# Patient Record
Sex: FEMALE | Race: WHITE | NOT HISPANIC OR LATINO | Employment: OTHER | URBAN - METROPOLITAN AREA
[De-identification: names, ages, dates, MRNs, and addresses within clinical notes are randomized per-mention and may not be internally consistent; named-entity substitution may affect disease eponyms.]

---

## 2019-12-03 ENCOUNTER — APPOINTMENT (EMERGENCY)
Dept: RADIOLOGY | Facility: HOSPITAL | Age: 74
DRG: 552 | End: 2019-12-03
Payer: COMMERCIAL

## 2019-12-03 ENCOUNTER — APPOINTMENT (OUTPATIENT)
Dept: ULTRASOUND IMAGING | Facility: HOSPITAL | Age: 74
DRG: 552 | End: 2019-12-03
Payer: COMMERCIAL

## 2019-12-03 ENCOUNTER — APPOINTMENT (OUTPATIENT)
Dept: MRI IMAGING | Facility: HOSPITAL | Age: 74
DRG: 552 | End: 2019-12-03
Payer: COMMERCIAL

## 2019-12-03 ENCOUNTER — HOSPITAL ENCOUNTER (INPATIENT)
Facility: HOSPITAL | Age: 74
LOS: 2 days | Discharge: NURSING, REHAB OR LONG-TERM CARE | DRG: 552 | End: 2019-12-06
Attending: EMERGENCY MEDICINE | Admitting: INTERNAL MEDICINE
Payer: COMMERCIAL

## 2019-12-03 DIAGNOSIS — Z79.4 CONTROLLED TYPE 2 DIABETES MELLITUS WITHOUT COMPLICATION, WITH LONG-TERM CURRENT USE OF INSULIN (HCC): ICD-10-CM

## 2019-12-03 DIAGNOSIS — M54.30 SCIATICA: ICD-10-CM

## 2019-12-03 DIAGNOSIS — E11.9 CONTROLLED TYPE 2 DIABETES MELLITUS WITHOUT COMPLICATION, WITH LONG-TERM CURRENT USE OF INSULIN (HCC): ICD-10-CM

## 2019-12-03 DIAGNOSIS — M54.50 LOW BACK PAIN: Primary | ICD-10-CM

## 2019-12-03 PROBLEM — R26.2 AMBULATORY DYSFUNCTION: Status: ACTIVE | Noted: 2019-12-03

## 2019-12-03 PROBLEM — I10 BENIGN ESSENTIAL HTN: Status: ACTIVE | Noted: 2019-12-03

## 2019-12-03 PROBLEM — M54.42 ACUTE LEFT-SIDED LOW BACK PAIN WITH LEFT-SIDED SCIATICA: Status: ACTIVE | Noted: 2019-12-03

## 2019-12-03 LAB
ALBUMIN SERPL BCP-MCNC: 3.6 G/DL (ref 3.5–5)
ALP SERPL-CCNC: 100 U/L (ref 46–116)
ALT SERPL W P-5'-P-CCNC: 24 U/L (ref 12–78)
ANION GAP SERPL CALCULATED.3IONS-SCNC: 8 MMOL/L (ref 4–13)
AST SERPL W P-5'-P-CCNC: 8 U/L (ref 5–45)
BASOPHILS # BLD AUTO: 0.1 THOUSANDS/ΜL (ref 0–0.1)
BASOPHILS NFR BLD AUTO: 1 % (ref 0–1)
BILIRUB SERPL-MCNC: 0.29 MG/DL (ref 0.2–1)
BUN SERPL-MCNC: 24 MG/DL (ref 5–25)
CALCIUM SERPL-MCNC: 9.8 MG/DL (ref 8.3–10.1)
CHLORIDE SERPL-SCNC: 105 MMOL/L (ref 100–108)
CO2 SERPL-SCNC: 29 MMOL/L (ref 21–32)
CREAT SERPL-MCNC: 0.83 MG/DL (ref 0.6–1.3)
EOSINOPHIL # BLD AUTO: 0.13 THOUSAND/ΜL (ref 0–0.61)
EOSINOPHIL NFR BLD AUTO: 1 % (ref 0–6)
ERYTHROCYTE [DISTWIDTH] IN BLOOD BY AUTOMATED COUNT: 13.5 % (ref 11.6–15.1)
GFR SERPL CREATININE-BSD FRML MDRD: 69 ML/MIN/1.73SQ M
GLUCOSE SERPL-MCNC: 193 MG/DL (ref 65–140)
GLUCOSE SERPL-MCNC: 74 MG/DL (ref 65–140)
HCT VFR BLD AUTO: 45.5 % (ref 34.8–46.1)
HGB BLD-MCNC: 14.8 G/DL (ref 11.5–15.4)
IMM GRANULOCYTES # BLD AUTO: 0.1 THOUSAND/UL (ref 0–0.2)
IMM GRANULOCYTES NFR BLD AUTO: 1 % (ref 0–2)
LYMPHOCYTES # BLD AUTO: 2.79 THOUSANDS/ΜL (ref 0.6–4.47)
LYMPHOCYTES NFR BLD AUTO: 23 % (ref 14–44)
MCH RBC QN AUTO: 29.5 PG (ref 26.8–34.3)
MCHC RBC AUTO-ENTMCNC: 32.5 G/DL (ref 31.4–37.4)
MCV RBC AUTO: 91 FL (ref 82–98)
MONOCYTES # BLD AUTO: 1.14 THOUSAND/ΜL (ref 0.17–1.22)
MONOCYTES NFR BLD AUTO: 10 % (ref 4–12)
NEUTROPHILS # BLD AUTO: 7.69 THOUSANDS/ΜL (ref 1.85–7.62)
NEUTS SEG NFR BLD AUTO: 64 % (ref 43–75)
NRBC BLD AUTO-RTO: 0 /100 WBCS
PLATELET # BLD AUTO: 290 THOUSANDS/UL (ref 149–390)
PMV BLD AUTO: 9.8 FL (ref 8.9–12.7)
POTASSIUM SERPL-SCNC: 3.7 MMOL/L (ref 3.5–5.3)
PROT SERPL-MCNC: 6.7 G/DL (ref 6.4–8.2)
RBC # BLD AUTO: 5.02 MILLION/UL (ref 3.81–5.12)
SODIUM SERPL-SCNC: 142 MMOL/L (ref 136–145)
WBC # BLD AUTO: 11.95 THOUSAND/UL (ref 4.31–10.16)

## 2019-12-03 PROCEDURE — 72148 MRI LUMBAR SPINE W/O DYE: CPT

## 2019-12-03 PROCEDURE — 82948 REAGENT STRIP/BLOOD GLUCOSE: CPT

## 2019-12-03 PROCEDURE — 36415 COLL VENOUS BLD VENIPUNCTURE: CPT | Performed by: EMERGENCY MEDICINE

## 2019-12-03 PROCEDURE — 99285 EMERGENCY DEPT VISIT HI MDM: CPT

## 2019-12-03 PROCEDURE — 93971 EXTREMITY STUDY: CPT

## 2019-12-03 PROCEDURE — 85025 COMPLETE CBC W/AUTO DIFF WBC: CPT | Performed by: EMERGENCY MEDICINE

## 2019-12-03 PROCEDURE — 72100 X-RAY EXAM L-S SPINE 2/3 VWS: CPT

## 2019-12-03 PROCEDURE — 99223 1ST HOSP IP/OBS HIGH 75: CPT | Performed by: INTERNAL MEDICINE

## 2019-12-03 PROCEDURE — 99284 EMERGENCY DEPT VISIT MOD MDM: CPT | Performed by: PHYSICIAN ASSISTANT

## 2019-12-03 PROCEDURE — 80053 COMPREHEN METABOLIC PANEL: CPT | Performed by: EMERGENCY MEDICINE

## 2019-12-03 RX ORDER — INSULIN GLARGINE 100 [IU]/ML
55 INJECTION, SOLUTION SUBCUTANEOUS
Status: DISCONTINUED | OUTPATIENT
Start: 2019-12-03 | End: 2019-12-03

## 2019-12-03 RX ORDER — LIDOCAINE 50 MG/G
1 PATCH TOPICAL ONCE
Status: COMPLETED | OUTPATIENT
Start: 2019-12-03 | End: 2019-12-04

## 2019-12-03 RX ORDER — METHOCARBAMOL 500 MG/1
500 TABLET, FILM COATED ORAL EVERY 6 HOURS PRN
Status: DISCONTINUED | OUTPATIENT
Start: 2019-12-03 | End: 2019-12-03

## 2019-12-03 RX ORDER — NEBIVOLOL 5 MG/1
5 TABLET ORAL DAILY
COMMUNITY
End: 2020-05-26 | Stop reason: SDUPTHER

## 2019-12-03 RX ORDER — LIDOCAINE 50 MG/G
1 PATCH TOPICAL ONCE
Status: COMPLETED | OUTPATIENT
Start: 2019-12-04 | End: 2019-12-04

## 2019-12-03 RX ORDER — DOCUSATE SODIUM 100 MG/1
100 CAPSULE, LIQUID FILLED ORAL 2 TIMES DAILY
Status: DISCONTINUED | OUTPATIENT
Start: 2019-12-03 | End: 2019-12-06 | Stop reason: HOSPADM

## 2019-12-03 RX ORDER — GLIMEPIRIDE 2 MG/1
2 TABLET ORAL
COMMUNITY
End: 2020-10-13 | Stop reason: SDUPTHER

## 2019-12-03 RX ORDER — ACETAMINOPHEN 325 MG/1
650 TABLET ORAL ONCE
Status: COMPLETED | OUTPATIENT
Start: 2019-12-03 | End: 2019-12-03

## 2019-12-03 RX ORDER — METHOCARBAMOL 750 MG/1
750 TABLET, FILM COATED ORAL EVERY 8 HOURS SCHEDULED
Status: DISCONTINUED | OUTPATIENT
Start: 2019-12-03 | End: 2019-12-06 | Stop reason: HOSPADM

## 2019-12-03 RX ORDER — OXYCODONE HYDROCHLORIDE AND ACETAMINOPHEN 5; 325 MG/1; MG/1
1 TABLET ORAL ONCE
Status: COMPLETED | OUTPATIENT
Start: 2019-12-03 | End: 2019-12-03

## 2019-12-03 RX ORDER — AMLODIPINE BESYLATE 5 MG/1
5 TABLET ORAL DAILY
COMMUNITY
End: 2020-05-26 | Stop reason: SDUPTHER

## 2019-12-03 RX ORDER — INSULIN GLARGINE 100 [IU]/ML
55 INJECTION, SOLUTION SUBCUTANEOUS
Status: ON HOLD | COMMUNITY
End: 2019-12-06 | Stop reason: SDUPTHER

## 2019-12-03 RX ORDER — INSULIN GLARGINE 100 [IU]/ML
40 INJECTION, SOLUTION SUBCUTANEOUS
Status: DISCONTINUED | OUTPATIENT
Start: 2019-12-03 | End: 2019-12-06 | Stop reason: HOSPADM

## 2019-12-03 RX ORDER — AMLODIPINE BESYLATE 5 MG/1
5 TABLET ORAL DAILY
Status: DISCONTINUED | OUTPATIENT
Start: 2019-12-04 | End: 2019-12-06 | Stop reason: HOSPADM

## 2019-12-03 RX ORDER — KETOROLAC TROMETHAMINE 30 MG/ML
15 INJECTION, SOLUTION INTRAMUSCULAR; INTRAVENOUS EVERY 6 HOURS SCHEDULED
Status: DISCONTINUED | OUTPATIENT
Start: 2019-12-03 | End: 2019-12-03

## 2019-12-03 RX ORDER — ONDANSETRON 2 MG/ML
4 INJECTION INTRAMUSCULAR; INTRAVENOUS EVERY 6 HOURS PRN
Status: DISCONTINUED | OUTPATIENT
Start: 2019-12-03 | End: 2019-12-06 | Stop reason: HOSPADM

## 2019-12-03 RX ORDER — FOLIC ACID 1 MG/1
1 TABLET ORAL DAILY
Status: DISCONTINUED | OUTPATIENT
Start: 2019-12-04 | End: 2019-12-06 | Stop reason: HOSPADM

## 2019-12-03 RX ORDER — REPAGLINIDE 1 MG/1
1 TABLET ORAL DAILY
COMMUNITY
End: 2020-05-26 | Stop reason: SDUPTHER

## 2019-12-03 RX ORDER — NEBIVOLOL 5 MG/1
5 TABLET ORAL DAILY
Status: DISCONTINUED | OUTPATIENT
Start: 2019-12-04 | End: 2019-12-06 | Stop reason: HOSPADM

## 2019-12-03 RX ORDER — FOLIC ACID 1 MG/1
TABLET ORAL DAILY
COMMUNITY
End: 2020-05-26 | Stop reason: SDUPTHER

## 2019-12-03 RX ORDER — ACETAMINOPHEN 325 MG/1
975 TABLET ORAL EVERY 8 HOURS SCHEDULED
Status: DISCONTINUED | OUTPATIENT
Start: 2019-12-03 | End: 2019-12-06 | Stop reason: HOSPADM

## 2019-12-03 RX ADMIN — ACETAMINOPHEN 975 MG: 325 TABLET, FILM COATED ORAL at 22:45

## 2019-12-03 RX ADMIN — APREMILAST 1 TABLET: 30 TABLET, FILM COATED ORAL at 22:45

## 2019-12-03 RX ADMIN — DICLOFENAC SODIUM 2 G: 10 GEL TOPICAL at 22:46

## 2019-12-03 RX ADMIN — LIDOCAINE 1 PATCH: 50 PATCH TOPICAL at 13:03

## 2019-12-03 RX ADMIN — INSULIN GLARGINE 40 UNITS: 100 INJECTION, SOLUTION SUBCUTANEOUS at 22:45

## 2019-12-03 RX ADMIN — ACETAMINOPHEN 650 MG: 325 TABLET, FILM COATED ORAL at 13:03

## 2019-12-03 RX ADMIN — OXYCODONE HYDROCHLORIDE AND ACETAMINOPHEN 1 TABLET: 5; 325 TABLET ORAL at 13:03

## 2019-12-03 RX ADMIN — METHOCARBAMOL TABLETS 750 MG: 750 TABLET, COATED ORAL at 22:45

## 2019-12-03 RX ADMIN — DOCUSATE SODIUM 100 MG: 100 CAPSULE, LIQUID FILLED ORAL at 18:10

## 2019-12-03 RX ADMIN — ACETAMINOPHEN 975 MG: 325 TABLET, FILM COATED ORAL at 18:10

## 2019-12-03 RX ADMIN — INSULIN LISPRO 1 UNITS: 100 INJECTION, SOLUTION INTRAVENOUS; SUBCUTANEOUS at 19:04

## 2019-12-03 RX ADMIN — KETOROLAC TROMETHAMINE 15 MG: 30 INJECTION, SOLUTION INTRAMUSCULAR at 18:10

## 2019-12-03 NOTE — ASSESSMENT & PLAN NOTE
· Was seen by [address] [place] [address] outpatient and recommended admission with MRI Lspine  · Xray Lspine- Transitional lumbosacral vertebra is present  There are 4 non rib bearing lumbar vertebrae with unilateral sacralization of the L5 vertebra on the left  There is no evidence of acute fracture or destructive osseous lesion  There is 8 mm of anterolisthesis of L4 on L5   Alignment is otherwise unremarkable  Degenerative disc disease is present at several levels, including in the lower thoracic spine and at L4-L5    · Will order L-spine MRI inpatient  · Pain regimen: add Toradol IV, lidoderm patch, add PRN robaxin 500mg, routine tylenol 975mg TID, add aqua K pad  · Avoid steroids at this time in the setting of diabetes   · PT/OT-- pt unable to ambulate in the ED, concern for needing SNF   · Fall precautions   · F/u outpatient after discharge with Dr Jose Luis Almanzar

## 2019-12-03 NOTE — ASSESSMENT & PLAN NOTE
No results found for: HGBA1C    No results for input(s): POCGLU in the last 72 hours  Blood Sugar Average: Last 72 hrs:    · Check a1c  · Hold OHG: canaglifozin, amaryl and prandin     · Hod exenatide   · C/w lantus  55 units at HS  · Add SSI and accuchecks qac and HS   · CCD

## 2019-12-03 NOTE — ASSESSMENT & PLAN NOTE
· Was seen by [address] [place] [address] outpatient and recommended admission with MRI Lspine - MRI and XR showing no acute surgical intervention, she has anterolisthesis L4 on L5 without cord compression  · Pain regimen: add Toradol IV, lidoderm patch, add PRN robaxin 500mg, routine tylenol 975mg TID, add aqua K pad  · Avoid steroids at this time in the setting of diabetes   · PT/OT-- pt unable to ambulate in the ED, concern for needing SNF   · Fall precautions   · F/u outpatient after discharge with Dr Hamzah Medina

## 2019-12-03 NOTE — ED PROVIDER NOTES
History  Chief Complaint   Patient presents with    Back Pain     pt c/o lower back pain for a few days, nonradiating  denies injury      Pt is a 67 yo female with a PMH of DMII and HTN presents to the [facility] ED with complaints of L lower back pain, that radiates down the back of her left thigh and left lower leg  Pt states her symptoms started on Wednesday 11/27 and have persisted  She describes this pain as a constant aching pain with intermittent episodes of shooting pain into her left buttock and down her left posterior leg with movement  Pt states she has been unable to walk because of the pain she experiences in her L leg when she bears weight on it  She gets around using crutches at home and with the help of her grandson  Pt states she has been using heating pads, ice packs, and Aleve without relief of her symptoms  Pt denies any bowel/bladder incontinence, urinary retention, numbness in her legs, loss of sensation in her legs, fevers, chills, chest pain, and SOB  Pt denies any recent trauma or falls  Pt states she saw her neurologist this morning who stated he could not do anything more for her and sent her to the ED for an MRI and further workup  History provided by:  Patient   used: No    Back Pain   Location:  Lumbar spine  Quality:  Aching, shooting and stabbing  Radiates to:  L posterior upper leg  Pain severity:  Moderate  Pain is:  Same all the time  Onset quality:  Sudden  Duration:  6 days  Timing:  Constant (with intermittent episodes of sharp/shooting pain)  Progression:  Unchanged  Chronicity:  New  Relieved by:  Being still, heating pad and cold packs  Worsened by:   Movement  Ineffective treatments:  Ibuprofen (Aleve )  Associated symptoms: leg pain and weakness    Associated symptoms: no abdominal pain, no bladder incontinence, no bowel incontinence, no chest pain, no dysuria, no fever, no headaches, no numbness, no paresthesias and no tingling        Prior to Admission Medications   Prescriptions Last Dose Informant Patient Reported? Taking? Apremilast (OTEZLA) 30 MG TABS   Yes Yes   Sig: Take by mouth   Canagliflozin (INVOKANA) 300 MG TABS   Yes Yes   Sig: Take 300 mg by mouth daily   Exenatide ER (BYDUREON BCISE) 2 MG/0 85ML AUIJ   Yes Yes   Sig: Inject under the skin   amLODIPine (NORVASC) 5 mg tablet   Yes Yes   Sig: Take 5 mg by mouth daily   folic acid (FOLVITE) 1 mg tablet   Yes Yes   Sig: Take by mouth daily   glimepiride (AMARYL) 2 mg tablet   Yes Yes   Sig: Take 2 mg by mouth every morning before breakfast   insulin glargine (LANTUS) 100 units/mL subcutaneous injection   Yes Yes   Sig: Inject 55 Units under the skin daily at bedtime   nebivolol (BYSTOLIC) 5 mg tablet   Yes Yes   Sig: Take 5 mg by mouth daily   repaglinide (PRANDIN) 1 mg tablet   Yes Yes   Sig: Take 1 mg by mouth daily      Facility-Administered Medications: None       Past Medical History:   Diagnosis Date    [facility] mellitus ([facility] )        Past Surgical History:   Procedure Laterality Date    CHOLECYSTECTOMY         Family History   Problem Relation Age of Onset    Uterine cancer Mother     Emphysema Father     Leukemia Father      I have reviewed and agree with the history as documented  Social History     Tobacco Use    Smoking status: Former Smoker     Packs/day: 1 00     Years: 30 00     Pack years: 30 00     Types: Cigarettes    Smokeless tobacco: Never Used    Tobacco comment: quit 15 years ago    Substance Use Topics    Alcohol use: Never     Frequency: Never    Drug use: Never        Review of Systems   Constitutional: Negative for appetite change, chills, fatigue and fever  HENT: Negative for congestion, ear discharge, ear pain, postnasal drip, rhinorrhea, sinus pressure, sinus pain, sore throat and trouble swallowing  Eyes: Negative for pain, redness and itching     Respiratory: Negative for apnea, cough, choking, chest tightness, shortness of breath, wheezing and stridor  Cardiovascular: Negative for chest pain  Gastrointestinal: Negative for abdominal distention, abdominal pain, bowel incontinence, constipation, diarrhea, nausea and vomiting  Endocrine: Negative for polydipsia, polyphagia and polyuria  Genitourinary: Negative for bladder incontinence, difficulty urinating, dysuria, enuresis, frequency and urgency  Musculoskeletal: Positive for back pain  Negative for arthralgias and neck pain         + left lower back pain that radiates down her posterior left leg    Skin: Negative for color change, pallor, rash and wound  Neurological: Positive for weakness  Negative for dizziness, tingling, syncope, light-headedness, numbness, headaches and paresthesias  Psychiatric/Behavioral: Negative for agitation, behavioral problems, confusion and decreased concentration  The patient is not nervous/anxious  Physical Exam  Physical Exam   Constitutional: She is oriented to person, place, and time  She appears well-developed and well-nourished  No distress  HENT:   Head: Normocephalic and atraumatic  Right Ear: External ear normal    Left Ear: External ear normal    Eyes: Conjunctivae and EOM are normal  Right eye exhibits no discharge  Left eye exhibits no discharge  Neck: Normal range of motion  Neck supple  No JVD present  No tracheal deviation present  Cardiovascular: Normal rate, regular rhythm, normal heart sounds and intact distal pulses  No murmur heard   +2 dorsalis pedis pulses wes   Pulmonary/Chest: Effort normal and breath sounds normal  No stridor  No respiratory distress  She has no wheezes  She has no rales  She exhibits no tenderness  Abdominal: Soft  She exhibits no distension  There is no tenderness  Musculoskeletal: She exhibits no edema or deformity  Tenderness noted in left lumbar region of back  Neurological: She is alert and oriented to person, place, and time  She displays normal reflexes  No sensory deficit   She exhibits normal muscle tone  +2 wes patellar reflexes   +SLR left side   Strength 4/5 in flexion at L knee  Strength 4/5 in flexion of the L hip  Strength other 5/5 in other muscle groups of LLE  Strength 5/5 RLE  Sensation intact bilateral lower extremities    Skin: Skin is warm and dry  Capillary refill takes less than 2 seconds  No rash noted  No erythema  Psychiatric: She has a normal mood and affect   Her behavior is normal  Judgment and thought content normal        Vital Signs  ED Triage Vitals   Temperature Pulse Respirations Blood Pressure SpO2   12/03/19 1253 12/03/19 1154 12/03/19 1154 12/03/19 1154 12/03/19 1154   97 9 °F (36 6 °C) 66 18 (!) 137/102 98 %      Temp Source Heart Rate Source Patient Position - Orthostatic VS BP Location FiO2 (%)   12/03/19 1253 12/03/19 1154 12/03/19 1154 12/03/19 1154 --   Oral Monitor Sitting Right arm       Pain Score       12/03/19 1154       8           Vitals:    12/03/19 1154 12/03/19 1438 12/03/19 1608   BP: (!) 137/102 145/65 150/65   Pulse: 66 55 92   Patient Position - Orthostatic VS: Sitting Lying Lying         Visual Acuity      ED Medications  Medications   lidocaine (LIDODERM) 5 % patch 1 patch (1 patch Topical Medication Applied 12/3/19 1303)   amLODIPine (NORVASC) tablet 5 mg (has no administration in time range)   nebivolol (BYSTOLIC) tablet 5 mg (has no administration in time range)   folic acid (FOLVITE) tablet 1 mg (has no administration in time range)   Apremilast TABS 1 tablet (has no administration in time range)   lidocaine (LIDODERM) 5 % patch 1 patch (has no administration in time range)   acetaminophen (TYLENOL) tablet 975 mg (has no administration in time range)   ketorolac (TORADOL) injection 15 mg (has no administration in time range)   methocarbamol (ROBAXIN) tablet 500 mg (has no administration in time range)   insulin lispro (HumaLOG) 100 units/mL subcutaneous injection 1-6 Units (has no administration in time range)   insulin glargine (LANTUS) subcutaneous injection 40 Units 0 4 mL (has no administration in time range)   enoxaparin (LOVENOX) subcutaneous injection 40 mg (has no administration in time range)   docusate sodium (COLACE) capsule 100 mg (has no administration in time range)   ondansetron (ZOFRAN) injection 4 mg (has no administration in time range)   acetaminophen (TYLENOL) tablet 650 mg (650 mg Oral Given 12/3/19 1303)   oxyCODONE-acetaminophen (PERCOCET) 5-325 mg per tablet 1 tablet (1 tablet Oral Given 12/3/19 1303)       Diagnostic Studies  Results Reviewed     Procedure Component Value Units Date/Time    Platelet count [[id number]]     Lab Status:  No result Specimen:  Blood     Comprehensive metabolic panel [[id number]] Collected:  12/03/19 1450    Lab Status:  Final result Specimen:  Blood from Arm, Right Updated:  12/03/19 8073     Sodium 142 mmol/L      Potassium 3 7 mmol/L      Chloride 105 mmol/L      CO2 29 mmol/L      ANION GAP 8 mmol/L      BUN 24 mg/dL      Creatinine 0 83 mg/dL      Glucose 74 mg/dL      Calcium 9 8 mg/dL      AST 8 U/L      ALT 24 U/L      Alkaline Phosphatase 100 U/L      Total Protein 6 7 g/dL      Albumin 3 6 g/dL      Total Bilirubin 0 29 mg/dL      eGFR 69 ml/min/1 73sq m     Narrative:       [place] guidelines for Chronic Kidney Disease (CKD):     Stage 1 with normal or high GFR (GFR > 90 mL/min/1 73 square meters)    Stage 2 Mild CKD (GFR = 60-89 mL/min/1 73 square meters)    Stage 3A Moderate CKD (GFR = 45-59 mL/min/1 73 square meters)    Stage 3B Moderate CKD (GFR = 30-44 mL/min/1 73 square meters)    Stage 4 Severe CKD (GFR = 15-29 mL/min/1 73 square meters)    Stage 5 End Stage CKD (GFR <15 mL/min/1 73 square meters)  Note: GFR calculation is accurate only with a steady state creatinine    CBC and differential [[id number]]  (Abnormal) Collected:  12/03/19 1450    Lab Status:  Final result Specimen:  Blood from Arm, Right Updated:  12/03/19 4579     WBC 11 95 Thousand/uL      RBC 5 02 Million/uL      Hemoglobin 14 8 g/dL      Hematocrit 45 5 %      MCV 91 fL      MCH 29 5 pg      MCHC 32 5 g/dL      RDW 13 5 %      MPV 9 8 fL      Platelets 516 Thousands/uL      nRBC 0 /100 WBCs      Neutrophils Relative 64 %      Immat GRANS % 1 %      Lymphocytes Relative 23 %      Monocytes Relative 10 %      Eosinophils Relative 1 %      Basophils Relative 1 %      Neutrophils Absolute 7 69 Thousands/µL      Immature Grans Absolute 0 10 Thousand/uL      Lymphocytes Absolute 2 79 Thousands/µL      Monocytes Absolute 1 14 Thousand/µL      Eosinophils Absolute 0 13 Thousand/µL      Basophils Absolute 0 10 Thousands/µL                  XR lumbar spine 2 or 3 views   Final Result by Ion Paulino MD (12/03 1538)      Degenerative changes as described above  Transitional vertebra at the lumbosacral junction  No acute bony abnormality in the lumbar spine  Workstation performed: [id number]         MRI inpatient order    (Results Pending)              Procedures  Procedures         ED Course                               MDM  Number of Diagnoses or Management Options  Low back pain: new and requires workup  Sciatica: new and requires workup     Amount and/or Complexity of Data Reviewed  Clinical lab tests: ordered and reviewed  Tests in the radiology section of CPT®: ordered and reviewed  Tests in the medicine section of CPT®: ordered and reviewed    Risk of Complications, Morbidity, and/or Mortality  Presenting problems: moderate  Diagnostic procedures: moderate  Management options: moderate  General comments:     Pt presented to the ED complaining of left lower back pain that radiated down her posterior left leg  Pt was seen by her neurologist this morning and also spoke with her PCP who recommended she come to the ED for further evaluation and work up  The pt received an xray of her lumbar spine which showed degenerative changes but not acute bony abnormality   Pt also had labwork done here in the ED, including a CBC and CMP  The only abnormality noted was a mildy elevated WBC of 11 95  All her other results were within normal limits  The patient was given pain medicaitons in the ED of percocet and tylenol  She states they did not help her pain  Pt was still unable to ambulate in the ED  Pt was seen by case management who stated the patient will need to be evaluated by physical therapy and most likely be placed in a rehab facility  The pt was admitted to the hospitalist service for ambulatory dysfunction  Disposition  Final diagnoses:   Low back pain   Sciatica     Time reflects when diagnosis was documented in both MDM as applicable and the Disposition within this note     Time User Action Codes Description Comment    12/3/2019  3:50 PM Marbella Molina Add [M54 5] Low back pain     12/3/2019  3:50 PM Marbella Walker [M54 30] Sciatica       ED Disposition     ED Disposition Condition Date/Time Comment    Admit Stable Tue Dec 3, 2019  3:49 PM Case was discussed with AYESHA and the patient's admission status was agreed to be Admission Status: observation status to the service of Dr Alyssia Gross           Follow-up Information    None         Current Discharge Medication List      CONTINUE these medications which have NOT CHANGED    Details   amLODIPine (NORVASC) 5 mg tablet Take 5 mg by mouth daily      Apremilast (OTEZLA) 30 MG TABS Take by mouth      Canagliflozin (INVOKANA) 300 MG TABS Take 300 mg by mouth daily      Exenatide ER (BYDUREON BCISE) 2 MG/0 85ML AUIJ Inject under the skin      folic acid (FOLVITE) 1 mg tablet Take by mouth daily      glimepiride (AMARYL) 2 mg tablet Take 2 mg by mouth every morning before breakfast      insulin glargine (LANTUS) 100 units/mL subcutaneous injection Inject 55 Units under the skin daily at bedtime      nebivolol (BYSTOLIC) 5 mg tablet Take 5 mg by mouth daily      repaglinide (PRANDIN) 1 mg tablet Take 1 mg by mouth daily           No discharge procedures on file      ED Provider  Electronically Signed by           Taryn Tang PA-C  12/03/19 5647

## 2019-12-03 NOTE — H&P
H&P- Yani Jc 1945, 66 y o  female MRN: [id number]    Unit/Bed#: JOSEFINA Encounter: [id number]    Primary Care Provider: Lisette Elmore DO   Date and time admitted to hospital: 12/3/2019 11:52 AM        * Acute left-sided low back pain with left-sided sciatica  Assessment & Plan  · Was seen by [address] [place] [address] outpatient and recommended admission with MRI Lspine  · Xray Lspine- Transitional lumbosacral vertebra is present  There are 4 non rib bearing lumbar vertebrae with unilateral sacralization of the L5 vertebra on the left  There is no evidence of acute fracture or destructive osseous lesion  There is 8 mm of anterolisthesis of L4 on L5   Alignment is otherwise unremarkable  Degenerative disc disease is present at several levels, including in the lower thoracic spine and at L4-L5  · Will order L-spine MRI inpatient  · Pain regimen: add Toradol IV, lidoderm patch, add PRN robaxin 500mg, routine tylenol 975mg TID, add aqua K pad  · Avoid steroids at this time in the setting of diabetes   · PT/OT-- pt unable to ambulate in the ED, concern for needing SNF   · Fall precautions   · F/u outpatient after discharge with Dr Rehana Fermin    Ambulatory dysfunction  Assessment & Plan  · Consult PT/OT  · Fall precautions     Benign essential HTN  Assessment & Plan  · C/w home norvasc and bystolic   · BP elevated on admission likely secondary to pain     Controlled type 2 diabetes mellitus, with long-term current use of insulin ([facility] [address] )  Assessment & Plan  No results found for: HGBA1C    No results for input(s): POCGLU in the last 72 hours  Blood Sugar Average: Last 72 hrs:    · Check a1c  · Hold OHG: canaglifozin, amaryl and prandin  · Hod exenatide   · C/w lantus 40 units at HS  · Add SSI and accuchecks qac and HS   · CCD    VTE Prophylaxis: Enoxaparin (Lovenox)  / sequential compression device   Code Status: DNR level 3  POLST: POLST form is not discussed and not completed at this time        Anticipated Length of Stay: Patient will be admitted on an Observation basis with an anticipated length of stay of  Less than 2 midnights  Justification for Hospital Stay: ambulatory dysfunction and intractable pain     Total Time for Visit, including Counseling / Coordination of Care: 1 hour  Greater than 50% of this total time spent on direct patient counseling and coordination of care  Chief Complaint:   Intractable left low back pain and ambulatory dysfunction     History of Present Illness:    Pricila Manuel is a 66 y o  female history of insulin dependent diabetes, hypertension who presents with left lower back pain  Pt reports that the pain started on Wednesday, it has been constant since then and radiating from the left low back/buttocks into the posterior calf  She describes the pain as a sharp, constant, 10/10 pain  "It's like a elsi horse " The patient trialed Aleve, ibuprofen, ice and heating pad at home with no relief  Patient denies any bowel or bladder incontinence or urine retention  She denies any numbness or tingling in the left leg  She denies any recent trauma or falls  The patient went to see her primary neurosurgeon today that she follows with outpatient Dr Vadim Portillo and requested that he gave her an injection into her left lower back  The Neurosurgery reported that he is unable to give her another injeciton d/t her diabetes and recommended that she come to the emergency room for evaluation with an MRI of the lumbar spine due to the severity of her pain  The patient then reported to the emergency room and they trialed a lidocaine patch and Tylenol 975 mg x1 dose, and 1 dose of Percocet and the patient did not have any relief with this regimen  They then attempted to ambulate her but she was unable to bear weight and walk and was recommended that the patient be admitted and have a formal physical therapy evaluation MRI performed inpatient        Review of Systems:    Review of Systems   Constitutional: Negative for appetite change, chills, fever and unexpected weight change  HENT: Negative for congestion, ear pain, sore throat and trouble swallowing  Eyes: Negative for visual disturbance  Respiratory: Negative for cough, shortness of breath and wheezing  Cardiovascular: Negative for chest pain, palpitations and leg swelling  Gastrointestinal: Negative for abdominal pain, blood in stool, constipation, diarrhea, nausea and vomiting  Genitourinary: Negative for difficulty urinating, dysuria, frequency and hematuria  Musculoskeletal: Negative for back pain, joint swelling and myalgias  Skin: Negative for rash  Neurological: Negative for dizziness, tremors, seizures, syncope, speech difficulty, weakness, light-headedness, numbness and headaches  Psychiatric/Behavioral: Negative for suicidal ideas  The patient is not nervous/anxious  Past Medical and Surgical History:     Past Medical History:   Diagnosis Date    [facility]        Past Surgical History:   Procedure Laterality Date    CHOLECYSTECTOMY         Meds/Allergies:    Prior to Admission medications    Medication Sig Start Date End Date Taking?  Authorizing Provider   amLODIPine (NORVASC) 5 mg tablet Take 5 mg by mouth daily   Yes Historical Provider, MD   Apremilast (OTEZLA) 30 MG TABS Take by mouth   Yes Historical Provider, MD   Canagliflozin (INVOKANA) 300 MG TABS Take 300 mg by mouth daily   Yes Historical Provider, MD   Exenatide ER (BYDUREON BCISE) 2 MG/0 85ML AUIJ Inject under the skin   Yes Historical Provider, MD   folic acid (FOLVITE) 1 mg tablet Take by mouth daily   Yes Historical Provider, MD   glimepiride (AMARYL) 2 mg tablet Take 2 mg by mouth every morning before breakfast   Yes Historical Provider, MD   insulin glargine (LANTUS) 100 units/mL subcutaneous injection Inject 55 Units under the skin daily at bedtime   Yes Historical Provider, MD   nebivolol (BYSTOLIC) 5 mg tablet Take 5 mg by mouth daily Yes Historical Provider, MD   repaglinide (PRANDIN) 1 mg tablet Take 1 mg by mouth daily   Yes Historical Provider, MD     I have reviewed home medications with patient personally  Allergies: No Known Allergies    Social History:     Marital Status:    Occupation: retired   Patient Pre-hospital Living Situation: lives with grandson   Patient Pre-hospital Level of Mobility: was independent now using a walker and a crutch since Wednesday   Patient Pre-hospital Diet Restrictions: diabetic diet   Substance Use History:   Social History     Substance and Sexual Activity   Alcohol Use Never    Frequency: Never     Social History     Tobacco Use   Smoking Status Former Smoker    Packs/day: 1 00    Years: 30 00    Pack years: 30 00    Types: Cigarettes   Smokeless Tobacco Never Used   Tobacco Comment    quit 15 years ago      Social History     Substance and Sexual Activity   Drug Use Never       Family History:    non-contributory    Physical Exam:     Vitals:   Blood Pressure: 150/65 (12/03/19 1608)  Pulse: 92 (12/03/19 1608)  Temperature: 97 9 °F (36 6 °C) (12/03/19 1253)  Temp Source: Oral (12/03/19 1253)  Respirations: 18 (12/03/19 1608)  Weight - Scale: 93 5 kg (206 lb 2 1 oz) (12/03/19 1154)  SpO2: 97 % (12/03/19 1608)    Physical Exam   Constitutional: She is oriented to person, place, and time  She appears well-developed  She is cooperative  No distress  HENT:   Head: Normocephalic and atraumatic  Mouth/Throat: Oropharynx is clear and moist  No oropharyngeal exudate  Edentulous    Eyes: Pupils are equal, round, and reactive to light  Conjunctivae and EOM are normal  Right eye exhibits no discharge  No scleral icterus  Neck: Neck supple  No JVD present  Carotid bruit is not present  Cardiovascular: Normal rate, regular rhythm, S1 normal, S2 normal, normal heart sounds and intact distal pulses  Exam reveals no gallop and no friction rub  No murmur heard    Pulmonary/Chest: Effort normal and breath sounds normal  No respiratory distress  She has no wheezes  She has no rhonchi  She has no rales  Abdominal: Soft  Bowel sounds are normal  She exhibits no distension  There is no tenderness  There is no guarding  Musculoskeletal: She exhibits tenderness (left low back tenderness to palpation )  She exhibits no edema  Neurological: She is alert and oriented to person, place, and time  No cranial nerve deficit  Skin: Skin is warm and dry  No rash noted  She is not diaphoretic  No erythema  Psychiatric: She has a normal mood and affect  Her behavior is normal  Her mood appears not anxious  She does not exhibit a depressed mood  Additional Data:     Lab Results: I have personally reviewed pertinent reports  Results from last 7 days   Lab Units 12/03/19  1450   WBC Thousand/uL 11 95*   HEMOGLOBIN g/dL 14 8   HEMATOCRIT % 45 5   PLATELETS Thousands/uL 290   NEUTROS PCT % 64   LYMPHS PCT % 23   MONOS PCT % 10   EOS PCT % 1     Results from last 7 days   Lab Units 12/03/19  1450   SODIUM mmol/L 142   POTASSIUM mmol/L 3 7   CHLORIDE mmol/L 105   CO2 mmol/L 29   BUN mg/dL 24   CREATININE mg/dL 0 83   ANION GAP mmol/L 8   CALCIUM mg/dL 9 8   ALBUMIN g/dL 3 6   TOTAL BILIRUBIN mg/dL 0 29   ALK PHOS U/L 100   ALT U/L 24   AST U/L 8   GLUCOSE RANDOM mg/dL 74                       Imaging: I have personally reviewed pertinent reports  XR lumbar spine 2 or 3 views   Final Result by Tiff Niño MD (12/03 1538)      Degenerative changes as described above  Transitional vertebra at the lumbosacral junction  No acute bony abnormality in the lumbar spine  Workstation performed: [id number]             EKG, Pathology, and Other Studies Reviewed on Admission:   · EKG: n/a    Allscripts / Epic Records Reviewed: not available     ** Please Note: This note has been constructed using a voice recognition system   **

## 2019-12-03 NOTE — ASSESSMENT & PLAN NOTE
No results found for: HGBA1C    No results for input(s): POCGLU in the last 72 hours  Blood Sugar Average: Last 72 hrs:    · Check a1c  · Hold OHG: canaglifozin, amaryl and prandin     · Hod exenatide   · C/w lantus 40 units at HS  · Add SSI and accuchecks qac and HS   · CCD

## 2019-12-03 NOTE — SOCIAL WORK
ÓSCAR contacted Bertha Perez, Physical Therapist to let her know that patient is priority for eval  Bertha Perez stated that CM should email info, ÓSCAR emailed patient information  Bertha Perez mentioned that she might be able to see patient tonight if not then patient will be priority tomorrow  Patient will then need rehab choices if needing rehab and auth with insurance  CM department will continue to follow

## 2019-12-03 NOTE — ED ATTENDING ATTESTATION
12/3/2019  Jan Araya Marylin DO, saw and evaluated the patient  I have discussed the patient with the resident/non-physician practitioner and agree with the resident's/non-physician practitioner's findings, Plan of Care, and MDM as documented in the resident's/non-physician practitioner's note, except where noted  All available labs and Radiology studies were reviewed  I was present for key portions of any procedure(s) performed by the resident/non-physician practitioner and I was immediately available to provide assistance  At this point I agree with the current assessment done in the Emergency Department  I have conducted an independent evaluation of this patient a history and physical is as follows:  Patient is alert awake and oriented in no acute distress  Reports midline low back pain to palpation as well as left-sided paraspinal spasm noted  Positive straight leg raising on the left  Neurovascularly intact distally  No saddle anesthesia  ED Course     Lumbar spine x-ray shows degenerative disease but no acute findings  Patient is upset because she states she was sent here by her PCP or neurologist for an MRI  I explained there is no indication for emergent MRI at this time  Will treat her pain and recommend she follow up with [facility] as an outpatient  3:53 PM  Patient reports pain is too severe to ambulate  She is elderly and lives alone  Will need admission for PT evaluation and possible placement in short term rehab   D/w [facility] who accepts on Dr Socrates Rao Time  Procedures

## 2019-12-03 NOTE — ED NOTES
Spoke to Francisco Horn from case management, states she will come down to the ED and evaluate patient        Jean Jain RN  12/03/19 8874

## 2019-12-03 NOTE — PLAN OF CARE
Problem: Potential for Falls  Goal: Patient will remain free of falls  Description  INTERVENTIONS:  - Assess patient frequently for physical needs  -  Identify cognitive and physical deficits and behaviors that affect risk of falls    -  [place] fall precautions as indicated by assessment   - Educate patient/family on patient safety including physical limitations  - Instruct patient to call for assistance with activity based on assessment  - Modify environment to reduce risk of injury  - Consider OT/PT consult to assist with strengthening/mobility  Outcome: Progressing     Problem: PAIN - ADULT  Goal: Verbalizes/displays adequate comfort level or baseline comfort level  Description  Interventions:  - Encourage patient to monitor pain and request assistance  - Assess pain using appropriate pain scale  - Administer analgesics based on type and severity of pain and evaluate response  - Implement non-pharmacological measures as appropriate and evaluate response  - Consider cultural and social influences on pain and pain management  - Notify physician/advanced practitioner if interventions unsuccessful or patient reports new pain  Outcome: Progressing     Problem: SAFETY ADULT  Goal: Maintain or return to baseline ADL function  Description  INTERVENTIONS:  -  Assess patient's ability to carry out ADLs; assess patient's baseline for ADL function and identify physical deficits which impact ability to perform ADLs (bathing, care of mouth/teeth, toileting, grooming, dressing, etc )  - Assess/evaluate cause of self-care deficits   - Assess range of motion  - Assess patient's mobility; develop plan if impaired  - Assess patient's need for assistive devices and provide as appropriate  - Encourage maximum independence but intervene and supervise when necessary  - Involve family in performance of ADLs  - Assess for home care needs following discharge   - Consider OT consult to assist with ADL evaluation and planning for discharge  - Provide patient education as appropriate  Outcome: Progressing  Goal: Maintain or return mobility status to optimal level  Description  INTERVENTIONS:  - Assess patient's baseline mobility status (ambulation, transfers, stairs, etc )    - Identify cognitive and physical deficits and behaviors that affect mobility  - Identify mobility aids required to assist with transfers and/or ambulation (gait belt, sit-to-stand, lift, walker, cane, etc )  - [place] fall precautions as indicated by assessment  - Record patient progress and toleration of activity level on Mobility SBAR; progress patient to next Phase/Stage  - Instruct patient to call for assistance with activity based on assessment  - Consider rehabilitation consult to assist with strengthening/weightbearing, etc   Outcome: Progressing     Problem: DISCHARGE PLANNING  Goal: Discharge to home or other facility with appropriate resources  Description  INTERVENTIONS:  - Identify barriers to discharge w/patient and caregiver  - Arrange for needed discharge resources and transportation as appropriate  - Identify discharge learning needs (meds, wound care, etc )  - Arrange for interpretive services to assist at discharge as needed  - Refer to Case Management Department for coordinating discharge planning if the patient needs post-hospital services based on physician/advanced practitioner order or complex needs related to functional status, cognitive ability, or social support system  Outcome: Progressing     Problem: Knowledge Deficit  Goal: Patient/family/caregiver demonstrates understanding of disease process, treatment plan, medications, and discharge instructions  Description  Complete learning assessment and assess knowledge base    Interventions:  - Provide teaching at level of understanding  - Provide teaching via preferred learning methods  Outcome: Progressing

## 2019-12-04 LAB
ANION GAP SERPL CALCULATED.3IONS-SCNC: 8 MMOL/L (ref 4–13)
BUN SERPL-MCNC: 29 MG/DL (ref 5–25)
CALCIUM SERPL-MCNC: 9.7 MG/DL (ref 8.3–10.1)
CHLORIDE SERPL-SCNC: 103 MMOL/L (ref 100–108)
CO2 SERPL-SCNC: 31 MMOL/L (ref 21–32)
CREAT SERPL-MCNC: 0.92 MG/DL (ref 0.6–1.3)
EST. AVERAGE GLUCOSE BLD GHB EST-MCNC: 157 MG/DL
GFR SERPL CREATININE-BSD FRML MDRD: 61 ML/MIN/1.73SQ M
GLUCOSE SERPL-MCNC: 128 MG/DL (ref 65–140)
GLUCOSE SERPL-MCNC: 144 MG/DL (ref 65–140)
GLUCOSE SERPL-MCNC: 150 MG/DL (ref 65–140)
GLUCOSE SERPL-MCNC: 176 MG/DL (ref 65–140)
GLUCOSE SERPL-MCNC: 68 MG/DL (ref 65–140)
GLUCOSE SERPL-MCNC: 98 MG/DL (ref 65–140)
HBA1C MFR BLD: 7.1 % (ref 4.2–6.3)
POTASSIUM SERPL-SCNC: 4.4 MMOL/L (ref 3.5–5.3)
SODIUM SERPL-SCNC: 142 MMOL/L (ref 136–145)

## 2019-12-04 PROCEDURE — G8988 SELF CARE GOAL STATUS: HCPCS

## 2019-12-04 PROCEDURE — 82948 REAGENT STRIP/BLOOD GLUCOSE: CPT

## 2019-12-04 PROCEDURE — 99232 SBSQ HOSP IP/OBS MODERATE 35: CPT | Performed by: PHYSICIAN ASSISTANT

## 2019-12-04 PROCEDURE — 83036 HEMOGLOBIN GLYCOSYLATED A1C: CPT | Performed by: NURSE PRACTITIONER

## 2019-12-04 PROCEDURE — 97116 GAIT TRAINING THERAPY: CPT

## 2019-12-04 PROCEDURE — G8978 MOBILITY CURRENT STATUS: HCPCS

## 2019-12-04 PROCEDURE — 97163 PT EVAL HIGH COMPLEX 45 MIN: CPT

## 2019-12-04 PROCEDURE — G8979 MOBILITY GOAL STATUS: HCPCS

## 2019-12-04 PROCEDURE — G8987 SELF CARE CURRENT STATUS: HCPCS

## 2019-12-04 PROCEDURE — 97166 OT EVAL MOD COMPLEX 45 MIN: CPT

## 2019-12-04 PROCEDURE — 93971 EXTREMITY STUDY: CPT | Performed by: SURGERY

## 2019-12-04 PROCEDURE — 80048 BASIC METABOLIC PNL TOTAL CA: CPT | Performed by: NURSE PRACTITIONER

## 2019-12-04 RX ORDER — HYDROMORPHONE HCL/PF 1 MG/ML
0.5 SYRINGE (ML) INJECTION EVERY 4 HOURS PRN
Status: DISCONTINUED | OUTPATIENT
Start: 2019-12-04 | End: 2019-12-06

## 2019-12-04 RX ORDER — OXYCODONE HYDROCHLORIDE 10 MG/1
10 TABLET ORAL EVERY 4 HOURS PRN
Status: DISCONTINUED | OUTPATIENT
Start: 2019-12-04 | End: 2019-12-06 | Stop reason: HOSPADM

## 2019-12-04 RX ORDER — OXYCODONE HYDROCHLORIDE 5 MG/1
5 TABLET ORAL EVERY 4 HOURS PRN
Status: DISCONTINUED | OUTPATIENT
Start: 2019-12-04 | End: 2019-12-06 | Stop reason: HOSPADM

## 2019-12-04 RX ADMIN — OXYCODONE HYDROCHLORIDE 10 MG: 10 TABLET ORAL at 02:26

## 2019-12-04 RX ADMIN — DICLOFENAC SODIUM 2 G: 10 GEL TOPICAL at 21:46

## 2019-12-04 RX ADMIN — NEBIVOLOL HYDROCHLORIDE 5 MG: 5 TABLET ORAL at 08:16

## 2019-12-04 RX ADMIN — AMLODIPINE BESYLATE 5 MG: 5 TABLET ORAL at 08:16

## 2019-12-04 RX ADMIN — DICLOFENAC SODIUM 2 G: 10 GEL TOPICAL at 08:16

## 2019-12-04 RX ADMIN — ACETAMINOPHEN 975 MG: 325 TABLET, FILM COATED ORAL at 05:24

## 2019-12-04 RX ADMIN — INSULIN GLARGINE 40 UNITS: 100 INJECTION, SOLUTION SUBCUTANEOUS at 21:47

## 2019-12-04 RX ADMIN — OXYCODONE HYDROCHLORIDE 10 MG: 10 TABLET ORAL at 20:51

## 2019-12-04 RX ADMIN — DICLOFENAC SODIUM 2 G: 10 GEL TOPICAL at 12:08

## 2019-12-04 RX ADMIN — METHOCARBAMOL TABLETS 750 MG: 750 TABLET, COATED ORAL at 21:46

## 2019-12-04 RX ADMIN — LIDOCAINE 1 PATCH: 50 PATCH TOPICAL at 05:25

## 2019-12-04 RX ADMIN — DOCUSATE SODIUM 100 MG: 100 CAPSULE, LIQUID FILLED ORAL at 17:53

## 2019-12-04 RX ADMIN — METHOCARBAMOL TABLETS 750 MG: 750 TABLET, COATED ORAL at 05:24

## 2019-12-04 RX ADMIN — OXYCODONE HYDROCHLORIDE 10 MG: 10 TABLET ORAL at 07:33

## 2019-12-04 RX ADMIN — METHOCARBAMOL TABLETS 750 MG: 750 TABLET, COATED ORAL at 14:15

## 2019-12-04 RX ADMIN — APREMILAST 1 TABLET: 30 TABLET, FILM COATED ORAL at 21:46

## 2019-12-04 RX ADMIN — DICLOFENAC SODIUM 2 G: 10 GEL TOPICAL at 17:53

## 2019-12-04 RX ADMIN — ACETAMINOPHEN 975 MG: 325 TABLET, FILM COATED ORAL at 14:15

## 2019-12-04 RX ADMIN — DOCUSATE SODIUM 100 MG: 100 CAPSULE, LIQUID FILLED ORAL at 08:16

## 2019-12-04 RX ADMIN — INSULIN LISPRO 1 UNITS: 100 INJECTION, SOLUTION INTRAVENOUS; SUBCUTANEOUS at 17:53

## 2019-12-04 RX ADMIN — FOLIC ACID 1 MG: 1 TABLET ORAL at 08:16

## 2019-12-04 RX ADMIN — ENOXAPARIN SODIUM 40 MG: 40 INJECTION SUBCUTANEOUS at 08:16

## 2019-12-04 NOTE — PLAN OF CARE
Problem: OCCUPATIONAL THERAPY ADULT  Goal: Performs self-care activities at highest level of function for planned discharge setting  See evaluation for individualized goals  Description  Treatment Interventions: ADL retraining, Functional transfer training, Patient/family training, Equipment evaluation/education, Compensatory technique education, Energy conservation, Activityengagement  Equipment Recommended: (continued use of tub bench)       See flowsheet documentation for full assessment, interventions and recommendations  Note:   Limitation: Decreased ADL status, Decreased self-care trans, Decreased high-level ADLs     Assessment: Pt is a 72yo female [facility] 12/3/2019  Pt presents w/ acute left sided low back pain and significant PMH Imapcting her occupational performance including DM, HTN  Per [facility], MRI demonstrates no acute surgical intervention (anterolisthesis L4,L5 w/ out cord compression)  Pt reports living alone in [address] [facility] w/ full flight to enter from garage  Pt reports I w/ ADL/ IADL at baseline w/ out use of AD  Upon eval, pt alert, oriented; able to participate in conversation and provide social history  Pt completed LBD w/ min A  Pt completed UBD w/ mod I after set- up  Pt able to complete grooming w/ mod I after set- up while seated  Pt completed sit <> stand w/ S and engaged in functional mobility w/ min A (CG/steadying) using RW  Pt presents w/ increased pain, decreased activity tolerance, decreased standing balance, decreased L LE ROM / strength, limited recall precautions, tech during functional transfers impacting her I w/ dressing, bathing, functional mobility, functional transfers, activity engagement, clothing mgmt, oral hygiene, food prep / clean up  Pt would benefit from OT while in acute care to address deficits   From an OT perspective, pt would benefit from post acute rehab when medically stable for discharge from acute care pending progress w/ functional mobility and stairs vs home   Will continue to follow     OT Discharge Recommendation: (post acute rehab vs home pend progress (pain, steps))  OT - OK to Discharge: (when medically stable pend progress)

## 2019-12-04 NOTE — PLAN OF CARE
Problem: PHYSICAL THERAPY ADULT  Goal: Performs mobility at highest level of function for planned discharge setting  See evaluation for individualized goals  Description  Treatment/Interventions: Functional transfer training, LE strengthening/ROM, Elevations, Therapeutic exercise, Endurance training, Cognitive reorientation, Patient/family training, Equipment eval/education, Gait training  Equipment Recommended: Other (Comment)(roller walker)       See flowsheet documentation for full assessment, interventions and recommendations  12/4/2019 1327 by Angélica Marcos PT  Outcome: Progressing  Note:   Prognosis: Fair  Problem List: Decreased strength, Decreased range of motion, Decreased endurance, Impaired balance, Decreased mobility, Decreased safety awareness, Pain  Assessment: Therapist provided education to pt for mobility technique including transfers and roller walker use  Education was provided due to findings from evaluation  Occasional repetition was needed for carryover to be noted  Pt was found to have improvement after education w/ decreased level of assist to maintain safety and increased ambulation tolerance  Pt continues to be a fall risk  continued inpatient PT tx is indicated to reduce fall risk factors and progress mobility training as appropriate  Recommendation: Short-term skilled PT          See flowsheet documentation for full assessment  12/4/2019 1321 by Angélica Marcos PT  Outcome: Progressing  Note:   Prognosis: Fair  Problem List: Decreased strength, Decreased range of motion, Decreased endurance, Impaired balance, Decreased mobility, Decreased safety awareness, Pain  Assessment: Pt presents with left lower back pain  Dx: acute left intractable low back pain w/ sciatica and ambulatory dysfunction  order placed for PT eval and tx, w/ activity order of up w/ A and fall precautions   pt presents w/ comorbidities of DM and HTN and personal factors of advanced age, mobilizing w/ assistive device, stair(s) to enter home, limited home support and positive fall history  pt presents w/ pain, weakness, decreased ROM, decreased endurance, impaired balance, gait deviations, decreased safety awareness and fall risk  these impairments are evident in findings from physical examination (weakness and decreased ROM), mobility assessment (need for standby to mod assist w/ all phases of mobility when usually mobilizing independently, tolerance to only 15 feet of ambulation and need for cueing for mobility technique), and Barthel Index: 55/100  pt needed input for task focus and mobility technique/safety  pt is at risk for falls due to physical and safety awareness deficits  pt's clinical presentation is unstable/unpredictable (evident in need for assist w/ all phases of mobility when usually mobilizing independently, tolerance to only 15 feet of ambulation, pain impacting overall mobility status and need for input for task focus and mobility technique/safety)  pt needs inpatient PT tx to improve mobility deficits  discharge recommendation is for inpatient rehab to reduce fall risk and maximize level of functional independence  Recommendation: Short-term skilled PT          See flowsheet documentation for full assessment

## 2019-12-04 NOTE — UTILIZATION REVIEW
Initial Clinical Review    Admission: Date/Time/Statement: OBSERVATION 12/3/19 @1549 CONVERTED TO INPATIENT 12/4/19 @1233 DUE TO >2MN REQUIRED TO CARE FOR ELDERLY PATIENT WITH INTRACTABLE BACK PAIN WHO IS UNABLE TO AMBULATE WITHOUT ASSISTANCE, NEEDS PT/OT TREATMENT AND IMPROVED PAIN CONTROL  Inpatient Admission Orders (From admission, onward)     Ordered        12/04/19 1233  Inpatient Admission  Once                   Orders Placed This Encounter   Procedures    Inpatient Admission     Standing Status:   Standing     Number of Occurrences:   1     Order Specific Question:   Admitting Physician     Answer:   Jarad Mancini [[id number]]     Order Specific Question:   Level of Care     Answer:   Med Surg [[id number]]     Order Specific Question:   Estimated length of stay     Answer:   More than 2 Midnights     Order Specific Question:   Certification     Answer:   I certify that inpatient services are medically necessary for this patient for a duration of greater than two midnights  See H&P and MD Progress Notes for additional information about the patient's course of treatment  ED Arrival Information     Expected Arrival Acuity Means of Arrival Escorted By Service Admission Type    - 12/3/2019 11:44 Less Urgent Walk-In Family Member Hospitalist Urgent    Arrival Complaint    back pain        Chief Complaint   Patient presents with    Back Pain     pt c/o lower back pain for a few days, nonradiating  denies injury      Assessment/Plan: 67 yo female to ED from home admitted under observation due to intractable lower back pain, L sciatica with ambulatory dysfunction  Presented with constant sharp 10/10 L lower back pain radiating from the back/buttocks into the posterior calf  Unrelieved by NSAIDS, ice, and heat  Was seen by neurosurgery who sent her to the ED for MRI due to severe pain  They could not provide an injection due to diabetes  In ED, lidocaine patch with tylenol and percocet did not relieve the pain  Ambulation attempt was unsuccessful as she couldn't bear weight  PT/OT was consulted and MRI was ordered  Analgesic regimen and aqua K in progress  Concern exists for needing a SNF due to inability to walk  12/3 @1953 per med attending: calf pain seems unrelated to back pain  Doppler ordered to r/o DVT  Holding SCD's until this is ruled out  DC toradol, change robaxin to q8h, and continue tylenol, lidocaine patch, and voltaren gel  12/4: converted to inpatient admission  Per med: was unable to sleep 2nd to pain  Using rolling walker with difficulty, continues with L hip/L calf pain  needs to work with PT/OT, has insufficient pain control, and is not able to be dc to home  PT evaluated and notes that patient is a fall risk with decreased strength/rom/endurance/impaired balance/decreased mobility/decreased safety awareness, and pain  Recommending short term skilled PT and roller walker  Was able to use rolling walker with assist, required seated rest breaks for 1-2 minutes each in between ambulation attempts  Able to walk 25 feet, 20 feet, then 5 feet       ED Triage Vitals   Temperature Pulse Respirations Blood Pressure SpO2   12/03/19 1253 12/03/19 1154 12/03/19 1154 12/03/19 1154 12/03/19 1154   97 9 °F (36 6 °C) 66 18 (!) 137/102 98 %      Temp Source Heart Rate Source Patient Position - Orthostatic VS BP Location FiO2 (%)   12/03/19 1253 12/03/19 1154 12/03/19 1154 12/03/19 1154 --   Oral Monitor Sitting Right arm       Pain Score       12/03/19 1154       8        Wt Readings from Last 1 Encounters:   12/03/19 93 5 kg (206 lb 2 1 oz)     Additional Vital Signs:   Date/Time  Temp  Pulse  Resp  BP  MAP (mmHg)  SpO2  O2 Device  Patient Position - Orthostatic VS   12/04/19 0739  97 9 °F (36 6 °C)  56  18  182/72Abnormal   104  98 %  None (Room air)  Sitting   12/03/19 2300  98 °F (36 7 °C)  60  20  141/98  --  96 %  None (Room air)  Sitting   12/03/19 1700  98 5 °F (36 9 °C)  58  18  147/67  --  95 %  None (Room air)  Sitting   12/03/19 1608  --  92  18  150/65  --  97 %  None (Room air)  Lying   12/03/19 1438  --  55  16  145/65  --  98 %  None (Room air)  Lying       Pertinent Labs/Diagnostic Test Results:   No EKG  12/3 L spine:Degenerative changes Transitional vertebra at the lumbosacral junction  No acute bony abnormality in the lumbar spine  12/3 BLE doppler: pending  12/3 MRI L spine: 1  Scattered spondylotic changes most pronounced at L4-5 where there is a grade 1 anterolisthesis  2  There is a transitional vertebral body at the lumbosacral junction          Results from last 7 days   Lab Units 12/03/19  1450   WBC Thousand/uL 11 95*   HEMOGLOBIN g/dL 14 8   HEMATOCRIT % 45 5   PLATELETS Thousands/uL 290   NEUTROS ABS Thousands/µL 7 69*         Results from last 7 days   Lab Units 12/04/19  0432 12/03/19  1450   SODIUM mmol/L 142 142   POTASSIUM mmol/L 4 4 3 7   CHLORIDE mmol/L 103 105   CO2 mmol/L 31 29   ANION GAP mmol/L 8 8   BUN mg/dL 29* 24   CREATININE mg/dL 0 92 0 83   EGFR ml/min/1 73sq m 61 69   CALCIUM mg/dL 9 7 9 8     Results from last 7 days   Lab Units 12/03/19  1450   AST U/L 8   ALT U/L 24   ALK PHOS U/L 100   TOTAL PROTEIN g/dL 6 7   ALBUMIN g/dL 3 6   TOTAL BILIRUBIN mg/dL 0 29     Results from last 7 days   Lab Units 12/04/19  0738 12/03/19  2125 12/03/19  1736   POC GLUCOSE mg/dl 98 193* 150*     Results from last 7 days   Lab Units 12/04/19  0432 12/03/19  1450   GLUCOSE RANDOM mg/dL 68 74         Results from last 7 days   Lab Units 12/04/19  0432   HEMOGLOBIN A1C % 7 1*   EAG mg/dl 157   ED Treatment:   Medication Administration from 12/03/2019 1144 to 12/03/2019 1718       Date/Time Order Dose Route Action     12/03/2019 1303 acetaminophen (TYLENOL) tablet 650 mg 650 mg Oral Given     12/03/2019 1303 lidocaine (LIDODERM) 5 % patch 1 patch 1 patch Topical Medication Applied     12/03/2019 1303 oxyCODONE-acetaminophen (PERCOCET) 5-325 mg per tablet 1 tablet 1 tablet Oral Given Past Medical History:   Diagnosis Date    Diabetes mellitus ([facility] [address] )        Admitting Diagnosis: Sciatica [M54 30]  Low back pain [M54 5]  Back pain [M54 9]  Age/Sex: 66 y o  female  Admission Orders:  Scheduled Medications:    Medications:  acetaminophen 975 mg Oral Q8H [address]   amLODIPine 5 mg Oral Daily   Apremilast 30 mg Oral Daily   diclofenac sodium 2 g Topical 4x Daily   docusate sodium 100 mg Oral BID   enoxaparin 40 mg Subcutaneous Daily   folic acid 1 mg Oral Daily   insulin glargine 40 Units Subcutaneous HS   insulin lispro 1-6 Units Subcutaneous TID AC   lidocaine 1 patch Topical Once applied 12/3, reapplied 12/4   methocarbamol 750 mg Oral Q8H [address]   nebivolol 5 mg Oral Daily     ketorolac (TORADOL) injection 15 mg   Dose: 15 mg  Freq: Every 6 hours scheduled Route: IV  Start: 12/03/19 1800 End: 12/03/19 1848     PRN Meds:    HYDROmorphone 0 5 mg Intravenous Q4H PRN   ondansetron 4 mg Intravenous Q6H PRN   oxyCODONE 10 mg Oral Q4H PRN x 2 on 12/4   oxyCODONE 5 mg Oral Q4H PRN       SCD's      Network Utilization Review Department  [email or web address]  ATTENTION: Please call with any questions or concerns to [phone] and carefully listen to the prompts so that you are directed to the right person  All voicemails are confidential   Rashid Alfonso all requests for admission clinical reviews, approved or denied determinations and any other requests to dedicated fax number below belonging to the campus where the patient is receiving treatment   List of dedicated fax numbers for the Facilities:  FACILITY NAME UR FAX NUMBER   ADMISSION DENIALS (Administrative/Medical Necessity) [phone]   [address]  (Maternity/NICU/Pediatrics) [phone]   Cameron Barrera [phone]   Joana Navarro [phone]   [facility] [employer] [address] [place] [address] [facility] [phone]   [address] [phone]   [address] [place] [address] [phone]

## 2019-12-04 NOTE — PHYSICAL THERAPY NOTE
Physical Therapy Cancellation Note      Referral received for PT eval and tx  Chart check performed  Pt is pending results of L LE dopper study to rule out DVT  Will await results and perform eval as appropriate  Notified Susan CANTRELL and Lor KEARNEY of cancellation      Gerlean Hemp, PT

## 2019-12-04 NOTE — PLAN OF CARE
Problem: Potential for Falls  Goal: Patient will remain free of falls  Description  INTERVENTIONS:  - Assess patient frequently for physical needs  -  Identify cognitive and physical deficits and behaviors that affect risk of falls    -  [place] fall precautions as indicated by assessment   - Educate patient/family on patient safety including physical limitations  - Instruct patient to call for assistance with activity based on assessment  - Modify environment to reduce risk of injury  - Consider OT/PT consult to assist with strengthening/mobility  Outcome: Progressing     Problem: PAIN - ADULT  Goal: Verbalizes/displays adequate comfort level or baseline comfort level  Description  Interventions:  - Encourage patient to monitor pain and request assistance  - Assess pain using appropriate pain scale  - Administer analgesics based on type and severity of pain and evaluate response  - Implement non-pharmacological measures as appropriate and evaluate response  - Consider cultural and social influences on pain and pain management  - Notify physician/advanced practitioner if interventions unsuccessful or patient reports new pain  Outcome: Progressing     Problem: SAFETY ADULT  Goal: Maintain or return to baseline ADL function  Description  INTERVENTIONS:  -  Assess patient's ability to carry out ADLs; assess patient's baseline for ADL function and identify physical deficits which impact ability to perform ADLs (bathing, care of mouth/teeth, toileting, grooming, dressing, etc )  - Assess/evaluate cause of self-care deficits   - Assess range of motion  - Assess patient's mobility; develop plan if impaired  - Assess patient's need for assistive devices and provide as appropriate  - Encourage maximum independence but intervene and supervise when necessary  - Involve family in performance of ADLs  - Assess for home care needs following discharge   - Consider OT consult to assist with ADL evaluation and planning for discharge  - Provide patient education as appropriate  Outcome: Progressing  Goal: Maintain or return mobility status to optimal level  Description  INTERVENTIONS:  - Assess patient's baseline mobility status (ambulation, transfers, stairs, etc )    - Identify cognitive and physical deficits and behaviors that affect mobility  - Identify mobility aids required to assist with transfers and/or ambulation (gait belt, sit-to-stand, lift, walker, cane, etc )  - [place] fall precautions as indicated by assessment  - Record patient progress and toleration of activity level on Mobility SBAR; progress patient to next Phase/Stage  - Instruct patient to call for assistance with activity based on assessment  - Consider rehabilitation consult to assist with strengthening/weightbearing, etc   Outcome: Progressing     Problem: DISCHARGE PLANNING  Goal: Discharge to home or other facility with appropriate resources  Description  INTERVENTIONS:  - Identify barriers to discharge w/patient and caregiver  - Arrange for needed discharge resources and transportation as appropriate  - Identify discharge learning needs (meds, wound care, etc )  - Arrange for interpretive services to assist at discharge as needed  - Refer to Case Management Department for coordinating discharge planning if the patient needs post-hospital services based on physician/advanced practitioner order or complex needs related to functional status, cognitive ability, or social support system  Outcome: Progressing     Problem: Knowledge Deficit  Goal: Patient/family/caregiver demonstrates understanding of disease process, treatment plan, medications, and discharge instructions  Description  Complete learning assessment and assess knowledge base    Interventions:  - Provide teaching at level of understanding  - Provide teaching via preferred learning methods  Outcome: Progressing

## 2019-12-04 NOTE — PLAN OF CARE
Problem: PHYSICAL THERAPY ADULT  Goal: Performs mobility at highest level of function for planned discharge setting  See evaluation for individualized goals  Description  Treatment/Interventions: Functional transfer training, LE strengthening/ROM, Elevations, Therapeutic exercise, Endurance training, Cognitive reorientation, Patient/family training, Equipment eval/education, Gait training  Equipment Recommended: Other (Comment)(roller walker)       See flowsheet documentation for full assessment, interventions and recommendations  Outcome: Progressing  Note:   Prognosis: Fair  Problem List: Decreased strength, Decreased range of motion, Decreased endurance, Impaired balance, Decreased mobility, Decreased safety awareness, Pain  Assessment: Pt presents with left lower back pain  Dx: acute left intractable low back pain w/ sciatica and ambulatory dysfunction  order placed for PT eval and tx, w/ activity order of up w/ A and fall precautions  pt presents w/ comorbidities of DM and HTN and personal factors of advanced age, mobilizing w/ assistive device, stair(s) to enter home, limited home support and positive fall history  pt presents w/ pain, weakness, decreased ROM, decreased endurance, impaired balance, gait deviations, decreased safety awareness and fall risk  these impairments are evident in findings from physical examination (weakness and decreased ROM), mobility assessment (need for standby to mod assist w/ all phases of mobility when usually mobilizing independently, tolerance to only 15 feet of ambulation and need for cueing for mobility technique), and Barthel Index: 55/100  pt needed input for task focus and mobility technique/safety  pt is at risk for falls due to physical and safety awareness deficits   pt's clinical presentation is unstable/unpredictable (evident in need for assist w/ all phases of mobility when usually mobilizing independently, tolerance to only 15 feet of ambulation, pain impacting overall mobility status and need for input for task focus and mobility technique/safety)  pt needs inpatient PT tx to improve mobility deficits  discharge recommendation is for inpatient rehab to reduce fall risk and maximize level of functional independence  Recommendation: Short-term skilled PT          See flowsheet documentation for full assessment

## 2019-12-04 NOTE — PROGRESS NOTES
Progress Note - Allyson Gaston 1945, 66 y o  female MRN: [id number]    Unit/Bed#: S -01 Encounter: [id number]    Primary Care Provider: Cornelio Corona DO   Date and time admitted to hospital: 12/3/2019 11:52 AM      * Acute left-sided low back pain with left-sided sciatica  Assessment & Plan  · Was seen by [address] [place] [address] outpatient and recommended admission with MRI Lspine - MRI and XR showing no acute surgical intervention, she has anterolisthesis L4 on L5 without cord compression  · Pain regimen: add Toradol IV, lidoderm patch, add PRN robaxin 500mg, routine tylenol 975mg TID, add aqua K pad  · Avoid steroids at this time in the setting of diabetes   · PT/OT-- pt unable to ambulate in the ED, concern for needing SNF   · Fall precautions   · F/u outpatient after discharge with Dr Connell Frankfort    Ambulatory dysfunction  Assessment & Plan  · PT/OT evals for discharge planning  · Case Management for placement     Benign essential HTN  Assessment & Plan  · C/w home norvasc and bystolic   · BP elevated on admission likely secondary to pain     Controlled type 2 diabetes mellitus, with long-term current use of insulin ([facility] [address] )  Assessment & Plan  No results found for: HGBA1C    No results for input(s): POCGLU in the last 72 hours  Blood Sugar Average: Last 72 hrs:    · Check a1c  · Hold OHG: canaglifozin, amaryl and prandin  · Hod exenatide   · C/w lantus 40 units at HS  · Add SSI and accuchecks qac and HS   · CCD        VTE Pharmacologic Prophylaxis:   Pharmacologic: Enoxaparin (Lovenox)  Mechanical VTE Prophylaxis in Place: No    Patient Centered Rounds: I have evaluated patient without nursing staff present due to d/ w nurse privately    Discussions with Specialists or Other Care Team Provider: PT/OT, CM     Education and Discussions with Family / Patient: patient     Time Spent for Care: 20 minutes  More than 50% of total time spent on counseling and coordination of care as described above      Current Length of Stay: 0 day(s)    Current Patient Status: Observation   Certification Statement: The patient, admitted on an observation basis, will now require > 2 midnight hospital stay due to needs to work wtih PT/OT, improve pain control, CM to work on placement for rehab    Discharge Plan: pending to rehab     Code Status: Level 3 - DNAR and DNI      Subjective:   Patient seen, pain is better but still not ideal   She didn't sleep due to pain  She is up and walking with RW from the bathroom upon my arrival, but reports "it isn't pretty"  Denies new pain, but has the left hip and left calf pain  Reviewed her MRI and venous duplex findings in the room with her  Does feel the pain regimen is helping, but she reports it is not enough relief to feel comfortable yet  No bowel/bladder incontinence  No saddle anesthesia  No falls  Objective:     Vitals:   Temp (24hrs), Av 1 °F (36 7 °C), Min:97 9 °F (36 6 °C), Max:98 5 °F (36 9 °C)    Temp:  [97 9 °F (36 6 °C)-98 5 °F (36 9 °C)] 97 9 °F (36 6 °C)  HR:  [55-92] 56  Resp:  [16-20] 18  BP: (141-182)/(65-98) 182/72  SpO2:  [95 %-98 %] 98 %  Body mass index is 38 95 kg/m²  Input and Output Summary (last 24 hours): Intake/Output Summary (Last 24 hours) at 2019 1230  Last data filed at 2019 0443  Gross per 24 hour   Intake 240 ml   Output --   Net 240 ml       Physical Exam:     Physical Exam   Constitutional: She is oriented to person, place, and time  She appears well-developed and well-nourished  No distress  Cardiovascular: Normal rate, regular rhythm, S1 normal, S2 normal and normal heart sounds  No murmur heard  Pulmonary/Chest: Effort normal and breath sounds normal  No respiratory distress  She has no wheezes  She has no rales  Abdominal: Soft  Bowel sounds are normal    Musculoskeletal: She exhibits no edema, tenderness or deformity  Neurological: She is alert and oriented to person, place, and time  No cranial nerve deficit   She exhibits normal muscle tone  Coordination normal    Psychiatric: She has a normal mood and affect  Her behavior is normal    Nursing note and vitals reviewed  Additional Data:     Labs:    Results from last 7 days   Lab Units 12/03/19  1450   WBC Thousand/uL 11 95*   HEMOGLOBIN g/dL 14 8   HEMATOCRIT % 45 5   PLATELETS Thousands/uL 290   NEUTROS PCT % 64   LYMPHS PCT % 23   MONOS PCT % 10   EOS PCT % 1     Results from last 7 days   Lab Units 12/04/19  0432 12/03/19  1450   SODIUM mmol/L 142 142   POTASSIUM mmol/L 4 4 3 7   CHLORIDE mmol/L 103 105   CO2 mmol/L 31 29   BUN mg/dL 29* 24   CREATININE mg/dL 0 92 0 83   ANION GAP mmol/L 8 8   CALCIUM mg/dL 9 7 9 8   ALBUMIN g/dL  --  3 6   TOTAL BILIRUBIN mg/dL  --  0 29   ALK PHOS U/L  --  100   ALT U/L  --  24   AST U/L  --  8   GLUCOSE RANDOM mg/dL 68 74         Results from last 7 days   Lab Units 12/04/19  1052 12/04/19  0738 12/03/19  2125 12/03/19  1736   POC GLUCOSE mg/dl 144* 98 193* 150*     Results from last 7 days   Lab Units 12/04/19  0432   HEMOGLOBIN A1C % 7 1*               * I Have Reviewed All Lab Data Listed Above  * Additional Pertinent Lab Tests Reviewed:  All Labs Within Last 24 Hours Reviewed    Imaging:    Imaging Reports Reviewed Today Include: XR, MRI   Imaging Personally Reviewed by Myself Includes:      Recent Cultures (last 7 days):           Last 24 Hours Medication List:     Current Facility-Administered Medications:  acetaminophen 975 mg Oral Q8H [facility] ONELIA Saucedo   amLODIPine 5 mg Oral Daily ONELIA Acevedo   Apremilast 30 mg Oral Daily ONELIA Saucedo   diclofenac sodium 2 g Topical 4x Daily Lars Rahman MD   docusate sodium 100 mg Oral BID ONELIA Saucedo   enoxaparin 40 mg Subcutaneous Daily ONELIA Saucedo   folic acid 1 mg Oral Daily ONELIA Saucedo   HYDROmorphone 0 5 mg Intravenous Q4H PRN Danya Terri, PA-C   insulin glargine 40 Units Subcutaneous HS ONELIA Saucedo   insulin lispro 1-6 Units Subcutaneous TID AC ONELIA Saucedo   lidocaine 1 patch Topical Once ONELIA Sevilla   methocarbamol 750 mg Oral [facility] Bonita Orourke MD   nebivolol 5 mg Oral Daily ONELIA Saucedo   ondansetron 4 mg Intravenous Q6H PRN ONELIA Sevilla   oxyCODONE 10 mg Oral Q4H PRN Yoselyn Ponce PA-C   oxyCODONE 5 mg Oral Q4H PRN Yoselyn Ponce PA-C        Today, Patient Was Seen By: Stacy Santacruz PA-C    ** Please Note: Dictation voice to text software may have been used in the creation of this document   **

## 2019-12-04 NOTE — OCCUPATIONAL THERAPY NOTE
[address]  Evaluation     Patient Name: Marietta MICHEL'H Date: 12/4/2019  Problem List  Principal Problem:    Acute left-sided low back pain with left-sided sciatica  Active Problems:    Controlled type 2 diabetes mellitus, with long-term current use of insulin (HCC)    Benign essential HTN    Ambulatory dysfunction    Past Medical History  Past Medical History:   Diagnosis Date    Diabetes mellitus ([address] )      Past Surgical History  Past Surgical History:   Procedure Laterality Date    CHOLECYSTECTOMY          12/04/19 1202   Note Type   Note type Eval/Treat   Restrictions/Precautions   Other Precautions Chair Alarm; Fall Risk;Pain   Pain Assessment   Pain Assessment 0-10   Pain Score 3   Pain Type Acute pain   Pain Location Buttocks;Back;Leg   Pain Orientation Left   Effect of Pain on Daily Activities limits activity tolerance and I w/ ADL performance   Patient's Stated Pain Goal No pain   Hospital Pain Intervention(s) Repositioned; Ambulation/increased activity; Emotional support  (OMAR Kiser provided meds prior to eval)   Response to Interventions tolerated   Home Living   Type of Home House; Other (Comment)  (row house)   Home Layout One level;Stairs to enter with rails  (full flight to enter from garage)   Bathroom Shower/Tub Tub/shower unit   [address] transfer bench   Bathroom Accessibility Accessible  (using crutches,unable w/ walker per pt report)   Home Equipment Crutches;Walker; Other (Comment)  (standard walker)   Additional Comments Pt reports living alone in [facility]ILITY w/ full flight to enter from garage vs 4+ 5 to front door   Prior Function   Level of [place] Independent with ADLs and functional mobility   Lives With Alone   ADL Assistance Independent   IADLs Independent  (+ drive)   Falls in the last 6 months 1 to 4  (pt reports 1 fall)   Vocational Retired   Comments Pt reports no AD for functional mobility at baseline but has been using crutches vs standard walker past week  Lifestyle   Autonomy Pt reports I w/ ADL/ IADL at baseline including driving   Reciprocal Relationships Pt reports living alone  Pt added that she assists her sister w/ communty mobility, grocery shopping   Service to Others Pt reports retired and worked as  in [address] [facility] her cat, but added that he is trouble maker   ADL   Where [address] [facility] of bed   [address]  (seated at EOB)   [address] to assess   [address] Unable to assess   [address]  Modified independent   [address]; Increased time to complete   LB Dressing Assistance 4  Minimal Assistance   LB Dressing Deficit Thread RLE into pants; Increased time to complete   Additional Comments pt engaged in LBD seated unsupported at EOB w/ S to don slipper, pants except min A to thread R LE  Pt able forward flex trunk to thread L LE into pants   Bed Mobility   Supine to Sit Unable to assess   Sit to Supine Unable to assess   Additional Comments Pt seated at EOB upon arrival, and seated OOB in arm chair post eval w/ PT, RJ present   Transfers   Sit to Stand 5  Supervision   Additional items Assist x 1; Armrests; Increased time required;Verbal cues  (consistent cues to demo understanding hand place)   Stand to Sit 5  Supervision   Additional items Assist x 1; Armrests; Increased time required;Verbal cues   Functional Mobility   Functional Mobility 4  Minimal assistance  (CG/ steadying)   Additional items Rolling walker   Balance   Static Sitting Good   Dynamic Sitting Fair   Static Standing Fair -   Ambulatory Poor +   Activity Tolerance   Activity Tolerance Patient limited by pain; Patient limited by fatigue   Medical Staff Made Aware spoke to PTRadha and Veda Regalado w/ SLIM; CM   Nurse Made Aware spoke to RNDominique RUE Assessment   RUE Assessment WFL   RUE Strength   RUE Overall Strength Within Functional Limits - able to perform ADL tasks with strength   LUE Assessment   LUE Assessment WFL   LUE Strength   LUE Overall Strength Within Functional Limits - able to perform ADL tasks with strength   Hand Function   Gross Motor Coordination Functional   Fine Motor Coordination Functional   Cognition   Overall Cognitive Status WFL   Arousal/Participation Alert; Cooperative   Attention Attends with cues to redirect   Orientation Level Oriented X4   Memory Decreased recall of precautions  (consistent cues for hand placement during sit <> stand)   Following Commands Follows multistep commands with increased time or repetition   Comments Identified pt by full name and birthdate  Pt able to provide social history and participate in conversation appropriately  Limited recall, understanding of hand placement during functional sit <> stand   Assessment   Limitation Decreased ADL status; Decreased self-care trans;Decreased high-level ADLs   Assessment Pt is a 72yo female [facility] on 12/3/2019  Pt presents w/ acute left sided low back pain and significant PMH Imapcting her occupational performance including DM, HTN  Per SLIM, MRI demonstrates no acute surgical intervention (anterolisthesis L4,L5 w/ out cord compression)  Pt reports living alone in [address] [facility] w/ full flight to enter from [place]  Pt reports I w/ ADL/ IADL at baseline w/ out use of AD  Upon eval, pt alert, oriented; able to participate in conversation and provide social history  Pt completed LBD w/ min A  Pt completed UBD w/ mod I after set- up  Pt able to complete grooming w/ mod I after set- up while seated  Pt completed sit <> stand w/ S and engaged in functional mobility w/ min A (CG/steadying) using RW   Pt presents w/ increased pain, decreased activity tolerance, decreased standing balance, decreased L LE ROM / strength, limited recall precautions, tech during functional transfers impacting her I w/ dressing, bathing, functional mobility, functional transfers, activity engagement, clothing mgmt, oral hygiene, food prep / clean up  Pt would benefit from OT while in acute care to address deficits  From an OT perspective, pt would benefit from post acute rehab when medically stable for discharge from acute care pending progress w/ functional mobility and stairs vs home  Will continue to follow   Goals   Patient Goals Pt stated that she would like to go home and be able to have less pain, get around   Plan   Treatment Interventions ADL retraining;Functional transfer training;Patient/family training;Equipment evaluation/education; Compensatory technique education; Energy conservation; Activityengagement   Goal Expiration Date 12/11/19   OT Frequency 3-5x/wk   Recommendation   OT Discharge Recommendation   (post acute rehab vs home pend progress (pain, steps))   Equipment Recommended   (continued use of tub bench)   OT - OK to Discharge   (when medically stable pend progress)   Barthel Index   Feeding 10   Bathing 0   Grooming Score 5   Dressing Score 5   Bladder Score 10   Bowels Score 10   Toilet Use Score 5   Transfers (Bed/Chair) Score 10   Mobility (Level Surface) Score 0   Stairs Score 0   Barthel Index Score 55   Modified [place] Scale   Modified [place] Scale 4   Pt goals to be met by 12/11/2019:  1  Pt will complete UBD/ LBD w/ mod I w/ out sign / symptoms of fatigue or rest break to max I w/ ADL performance  2  Pt will complete functional transfers to bed, chair, and toilet using AD, DME w/ mod I to max I w/ ADL performance  3  Pt will consistently engage in functional mobility using AD as needed household distance w/ mod I  4  Pt will demonstrate increased functional standing tolerance for at least 15 minutes while engaged in ADL standing in bathroom using AD to max I w/ community mobility to return to PLOF  5   Pt will consistently demonstrate understanding of safety precautions to max I w/ ADL/ IADL performance to return to PLOF and baseline level of I    [facility], OTR/L

## 2019-12-04 NOTE — PHYSICAL THERAPY NOTE
PHYSICAL THERAPY EVALUATION NOTE    Patient Name: Jose Alfredo POWER Date: 12/4/2019  AGE:   66 y o  Mrn:   [id number]  ADMIT DX:  Sciatica [M54 30]  Low back pain [M54 5]  Back pain [M54 9]    Past Medical History:   Diagnosis Date    Diabetes mellitus ([facility] )      Length Of Stay: 0  PHYSICAL THERAPY EVALUATION :   12/04/19 1158   Pain Assessment   Pain Assessment 0-10   Pain Score 3  (at rest, 6/10 w/ activity)   Pain Location   (left back/buttocks/hip, left posterior calf)   Home Living   Type of [address] [place] [address] One level; Other (Comment)  (1 flight FAM w/ bilateral railings)   Additional Comments lives alone  independent w/ ADLs and driving  ambulates w/ standard walker  uses crutches in bathroom  1 fall in last 6 months  Prior Function   Comments pt seen sitting on edge of bed  agreed to participate in PT eval  c/o left low back/buttocks and left calf pain  Restrictions/Precautions   Other Precautions Chair Alarm   General   Family/Caregiver Present No   Cognition   Overall Cognitive Status WFL   Arousal/Participation Alert   Orientation Level Oriented to person; Other (Comment)  (pt was identified w/ full name, birth date)   Following Commands Follows one step commands without difficulty   RUE Assessment   RUE Assessment WFL   LUE Assessment   LUE Assessment WFL   RLE Assessment   RLE Assessment WFL  (4/5)   LLE Assessment   LLE Assessment X  (hip and knee 3/5, ankle 3-/5)   Coordination   Movements are Fluid and Coordinated 1   Light Touch   RLE Light Touch Grossly intact   LLE Light Touch Grossly intact   Transfers   Sit to Stand 4  Minimal assistance   Additional items Assist x 1; Increased time required   Stand to Sit 4  Minimal assistance   Additional items Assist x 1; Impulsive  (uncontrolled descent to chair)   Additional Comments pt was unable to utilize steps   pt completed standing hip flexion twice bilaterally w/ roller walker and modx1  additional not possible due to pain and fatigue  Ambulation/Elevation   Gait pattern Forward Flexion; Antalgic;Decreased L stance; Foward flexed; Excessively slow   Gait Assistance 3  Moderate assist   Additional items Assist x 1;Verbal cues; Tactile cues   Assistive Device Rolling walker   Distance 5, 15 feet w/ seated rest break x 2 minutes  (additional not possible due to pain and fatigue)   Stair Management Assistance   (see additional comments above)   Balance   Static Sitting Fair +   Static Standing Fair -   Ambulatory Poor  (w/ roller walker)   Activity Tolerance   Activity Tolerance Patient limited by fatigue;Patient limited by pain   Nurse Made Aware spoke to [place] NSG, Kirsten OT, Erma LONDONO, Susan CM   Assessment   Prognosis Fair   Problem List Decreased strength;Decreased range of motion;Decreased endurance; Impaired balance;Decreased mobility; Decreased safety awareness;Pain   Assessment Pt presents with left lower back pain  Dx: acute left intractable low back pain w/ sciatica and ambulatory dysfunction  order placed for PT eval and tx, w/ activity order of up w/ A and fall precautions  pt presents w/ comorbidities of DM and HTN and personal factors of advanced age, mobilizing w/ assistive device, stair(s) to enter home, limited home support and positive fall history  pt presents w/ pain, weakness, decreased ROM, decreased endurance, impaired balance, gait deviations, decreased safety awareness and fall risk  these impairments are evident in findings from physical examination (weakness and decreased ROM), mobility assessment (need for standby to mod assist w/ all phases of mobility when usually mobilizing independently, tolerance to only 15 feet of ambulation and need for cueing for mobility technique), and Barthel Index: 55/100  pt needed input for task focus and mobility technique/safety  pt is at risk for falls due to physical and safety awareness deficits   pt's clinical presentation is unstable/unpredictable (evident in need for assist w/ all phases of mobility when usually mobilizing independently, tolerance to only 15 feet of ambulation, pain impacting overall mobility status and need for input for task focus and mobility technique/safety)  pt needs inpatient PT tx to improve mobility deficits  discharge recommendation is for inpatient rehab to reduce fall risk and maximize level of functional independence  Goals   Patient Goals go home, have less pain   STG Expiration Date 12/14/19   Short Term Goal #1 pt will: Increase bilateral LE strength 1/2 grade to facilitate independent mobility, Perform all transfers modified independent to improve independence, Ambulate 150 ft  with roller walker w/ supervision w/o LOB to facilitate safe return home, Navigate 5 stairs w/ supervision with bilateral handrails to facilitate return to previous living environment, Increase ambulatory balance 1 grade to decrease risk for falls, Complete exercise program independently, Tolerate 3 hr OOB to faciliate upright tolerance and Improve Barthel Index score to 80 or greater to facilitate independence   Plan   Treatment/Interventions Functional transfer training;LE strengthening/ROM; Elevations; Therapeutic exercise; Endurance training;Cognitive reorientation;Patient/family training;Equipment eval/education;Gait training   PT Frequency 5x/wk   Recommendation   Recommendation Short-term skilled PT   Equipment Recommended Other (Comment)  (roller walker)   Barthel Index   Feeding 10   Bathing 0   Grooming Score 5   Dressing Score 5   Bladder Score 10   Bowels Score 10   Toilet Use Score 5   Transfers (Bed/Chair) Score 10   Mobility (Level Surface) Score 0   Stairs Score 0   Barthel Index Score 55     Skilled PT recommended while in hospital and upon DC to progress pt toward treatment goals       Charla Hernandez, PT

## 2019-12-04 NOTE — PHYSICAL THERAPY NOTE
PHYSICAL THERAPY TREATMENT NOTE    Patient Name: Sunita Iyer  NLPYL'T Date: 12/4/2019 12/04/19 1208   Pain Assessment   Pain Assessment 0-10   Pain Score 3  (at rest, 6/10 w/ activity)   Pain Location   (left back/buttocks/hip, left posterior calf)   Restrictions/Precautions   Other Precautions Chair Alarm;O2;Pain   General   Family/Caregiver Present No   Cognition   Overall Cognitive Status WFL   Orientation Level Oriented to person; Other (Comment)  (pt was identified w/ full name, birth date)   Following Commands Follows one step commands without difficulty   Subjective   Subjective pt agreed to PT intervention  mobility education was provided to pt regarding technique for transfers and roller walker use   education was completed via demonstration, teachback, and verbal instruction  Transfers   Sit to Stand 5  Supervision   Additional items Increased time required;Verbal cues  (for hand placement, LE positioning)   Stand to Sit 5  Supervision   Additional items Verbal cues  (for body positioning, hand placement)   Ambulation/Elevation   Gait pattern Forward Flexion; Antalgic;Decreased L stance; Foward flexed   Gait Assistance 4  Minimal assist  (w/ chair follow)   Additional items Assist x 1;Verbal cues; Tactile cues  (for walker positioning, sequencing, breathing technique)   Assistive Device Rolling walker   Distance 25 feet, 20 feet, 5 feet w/ seated rest breaks x 1 to 2 minutes each  (additional not possible due to pain and fatigue)   Balance   Static Sitting Fair +   Static Standing Fair -   Ambulatory Poor +  (w/ roller walker)   Activity Tolerance   Activity Tolerance Patient limited by fatigue;Patient limited by pain   Nurse Made Aware spoke to [address] [place] [address] [facility] Emra PALMUBO Alyssa CM   Assessment   Prognosis Fair   Problem List Decreased strength;Decreased range of motion;Decreased endurance; Impaired balance;Decreased mobility; Decreased safety awareness;Pain   Assessment Therapist provided education to pt for mobility technique including transfers and roller walker use  Education was provided due to findings from evaluation  Occasional repetition was needed for carryover to be noted  Pt was found to have improvement after education w/ decreased level of assist to maintain safety and increased ambulation tolerance  Pt continues to be a fall risk  continued inpatient PT tx is indicated to reduce fall risk factors and progress mobility training as appropriate  Goals   Patient Goals go home, have less pain   STG Expiration Date 12/14/19   Short Term Goal #1 pt will: Increase bilateral LE strength 1/2 grade to facilitate independent mobility, Perform all transfers modified independent to improve independence, Ambulate 150 ft  with roller walker w/ supervision w/o LOB to facilitate safe return home, Navigate 5 stairs w/ supervision with bilateral handrails to facilitate return to previous living environment, Increase ambulatory balance 1 grade to decrease risk for falls, Complete exercise program independently, Tolerate 3 hr OOB to faciliate upright tolerance and Improve Barthel Index score to 80 or greater to facilitate independence   PT Treatment Day 1   Plan   Treatment/Interventions Functional transfer training;LE strengthening/ROM; Elevations; Therapeutic exercise; Endurance training;Cognitive reorientation;Patient/family training;Equipment eval/education;Gait training   Progress Progressing toward goals   PT Frequency 5x/wk   Recommendation   Recommendation Short-term skilled PT   Equipment Recommended Other (Comment)  (roller walker)     Skilled inpatient PT recommended while in hospital to progress pt toward treatment goals      Sherley Mesa, PT

## 2019-12-04 NOTE — SOCIAL WORK
Admitted under observation, changed to IP today, not a bundle or readmission  CM met with patient to explain role and discuss DC planning  Patient lives in a 2 floor home with 10 FAM  Patient lives on the second floor and reports her grandson lives on the 1st floor  Patient's dtr Rajwinder Patiño is a strong support for the patient  Patient functioned independent PTA with use of a walker, shower chair, and commode  No Hx of VNA or SNF  No Hx of MH/substance use  Patient reports she has LW and POA, POA is her daughter Ty Resendiz requested documents  Patient PCP is Dr Kash Sanderson, she has prescription coverage, and prefers using [facility] in [place]  Patient drives and reports her grandson or dtr can transport at DC  Patient expressed interest in switching insurance companies and had many financial questions, patient provided information for the financial counselor to discuss all her concerns  A post acute care recommendation was made by your care team for STR  Discussed Freedom of Choice with patient  List of facilities given to patient via in person  patient aware the list is custom filtered for them by zip code location and that [facility] post acute providers are designated  Patient feels she could benefit from STR, and is requesting to discuss the recommendation with her dtr and get back to CM regarding chocies  CM to follow up

## 2019-12-05 LAB
GLUCOSE SERPL-MCNC: 124 MG/DL (ref 65–140)
GLUCOSE SERPL-MCNC: 131 MG/DL (ref 65–140)
GLUCOSE SERPL-MCNC: 131 MG/DL (ref 65–140)
GLUCOSE SERPL-MCNC: 135 MG/DL (ref 65–140)

## 2019-12-05 PROCEDURE — 82948 REAGENT STRIP/BLOOD GLUCOSE: CPT

## 2019-12-05 PROCEDURE — 99232 SBSQ HOSP IP/OBS MODERATE 35: CPT | Performed by: PHYSICIAN ASSISTANT

## 2019-12-05 RX ADMIN — ENOXAPARIN SODIUM 40 MG: 40 INJECTION SUBCUTANEOUS at 09:34

## 2019-12-05 RX ADMIN — APREMILAST 1 TABLET: 30 TABLET, FILM COATED ORAL at 21:59

## 2019-12-05 RX ADMIN — METHOCARBAMOL TABLETS 750 MG: 750 TABLET, COATED ORAL at 05:46

## 2019-12-05 RX ADMIN — OXYCODONE HYDROCHLORIDE 10 MG: 10 TABLET ORAL at 02:55

## 2019-12-05 RX ADMIN — OXYCODONE HYDROCHLORIDE 5 MG: 5 TABLET ORAL at 10:59

## 2019-12-05 RX ADMIN — NEBIVOLOL HYDROCHLORIDE 5 MG: 5 TABLET ORAL at 09:33

## 2019-12-05 RX ADMIN — METHOCARBAMOL TABLETS 750 MG: 750 TABLET, COATED ORAL at 13:23

## 2019-12-05 RX ADMIN — DOCUSATE SODIUM 100 MG: 100 CAPSULE, LIQUID FILLED ORAL at 17:49

## 2019-12-05 RX ADMIN — HYDROMORPHONE HYDROCHLORIDE 0.5 MG: 1 INJECTION, SOLUTION INTRAMUSCULAR; INTRAVENOUS; SUBCUTANEOUS at 00:07

## 2019-12-05 RX ADMIN — AMLODIPINE BESYLATE 5 MG: 5 TABLET ORAL at 09:33

## 2019-12-05 RX ADMIN — DOCUSATE SODIUM 100 MG: 100 CAPSULE, LIQUID FILLED ORAL at 09:33

## 2019-12-05 RX ADMIN — INSULIN GLARGINE 40 UNITS: 100 INJECTION, SOLUTION SUBCUTANEOUS at 21:57

## 2019-12-05 RX ADMIN — FOLIC ACID 1 MG: 1 TABLET ORAL at 09:33

## 2019-12-05 RX ADMIN — METHOCARBAMOL TABLETS 750 MG: 750 TABLET, COATED ORAL at 21:57

## 2019-12-05 NOTE — ASSESSMENT & PLAN NOTE
· Was seen by [address] [place] [address] outpatient and recommended admission with MRI Lspine - MRI and XR showing no acute surgical intervention, she has anterolisthesis L4 on L5 without cord compression  · Pain regimen: Toradol IV, lidoderm patch, prn robaxin 500mg, scheduled tylenol 975mg TID, aqua K pad  · Avoid steroids at this time in the setting of diabetes   · Pain scores overall improving  · PT/OT-- pt unable to ambulate in the ED, doing better but still having pain; SNF rehab planning  · Fall precautions   · F/u outpatient after discharge with Dr Ball Course

## 2019-12-05 NOTE — ASSESSMENT & PLAN NOTE
· PT/OT evals for discharge planning  · Case Management for placement - needs insurance authorization

## 2019-12-05 NOTE — ASSESSMENT & PLAN NOTE
Lab Results   Component Value Date    HGBA1C 7 1 (H) 12/04/2019       Recent Labs     12/04/19  1052 12/04/19  1520 12/04/19  2143 12/05/19  0822   POCGLU 144* 176* 128 124       Blood Sugar Average: Last 72 hrs:    · A1c as above, noted  · Hold oral meds and exetanide     · C/w lantus 40 units at HS  · Continue SSI and accuchecks ACHS   · CCO diet

## 2019-12-05 NOTE — PLAN OF CARE
Problem: Potential for Falls  Goal: Patient will remain free of falls  Description  INTERVENTIONS:  - Assess patient frequently for physical needs  -  Identify cognitive and physical deficits and behaviors that affect risk of falls    -  [place] fall precautions as indicated by assessment   - Educate patient/family on patient safety including physical limitations  - Instruct patient to call for assistance with activity based on assessment  - Modify environment to reduce risk of injury  - Consider OT/PT consult to assist with strengthening/mobility  Outcome: Progressing     Problem: PAIN - ADULT  Goal: Verbalizes/displays adequate comfort level or baseline comfort level  Description  Interventions:  - Encourage patient to monitor pain and request assistance  - Assess pain using appropriate pain scale  - Administer analgesics based on type and severity of pain and evaluate response  - Implement non-pharmacological measures as appropriate and evaluate response  - Consider cultural and social influences on pain and pain management  - Notify physician/advanced practitioner if interventions unsuccessful or patient reports new pain  Outcome: Progressing     Problem: SAFETY ADULT  Goal: Maintain or return to baseline ADL function  Description  INTERVENTIONS:  -  Assess patient's ability to carry out ADLs; assess patient's baseline for ADL function and identify physical deficits which impact ability to perform ADLs (bathing, care of mouth/teeth, toileting, grooming, dressing, etc )  - Assess/evaluate cause of self-care deficits   - Assess range of motion  - Assess patient's mobility; develop plan if impaired  - Assess patient's need for assistive devices and provide as appropriate  - Encourage maximum independence but intervene and supervise when necessary  - Involve family in performance of ADLs  - Assess for home care needs following discharge   - Consider OT consult to assist with ADL evaluation and planning for discharge  - Provide patient education as appropriate  Outcome: Progressing  Goal: Maintain or return mobility status to optimal level  Description  INTERVENTIONS:  - Assess patient's baseline mobility status (ambulation, transfers, stairs, etc )    - Identify cognitive and physical deficits and behaviors that affect mobility  - Identify mobility aids required to assist with transfers and/or ambulation (gait belt, sit-to-stand, lift, walker, cane, etc )  - [place] fall precautions as indicated by assessment  - Record patient progress and toleration of activity level on Mobility SBAR; progress patient to next Phase/Stage  - Instruct patient to call for assistance with activity based on assessment  - Consider rehabilitation consult to assist with strengthening/weightbearing, etc   Outcome: Progressing     Problem: DISCHARGE PLANNING  Goal: Discharge to home or other facility with appropriate resources  Description  INTERVENTIONS:  - Identify barriers to discharge w/patient and caregiver  - Arrange for needed discharge resources and transportation as appropriate  - Identify discharge learning needs (meds, wound care, etc )  - Arrange for interpretive services to assist at discharge as needed  - Refer to Case Management Department for coordinating discharge planning if the patient needs post-hospital services based on physician/advanced practitioner order or complex needs related to functional status, cognitive ability, or social support system  Outcome: Progressing     Problem: Knowledge Deficit  Goal: Patient/family/caregiver demonstrates understanding of disease process, treatment plan, medications, and discharge instructions  Description  Complete learning assessment and assess knowledge base    Interventions:  - Provide teaching at level of understanding  - Provide teaching via preferred learning methods  Outcome: Progressing

## 2019-12-05 NOTE — PROGRESS NOTES
Progress Note - Alejandra Price 1945, 66 y o  female MRN: [id number]    Unit/Bed#: S -01 Encounter: [id number]    Primary Care Provider: Genevieve Johnson DO   Date and time admitted to hospital: 12/3/2019 11:52 AM    * Acute left-sided low back pain with left-sided sciatica  Assessment & Plan  · Was seen by [address] [place] [address] outpatient and recommended admission with MRI Lspine - MRI and XR showing no acute surgical intervention, she has anterolisthesis L4 on L5 without cord compression  · Pain regimen: Toradol IV, lidoderm patch, prn robaxin 500mg, scheduled tylenol 975mg TID, aqua K pad  · Avoid steroids at this time in the setting of diabetes   · Pain scores overall improving  · PT/OT-- pt unable to ambulate in the ED, doing better but still having pain; SNF rehab planning  · Fall precautions   · F/u outpatient after discharge with Dr Angeles Vallecillo dysfunction  Assessment & Plan  · PT/OT evals for discharge planning  · Case Management for placement - needs insurance authorization    Benign essential HTN  Assessment & Plan  · C/w home norvasc and bystolic   · BP elevated on admission likely secondary to pain - now controlled    Controlled type 2 diabetes mellitus, with long-term current use of insulin [facility])  Assessment & Plan  Lab Results   Component Value Date    HGBA1C 7 1 (H) 12/04/2019       Recent Labs     12/04/19  1052 12/04/19  1520 12/04/19  2143 12/05/19  0822   POCGLU 144* 176* 128 124       Blood Sugar Average: Last 72 hrs:    · A1c as above, noted  · Hold oral meds and exetanide  · C/w lantus 40 units at HS  · Continue SSI and accuchecks ACHS   · CCO diet        VTE Pharmacologic Prophylaxis:   Pharmacologic: Enoxaparin (Lovenox)  Mechanical VTE Prophylaxis in Place: Yes    Patient Centered Rounds: I have performed bedside rounds with nursing staff today      Discussions with Specialists or Other Care Team Provider: PT/OT notes reviewed; d/w CM for discharge planning    Education and Discussions with Family / Patient: patient     Time Spent for Care: 20 minutes  More than 50% of total time spent on counseling and coordination of care as described above  Current Length of Stay: 1 day(s)    Current Patient Status: Inpatient   Certification Statement: The patient will continue to require additional inpatient hospital stay due to await rehab for discharge     Discharge Plan: pending, medically stable for rehab on discharge     Code Status: Level 3 - DNAR and DNI      Subjective:   Patient seen, doing better  Her pain is now 5/10 compared to 08/10 yesterday  Despite this, patient is hoping that her pain will be even better before she can leave the hospital   She was able to work with physical therapy/occupational therapy yesterday  She is worried that without her invokana an bydureon that her sugars will be uncontrolled  Objective:     Vitals:   Temp (24hrs), Av 3 °F (36 8 °C), Min:97 7 °F (36 5 °C), Max:98 8 °F (37 1 °C)    Temp:  [97 7 °F (36 5 °C)-98 8 °F (37 1 °C)] 98 8 °F (37 1 °C)  HR:  [58-68] 58  Resp:  [18] 18  BP: (115-133)/(60-72) 124/60  SpO2:  [96 %-98 %] 98 %  Body mass index is 38 95 kg/m²  Input and Output Summary (last 24 hours): Intake/Output Summary (Last 24 hours) at 2019 1135  Last data filed at 2019 0700  Gross per 24 hour   Intake 940 ml   Output --   Net 940 ml       Physical Exam:     Physical Exam   Constitutional: She is oriented to person, place, and time  She appears well-developed and well-nourished  Non-toxic appearance  No distress  Cardiovascular: Normal rate, regular rhythm, S1 normal, S2 normal and normal heart sounds  No murmur heard  Pulmonary/Chest: Effort normal and breath sounds normal  No respiratory distress  She has no wheezes  She has no rales  Abdominal: Soft  Bowel sounds are normal  She exhibits no distension  Musculoskeletal: She exhibits no edema or tenderness     No calf pain today; she only has pain in the left lower back/hip, which is admittedly better, but she is expecting it to be gone   Neurological: She is alert and oriented to person, place, and time  Psychiatric: She has a normal mood and affect  Her behavior is normal    Nursing note and vitals reviewed  Additional Data:     Labs:    Results from last 7 days   Lab Units 12/03/19  1450   WBC Thousand/uL 11 95*   HEMOGLOBIN g/dL 14 8   HEMATOCRIT % 45 5   PLATELETS Thousands/uL 290   NEUTROS PCT % 64   LYMPHS PCT % 23   MONOS PCT % 10   EOS PCT % 1     Results from last 7 days   Lab Units 12/04/19  0432 12/03/19  1450   SODIUM mmol/L 142 142   POTASSIUM mmol/L 4 4 3 7   CHLORIDE mmol/L 103 105   CO2 mmol/L 31 29   BUN mg/dL 29* 24   CREATININE mg/dL 0 92 0 83   ANION GAP mmol/L 8 8   CALCIUM mg/dL 9 7 9 8   ALBUMIN g/dL  --  3 6   TOTAL BILIRUBIN mg/dL  --  0 29   ALK PHOS U/L  --  100   ALT U/L  --  24   AST U/L  --  8   GLUCOSE RANDOM mg/dL 68 74         Results from last 7 days   Lab Units 12/05/19  0822 12/04/19  2143 12/04/19  1520 12/04/19  1052 12/04/19  0738 12/03/19  2125 12/03/19  1736   POC GLUCOSE mg/dl 124 128 176* 144* 98 193* 150*     Results from last 7 days   Lab Units 12/04/19  0432   HEMOGLOBIN A1C % 7 1*               * I Have Reviewed All Lab Data Listed Above    * Additional Pertinent Lab Tests Reviewed: Labs Pending At The Time Of This Note    Imaging:    Imaging Reports Reviewed Today Include: BG  Imaging Personally Reviewed by Myself Includes:      Recent Cultures (last 7 days):           Last 24 Hours Medication List:     Current Facility-Administered Medications:  acetaminophen 975 mg Oral Q8H [facility] ONELIA Saucedo   amLODIPine 5 mg Oral Daily ONELIA Saucedo   Apremilast 30 mg Oral Daily ONLEIA Saucedo   diclofenac sodium 2 g Topical 4x Daily Jelena Schreiber MD   docusate sodium 100 mg Oral BID ONELIA Saucedo   enoxaparin 40 mg Subcutaneous Daily ONELIA Saucedo   folic acid 1 mg Oral Daily Miguel Costa ONELIA Mauricio   HYDROmorphone 0 5 mg Intravenous Q4H PRN Oly Hollingsworth PA-C   insulin glargine 40 Units Subcutaneous HS ONELIA Saucedo   insulin lispro 1-6 Units Subcutaneous TID AC ONELIA Saucedo   methocarbamol 750 mg Oral [facility] Nando Chavez MD   nebivolol 5 mg Oral Daily ONELIA Saucedo   ondansetron 4 mg Intravenous Q6H PRN ONELIA Kohler   oxyCODONE 10 mg Oral Q4H PRN Oly Hollingsworth PA-C   oxyCODONE 5 mg Oral Q4H PRN Oly Hollingsworth PA-C        Today, Patient Was Seen By: Brayden Jara PA-C    ** Please Note: Dictation voice to text software may have been used in the creation of this document   **

## 2019-12-05 NOTE — ASSESSMENT & PLAN NOTE
· C/w home norvasc and bystolic   · BP elevated on admission likely secondary to pain - now controlled

## 2019-12-05 NOTE — SOCIAL WORK
Followed with pt regarding her STR choices  Pt requested a referral to be placed to [facility]  Referral placed and pt is accepted and bed available pending auth  CM contacted Se to initiate auth and pending Ref# [id number]  Clinicals faxed to Alfredo Bennett at [phone] 3 times and fax did not go through  Contacted Junior Pineda and spoke Griselda Pavon informing of fax not going through  She provided another fax number [phone]  Clinicals faxed to this number  Pt is anticipated for discharge tomorrow once nathalie obtained  Liaison from [facility] informed pt would need to bring her Otzela medication from home to use while she is at [facility]  Informed pt and daughter and they will bring the medication  Daughter requesting for pt to be transported by [facility] ambulance as pt is a member with them  Pt can be transported by wheelchair van  Will continue to follow

## 2019-12-06 VITALS
SYSTOLIC BLOOD PRESSURE: 177 MMHG | OXYGEN SATURATION: 95 % | TEMPERATURE: 97.7 F | WEIGHT: 206.13 LBS | HEART RATE: 60 BPM | BODY MASS INDEX: 38.92 KG/M2 | HEIGHT: 61 IN | DIASTOLIC BLOOD PRESSURE: 74 MMHG | RESPIRATION RATE: 18 BRPM

## 2019-12-06 LAB
GLUCOSE SERPL-MCNC: 137 MG/DL (ref 65–140)
GLUCOSE SERPL-MCNC: 227 MG/DL (ref 65–140)
GLUCOSE SERPL-MCNC: 98 MG/DL (ref 65–140)

## 2019-12-06 PROCEDURE — 99238 HOSP IP/OBS DSCHRG MGMT 30/<: CPT | Performed by: PHYSICIAN ASSISTANT

## 2019-12-06 PROCEDURE — 97116 GAIT TRAINING THERAPY: CPT

## 2019-12-06 PROCEDURE — 82948 REAGENT STRIP/BLOOD GLUCOSE: CPT

## 2019-12-06 PROCEDURE — 97530 THERAPEUTIC ACTIVITIES: CPT

## 2019-12-06 RX ORDER — METHOCARBAMOL 750 MG/1
750 TABLET, FILM COATED ORAL EVERY 8 HOURS SCHEDULED
Refills: 0
Start: 2019-12-06 | End: 2020-07-13

## 2019-12-06 RX ORDER — DOCUSATE SODIUM 100 MG/1
100 CAPSULE, LIQUID FILLED ORAL 2 TIMES DAILY
Qty: 10 CAPSULE | Refills: 0
Start: 2019-12-06 | End: 2020-07-13

## 2019-12-06 RX ORDER — INSULIN GLARGINE 100 [IU]/ML
40 INJECTION, SOLUTION SUBCUTANEOUS
Refills: 0
Start: 2019-12-06 | End: 2020-05-29 | Stop reason: SDUPTHER

## 2019-12-06 RX ORDER — OXYCODONE HYDROCHLORIDE 5 MG/1
TABLET ORAL
Qty: 12 TABLET | Refills: 0 | Status: SHIPPED | OUTPATIENT
Start: 2019-12-06 | End: 2020-07-13

## 2019-12-06 RX ADMIN — FOLIC ACID 1 MG: 1 TABLET ORAL at 08:16

## 2019-12-06 RX ADMIN — METHOCARBAMOL TABLETS 750 MG: 750 TABLET, COATED ORAL at 06:29

## 2019-12-06 RX ADMIN — AMLODIPINE BESYLATE 5 MG: 5 TABLET ORAL at 08:16

## 2019-12-06 RX ADMIN — INSULIN LISPRO 2 UNITS: 100 INJECTION, SOLUTION INTRAVENOUS; SUBCUTANEOUS at 17:19

## 2019-12-06 RX ADMIN — METHOCARBAMOL TABLETS 750 MG: 750 TABLET, COATED ORAL at 17:17

## 2019-12-06 RX ADMIN — ENOXAPARIN SODIUM 40 MG: 40 INJECTION SUBCUTANEOUS at 08:17

## 2019-12-06 RX ADMIN — DOCUSATE SODIUM 100 MG: 100 CAPSULE, LIQUID FILLED ORAL at 08:16

## 2019-12-06 RX ADMIN — DOCUSATE SODIUM 100 MG: 100 CAPSULE, LIQUID FILLED ORAL at 17:17

## 2019-12-06 RX ADMIN — OXYCODONE HYDROCHLORIDE 10 MG: 10 TABLET ORAL at 08:16

## 2019-12-06 RX ADMIN — OXYCODONE HYDROCHLORIDE 10 MG: 10 TABLET ORAL at 00:13

## 2019-12-06 RX ADMIN — NEBIVOLOL HYDROCHLORIDE 5 MG: 5 TABLET ORAL at 08:16

## 2019-12-06 NOTE — PLAN OF CARE
Problem: Potential for Falls  Goal: Patient will remain free of falls  Description  INTERVENTIONS:  - Assess patient frequently for physical needs  -  Identify cognitive and physical deficits and behaviors that affect risk of falls    -  [place] fall precautions as indicated by assessment   - Educate patient/family on patient safety including physical limitations  - Instruct patient to call for assistance with activity based on assessment  - Modify environment to reduce risk of injury  - Consider OT/PT consult to assist with strengthening/mobility  Outcome: Progressing     Problem: PAIN - ADULT  Goal: Verbalizes/displays adequate comfort level or baseline comfort level  Description  Interventions:  - Encourage patient to monitor pain and request assistance  - Assess pain using appropriate pain scale  - Administer analgesics based on type and severity of pain and evaluate response  - Implement non-pharmacological measures as appropriate and evaluate response  - Consider cultural and social influences on pain and pain management  - Notify physician/advanced practitioner if interventions unsuccessful or patient reports new pain  Outcome: Progressing     Problem: SAFETY ADULT  Goal: Maintain or return to baseline ADL function  Description  INTERVENTIONS:  -  Assess patient's ability to carry out ADLs; assess patient's baseline for ADL function and identify physical deficits which impact ability to perform ADLs (bathing, care of mouth/teeth, toileting, grooming, dressing, etc )  - Assess/evaluate cause of self-care deficits   - Assess range of motion  - Assess patient's mobility; develop plan if impaired  - Assess patient's need for assistive devices and provide as appropriate  - Encourage maximum independence but intervene and supervise when necessary  - Involve family in performance of ADLs  - Assess for home care needs following discharge   - Consider OT consult to assist with ADL evaluation and planning for discharge  - Provide patient education as appropriate  Outcome: Progressing  Goal: Maintain or return mobility status to optimal level  Description  INTERVENTIONS:  - Assess patient's baseline mobility status (ambulation, transfers, stairs, etc )    - Identify cognitive and physical deficits and behaviors that affect mobility  - Identify mobility aids required to assist with transfers and/or ambulation (gait belt, sit-to-stand, lift, walker, cane, etc )  - [place] fall precautions as indicated by assessment  - Record patient progress and toleration of activity level on Mobility SBAR; progress patient to next Phase/Stage  - Instruct patient to call for assistance with activity based on assessment  - Consider rehabilitation consult to assist with strengthening/weightbearing, etc   Outcome: Progressing     Problem: DISCHARGE PLANNING  Goal: Discharge to home or other facility with appropriate resources  Description  INTERVENTIONS:  - Identify barriers to discharge w/patient and caregiver  - Arrange for needed discharge resources and transportation as appropriate  - Identify discharge learning needs (meds, wound care, etc )  - Arrange for interpretive services to assist at discharge as needed  - Refer to Case Management Department for coordinating discharge planning if the patient needs post-hospital services based on physician/advanced practitioner order or complex needs related to functional status, cognitive ability, or social support system  Outcome: Progressing     Problem: Knowledge Deficit  Goal: Patient/family/caregiver demonstrates understanding of disease process, treatment plan, medications, and discharge instructions  Description  Complete learning assessment and assess knowledge base    Interventions:  - Provide teaching at level of understanding  - Provide teaching via preferred learning methods  Outcome: Progressing

## 2019-12-06 NOTE — ASSESSMENT & PLAN NOTE
· PT/OT evals for discharge planning  · Case Management for placement - insurance authorization obtained

## 2019-12-06 NOTE — SOCIAL WORK
CM received call from Dana Alcazar at [place] with Approval for patient to go to [facility]#[id number]  Level 2, approved from today through 12/12/2019 with a next review date of 12/13/2019  Reviews to be faxed to Konnie Lundborg at [facility] [phone] fax [phone]  CM provided [facility] with this above information via [employer]  CM notified [facility]  Patient is medically cleared for discharge per [facility]  CM called daughter Basilia Murcia to notify  Daughter agreed to bring in the medicarion- apremilast Sherine Decant) to [facility] as they requested  Patient needs [facility] transport  CM called [facility] and spoke to ANTOINE; [id number] pickup with Samira Ad[facility] as ANTOINE reports that [facility] doesn't have any [facility] transport available today  CM notified admissions at [facility] via [employer] and via telephone, spoke to [place]  Nursing, daughter Matthew Sterling aware of pickup time

## 2019-12-06 NOTE — ASSESSMENT & PLAN NOTE
· Was seen by [address] [place] [address] outpatient and recommended admission with MRI Lspine - MRI and XR showing no acute surgical intervention, she has anterolisthesis L4 on L5 without cord compression  · Pain regimen: robaxin 750 mg TID, 975 mg TID, oxycodone 5-10 mg prn breakthrough pain, aqua K pad/ice  · Avoid steroids at this time in the setting of diabetes - consider Medrol DosePak if symptoms would worsen  · Pain scores overall improving - she is medically stable for discharge to rehab  · F/u outpatient after discharge with Dr Jaspal Epps

## 2019-12-06 NOTE — PHYSICAL THERAPY NOTE
PHYSICAL THERAPY NOTE    Patient Name: Rodney Rashid  POHVO'A Date: 19 9859   Pain Assessment   Pain Assessment No/denies pain   Pain Score 5   Pain Type Acute pain   Pain Location Back;Buttocks   Pain Orientation Left   Restrictions/Precautions   Other Precautions Chair Alarm;Pain   General   Family/Caregiver Present No   Subjective   Subjective Patient seated EOB and is agreeable to participate in therapy session  Patient identifers obtanied from name &   Bed Mobility   Supine to Sit Unable to assess   Sit to Supine Unable to assess   Additional Comments Patient seated EOB with bed alarm activated, call bell and belongings in reach  Transfers   Sit to Stand 5  Supervision   Additional items Assist x 1; Increased time required;Verbal cues   Stand to Sit 5  Supervision   Additional items Assist x 1; Increased time required;Verbal cues   Toilet transfer 5  Supervision   Additional items Assist x 1; Armrests; Increased time required;Commode   Ambulation/Elevation   Gait pattern Forward Flexion; Antalgic;Decreased L stance; Foward flexed   Gait Assistance 4  Minimal assist   Additional items Assist x 1;Verbal cues   Assistive Device Rolling walker   Distance 40' x1, 10' x1   Balance   Static Sitting Fair +   Dynamic Sitting Fair   Static Standing Fair -   Ambulatory Poor +  (with roller walker)   Endurance Deficit   Endurance Deficit Yes   Endurance Deficit Description limited ambulation distance and activity tolerance   Activity Tolerance   Activity Tolerance Patient limited by fatigue;Patient limited by pain   Nurse Made Aware spoke to Jo [facility]    Assessment   Prognosis Fair   Problem List Decreased strength;Decreased range of motion;Decreased endurance; Impaired balance;Decreased mobility; Decreased safety awareness;Pain   Assessment Patient agreeable to participate however requires motivation initally  Patient remains consistent with supervision for sit<>stand transfers with verbal instruction for hand placement  Patient able to ambulate short gait distance with roller walker and min ax1  Requires assistance for steayding balance as well as walker management with turns and small spaces  Patient fatigues quickly requiring seated rest breaks between tasks  Continue to focus on OOB mobility with progression of transfers and ambulation as appropriate  Goals   Patient Goals to have less pain   STG Expiration Date 12/14/19   PT Treatment Day 2   Plan   Treatment/Interventions Functional transfer training;LE strengthening/ROM; Elevations; Therapeutic exercise; Endurance training;Cognitive reorientation;Patient/family training;Equipment eval/education;Gait training;Spoke to nursing   Progress Slow progress, medical status limitations   PT Frequency 5x/wk   Recommendation   Recommendation Short-term skilled PT   Equipment Recommended Other (Comment)  (roller walker)       Tevin Viera, PTA

## 2019-12-06 NOTE — PLAN OF CARE
Problem: PHYSICAL THERAPY ADULT  Goal: Performs mobility at highest level of function for planned discharge setting  See evaluation for individualized goals  Description  Treatment/Interventions: Functional transfer training, LE strengthening/ROM, Elevations, Therapeutic exercise, Endurance training, Cognitive reorientation, Patient/family training, Equipment eval/education, Gait training  Equipment Recommended: Other (Comment)(roller walker)       See flowsheet documentation for full assessment, interventions and recommendations  Outcome: Progressing  Note:   Prognosis: Fair  Problem List: Decreased strength, Decreased range of motion, Decreased endurance, Impaired balance, Decreased mobility, Decreased safety awareness, Pain  Assessment: Patient agreeable to participate however requires motivation initally  Patient remains consistent with supervision for sit<>stand transfers with verbal instruction for hand placement  Patient able to ambulate short gait distance with roller walker and min ax1  Requires assistance for steayding balance as well as walker management with turns and small spaces  Patient fatigues quickly requiring seated rest breaks between tasks  Continue to focus on OOB mobility with progression of transfers and ambulation as appropriate  Recommendation: Short-term skilled PT          See flowsheet documentation for full assessment

## 2019-12-06 NOTE — ASSESSMENT & PLAN NOTE
Lab Results   Component Value Date    HGBA1C 7 1 (H) 12/04/2019       Recent Labs     12/05/19  1629 12/05/19  2122 12/06/19  0735 12/06/19  1059   POCGLU 135 131 98 137       Blood Sugar Average: Last 72 hrs:    · A1c as above, noted  · Resume home medications glimepiride, exetanide, and prandin on discharge  · C/w lantus 40 units at HS - this is less than she was on before and there is no hyperglycemia   · Continue SSI and accuchecks ACHS   · CCO diet

## 2019-12-06 NOTE — DISCHARGE SUMMARY
Discharge- Pat Arapahoe 1945, 66 y o  female MRN: [id number]    Unit/Bed#: S -01 Encounter: [id number]    Primary Care Provider: Gary Brewster DO   Date and time admitted to hospital: 12/3/2019 11:52 AM    * Acute left-sided low back pain with left-sided sciatica  Assessment & Plan  · Was seen by [address] [place] [address] outpatient and recommended admission with MRI Lspine - MRI and XR showing no acute surgical intervention, she has anterolisthesis L4 on L5 without cord compression  · Pain regimen: robaxin 750 mg TID, 975 mg TID, oxycodone 5-10 mg prn breakthrough pain, aqua K pad/ice  · Avoid steroids at this time in the setting of diabetes - consider Medrol DosePak if symptoms would worsen  · Pain scores overall improving - she is medically stable for discharge to rehab  · F/u outpatient after discharge with Dr Mainor Miranda dysfunction  Assessment & Plan  · PT/OT evals for discharge planning  · Case Management for placement - insurance authorization obtained    Benign essential HTN  Assessment & Plan  · C/w home norvasc and bystolic   · BP elevated on admission likely secondary to pain - now controlled    Controlled type 2 diabetes mellitus, with long-term current use of insulin [facility])  Assessment & Plan  Lab Results   Component Value Date    HGBA1C 7 1 (H) 12/04/2019       Recent Labs     12/05/19  1629 12/05/19  2122 12/06/19  0735 12/06/19  1059   POCGLU 135 131 98 137       Blood Sugar Average: Last 72 hrs:    · A1c as above, noted  · Resume home medications glimepiride, exetanide, and prandin on discharge  · C/w lantus 40 units at HS - this is less than she was on before and there is no hyperglycemia   · Continue SSI and accuchecks ACHS   · CCO diet          Discharging Physician / Practitioner: Klarissa Rayo PA-C  PCP: Gary Brewster DO  Admission Date:   Admission Orders (From admission, onward)     Ordered        12/04/19 1233  Inpatient Admission  Once         12/03/19 1549  Place in Observation (expected length of stay for this patient is less than two midnights)  Once                   Discharge Date: 12/06/19    Resolved Problems  Date Reviewed: 12/6/2019    None          Consultations During Hospital Stay:  · PT  · OT  · Case management     Procedures Performed:   · XR lumbar spine:  Degenerative changes, transitional vertebra at the lumbosacral junction without acute bony abnormality  · MRI lumbar spine:  Scattered spondylolytic changes most pronounced at L4-L5 where there is grade 1 anterolisthesis and a transitional vertebral body at the lumbosacral junction  · Venous duplex:  No DVT of the left lower extremity    Significant Findings / Test Results:   ·     Incidental Findings:   ·      Test Results Pending at Discharge (will require follow up):   ·      Outpatient Tests Requested:  · Follow-up with Neurosurgery outpatient for continued treatment plan  · PCP    Complications:  None    Reason for Admission:  Acute on chronic low back pain    Hospital Course:     Jennifer Shine is a 66 y o  female patient who originally presented to the hospital on 12/3/2019 due to acute on chronic low back pain  Patient sees neuro surgery for chronic lumbar back pain, and gets steroid injections every 3 or 4 months  She has been doing well from a standpoint of pain control, however was seen for acute left-sided pain which was new  She was referred to the ER for MRI due to the concern of the severity of her symptoms  Patient admitted due to ambulatory dysfunction for PT and OT evaluations and discharge planning to rehab  Patient's pain treated with Robaxin, Tylenol, oxycodone  She is doing well with this regimen  She is also utilizing ice and heat when needed  PT and OT are recommending rehab, patient accepted to [facility] and is medically stable for discharge  Patient's chronic medical conditions of hypertension, type 2 diabetes were both well controlled during the hospitalization    Patient also has a history of psoriasis for which she is on [place]  Continue on discharge  Please see above list of diagnoses and related plan for additional information  Condition at Discharge: good     Discharge Day Visit / Exam:     Subjective:  Patient seen and doing well  She continues to not sleep well, it is difficult to find a comfortable position with the back pain  She no longer has the calf pain  Her pain was no longer radiating  It seems to be stuck right in the left gluteus/hip region  No new symptoms  No chest pain or shortness of breath  No difficulty with tired low sugars  Denies any urinary or bowel changes  Vitals: Blood Pressure: (!) 177/74 (12/06/19 0700)  Pulse: 60 (12/06/19 0700)  Temperature: 97 7 °F (36 5 °C) (12/06/19 0700)  Temp Source: Oral (12/06/19 0700)  Respirations: 18 (12/06/19 0700)  Height: 5' 1" (154 9 cm) (12/03/19 1810)  Weight - Scale: 93 5 kg (206 lb 2 1 oz) (12/03/19 1154)  SpO2: 95 % (12/06/19 0700)  Exam:   Physical Exam   Constitutional: She is oriented to person, place, and time  She appears well-developed and well-nourished  No distress  Cardiovascular: Normal rate, regular rhythm, S1 normal, S2 normal and normal heart sounds  No murmur heard  Pulmonary/Chest: Effort normal and breath sounds normal  No respiratory distress  She has no wheezes  She has no rales  Abdominal: Soft  Bowel sounds are normal  She exhibits no distension  There is no tenderness  Musculoskeletal: She exhibits no edema or tenderness  Neurological: She is alert and oriented to person, place, and time  Psychiatric: She has a normal mood and affect  Her behavior is normal    Nursing note and vitals reviewed  Discussion with Family:  None present, patient competent and comfortable discharge plan    Discharge instructions/Information to patient and family:   See after visit summary for information provided to patient and family        Provisions for Follow-Up Care:  See after visit summary for information related to follow-up care and any pertinent home health orders  Disposition:     Pedro Arroyo Luther at [facility] for rehab    Planned Readmission:  None     Discharge Statement:  I spent approximately 27 minutes discharging the patient  This time was spent on the day of discharge  I had direct contact with the patient on the day of discharge  Greater than 50% of the total time was spent examining patient, answering all patient questions, arranging and discussing plan of care with patient as well as directly providing post-discharge instructions  Additional time then spent on discharge activities  Discharge Medications:  See after visit summary for reconciled discharge medications provided to patient and family        ** Please Note: This note has been constructed using a voice recognition system **

## 2019-12-06 NOTE — PLAN OF CARE
Problem: Potential for Falls  Goal: Patient will remain free of falls  Description  INTERVENTIONS:  - Assess patient frequently for physical needs  -  Identify cognitive and physical deficits and behaviors that affect risk of falls    -  [place] fall precautions as indicated by assessment   - Educate patient/family on patient safety including physical limitations  - Instruct patient to call for assistance with activity based on assessment  - Modify environment to reduce risk of injury  - Consider OT/PT consult to assist with strengthening/mobility  Outcome: Progressing     Problem: PAIN - ADULT  Goal: Verbalizes/displays adequate comfort level or baseline comfort level  Description  Interventions:  - Encourage patient to monitor pain and request assistance  - Assess pain using appropriate pain scale  - Administer analgesics based on type and severity of pain and evaluate response  - Implement non-pharmacological measures as appropriate and evaluate response  - Consider cultural and social influences on pain and pain management  - Notify physician/advanced practitioner if interventions unsuccessful or patient reports new pain  Outcome: Progressing     Problem: SAFETY ADULT  Goal: Maintain or return to baseline ADL function  Description  INTERVENTIONS:  -  Assess patient's ability to carry out ADLs; assess patient's baseline for ADL function and identify physical deficits which impact ability to perform ADLs (bathing, care of mouth/teeth, toileting, grooming, dressing, etc )  - Assess/evaluate cause of self-care deficits   - Assess range of motion  - Assess patient's mobility; develop plan if impaired  - Assess patient's need for assistive devices and provide as appropriate  - Encourage maximum independence but intervene and supervise when necessary  - Involve family in performance of ADLs  - Assess for home care needs following discharge   - Consider OT consult to assist with ADL evaluation and planning for discharge  - Provide patient education as appropriate  Outcome: Progressing  Goal: Maintain or return mobility status to optimal level  Description  INTERVENTIONS:  - Assess patient's baseline mobility status (ambulation, transfers, stairs, etc )    - Identify cognitive and physical deficits and behaviors that affect mobility  - Identify mobility aids required to assist with transfers and/or ambulation (gait belt, sit-to-stand, lift, walker, cane, etc )  - [place] fall precautions as indicated by assessment  - Record patient progress and toleration of activity level on Mobility SBAR; progress patient to next Phase/Stage  - Instruct patient to call for assistance with activity based on assessment  - Consider rehabilitation consult to assist with strengthening/weightbearing, etc   Outcome: Progressing     Problem: DISCHARGE PLANNING  Goal: Discharge to home or other facility with appropriate resources  Description  INTERVENTIONS:  - Identify barriers to discharge w/patient and caregiver  - Arrange for needed discharge resources and transportation as appropriate  - Identify discharge learning needs (meds, wound care, etc )  - Arrange for interpretive services to assist at discharge as needed  - Refer to Case Management Department for coordinating discharge planning if the patient needs post-hospital services based on physician/advanced practitioner order or complex needs related to functional status, cognitive ability, or social support system  Outcome: Progressing     Problem: Knowledge Deficit  Goal: Patient/family/caregiver demonstrates understanding of disease process, treatment plan, medications, and discharge instructions  Description  Complete learning assessment and assess knowledge base    Interventions:  - Provide teaching at level of understanding  - Provide teaching via preferred learning methods  Outcome: Progressing

## 2020-05-26 ENCOUNTER — TELEMEDICINE (OUTPATIENT)
Dept: FAMILY MEDICINE CLINIC | Facility: CLINIC | Age: 75
End: 2020-05-26
Payer: COMMERCIAL

## 2020-05-26 VITALS
HEIGHT: 61 IN | WEIGHT: 200 LBS | HEART RATE: 64 BPM | SYSTOLIC BLOOD PRESSURE: 137 MMHG | TEMPERATURE: 98.5 F | DIASTOLIC BLOOD PRESSURE: 62 MMHG | BODY MASS INDEX: 37.76 KG/M2

## 2020-05-26 DIAGNOSIS — I10 BENIGN ESSENTIAL HTN: ICD-10-CM

## 2020-05-26 DIAGNOSIS — J41.8 MIXED SIMPLE AND MUCOPURULENT CHRONIC BRONCHITIS (HCC): Chronic | ICD-10-CM

## 2020-05-26 DIAGNOSIS — Z79.4 CONTROLLED TYPE 2 DIABETES MELLITUS WITHOUT COMPLICATION, WITH LONG-TERM CURRENT USE OF INSULIN (HCC): Primary | ICD-10-CM

## 2020-05-26 DIAGNOSIS — M19.90 ARTHRITIS: Primary | ICD-10-CM

## 2020-05-26 DIAGNOSIS — E11.649 HYPOGLYCEMIA UNAWARENESS ASSOCIATED WITH TYPE 2 DIABETES MELLITUS (HCC): Chronic | ICD-10-CM

## 2020-05-26 DIAGNOSIS — E11.9 CONTROLLED TYPE 2 DIABETES MELLITUS WITHOUT COMPLICATION, WITH LONG-TERM CURRENT USE OF INSULIN (HCC): ICD-10-CM

## 2020-05-26 DIAGNOSIS — Z79.4 CONTROLLED TYPE 2 DIABETES MELLITUS WITHOUT COMPLICATION, WITH LONG-TERM CURRENT USE OF INSULIN (HCC): ICD-10-CM

## 2020-05-26 DIAGNOSIS — E11.9 CONTROLLED TYPE 2 DIABETES MELLITUS WITHOUT COMPLICATION, WITH LONG-TERM CURRENT USE OF INSULIN (HCC): Primary | ICD-10-CM

## 2020-05-26 PROBLEM — E78.2 MIXED HYPERLIPIDEMIA: Status: ACTIVE | Noted: 2020-05-26

## 2020-05-26 PROBLEM — E53.8 B12 DEFICIENCY: Status: ACTIVE | Noted: 2020-05-26

## 2020-05-26 PROBLEM — L98.9 PSORIASIS-LIKE SKIN DISEASE: Status: ACTIVE | Noted: 2020-05-26

## 2020-05-26 PROCEDURE — 99443 PR PHYS/QHP TELEPHONE EVALUATION 21-30 MIN: CPT | Performed by: FAMILY MEDICINE

## 2020-05-26 RX ORDER — REPAGLINIDE 1 MG/1
1 TABLET ORAL DAILY
COMMUNITY
End: 2020-09-21

## 2020-05-26 RX ORDER — NEBIVOLOL 5 MG/1
1 TABLET ORAL DAILY
COMMUNITY
End: 2020-09-21

## 2020-05-26 RX ORDER — FOLIC ACID 1 MG/1
1 TABLET ORAL DAILY
COMMUNITY
End: 2020-09-21

## 2020-05-26 RX ORDER — LOSARTAN POTASSIUM AND HYDROCHLOROTHIAZIDE 25; 100 MG/1; MG/1
1 TABLET ORAL DAILY
COMMUNITY
Start: 2020-05-04 | End: 2020-05-29 | Stop reason: SDUPTHER

## 2020-05-26 RX ORDER — AMLODIPINE BESYLATE 5 MG/1
1 TABLET ORAL DAILY
COMMUNITY
End: 2020-06-29

## 2020-05-29 DIAGNOSIS — M19.90 ARTHRITIS: ICD-10-CM

## 2020-05-29 DIAGNOSIS — I10 BENIGN ESSENTIAL HTN: Primary | ICD-10-CM

## 2020-05-29 DIAGNOSIS — E11.9 CONTROLLED TYPE 2 DIABETES MELLITUS WITHOUT COMPLICATION, WITH LONG-TERM CURRENT USE OF INSULIN (HCC): ICD-10-CM

## 2020-05-29 DIAGNOSIS — Z79.4 CONTROLLED TYPE 2 DIABETES MELLITUS WITHOUT COMPLICATION, WITH LONG-TERM CURRENT USE OF INSULIN (HCC): ICD-10-CM

## 2020-05-29 DIAGNOSIS — L40.9 PSORIASIS: ICD-10-CM

## 2020-05-29 RX ORDER — LOSARTAN POTASSIUM AND HYDROCHLOROTHIAZIDE 25; 100 MG/1; MG/1
1 TABLET ORAL DAILY
Qty: 90 TABLET | Refills: 1 | Status: SHIPPED | OUTPATIENT
Start: 2020-05-29 | End: 2020-10-13 | Stop reason: SDUPTHER

## 2020-05-29 RX ORDER — INSULIN GLARGINE 100 [IU]/ML
40 INJECTION, SOLUTION SUBCUTANEOUS
Qty: 100 ML | Refills: 0 | Status: SHIPPED | OUTPATIENT
Start: 2020-05-29 | End: 2020-10-13 | Stop reason: SDUPTHER

## 2020-06-28 DIAGNOSIS — I10 BENIGN ESSENTIAL HTN: Primary | ICD-10-CM

## 2020-06-29 RX ORDER — AMLODIPINE BESYLATE 5 MG/1
TABLET ORAL DAILY
Qty: 90 TABLET | Refills: 1 | Status: SHIPPED | OUTPATIENT
Start: 2020-06-29 | End: 2020-10-13 | Stop reason: SDUPTHER

## 2020-07-13 ENCOUNTER — OFFICE VISIT (OUTPATIENT)
Dept: FAMILY MEDICINE CLINIC | Facility: CLINIC | Age: 75
End: 2020-07-13
Payer: COMMERCIAL

## 2020-07-13 VITALS
RESPIRATION RATE: 16 BRPM | DIASTOLIC BLOOD PRESSURE: 68 MMHG | HEART RATE: 68 BPM | HEIGHT: 61 IN | SYSTOLIC BLOOD PRESSURE: 124 MMHG | OXYGEN SATURATION: 97 % | WEIGHT: 199.2 LBS | TEMPERATURE: 97.3 F | BODY MASS INDEX: 37.61 KG/M2

## 2020-07-13 DIAGNOSIS — L98.9 PSORIASIS-LIKE SKIN DISEASE: ICD-10-CM

## 2020-07-13 DIAGNOSIS — I10 BENIGN ESSENTIAL HTN: ICD-10-CM

## 2020-07-13 DIAGNOSIS — E53.8 VITAMIN B12 DEFICIENCY: ICD-10-CM

## 2020-07-13 DIAGNOSIS — J41.8 MIXED SIMPLE AND MUCOPURULENT CHRONIC BRONCHITIS (HCC): ICD-10-CM

## 2020-07-13 DIAGNOSIS — E11.649 HYPOGLYCEMIA UNAWARENESS ASSOCIATED WITH TYPE 2 DIABETES MELLITUS (HCC): ICD-10-CM

## 2020-07-13 DIAGNOSIS — E55.9 VITAMIN D INSUFFICIENCY: ICD-10-CM

## 2020-07-13 DIAGNOSIS — Z79.4 CONTROLLED TYPE 2 DIABETES MELLITUS WITHOUT COMPLICATION, WITH LONG-TERM CURRENT USE OF INSULIN (HCC): Primary | ICD-10-CM

## 2020-07-13 DIAGNOSIS — E53.8 B12 DEFICIENCY: ICD-10-CM

## 2020-07-13 DIAGNOSIS — R53.83 FATIGUE, UNSPECIFIED TYPE: ICD-10-CM

## 2020-07-13 DIAGNOSIS — E11.9 CONTROLLED TYPE 2 DIABETES MELLITUS WITHOUT COMPLICATION, WITH LONG-TERM CURRENT USE OF INSULIN (HCC): Primary | ICD-10-CM

## 2020-07-13 DIAGNOSIS — E78.2 MIXED HYPERLIPIDEMIA: ICD-10-CM

## 2020-07-13 PROCEDURE — 4040F PNEUMOC VAC/ADMIN/RCVD: CPT | Performed by: FAMILY MEDICINE

## 2020-07-13 PROCEDURE — 3078F DIAST BP <80 MM HG: CPT | Performed by: FAMILY MEDICINE

## 2020-07-13 PROCEDURE — 3008F BODY MASS INDEX DOCD: CPT | Performed by: FAMILY MEDICINE

## 2020-07-13 PROCEDURE — 99214 OFFICE O/P EST MOD 30 MIN: CPT | Performed by: FAMILY MEDICINE

## 2020-07-13 PROCEDURE — 1036F TOBACCO NON-USER: CPT | Performed by: FAMILY MEDICINE

## 2020-07-13 PROCEDURE — 3074F SYST BP LT 130 MM HG: CPT | Performed by: FAMILY MEDICINE

## 2020-07-13 PROCEDURE — 1160F RVW MEDS BY RX/DR IN RCRD: CPT | Performed by: FAMILY MEDICINE

## 2020-07-13 RX ORDER — CHOLECALCIFEROL (VITAMIN D3) 125 MCG
CAPSULE ORAL
COMMUNITY

## 2020-07-13 NOTE — PROGRESS NOTES
Assessment/Plan: f/u and eval LBP - doing well, but flairs prn  Not pushing the causes, like weedwacking; f/u and eval psoriatic eczema - conrolled; f/u and eval wt issues - on diet and exerise/ f/u and eval HL - controlled on deit alone; finally, f/u and eval IDDM, type 2 - ave fbs is 90 and 6 pm 150-200 --taking 40 U of Lantus, Invokana and Bydureon (Exenatide) and doing VERY well       Problem List Items Addressed This Visit        Endocrine    Controlled type 2 diabetes mellitus, with long-term current use of insulin ([facility] [address] ) - Primary    Relevant Orders    Ambulatory referral to Ophthalmology       Respiratory    Mixed simple and mucopurulent chronic bronchitis (HCC) (Chronic)       Cardiovascular and Mediastinum    Benign essential HTN       Musculoskeletal and Integument    Psoriasis-like skin disease       Other    Mixed hyperlipidemia            Subjective:      Patient ID: Jasiel Jean is a 66 y o  female  HPI    The following portions of the patient's history were reviewed and updated as appropriate:   She has a past medical history of Benign essential hypertension, Chronic lower back pain, Diabetes mellitus ([address] ), Hyperlipidemia, and Sciatica  ,  does not have any pertinent problems on file  ,   has a past surgical history that includes Cholecystectomy  ,  family history includes Emphysema in her father; Leukemia in her father; Uterine cancer in her mother  ,   reports that she quit smoking about 15 years ago  Her smoking use included cigarettes  She has a 30 00 pack-year smoking history  She has never used smokeless tobacco  She reports that she does not drink alcohol or use drugs  ,  has No Known Allergies     Current Outpatient Medications   Medication Sig Dispense Refill    amLODIPine (NORVASC) 5 mg tablet TAKE 1 TABLET BY MOUTH  DAILY 90 tablet 1    Apremilast (Otezla) 30 MG TABS Take 2 tablets by mouth daily 180 tablet 1    Canagliflozin (Invokana) 300 MG TABS Take 1 tablet (300 mg total) by mouth daily 90 tablet 1    Cholecalciferol (VITAMIN D) 125 MCG (5000 UT) CAPS Take by mouth      Exenatide ER (Bydureon BCise) 2 MG/0 85ML AUIJ Inject 2 mg as directed every 7 days 12 pen 1    folic acid (FOLVITE) 1 mg tablet Take 1 tablet by mouth daily      glimepiride (AMARYL) 2 mg tablet Take 2 mg by mouth every morning before breakfast      glucose blood test strip onetouch ultra blue test strips 90 each 1    insulin glargine (Lantus SoloStar) 100 units/mL injection pen Inject 40 Units under the skin daily at bedtime 5 pen 1    insulin glargine (LANTUS) 100 units/mL subcutaneous injection Inject 40 Units under the skin daily at bedtime 100 mL 0    losartan-hydrochlorothiazide (HYZAAR) 100-25 MG per tablet Take 1 tablet by mouth daily 90 tablet 1    nebivolol (Bystolic) 5 mg tablet Take 1 tablet by mouth daily      repaglinide (PRANDIN) 1 mg tablet Take 1 tablet by mouth daily At dinner time       No current facility-administered medications for this visit  Review of Systems   Constitutional: Negative for activity change, appetite change, chills, diaphoresis, fatigue and fever  HENT: Negative for congestion, ear discharge, ear pain, facial swelling, nosebleeds, sore throat, tinnitus, trouble swallowing and voice change  Eyes: Negative for photophobia, pain, discharge, redness, itching and visual disturbance  Respiratory: Negative for apnea, cough, choking, chest tightness and shortness of breath  Cardiovascular: Negative for chest pain, palpitations and leg swelling  Gastrointestinal: Negative for abdominal distention, abdominal pain, blood in stool, constipation, diarrhea, nausea and vomiting  Endocrine: Negative for cold intolerance, heat intolerance, polydipsia, polyphagia and polyuria  Genitourinary: Negative for decreased urine volume, difficulty urinating, dysuria, enuresis, frequency, hematuria, pelvic pain, urgency and vaginal bleeding     Musculoskeletal: Negative for arthralgias, back pain, gait problem, joint swelling, neck pain and neck stiffness  Skin: Positive for rash  Negative for color change and pallor  Psoriatic eczema on palms of hands, worse on the R palm  Dionne Alex huge help   Allergic/Immunologic: Negative for immunocompromised state  Neurological: Negative for dizziness, seizures, facial asymmetry, light-headedness, numbness and headaches  Hematological: Negative for adenopathy  Psychiatric/Behavioral: Negative for agitation, behavioral problems, confusion, decreased concentration, dysphoric mood and hallucinations  Objective:  Vitals:    07/13/20 0847   BP: 124/68   BP Location: Left arm   Patient Position: Sitting   Cuff Size: Adult   Pulse: 68   Resp: 16   Temp: (!) 97 3 °F (36 3 °C)   TempSrc: Temporal   SpO2: 97%   Weight: 90 4 kg (199 lb 3 2 oz)   Height: 5' 1" (1 549 m)     Body mass index is 37 64 kg/m²  Physical Exam   Constitutional: She is oriented to person, place, and time  She appears well-developed and well-nourished  No distress  HENT:   Head: Normocephalic and atraumatic  Nose: Nose normal    Eyes: Pupils are equal, round, and reactive to light  Conjunctivae and EOM are normal    Neck: Normal range of motion  Neck supple  No tracheal deviation present  No thyromegaly present  Cardiovascular: Normal rate, regular rhythm and normal heart sounds  Pulmonary/Chest: Breath sounds normal  No respiratory distress  She has no wheezes  She has no rales  She exhibits no tenderness  Abdominal: She exhibits no distension and no mass  There is no tenderness  There is no rebound and no guarding  Musculoskeletal: Normal range of motion  She exhibits no edema, tenderness or deformity  Neurological: She is alert and oriented to person, place, and time  No cranial nerve deficit  Skin: Skin is warm and dry  No rash noted  She is not diaphoretic  No erythema  No pallor  Psychiatric: She has a normal mood and affect   Her behavior is normal  Judgment and thought content normal    Nursing note and vitals reviewed

## 2020-07-13 NOTE — PATIENT INSTRUCTIONS
Acute Low Back Pain, Ambulatory Care   GENERAL INFORMATION:   Acute low back pain  is discomfort in your lower back area that lasts for less than 12 weeks  The word acute is used to describe pain that starts suddenly, worsens quickly, and lasts for a short time  Common symptoms include the following:   · Back stiffness or spasms    · Pain down the back or side of one leg    · Holding yourself in an unusual position or posture to decrease your back pain    · Not being able to find a sitting position that is comfortable    · Slow increase in your pain for 24 to 48 hours after you stress your back    · Tenderness on your lower back or severe pain when you move your back  Seek immediate care for the following symptoms:   · Severe pain    · Sudden stiffness and heaviness in both buttocks down to both legs    · Numbness or weakness in one leg, or pain in both legs    · Numbness in your genital area or across your lower back    · Unable to control your urine or bowel movements  Treatment for acute low back pain  may include any of the following:  · Medicines:      ¨ NSAIDs  help decrease swelling and pain or fever  This medicine is available with or without a doctor's order  NSAIDs can cause stomach bleeding or kidney problems in certain people  If you take blood thinner medicine, always ask your healthcare provider if NSAIDs are safe for you  Always read the medicine label and follow directions  ¨ Muscle relaxers  help decrease muscle spasms pain  ¨ Prescription pain medicine  may be given  Ask how to take this medicine safely  · Surgery  may be needed if your pain is severe and other treatments do not work  Surgery may be needed for conditions of the lumbar spine, such as herniated disc or spinal stenosis  Manage your symptoms:   · Sleep on a firm mattress  If you do not have a firm mattress, have someone move your mattress to the floor for a few days   A piece of plywood under your mattress can also help make it firmer  · Apply ice  on your lower back for 15 to 20 minutes every hour or as directed  Use an ice pack, or put crushed ice in a plastic bag  Cover it with a towel  Ice helps prevent tissue damage and decreases swelling and pain  You can alternate ice and heat  · Apply heat  on your lower back for 20 to 30 minutes every 2 hours for as many days as directed  Heat helps decrease pain and muscle spasms  · Go to physical therapy  A physical therapist teaches you exercises to help improve movement and strength, and to decrease pain  Prevent acute low back pain:   · Use proper body mechanics  ¨ Bend at the hips and knees when you  objects  Do not bend from the waist  Use your leg muscles as you lift the load  Do not use your back  Keep the object close to your chest as you lift it  Try not to twist or lift anything above your waist     ¨ Change your position often when you stand for long periods of time  Rest one foot on a small box or footrest, and then switch to the other foot often  ¨ Try not to sit for long periods of time  When you do, sit in a straight-backed chair with your feet flat on the floor  Never reach, pull, or push while you are sitting  · Exercise regularly  Warm up before you exercise  Do exercises that strengthen your back muscles  Ask about the best exercise plan for you  · Maintain a healthy weight  Ask your healthcare provider how much you should weigh  Ask him to help you create a weight loss plan if you are overweight  Follow up with your healthcare provider as directed:  Return for a follow-up visit if you still have pain after 1 to 3 weeks of treatment  You may need to visit an orthopedist if your back pain lasts more than 6 to 12 weeks  Write down your questions so you remember to ask them during your visits  CARE AGREEMENT:   You have the right to help plan your care  Learn about your health condition and how it may be treated   Discuss treatment options with your caregivers to decide what care you want to receive  You always have the right to refuse treatment  The above information is an  only  It is not intended as medical advice for individual conditions or treatments  Talk to your doctor, nurse or pharmacist before following any medical regimen to see if it is safe and effective for you  © 2014 [address] is for End User's use only and may not be sold, redistributed or otherwise used for commercial purposes  All illustrations and images included in CareNotes® are the copyrighted property of A D A M , Inc  or Ramesh Bae

## 2020-09-21 DIAGNOSIS — E11.9 CONTROLLED TYPE 2 DIABETES MELLITUS WITHOUT COMPLICATION, WITH LONG-TERM CURRENT USE OF INSULIN (HCC): ICD-10-CM

## 2020-09-21 DIAGNOSIS — Z79.4 CONTROLLED TYPE 2 DIABETES MELLITUS WITHOUT COMPLICATION, WITH LONG-TERM CURRENT USE OF INSULIN (HCC): ICD-10-CM

## 2020-09-21 DIAGNOSIS — I10 BENIGN ESSENTIAL HTN: Primary | ICD-10-CM

## 2020-09-21 RX ORDER — NEBIVOLOL HYDROCHLORIDE 5 MG/1
TABLET ORAL
Qty: 90 TABLET | Refills: 3 | Status: SHIPPED | OUTPATIENT
Start: 2020-09-21 | End: 2020-10-13 | Stop reason: SDUPTHER

## 2020-09-21 RX ORDER — REPAGLINIDE 1 MG/1
TABLET ORAL
Qty: 90 TABLET | Refills: 3 | Status: SHIPPED | OUTPATIENT
Start: 2020-09-21 | End: 2020-10-13 | Stop reason: SDUPTHER

## 2020-09-21 RX ORDER — FOLIC ACID 1 MG/1
TABLET ORAL DAILY
Qty: 90 TABLET | Refills: 3 | Status: SHIPPED | OUTPATIENT
Start: 2020-09-21 | End: 2020-10-13 | Stop reason: SDUPTHER

## 2020-09-29 ENCOUNTER — APPOINTMENT (OUTPATIENT)
Dept: LAB | Facility: HOSPITAL | Age: 75
End: 2020-09-29
Payer: COMMERCIAL

## 2020-09-29 DIAGNOSIS — R53.83 FATIGUE, UNSPECIFIED TYPE: ICD-10-CM

## 2020-09-29 DIAGNOSIS — E11.9 CONTROLLED TYPE 2 DIABETES MELLITUS WITHOUT COMPLICATION, WITH LONG-TERM CURRENT USE OF INSULIN (HCC): ICD-10-CM

## 2020-09-29 DIAGNOSIS — L98.9 PSORIASIS-LIKE SKIN DISEASE: ICD-10-CM

## 2020-09-29 DIAGNOSIS — E55.9 VITAMIN D INSUFFICIENCY: ICD-10-CM

## 2020-09-29 DIAGNOSIS — Z79.4 CONTROLLED TYPE 2 DIABETES MELLITUS WITHOUT COMPLICATION, WITH LONG-TERM CURRENT USE OF INSULIN (HCC): ICD-10-CM

## 2020-09-29 DIAGNOSIS — E53.8 VITAMIN B12 DEFICIENCY: ICD-10-CM

## 2020-09-29 DIAGNOSIS — J41.8 MIXED SIMPLE AND MUCOPURULENT CHRONIC BRONCHITIS (HCC): ICD-10-CM

## 2020-09-29 DIAGNOSIS — E78.2 MIXED HYPERLIPIDEMIA: ICD-10-CM

## 2020-09-29 DIAGNOSIS — I10 BENIGN ESSENTIAL HTN: ICD-10-CM

## 2020-09-29 LAB
25(OH)D3 SERPL-MCNC: 42.2 NG/ML (ref 30–100)
ALBUMIN SERPL BCP-MCNC: 4 G/DL (ref 3.4–4.8)
ALP SERPL-CCNC: 92.1 U/L (ref 35–140)
ALT SERPL W P-5'-P-CCNC: 21 U/L (ref 5–54)
ANION GAP SERPL CALCULATED.3IONS-SCNC: 9 MMOL/L (ref 4–13)
AST SERPL W P-5'-P-CCNC: 12 U/L (ref 15–41)
BACTERIA UR QL AUTO: ABNORMAL /HPF
BILIRUB SERPL-MCNC: 0.57 MG/DL (ref 0.3–1.2)
BILIRUB UR QL STRIP: NEGATIVE
BUN SERPL-MCNC: 19 MG/DL (ref 6–20)
CALCIUM SERPL-MCNC: 9.4 MG/DL (ref 8.4–10.2)
CHLORIDE SERPL-SCNC: 101 MMOL/L (ref 96–108)
CHOLEST SERPL-MCNC: 216 MG/DL
CLARITY UR: CLEAR
CO2 SERPL-SCNC: 29 MMOL/L (ref 22–33)
COLOR UR: YELLOW
CREAT SERPL-MCNC: 0.72 MG/DL (ref 0.4–1.1)
ERYTHROCYTE [DISTWIDTH] IN BLOOD BY AUTOMATED COUNT: 13.9 % (ref 11.6–15.1)
EST. AVERAGE GLUCOSE BLD GHB EST-MCNC: 166 MG/DL
GFR SERPL CREATININE-BSD FRML MDRD: 81 ML/MIN/1.73SQ M
GLUCOSE P FAST SERPL-MCNC: 144 MG/DL (ref 70–100)
GLUCOSE UR STRIP-MCNC: ABNORMAL MG/DL
HBA1C MFR BLD: 7.4 %
HCT VFR BLD AUTO: 46.3 % (ref 34.8–46.1)
HDLC SERPL-MCNC: 45 MG/DL
HGB BLD-MCNC: 15.5 G/DL (ref 11.5–15.4)
HGB UR QL STRIP.AUTO: NEGATIVE
KETONES UR STRIP-MCNC: NEGATIVE MG/DL
LDLC SERPL CALC-MCNC: 144 MG/DL (ref 0–100)
LEUKOCYTE ESTERASE UR QL STRIP: ABNORMAL
MCH RBC QN AUTO: 29.5 PG (ref 26.8–34.3)
MCHC RBC AUTO-ENTMCNC: 33.5 G/DL (ref 31.4–37.4)
MCV RBC AUTO: 88 FL (ref 82–98)
NITRITE UR QL STRIP: NEGATIVE
NON-SQ EPI CELLS URNS QL MICRO: ABNORMAL /HPF
NONHDLC SERPL-MCNC: 171 MG/DL
PH UR STRIP.AUTO: 5.5 [PH]
PLATELET # BLD AUTO: 274 THOUSANDS/UL (ref 149–390)
PMV BLD AUTO: 10.5 FL (ref 8.9–12.7)
POTASSIUM SERPL-SCNC: 3.6 MMOL/L (ref 3.5–5)
PROT SERPL-MCNC: 6.4 G/DL (ref 6.4–8.3)
PROT UR STRIP-MCNC: NEGATIVE MG/DL
RBC # BLD AUTO: 5.26 MILLION/UL (ref 3.81–5.12)
RBC #/AREA URNS AUTO: ABNORMAL /HPF
SODIUM SERPL-SCNC: 139 MMOL/L (ref 133–145)
SP GR UR STRIP.AUTO: 1.02 (ref 1–1.03)
TRIGL SERPL-MCNC: 135.3 MG/DL
TSH SERPL DL<=0.05 MIU/L-ACNC: 1.88 UIU/ML (ref 0.34–5.6)
UROBILINOGEN UR QL STRIP.AUTO: 0.2 E.U./DL
WBC # BLD AUTO: 10.26 THOUSAND/UL (ref 4.31–10.16)
WBC #/AREA URNS AUTO: ABNORMAL /HPF

## 2020-09-29 PROCEDURE — 83036 HEMOGLOBIN GLYCOSYLATED A1C: CPT

## 2020-09-29 PROCEDURE — 36415 COLL VENOUS BLD VENIPUNCTURE: CPT

## 2020-09-29 PROCEDURE — 85027 COMPLETE CBC AUTOMATED: CPT

## 2020-09-29 PROCEDURE — 84443 ASSAY THYROID STIM HORMONE: CPT

## 2020-09-29 PROCEDURE — 80061 LIPID PANEL: CPT

## 2020-09-29 PROCEDURE — 81001 URINALYSIS AUTO W/SCOPE: CPT | Performed by: FAMILY MEDICINE

## 2020-09-29 PROCEDURE — 82306 VITAMIN D 25 HYDROXY: CPT

## 2020-09-29 PROCEDURE — 80053 COMPREHEN METABOLIC PANEL: CPT

## 2020-09-29 PROCEDURE — 82607 VITAMIN B-12: CPT

## 2020-09-30 LAB — VIT B12 SERPL-MCNC: 372 PG/ML (ref 100–900)

## 2020-10-13 ENCOUNTER — OFFICE VISIT (OUTPATIENT)
Dept: FAMILY MEDICINE CLINIC | Facility: CLINIC | Age: 75
End: 2020-10-13
Payer: COMMERCIAL

## 2020-10-13 VITALS
DIASTOLIC BLOOD PRESSURE: 62 MMHG | TEMPERATURE: 96.7 F | BODY MASS INDEX: 38.51 KG/M2 | RESPIRATION RATE: 18 BRPM | OXYGEN SATURATION: 98 % | HEART RATE: 68 BPM | SYSTOLIC BLOOD PRESSURE: 122 MMHG | WEIGHT: 204 LBS | HEIGHT: 61 IN

## 2020-10-13 DIAGNOSIS — Z23 NEED FOR VACCINATION: ICD-10-CM

## 2020-10-13 DIAGNOSIS — L40.9 PSORIASIS: ICD-10-CM

## 2020-10-13 DIAGNOSIS — E11.69 HYPERLIPIDEMIA ASSOCIATED WITH TYPE 2 DIABETES MELLITUS (HCC): ICD-10-CM

## 2020-10-13 DIAGNOSIS — E11.9 CONTROLLED TYPE 2 DIABETES MELLITUS WITHOUT COMPLICATION, WITH LONG-TERM CURRENT USE OF INSULIN (HCC): ICD-10-CM

## 2020-10-13 DIAGNOSIS — E53.8 CYANOCOBALAMIN DEFICIENCY: Primary | ICD-10-CM

## 2020-10-13 DIAGNOSIS — J41.8 MIXED SIMPLE AND MUCOPURULENT CHRONIC BRONCHITIS (HCC): Chronic | ICD-10-CM

## 2020-10-13 DIAGNOSIS — Z79.4 CONTROLLED TYPE 2 DIABETES MELLITUS WITHOUT COMPLICATION, WITH LONG-TERM CURRENT USE OF INSULIN (HCC): ICD-10-CM

## 2020-10-13 DIAGNOSIS — T46.6X5A MYALGIA DUE TO STATIN: ICD-10-CM

## 2020-10-13 DIAGNOSIS — I10 BENIGN ESSENTIAL HTN: ICD-10-CM

## 2020-10-13 DIAGNOSIS — M79.10 MYALGIA DUE TO STATIN: ICD-10-CM

## 2020-10-13 DIAGNOSIS — E78.5 HYPERLIPIDEMIA ASSOCIATED WITH TYPE 2 DIABETES MELLITUS (HCC): ICD-10-CM

## 2020-10-13 PROCEDURE — 99215 OFFICE O/P EST HI 40 MIN: CPT | Performed by: FAMILY MEDICINE

## 2020-10-13 PROCEDURE — 3288F FALL RISK ASSESSMENT DOCD: CPT | Performed by: FAMILY MEDICINE

## 2020-10-13 PROCEDURE — 1101F PT FALLS ASSESS-DOCD LE1/YR: CPT | Performed by: FAMILY MEDICINE

## 2020-10-13 PROCEDURE — 3078F DIAST BP <80 MM HG: CPT | Performed by: FAMILY MEDICINE

## 2020-10-13 PROCEDURE — 96372 THER/PROPH/DIAG INJ SC/IM: CPT

## 2020-10-13 PROCEDURE — 3725F SCREEN DEPRESSION PERFORMED: CPT | Performed by: FAMILY MEDICINE

## 2020-10-13 RX ORDER — NEBIVOLOL 5 MG/1
5 TABLET ORAL DAILY
Qty: 90 TABLET | Refills: 3 | Status: SHIPPED | OUTPATIENT
Start: 2020-10-13 | End: 2021-03-09

## 2020-10-13 RX ORDER — FOLIC ACID 1 MG/1
1 TABLET ORAL DAILY
Qty: 90 TABLET | Refills: 3 | Status: SHIPPED | OUTPATIENT
Start: 2020-10-13 | End: 2021-11-23

## 2020-10-13 RX ORDER — REPAGLINIDE 1 MG/1
1 TABLET ORAL
Qty: 90 TABLET | Refills: 3 | Status: SHIPPED | OUTPATIENT
Start: 2020-10-13 | End: 2021-12-28

## 2020-10-13 RX ORDER — EXENATIDE 2 MG/.85ML
2 INJECTION, SUSPENSION, EXTENDED RELEASE SUBCUTANEOUS
Qty: 12 PEN | Refills: 1 | Status: SHIPPED | OUTPATIENT
Start: 2020-10-13 | End: 2021-01-11

## 2020-10-13 RX ORDER — LOSARTAN POTASSIUM AND HYDROCHLOROTHIAZIDE 25; 100 MG/1; MG/1
1 TABLET ORAL DAILY
Qty: 90 TABLET | Refills: 1 | Status: SHIPPED | OUTPATIENT
Start: 2020-10-13 | End: 2021-03-11

## 2020-10-13 RX ORDER — CYANOCOBALAMIN 1000 UG/ML
1000 INJECTION INTRAMUSCULAR; SUBCUTANEOUS
Status: COMPLETED | OUTPATIENT
Start: 2020-10-13 | End: 2020-10-13

## 2020-10-13 RX ORDER — INSULIN GLARGINE 100 [IU]/ML
40 INJECTION, SOLUTION SUBCUTANEOUS
Qty: 5 PEN | Refills: 1 | Status: SHIPPED | OUTPATIENT
Start: 2020-10-13 | End: 2021-01-11

## 2020-10-13 RX ORDER — GLIMEPIRIDE 2 MG/1
2 TABLET ORAL
Qty: 90 TABLET | Refills: 3 | Status: SHIPPED | OUTPATIENT
Start: 2020-10-13 | End: 2021-12-28

## 2020-10-13 RX ORDER — INSULIN GLARGINE 100 [IU]/ML
40 INJECTION, SOLUTION SUBCUTANEOUS
Qty: 100 ML | Refills: 0 | Status: SHIPPED | OUTPATIENT
Start: 2020-10-13 | End: 2021-01-11

## 2020-10-13 RX ORDER — CANAGLIFLOZIN 300 MG/1
300 TABLET, FILM COATED ORAL DAILY
Qty: 90 TABLET | Refills: 1 | Status: SHIPPED | OUTPATIENT
Start: 2020-10-13 | End: 2021-03-21

## 2020-10-13 RX ORDER — APREMILAST 30 MG/1
2 TABLET, FILM COATED ORAL DAILY
Qty: 180 TABLET | Refills: 3 | Status: SHIPPED | OUTPATIENT
Start: 2020-10-13

## 2020-10-13 RX ORDER — AMLODIPINE BESYLATE 5 MG/1
5 TABLET ORAL DAILY
Qty: 90 TABLET | Refills: 3 | Status: SHIPPED | OUTPATIENT
Start: 2020-10-13 | End: 2021-03-10 | Stop reason: SDUPTHER

## 2020-10-13 RX ADMIN — CYANOCOBALAMIN 1000 MCG: 1000 INJECTION INTRAMUSCULAR; SUBCUTANEOUS at 11:07

## 2020-10-19 ENCOUNTER — TELEPHONE (OUTPATIENT)
Dept: FAMILY MEDICINE CLINIC | Facility: CLINIC | Age: 75
End: 2020-10-19

## 2020-10-19 DIAGNOSIS — E11.9 CONTROLLED TYPE 2 DIABETES MELLITUS WITHOUT COMPLICATION, WITH LONG-TERM CURRENT USE OF INSULIN (HCC): ICD-10-CM

## 2020-10-19 DIAGNOSIS — Z79.4 CONTROLLED TYPE 2 DIABETES MELLITUS WITHOUT COMPLICATION, WITH LONG-TERM CURRENT USE OF INSULIN (HCC): ICD-10-CM

## 2020-10-20 ENCOUNTER — OFFICE VISIT (OUTPATIENT)
Dept: FAMILY MEDICINE CLINIC | Facility: CLINIC | Age: 75
End: 2020-10-20
Payer: COMMERCIAL

## 2020-10-20 DIAGNOSIS — E53.8 CYANOCOBALAMIN DEFICIENCY: Primary | ICD-10-CM

## 2020-10-20 PROCEDURE — 1160F RVW MEDS BY RX/DR IN RCRD: CPT | Performed by: FAMILY MEDICINE

## 2020-10-20 PROCEDURE — 1036F TOBACCO NON-USER: CPT | Performed by: FAMILY MEDICINE

## 2020-10-20 RX ORDER — CYANOCOBALAMIN 1000 UG/ML
1000 INJECTION INTRAMUSCULAR; SUBCUTANEOUS
Status: SHIPPED | OUTPATIENT
Start: 2020-10-20

## 2020-10-20 RX ADMIN — CYANOCOBALAMIN 1000 MCG: 1000 INJECTION INTRAMUSCULAR; SUBCUTANEOUS at 11:13

## 2020-10-27 ENCOUNTER — IMMUNIZATIONS (OUTPATIENT)
Dept: FAMILY MEDICINE CLINIC | Facility: CLINIC | Age: 75
End: 2020-10-27
Payer: COMMERCIAL

## 2020-10-27 DIAGNOSIS — E53.8 CYANOCOBALAMIN DEFICIENCY: ICD-10-CM

## 2020-10-27 DIAGNOSIS — Z23 INFLUENZA VACCINE NEEDED: Primary | ICD-10-CM

## 2020-10-27 PROCEDURE — G0008 ADMIN INFLUENZA VIRUS VAC: HCPCS

## 2020-10-27 PROCEDURE — 90662 IIV NO PRSV INCREASED AG IM: CPT

## 2020-10-27 PROCEDURE — 96372 THER/PROPH/DIAG INJ SC/IM: CPT

## 2020-10-27 RX ORDER — CYANOCOBALAMIN 1000 UG/ML
1000 INJECTION INTRAMUSCULAR; SUBCUTANEOUS
Status: SHIPPED | OUTPATIENT
Start: 2020-10-27

## 2020-10-27 RX ADMIN — CYANOCOBALAMIN 1000 MCG: 1000 INJECTION INTRAMUSCULAR; SUBCUTANEOUS at 14:49

## 2020-11-03 ENCOUNTER — CLINICAL SUPPORT (OUTPATIENT)
Dept: FAMILY MEDICINE CLINIC | Facility: CLINIC | Age: 75
End: 2020-11-03
Payer: COMMERCIAL

## 2020-11-03 DIAGNOSIS — E53.8 CYANOCOBALAMIN DEFICIENCY: Primary | ICD-10-CM

## 2020-11-03 PROCEDURE — 96372 THER/PROPH/DIAG INJ SC/IM: CPT

## 2020-11-03 RX ORDER — CYANOCOBALAMIN 1000 UG/ML
1000 INJECTION INTRAMUSCULAR; SUBCUTANEOUS
Status: SHIPPED | OUTPATIENT
Start: 2020-11-03

## 2020-11-03 RX ADMIN — CYANOCOBALAMIN 1000 MCG: 1000 INJECTION INTRAMUSCULAR; SUBCUTANEOUS at 10:31

## 2020-11-10 ENCOUNTER — CLINICAL SUPPORT (OUTPATIENT)
Dept: FAMILY MEDICINE CLINIC | Facility: CLINIC | Age: 75
End: 2020-11-10

## 2020-11-10 DIAGNOSIS — E53.8 CYANOCOBALAMIN DEFICIENCY: Primary | ICD-10-CM

## 2020-11-15 RX ORDER — CYANOCOBALAMIN 1000 UG/ML
1000 INJECTION INTRAMUSCULAR; SUBCUTANEOUS
Status: SHIPPED | OUTPATIENT
Start: 2020-11-15

## 2020-11-18 ENCOUNTER — CLINICAL SUPPORT (OUTPATIENT)
Dept: FAMILY MEDICINE CLINIC | Facility: CLINIC | Age: 75
End: 2020-11-18
Payer: COMMERCIAL

## 2020-11-18 DIAGNOSIS — E53.8 B12 DEFICIENCY: Primary | ICD-10-CM

## 2020-11-18 PROCEDURE — 96372 THER/PROPH/DIAG INJ SC/IM: CPT

## 2020-11-18 RX ADMIN — CYANOCOBALAMIN 1000 MCG: 1000 INJECTION INTRAMUSCULAR; SUBCUTANEOUS at 11:20

## 2020-11-25 ENCOUNTER — CLINICAL SUPPORT (OUTPATIENT)
Dept: FAMILY MEDICINE CLINIC | Facility: CLINIC | Age: 75
End: 2020-11-25
Payer: COMMERCIAL

## 2020-11-25 DIAGNOSIS — E53.8 CYANOCOBALAMIN DEFICIENCY: Primary | ICD-10-CM

## 2020-11-25 PROCEDURE — 96372 THER/PROPH/DIAG INJ SC/IM: CPT

## 2020-11-25 RX ADMIN — CYANOCOBALAMIN 1000 MCG: 1000 INJECTION INTRAMUSCULAR; SUBCUTANEOUS at 08:20

## 2020-12-01 ENCOUNTER — CLINICAL SUPPORT (OUTPATIENT)
Dept: FAMILY MEDICINE CLINIC | Facility: CLINIC | Age: 75
End: 2020-12-01
Payer: COMMERCIAL

## 2020-12-01 DIAGNOSIS — E53.8 CYANOCOBALAMIN DEFICIENCY: Primary | ICD-10-CM

## 2020-12-01 PROCEDURE — 96372 THER/PROPH/DIAG INJ SC/IM: CPT

## 2020-12-01 RX ADMIN — CYANOCOBALAMIN 1000 MCG: 1000 INJECTION INTRAMUSCULAR; SUBCUTANEOUS at 08:09

## 2020-12-08 ENCOUNTER — CLINICAL SUPPORT (OUTPATIENT)
Dept: FAMILY MEDICINE CLINIC | Facility: CLINIC | Age: 75
End: 2020-12-08
Payer: COMMERCIAL

## 2020-12-08 DIAGNOSIS — E53.8 CYANOCOBALAMIN DEFICIENCY: Primary | ICD-10-CM

## 2020-12-08 PROCEDURE — 96372 THER/PROPH/DIAG INJ SC/IM: CPT

## 2020-12-08 RX ADMIN — CYANOCOBALAMIN 1000 MCG: 1000 INJECTION INTRAMUSCULAR; SUBCUTANEOUS at 08:00

## 2020-12-22 ENCOUNTER — CLINICAL SUPPORT (OUTPATIENT)
Dept: FAMILY MEDICINE CLINIC | Facility: CLINIC | Age: 75
End: 2020-12-22
Payer: COMMERCIAL

## 2020-12-22 DIAGNOSIS — E53.8 VITAMIN B12 DEFICIENCY: Primary | ICD-10-CM

## 2020-12-22 RX ADMIN — CYANOCOBALAMIN 1000 MCG: 1000 INJECTION INTRAMUSCULAR; SUBCUTANEOUS at 08:14

## 2020-12-29 ENCOUNTER — TELEPHONE (OUTPATIENT)
Dept: FAMILY MEDICINE CLINIC | Facility: CLINIC | Age: 75
End: 2020-12-29

## 2020-12-29 NOTE — TELEPHONE ENCOUNTER
Patient called her insurance will not pay for her bydureon as of January 1st    ----she has six weeks left

## 2020-12-29 NOTE — TELEPHONE ENCOUNTER
Moved pt's OV up to Janurary 11th, as she states concerns that her sugars are "all over the place " Called [facility] [facility] and they state that it was a mistake and that Bydurion is formulary for 2021 year  A PA may need to be done, depending on the quantity ordered, but that it shouldn't be an issue  Spoke with Jony Fernández   At [facility]

## 2021-01-05 ENCOUNTER — CLINICAL SUPPORT (OUTPATIENT)
Dept: FAMILY MEDICINE CLINIC | Facility: CLINIC | Age: 76
End: 2021-01-05
Payer: COMMERCIAL

## 2021-01-05 DIAGNOSIS — E53.8 CYANOCOBALAMIN DEFICIENCY: Primary | ICD-10-CM

## 2021-01-05 PROCEDURE — 96372 THER/PROPH/DIAG INJ SC/IM: CPT

## 2021-01-05 RX ADMIN — CYANOCOBALAMIN 1000 MCG: 1000 INJECTION INTRAMUSCULAR; SUBCUTANEOUS at 07:50

## 2021-01-11 ENCOUNTER — OFFICE VISIT (OUTPATIENT)
Dept: FAMILY MEDICINE CLINIC | Facility: CLINIC | Age: 76
End: 2021-01-11
Payer: COMMERCIAL

## 2021-01-11 VITALS
RESPIRATION RATE: 17 BRPM | OXYGEN SATURATION: 98 % | SYSTOLIC BLOOD PRESSURE: 118 MMHG | WEIGHT: 204.6 LBS | DIASTOLIC BLOOD PRESSURE: 58 MMHG | HEIGHT: 61 IN | HEART RATE: 64 BPM | BODY MASS INDEX: 38.63 KG/M2 | TEMPERATURE: 97.2 F

## 2021-01-11 DIAGNOSIS — E53.8 B12 DEFICIENCY: ICD-10-CM

## 2021-01-11 DIAGNOSIS — R63.8 INCREASED BMI: ICD-10-CM

## 2021-01-11 DIAGNOSIS — Z79.4 CONTROLLED TYPE 2 DIABETES MELLITUS WITHOUT COMPLICATION, WITH LONG-TERM CURRENT USE OF INSULIN (HCC): ICD-10-CM

## 2021-01-11 DIAGNOSIS — F33.9 DEPRESSION, RECURRENT (HCC): ICD-10-CM

## 2021-01-11 DIAGNOSIS — E11.9 CONTROLLED TYPE 2 DIABETES MELLITUS WITHOUT COMPLICATION, WITH LONG-TERM CURRENT USE OF INSULIN (HCC): Primary | ICD-10-CM

## 2021-01-11 DIAGNOSIS — E11.9 CONTROLLED TYPE 2 DIABETES MELLITUS WITHOUT COMPLICATION, WITH LONG-TERM CURRENT USE OF INSULIN (HCC): ICD-10-CM

## 2021-01-11 DIAGNOSIS — I10 BENIGN ESSENTIAL HTN: ICD-10-CM

## 2021-01-11 DIAGNOSIS — E11.69 HYPERLIPIDEMIA ASSOCIATED WITH TYPE 2 DIABETES MELLITUS (HCC): ICD-10-CM

## 2021-01-11 DIAGNOSIS — E78.5 HYPERLIPIDEMIA ASSOCIATED WITH TYPE 2 DIABETES MELLITUS (HCC): ICD-10-CM

## 2021-01-11 DIAGNOSIS — Z79.4 CONTROLLED TYPE 2 DIABETES MELLITUS WITHOUT COMPLICATION, WITH LONG-TERM CURRENT USE OF INSULIN (HCC): Primary | ICD-10-CM

## 2021-01-11 DIAGNOSIS — E66.01 OBESITY, MORBID (HCC): ICD-10-CM

## 2021-01-11 PROCEDURE — 99215 OFFICE O/P EST HI 40 MIN: CPT | Performed by: FAMILY MEDICINE

## 2021-01-11 PROCEDURE — 3078F DIAST BP <80 MM HG: CPT | Performed by: FAMILY MEDICINE

## 2021-01-11 PROCEDURE — 3074F SYST BP LT 130 MM HG: CPT | Performed by: FAMILY MEDICINE

## 2021-01-11 PROCEDURE — 1036F TOBACCO NON-USER: CPT | Performed by: FAMILY MEDICINE

## 2021-01-11 PROCEDURE — 1160F RVW MEDS BY RX/DR IN RCRD: CPT | Performed by: FAMILY MEDICINE

## 2021-01-11 RX ORDER — INSULIN GLARGINE 100 [IU]/ML
44 INJECTION, SOLUTION SUBCUTANEOUS
Qty: 5 PEN | Refills: 1 | Status: SHIPPED | OUTPATIENT
Start: 2021-01-11 | End: 2021-05-10

## 2021-01-11 RX ORDER — EXENATIDE 2 MG/.65ML
2 INJECTION, SUSPENSION, EXTENDED RELEASE SUBCUTANEOUS WEEKLY
Qty: 12 EACH | Refills: 3 | Status: SHIPPED | OUTPATIENT
Start: 2021-01-11 | End: 2021-03-23 | Stop reason: SDUPTHER

## 2021-01-11 NOTE — PROGRESS NOTES
Assessment/Plan:  65 yo female seen and eval and f/u for the following medical issues:    · NIDDM: on 5 different med and BBG's x 1 sheet are noted: Ave fasting 120, and PP hi 100's for the most part  Is on lantus inject Pen 44 U a day + 4 orals  Doesn't want to inc the dose of Lantus any hi'er than 44 U  (causes wt gain)    · Vit D insuff - on 5,000 IU     · Obesity - BMI 38 and wt204 with peak 223 when smoking     · HTN - by my calculation,  and 160/80 range, so will ck daily at home and I'll valarie  Has new BP machine at home     · Psoriasis - on Otezla 30 mg OD - gets directly from the co--if misses one pill, the palms flair immediately and it takes 2 mo to clear it up again - just from missing one pill     · Vit B 12 insuff-- <400 on oral now on the injectin q2w and will go to mo'ly soon   · Vit D insuff -- 42 now, cont on VitD,    ·  she is taking 5,000 IU so ck level now    · HLD - last chol 216 and  on leb in Sept -- refuses to take the statins due ot myalgia - "no way"  "Will take my changes"     Problem List Items Addressed This Visit        Endocrine    Controlled type 2 diabetes mellitus, with long-term current use of insulin (HCC)    Relevant Medications    Exenatide ER (Bydureon) 2 MG PEN    insulin glargine (Lantus SoloStar) 100 units/mL injection pen    Hyperlipidemia associated with type 2 diabetes mellitus (HCC)    Relevant Medications    Exenatide ER (Bydureon) 2 MG PEN    insulin glargine (Lantus SoloStar) 100 units/mL injection pen       Other    Depression, recurrent (HCC)    Obesity, morbid ([facility] [address] )      Other Visit Diagnoses     B12 deficiency    -  Primary    Relevant Medications    cyanocobalamin (VITAMIN B-12) 1000 MCG tablet    Increased BMI                Subjective: 65 yo female doing well with BS's too hi, but A1c 7 4 and will valarie  BP too hi by me:  150/80     Patient ID: Simran Jhaveri is a 66 y o  female      HPI  · --NIDDM: on 5 different med and BBG's x 1 sheet are noted:  Ave fasting 120, and PP hi 100's for the most part  Is on lantus inject Pen 44 U a day + 4 orals  Doesn't want to inc the dose of Lantus any hi'er than 44 U  (causes wt gain)    · Vit D insuff - on 5,000 IU     · Obesity - BMI 38 and wt204 with peak 223 when smoking     · HTN - by my calculation,  and 160/80 range, so will ck daily at home and I'll valarie  Has new BP machine at home     · Psoriasis - on Otezla 30 mg OD - gets directly from the co--if misses one pill, the palms flair immediately and it takes 2 mo to clear it up again - just from missing one pill     · Vit B 12 insuff-- <400 on oral now on the injectin q2w and will go to mo'ly soon   · Vit D insuff -- 42 now, cont on VitD,    ·  she is taking 5,000 IU so ck level now    · HLD - last chol 216 and  on leb in Sept -- refuses to take the statins due ot myalgia - "no way"  "Will take my changes"  The following portions of the patient's history were reviewed and updated as appropriate:   She has a past medical history of Benign essential hypertension, Chronic lower back pain, Diabetes mellitus ([address] ), Hyperlipidemia, and Sciatica  ,  does not have any pertinent problems on file  ,   has a past surgical history that includes Cholecystectomy  ,  family history includes Emphysema in her father; Leukemia in her father; Uterine cancer in her mother  ,   reports that she quit smoking about 16 years ago  Her smoking use included cigarettes  She has a 30 00 pack-year smoking history  She has never used smokeless tobacco  She reports that she does not drink alcohol or use drugs  ,  has No Known Allergies     Current Outpatient Medications   Medication Sig Dispense Refill    amLODIPine (NORVASC) 5 mg tablet Take 1 tablet (5 mg total) by mouth daily 90 tablet 3    Apremilast (Otezla) 30 MG TABS Take 2 tablets by mouth daily 180 tablet 3    Canagliflozin (Invokana) 300 MG TABS Take 1 tablet (300 mg total) by mouth daily 90 tablet 1    Cholecalciferol (VITAMIN D) 125 MCG (5000 UT) CAPS Take by mouth      folic acid (FOLVITE) 1 mg tablet Take 1 tablet (1 mg total) by mouth daily 90 tablet 3    glimepiride (AMARYL) 2 mg tablet Take 1 tablet (2 mg total) by mouth daily with breakfast 90 tablet 3    glucose blood test strip Test sugar 2-3 times daily 90 each 6    insulin glargine (Lantus SoloStar) 100 units/mL injection pen Inject 44 Units under the skin daily at bedtime 5 pen 1    losartan-hydrochlorothiazide (HYZAAR) 100-25 MG per tablet Take 1 tablet by mouth daily 90 tablet 1    nebivolol (Bystolic) 5 mg tablet Take 1 tablet (5 mg total) by mouth daily 90 tablet 3    repaglinide (PRANDIN) 1 mg tablet Take 1 tablet (1 mg total) by mouth daily before dinner 90 tablet 3    cyanocobalamin (VITAMIN B-12) 1000 MCG tablet Take 1 tablet (1,000 mcg total) by mouth daily 100 tablet 6    Exenatide ER (Bydureon) 2 MG PEN Inject 2 mg under the skin once a week 12 each 3     Current Facility-Administered Medications   Medication Dose Route Frequency Provider Last Rate Last Admin    cyanocobalamin injection 1,000 mcg  1,000 mcg Intramuscular Q30 Days [facility], DO   1,000 mcg at 12/22/20 2024    cyanocobalamin injection 1,000 mcg  1,000 mcg Intramuscular Q30 Days [facility], DO   1,000 mcg at 10/27/20 1449    cyanocobalamin injection 1,000 mcg  1,000 mcg Intramuscular Q30 Days [facility], DO   1,000 mcg at 01/05/21 0750    cyanocobalamin injection 1,000 mcg  1,000 mcg Intramuscular Q30 Days [facility], DO   1,000 mcg at 12/08/20 0800       Review of Systems   Constitutional: Negative for activity change (Very active and no complaints of back pain at this time--it will be recalled that she was in nursing home last December with severe sciatica inability to walk), appetite change, chills, diaphoresis, fatigue and fever     HENT: Negative for congestion, ear discharge, ear pain, facial swelling, nosebleeds, sore throat, tinnitus, trouble swallowing and voice change  Eyes: Negative for photophobia, pain, discharge, redness, itching and visual disturbance  Respiratory: Negative for apnea, cough, choking, chest tightness and shortness of breath  Cardiovascular: Negative for chest pain, palpitations and leg swelling  Denies any and all cardiac symptoms   Gastrointestinal: Negative for abdominal distention, abdominal pain, blood in stool, constipation, diarrhea, nausea and vomiting  Endocrine: Negative for cold intolerance, heat intolerance, polydipsia, polyphagia and polyuria  Genitourinary: Negative for decreased urine volume, difficulty urinating, dysuria, enuresis, frequency, hematuria, pelvic pain, urgency and vaginal bleeding  Musculoskeletal: Negative for arthralgias, back pain, gait problem, joint swelling, neck pain and neck stiffness  Skin: Negative for color change and pallor  Rash:  palmar psoriasis notable on right hand but left hand has healed nicely  Louann Fallon has made a huge difference a taking it b i d  Causes significant vomiting not just nausea she generally takes 30 mg once daily but on occasion 60 mg once daily  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-- on Otezla 30 mg OD (occas BID)==> vomiting is the issue with the hi dose  Currently taking OD, occas BID, but cannot miss even one day, cause will result in a flare   Allergic/Immunologic: Negative for immunocompromised state  Neurological: Negative for dizziness, seizures, facial asymmetry, light-headedness, numbness and headaches  Hematological: Negative for adenopathy  Psychiatric/Behavioral: Negative for agitation, behavioral problems, confusion, decreased concentration, dysphoric mood and hallucinations           Objective:  Vitals:    01/11/21 1027   BP: 118/58   BP Location: Left arm   Patient Position: Sitting   Cuff Size: Large   Pulse: 64   Resp: 17   Temp: (!) 97 2 °F (36 2 °C)   TempSrc: Temporal   SpO2: 98%   Weight: 92 8 kg (204 lb 9 6 oz)   Height: 5' 1" (1 549 m)     Body mass index is 38 66 kg/m²  Physical Exam  Vitals signs and nursing note reviewed  Constitutional:       General: She is not in acute distress  Appearance: Normal appearance  She is well-developed  She is obese  She is not diaphoretic  HENT:      Head: Normocephalic and atraumatic  Nose: Nose normal  No congestion or rhinorrhea  Eyes:      General: No scleral icterus  Conjunctiva/sclera: Conjunctivae normal       Pupils: Pupils are equal, round, and reactive to light  Neck:      Musculoskeletal: Normal range of motion and neck supple  No neck rigidity or muscular tenderness  Thyroid: No thyromegaly  Trachea: No tracheal deviation  Cardiovascular:      Rate and Rhythm: Normal rate and regular rhythm  Pulses: Normal pulses  Heart sounds: Normal heart sounds  Pulmonary:      Effort: Pulmonary effort is normal  No respiratory distress  Breath sounds: Normal breath sounds  No wheezing, rhonchi or rales  Chest:      Chest wall: No tenderness  Musculoskeletal: Normal range of motion  General: No tenderness or deformity  Skin:     General: Skin is warm and dry  Coloration: Skin is not pale  Findings: No erythema or rash  Neurological:      General: No focal deficit present  Mental Status: She is alert and oriented to person, place, and time  Mental status is at baseline  Cranial Nerves: No cranial nerve deficit  Psychiatric:         Mood and Affect: Mood normal          Behavior: Behavior normal          Thought Content: Thought content normal          Judgment: Judgment normal         Pt advised to get the vaccine when available  Covid prescautions discussed      Time spent 45 min with >50% spent in counseling and coordination of care as related to all the above issues  , diet, exercise, wt, meds' and Covid-19

## 2021-01-11 NOTE — PATIENT INSTRUCTIONS
Type 2 Diabetes in the Older Adult   WHAT YOU NEED TO KNOW:   The risk for type 2 diabetes increases as a person gets older  Type 2 diabetes means your pancreas does not make enough insulin, or your body does not use insulin well  Insulin helps move sugar out of the blood so it can be used for energy  Diabetes cannot be cured, but it can be managed  DISCHARGE INSTRUCTIONS:   Call or have someone close to you call your local emergency number (911 in the [address] [facility]) for any of the following:   · You have any of the following signs of a stroke:      ? Numbness or drooping on one side of your face     ? Weakness in an arm or leg    ? Confusion or difficulty speaking    ? Dizziness, a severe headache, or vision loss    · You have any of the following signs of a heart attack:      ? Squeezing, pressure, or pain in your chest    ? You may  also have any of the following:     § Discomfort or pain in your back, neck, jaw, stomach, or arm    § Shortness of breath    § Nausea or vomiting    § Lightheadedness or a sudden cold sweat    Return to the emergency department if:   · Your blood sugar level is higher than your goal and does not come down with treatment  · You have signs of a high blood sugar level, such as blurred or double vision  · You have signs of a high ketone level, such as fruity, sweet smelling breath, or shallow breathing  · You have symptoms of a low blood sugar level, such as trouble thinking, sweating, or a pounding heartbeat  · Your blood sugar level is lower than normal and does not improve with treatment  Call your doctor or diabetes care team if:   · You are vomiting or have diarrhea  · You have an upset stomach and cannot eat the foods on your meal plan  · You feel weak or more tired than usual     · You feel dizzy, have headaches, or are easily irritated  · Your skin is red, warm, dry, or swollen      · You have a wound that does not heal     · You have numbness in your arms or legs     · You have trouble coping with diabetes, or you feel anxious or depressed  · You have problems with your memory  · You have changes in your vision  · You have questions or concerns about your condition or care  Manage diabetes and prevent problems:  Sometimes type 2 diabetes can be managed with changes in nutrition and physical activity  · Work with your diabetes care team to create plans to meet your needs  Your diabetes care team may include a physician, nurse practitioner, and physician assistant  It may also include a diabetes nurse educator, dietitian, and an exercise specialist  Family members, or others who are close to you, may also be part of the team  You and your team will make goals and plans to manage diabetes and other health problems  For example, the plan will include how to manage medicines you may take for diabetes and for other health conditions  The plans and goals will be specific to your needs and abilities  Your plan will change as your needs and abilities change  · Manage other health issues as directed  Health issues may include high blood pressure, high cholesterol levels, and heart problems  Health issues may also include depression  Together you and your care team can create a plan to manage any other health issues  · Try to be physically active for 30 to 60 minutes most days of the week  Physical activity, such as exercise, helps keep your blood sugar level steady and lowers your risk for heart disease  Physical activity can help improve your balance and strength and lower your risk for falls  Start slowly  Activity can be done in 10-minute intervals  ? Set a goal for 30 minutes of aerobic activity at least 5 times a week  Aerobic activity helps your heart stay strong  Aerobic activity includes walking, bicycling, dancing, swimming, and raking leaves  ? Set a goal for strength training 2 times a week    Strength training helps you keep the muscles you have and build new muscles  Strength training includes lifting weights, climbing stairs, and doing yoga or radha chi     ? Stay steady on your on your feet with balancing activities  These include walking backwards, standing on one foot, and walking heel to toe in a straight line  · Maintain a healthy weight  Ask your provider what a healthy weight is for you  A healthy weight can help you control diabetes and prevent heart disease  Ask your provider to help you create a weight loss plan if you are overweight  Weight loss of 10 to 15 pounds can help make a difference in managing diabetes  Together you and your care team can set manageable weight loss goals  · Know the risks if you choose to drink alcohol  Alcohol can cause your blood sugar levels to be low if you use insulin  Alcohol can cause high blood sugar levels and weight gain if you drink too much  Women 21 years or older and men 72 years or older should limit alcohol to 1 drink a day  Men 21 to 64 years should limit alcohol to 2 drinks a day  A drink of alcohol is 12 ounces of beer, 5 ounces of wine, or 1½ ounces of liquor  · Do not smoke  Nicotine and other chemicals in cigarettes can cause lung disease and other health problems  It can also cause blood vessel damage that makes diabetes more difficult to manage  Ask your healthcare provider for information if you currently smoke and need help to quit  Do not use e-cigarettes or smokeless tobacco in place of cigarettes or to help you quit  They still contain nicotine  Diabetes education:  Diabetes education will start right away  Members of your care team teach you, your family, and caregivers the following:  · How to check your blood sugar level: You will learn when to check your blood sugar level and what the level should be  You will learn what to do if your level is too high or too low  Write down the times of your checks and your levels   Take them to all follow-up appointments  · About diabetes medicine: You and your family members will be taught how to draw up and give insulin, if needed  You will learn how much insulin you need and what time to inject insulin  You will be taught when not to give insulin  Your team will also teach you how to dispose of needles and syringes  If you need oral diabetes medicine, you will be taught about side effects  You will also be taught when to take or not take the medicine  · About nutrition:  A dietitian will help you make a meal plan to keep your blood sugar level steady  You will learn how food affects your blood sugar levels  You will also learn to keep track of sugar and starchy foods (carbohydrates)  Do not skip meals  Your blood sugar level may drop too low if you have taken insulin and do not eat  · How to prevent complications:  Diabetes that is not well controlled can lead to health problems  Examples include foot sores, retinopathy (vision loss), and peripheral neuropathy (loss of feeling in your hands and feet)  Your team will help you know when to get regular checkups, such as vision checks  They will teach you how to watch for problems and when to get a problem checked  Other ways to manage diabetes:   · Check your feet every day for sores  Look at your whole foot, including the bottom, and between and under your toes  Check for wounds, corns, and calluses  Use a mirror to see the bottom of your feet  The skin on your feet may be shiny, tight, dry, or darker than normal  Your feet may also be cold and pale  Feel your feet by running your hands along the tops, bottoms, sides, and between your toes  Redness, swelling, and warmth are signs of blood flow problems that can lead to a foot ulcer  Do not try to remove corns or calluses yourself  · Wear medical alert identification  Wear medical alert jewelry or carry a card that says you have type 2 diabetes   Ask your healthcare provider where to get these items          · Ask about vaccines  You have a higher risk for serious illness if you get the flu, pneumonia, or hepatitis  Ask your healthcare provider if you should get a flu, pneumonia, shingles, or hepatitis B vaccine, and when to get the vaccine  · Get help from family and friends  You may need help checking your blood sugar level, giving insulin injections, or preparing your meals  You may also need help to check your feet for sores  Ask your family and friends to help you with these tasks  Talk to your care team if you need someone at home to help you  Follow up with your doctor or diabetes care team as directed: You will need to return to meet with different care team members  You may need tests to monitor for problems  Write down your questions so you remember to ask them during your visits  © Copyright [address] Information is for End User's use only and may not be sold, redistributed or otherwise used for commercial purposes  All illustrations and images included in CareNotes® are the copyrighted property of A D A M , Inc  or [address] [place], [place] [address]pa[facility]  The above information is an  only  It is not intended as medical advice for individual conditions or treatments  Talk to your doctor, nurse or pharmacist before following any medical regimen to see if it is safe and effective for you  DASH Eating Plan   WHAT YOU NEED TO KNOW:   The DASH (Dietary Approaches to Stop Hypertension) Eating Plan is designed to help prevent or lower high blood pressure  It can also help to lower LDL (bad) cholesterol and decrease your risk of heart disease  The plan is low in sodium, sugar, unhealthy fats, and total fat  It is high in potassium, calcium, magnesium, and fiber  These nutrients are added when you eat more fruits, vegetables, and whole grains  DISCHARGE INSTRUCTIONS:   Your sodium limit each day: Your dietitian will tell you how much sodium is safe for you to have each day   People with high blood pressure should have no more than 1,500 to 2,300 mg of sodium in a day  A teaspoon (tsp) of salt has 2,300 mg of sodium  This may seem like a difficult goal, but small changes to the foods you eat can make a big difference  Your healthcare provider or dietitian can help you create a meal plan that follows your sodium limit  How to limit sodium:   · Read food labels  Food labels can help you choose foods that are low in sodium  The amount of sodium is listed in milligrams (mg)  The % Daily Value (DV) column tells you how much of your daily needs are met by 1 serving of the food for each nutrient listed  Choose foods that have less than 5% of the DV of sodium  These foods are considered low in sodium  Foods that have 20% or more of the DV of sodium are considered high in sodium  Avoid foods that have more than 300 mg of sodium in each serving  Choose foods that say low-sodium, reduced-sodium, or no salt added on the food label  · Avoid salt  Do not salt food at the table, and add very little salt to foods during cooking  Use herbs and spices, such as onions, garlic, and salt-free seasonings to add flavor to foods  Try lemon or lime juice or vinegar to give foods a tart flavor  Use hot peppers or a small amount of hot pepper sauce to add a spicy flavor to foods  · Ask about salt substitutes  Ask your healthcare provider if you may use salt substitutes  Some salt substitutes have ingredients that can be harmful if you have certain health conditions  · Choose foods carefully at restaurants  Meals from restaurants, especially fast food restaurants, are often high in sodium  Some restaurants have nutrition information that tells you the amount of sodium in their foods  Ask to have your food prepared with less, or no salt  What you need to know about fats:   · Include healthy fats  Examples are unsaturated fats and omega-3 fatty acids   Unsaturated fats are found in soybean, canola, olive, or sunflower oil, and liquid and soft tub margarines  Omega-3 fatty acids are found in fatty fish, such as salmon, tuna, mackerel, and sardines  It is also found in flaxseed oil and ground flaxseed  · Avoid unhealthy fats  Do not eat unhealthy fats, such as saturated fats and trans fats  Saturated fats are found in foods that contain fat from animals  Examples are fatty meats, whole milk, butter, cream, and other dairy foods  It is also found in shortening, stick margarine, palm oil, and coconut oil  Trans fats are found in fried foods, crackers, chips, and baked goods made with margarine or shortening  Foods to include: With the DASH eating plan, you need to eat a certain number of servings from each food group  This will help you get enough of certain nutrients and limit others  The amount of servings you should eat depends on how many calories you need  Your dietitian can tell you how many calories you need  The number of servings listed next to the food groups below are for people who need about 2,000 calories each day  · Grains:  6 to 8 servings (3 of these servings should be whole-grain foods)    ? 1 slice of whole-grain bread     ? 1 ounce of dry cereal    ? ½ cup of cooked cereal, pasta, or brown rice    · Vegetables and fruits:  4 to 5 servings of fruits and 4 to 5 servings of vegetables    ? 1 medium fruit    ? ½ cup of frozen, canned (no added salt), or chopped fresh vegetables     ? ½ cup of fresh, frozen, dried, or canned fruit (canned in light syrup or fruit juice)    ? ½ cup of vegetable or fruit juice    · Dairy:  2 to 3 servings    ? 1 cup of nonfat (skim) or 1% milk    ? 1½ ounces of fat-free or low-fat cheese    ? 6 ounces of nonfat or low-fat yogurt    · Lean meat, poultry, and fish:  6 ounces or less    ? Poultry (chicken, turkey) with no skin    ? Fish (especially fatty fish, such as salmon, fresh tuna, or mackerel)    ?  Lean beef and pork (loin, round, extra lean hamburger)    ? Egg whites and egg substitutes    · Nuts, seeds, and legumes:  4 to 5 servings each week    ? ½ cup of cooked beans and peas    ? 1½ ounces of unsalted nuts    ? 2 tablespoons of peanut butter or seeds    · Sweets and added sugars:  5 or less each week    ? 1 tablespoon of sugar, jelly, or jam    ? ½ cup of sorbet or gelatin    ? 1 cup of lemonade    · Fats:  2 to 3 servings each week    ? 1 teaspoon of soft margarine or vegetable oil    ? 1 tablespoon of mayonnaise    ? 2 tablespoons of salad dressing    Foods to avoid:   · Grains:      ? Baked goods, such as doughnuts, pastries, cookies, and biscuits (high in fat and sugar)    ? Mixes for cornbread and biscuits, packaged foods, such as bread stuffing, rice and pasta mixes, macaroni and cheese, and instant cereals (high in sodium)    · Fruits and vegetables:      ? Regular, canned vegetables (high in sodium)    ? Sauerkraut, pickled vegetables, and other foods prepared in brine (high in sodium)    ? Fried vegetables or vegetables in butter or high-fat sauces    ? Fruit in cream or butter sauce (high in fat)    · Dairy:      ? Whole milk, 2% milk, and cream (high in fat)    ? Regular cheese and processed cheese (high in fat and sodium)    · Meats and protein foods:      ? Smoked or cured meat, such as corned beef, alberto, ham, hot dogs, and sausage (high in fat and sodium)    ? Canned beans and canned meats or spreads, such as potted meats, sardines, anchovies, and imitation seafood (high in sodium)    ? Deli or lunch meats, such as bologna, ham, turkey, and roast beef (high in sodium)    ? High-fat meat (T-bone steak, regular hamburger, and ribs)    ? Whole eggs and egg yolks (high in fat)    · Other:      ? Seasonings made with salt, such as garlic salt, celery salt, onion salt, seasoned salt, meat tenderizers, and monosodium glutamate (MSG)    ? Miso soup and canned or dried soup mixes (high in sodium)    ?  Regular soy sauce, barbecue sauce, teriyaki sauce, steak sauce, Worcestershire sauce, and most flavored vinegars (high in sodium)    ? Regular condiments, such as mustard, ketchup, and salad dressings (high in sodium)    ? Gravy and sauces, such as Cricket or cheese sauces (high in sodium and fat)    ? Drinks high in sugar, such as soda or fruit drinks    ? Snack foods, such as salted chips, popcorn, pretzels, pork rinds, salted crackers, and salted nuts    ? Frozen foods, such as dinners, entrees, vegetables with sauces, and breaded meats (high in sodium)    Other guidelines to follow:   · Maintain a healthy weight  Your risk for heart disease is higher if you are overweight  Your healthcare provider may suggest that you lose weight if you are overweight  You can lose weight by eating fewer calories and foods that have added sugars and fat  The DASH meal plan can help you do this  Decrease calories by eating smaller portions at each meal and fewer snacks  Ask your healthcare provider for more information about how to lose weight  · Exercise regularly  Regular exercise can help you reach or maintain a healthy weight  Regular exercise can also help decrease your blood pressure and improve your cholesterol levels  Get 30 minutes or more of moderate exercise each day of the week  To lose weight, get at least 60 minutes of exercise  Talk to your healthcare provider about the best exercise program for you  · Limit alcohol  Women should limit alcohol to 1 drink a day  Men should limit alcohol to 2 drinks a day  A drink of alcohol is 12 ounces of beer, 5 ounces of wine, or 1½ ounces of liquor  © Copyright [address] Information is for End User's use only and may not be sold, redistributed or otherwise used for commercial purposes  All illustrations and images included in CareNotes® are the copyrighted property of [employer] M , Inc  or [facility]   The above information is an  only   It is not intended as medical advice for individual conditions or treatments  Talk to your doctor, nurse or pharmacist before following any medical regimen to see if it is safe and effective for you  Chronic Hypertension   WHAT YOU NEED TO KNOW:   Hypertension is high blood pressure  Your blood pressure is the force of your blood moving against the walls of your arteries  Hypertension causes your blood pressure to get so high that your heart has to work much harder than normal  This can damage your heart  Even if you have hypertension for years, lifestyle changes, medicines, or both can help bring your blood pressure to normal   DISCHARGE INSTRUCTIONS:   Call 911 for any of the following:   · You have chest pain  · You have any of the following signs of a heart attack:      ? Squeezing, pressure, or pain in your chest    ? You may  also have any of the following:     § Discomfort or pain in your back, neck, jaw, stomach, or arm    § Shortness of breath    § Nausea or vomiting    § Lightheadedness or a sudden cold sweat    · You become confused or have difficulty speaking  · You suddenly feel lightheaded or have trouble breathing  Return to the emergency department if:   · You have a severe headache or vision loss  · You have weakness in an arm or leg  Contact your healthcare provider if:   · You feel faint, dizzy, confused, or drowsy  · You have been taking your blood pressure medicine but your pressure is higher than your provider says it should be  · You have questions or concerns about your condition or care  Medicines: You may need any of the following:  · Antihypertensives  may be used to help lower your blood pressure  Several kinds of medicines are available  Your healthcare provider may change the medicine or medicines you currently take  This may be needed if your blood pressure is often high when you check it at home or you are having other problems with blood pressure control      · Diuretics  help decrease extra fluid that collects in your body  This will help lower your BP  You may urinate more often while you take this medicine  · Cholesterol medicine  helps lower your cholesterol level  A low cholesterol level helps prevent heart disease and makes it easier to control your blood pressure  · Take your medicine as directed  Contact your healthcare provider if you think your medicine is not helping or if you have side effects  Tell him or her if you are allergic to any medicine  Keep a list of the medicines, vitamins, and herbs you take  Include the amounts, and when and why you take them  Bring the list or the pill bottles to follow-up visits  Carry your medicine list with you in case of an emergency  Follow up with your healthcare provider as directed: You will need to return to have your blood pressure checked and to have other lab tests done  Write down your questions so you remember to ask them during your visits  Stages of hypertension:       · Normal blood pressure is 119/79 or lower   Your healthcare provider may only check your blood pressure each year if it stays at a normal level  · Elevated blood pressure is 120/79 to 129/79   This is sometimes called prehypertension  Your healthcare provider may suggest lifestyle changes to help lower your blood pressure to a normal level  He or she may then check it again in 3 to 6 months  · Stage 1 hypertension is 130/80  to 139/89   Your provider may recommend lifestyle changes, medication, and checks every 3 to 6 months until your blood pressure is controlled  · Stage 2 hypertension is 140/90 or higher   Your provider will recommend lifestyle changes and have you take 2 kinds of hypertension medicines  You will also need to have your blood pressure checked monthly until it is controlled  Manage chronic hypertension:   · Check your blood pressure at home    Avoid smoking, caffeine, and exercise at least 30 minutes before checking your blood pressure  Sit and rest for 5 minutes before you take your blood pressure  Extend your arm and support it on a flat surface  Your arm should be at the same level as your heart  Follow the directions that came with your blood pressure monitor  Check your blood pressure 2 times, 1 minute apart, before you take your medicine in the morning  Also check your blood pressure before your evening meal  Keep a record of your readings and bring it to your follow-up visits  Ask your healthcare provider what your blood pressure should be  · Manage any other health conditions you have  Health conditions such as diabetes can increase your risk for hypertension  Follow your healthcare provider's instructions and take all your medicines as directed  Talk to your healthcare provider about any new health conditions you have recently developed  · Ask about all medicines  Certain medicines can increase your blood pressure  Examples include oral birth control pills, decongestants, herbal supplements, and NSAIDs, such as ibuprofen  Your healthcare provider can tell you which medicines are safe for you to take  This includes prescription and over-the-counter medicines  Lifestyle changes you can make to lower your blood pressure: Your provider may want you to make more lifestyle changes if you are having trouble controlling your blood pressure  This may feel difficult over time, especially if you think you are making good changes but your pressure is still high  It might help to focus on one new change at a time  For example, try to add 1 more day of exercise, or exercise for an extra 10 minutes on 2 days  Small changes can make a big difference  Your healthcare provider can also refer you to specialists such as a dietitian who can help you make small changes  · Limit sodium (salt) as directed  Too much sodium can affect your fluid balance  Check labels to find low-sodium or no-salt-added foods   Some low-sodium foods use potassium salts for flavor  Too much potassium can also cause health problems  Your healthcare provider will tell you how much sodium and potassium are safe for you to have in a day  He or she may recommend that you limit sodium to 2,300 mg a day  · Follow the meal plan recommended by your healthcare provider  A dietitian or your provider can give you more information on low-sodium plans or the DASH (Dietary Approaches to Stop Hypertension) eating plan  The DASH plan is low in sodium, unhealthy fats, and total fat  It is high in potassium, calcium, and fiber  · Exercise to maintain a healthy weight  Exercise at least 30 minutes per day, on most days of the week  This will help decrease your blood pressure  Ask your healthcare provider about the best exercise plan for you  · Decrease stress  This may help lower your blood pressure  Learn ways to relax, such as deep breathing or listening to music  · Limit alcohol as directed  Alcohol can increase your blood pressure  A drink of alcohol is 12 ounces of beer, 5 ounces of wine, or 1½ ounces of liquor  · Do not smoke  Nicotine and other chemicals in cigarettes and cigars can increase your blood pressure and also cause lung damage  Ask your healthcare provider for information if you currently smoke and need help to quit  E-cigarettes or smokeless tobacco still contain nicotine  Talk to your healthcare provider before you use these products  © Copyright [address] Information is for End User's use only and may not be sold, redistributed or otherwise used for commercial purposes  All illustrations and images included in CareNotes® are the copyrighted property of A D A M , Inc  or [address] [place], [place] [address]pa[facility]  The above information is an  only  It is not intended as medical advice for individual conditions or treatments   Talk to your doctor, nurse or pharmacist before following any medical regimen to see if it is safe and effective for you

## 2021-01-11 NOTE — PROGRESS NOTES
BMI Counseling: Body mass index is 38 66 kg/m²  The BMI is above normal  Nutrition recommendations include decreasing portion sizes, encouraging healthy choices of fruits and vegetables, decreasing fast food intake, consuming healthier snacks, limiting drinks that contain sugar, moderation in carbohydrate intake, increasing intake of lean protein and reducing intake of saturated and trans fat  Exercise recommendations include moderate physical activity 150 minutes/week and exercising 3-5 times per week  No pharmacotherapy was ordered  Pt rarely eats fast foods, is aware of need to reduce salt as pt knows the average American eats 6-8,000 mg Na+ a day, whereas shouldn't eat >1,500-2,000 mg Na+ a day         Peak wt ever 223 in c2009, when stopped smoking     Wt now 204

## 2021-01-19 ENCOUNTER — CLINICAL SUPPORT (OUTPATIENT)
Dept: FAMILY MEDICINE CLINIC | Facility: CLINIC | Age: 76
End: 2021-01-19
Payer: COMMERCIAL

## 2021-01-19 DIAGNOSIS — E53.8 CYANOCOBALAMIN DEFICIENCY: Primary | ICD-10-CM

## 2021-01-19 PROCEDURE — 96372 THER/PROPH/DIAG INJ SC/IM: CPT

## 2021-01-19 RX ADMIN — CYANOCOBALAMIN 1000 MCG: 1000 INJECTION INTRAMUSCULAR; SUBCUTANEOUS at 08:03

## 2021-01-22 ENCOUNTER — TELEPHONE (OUTPATIENT)
Dept: FAMILY MEDICINE CLINIC | Facility: HOSPITAL | Age: 76
End: 2021-01-22

## 2021-01-22 NOTE — TELEPHONE ENCOUNTER
Spoke with Dr Lianna De Santiago in office, he had me advise pt to start taking Bystolic 5mg (1 5 tabs per day now)   Will review in March at upcoming appt

## 2021-01-22 NOTE — TELEPHONE ENCOUNTER
Interesting, her bp on 1/11 was normal in office  please let pt know 159/70 is acceptable   We'll recheck bp fo benny at visit 2/2

## 2021-01-22 NOTE — TELEPHONE ENCOUNTER
Pt LM on VM @ 3:00pm at [facility]  She states that earlier this week her BP was 188/100  Today it was better at 159/70  She wanted to let Dr Yuliana Ortiz know and if he thinks this OK for her

## 2021-01-26 NOTE — TELEPHONE ENCOUNTER
Pt called - she is scheduled for 1/29 to have the Covid vaccine -  She has had a consistent headache since 1/22 and her BP has been elevated - her numbers yesterday were 160/73, 154/86, 160/90 - she wants to know if it is ok to get the Covid vaccine?  I offered her a visit but she'd like your opinion first

## 2021-01-27 NOTE — TELEPHONE ENCOUNTER
Saturnino Wilson, tell pt to start taking Bystolic 10 mg OD and start that dose Wed (today)  Must get it down  Do this 2-3 wk, then see me for brief OV in 2-3 wks  These BP's are too hi

## 2021-01-27 NOTE — TELEPHONE ENCOUNTER
Spoke with pt, she will start taking the 10 mg of Bystolic daily again  Pt is scheduled to see Dr Zuleika Van on 2/16  I advised her to please call if any of her symptoms worsened

## 2021-01-28 ENCOUNTER — TELEPHONE (OUTPATIENT)
Dept: FAMILY MEDICINE CLINIC | Facility: CLINIC | Age: 76
End: 2021-01-28

## 2021-01-28 NOTE — TELEPHONE ENCOUNTER
Pt called and has started to take the 10 mg of Bystolic  She reports her BP to be 116/77 this morning  However states that she doesn't;t feel right  Has a headache up the back of the neck to front of the forehead  Pt states that her neck feels "creaky," like it "needs oiling " And every now and then she says that she has a pain in her chest that feels like indigestion  She wanted to let Dr Kash Sanderson know to see what he thinks

## 2021-01-28 NOTE — TELEPHONE ENCOUNTER
Spoke with Dr Rosey Lora directly regarding pt's concerns  Per , spoke with pt and relayed that she should take Tylenol prn and put hot towel behind neck and will see her 2 weeks

## 2021-02-05 ENCOUNTER — CLINICAL SUPPORT (OUTPATIENT)
Dept: FAMILY MEDICINE CLINIC | Facility: CLINIC | Age: 76
End: 2021-02-05
Payer: COMMERCIAL

## 2021-02-05 ENCOUNTER — LAB (OUTPATIENT)
Dept: LAB | Facility: HOSPITAL | Age: 76
End: 2021-02-05
Payer: COMMERCIAL

## 2021-02-05 DIAGNOSIS — E11.9 CONTROLLED TYPE 2 DIABETES MELLITUS WITHOUT COMPLICATION, WITH LONG-TERM CURRENT USE OF INSULIN (HCC): ICD-10-CM

## 2021-02-05 DIAGNOSIS — I10 BENIGN ESSENTIAL HTN: ICD-10-CM

## 2021-02-05 DIAGNOSIS — Z79.4 CONTROLLED TYPE 2 DIABETES MELLITUS WITHOUT COMPLICATION, WITH LONG-TERM CURRENT USE OF INSULIN (HCC): ICD-10-CM

## 2021-02-05 DIAGNOSIS — E53.8 CYANOCOBALAMIN DEFICIENCY: Primary | ICD-10-CM

## 2021-02-05 DIAGNOSIS — E53.8 B12 DEFICIENCY: ICD-10-CM

## 2021-02-05 LAB
ALBUMIN SERPL BCP-MCNC: 4 G/DL (ref 3.4–4.8)
ALP SERPL-CCNC: 98.8 U/L (ref 35–140)
ALT SERPL W P-5'-P-CCNC: 25 U/L (ref 5–54)
ANION GAP SERPL CALCULATED.3IONS-SCNC: 9 MMOL/L (ref 4–13)
AST SERPL W P-5'-P-CCNC: 11 U/L (ref 15–41)
BASOPHILS # BLD AUTO: 0.09 THOUSANDS/ΜL (ref 0–0.1)
BASOPHILS NFR BLD AUTO: 1 % (ref 0–1)
BILIRUB SERPL-MCNC: 0.45 MG/DL (ref 0.3–1.2)
BUN SERPL-MCNC: 23 MG/DL (ref 6–20)
CALCIUM SERPL-MCNC: 9.9 MG/DL (ref 8.4–10.2)
CHLORIDE SERPL-SCNC: 101 MMOL/L (ref 96–108)
CO2 SERPL-SCNC: 29 MMOL/L (ref 22–33)
CREAT SERPL-MCNC: 0.87 MG/DL (ref 0.4–1.1)
EOSINOPHIL # BLD AUTO: 0.13 THOUSAND/ΜL (ref 0–0.61)
EOSINOPHIL NFR BLD AUTO: 1 % (ref 0–6)
ERYTHROCYTE [DISTWIDTH] IN BLOOD BY AUTOMATED COUNT: 13.3 % (ref 11.6–15.1)
EST. AVERAGE GLUCOSE BLD GHB EST-MCNC: 160 MG/DL
GFR SERPL CREATININE-BSD FRML MDRD: 64 ML/MIN/1.73SQ M
GLUCOSE P FAST SERPL-MCNC: 204 MG/DL (ref 70–105)
HBA1C MFR BLD: 7.2 %
HCT VFR BLD AUTO: 46.3 % (ref 34.8–46.1)
HGB BLD-MCNC: 15.3 G/DL (ref 11.5–15.4)
IMM GRANULOCYTES # BLD AUTO: 0.02 THOUSAND/UL (ref 0–0.2)
IMM GRANULOCYTES NFR BLD AUTO: 0 % (ref 0–2)
LYMPHOCYTES # BLD AUTO: 1.85 THOUSANDS/ΜL (ref 0.6–4.47)
LYMPHOCYTES NFR BLD AUTO: 20 % (ref 14–44)
MCH RBC QN AUTO: 29.3 PG (ref 26.8–34.3)
MCHC RBC AUTO-ENTMCNC: 33 G/DL (ref 31.4–37.4)
MCV RBC AUTO: 89 FL (ref 82–98)
MONOCYTES # BLD AUTO: 0.92 THOUSAND/ΜL (ref 0.17–1.22)
MONOCYTES NFR BLD AUTO: 10 % (ref 4–12)
NEUTROPHILS # BLD AUTO: 6.38 THOUSANDS/ΜL (ref 1.85–7.62)
NEUTS SEG NFR BLD AUTO: 68 % (ref 43–75)
PLATELET # BLD AUTO: 278 THOUSANDS/UL (ref 149–390)
PMV BLD AUTO: 10.4 FL (ref 8.9–12.7)
POTASSIUM SERPL-SCNC: 4 MMOL/L (ref 3.5–5)
PROT SERPL-MCNC: 6.4 G/DL (ref 6.4–8.3)
RBC # BLD AUTO: 5.22 MILLION/UL (ref 3.81–5.12)
SODIUM SERPL-SCNC: 139 MMOL/L (ref 133–145)
WBC # BLD AUTO: 9.39 THOUSAND/UL (ref 4.31–10.16)

## 2021-02-05 PROCEDURE — 83036 HEMOGLOBIN GLYCOSYLATED A1C: CPT | Performed by: FAMILY MEDICINE

## 2021-02-05 PROCEDURE — 36415 COLL VENOUS BLD VENIPUNCTURE: CPT | Performed by: FAMILY MEDICINE

## 2021-02-05 PROCEDURE — 96372 THER/PROPH/DIAG INJ SC/IM: CPT

## 2021-02-05 PROCEDURE — 85025 COMPLETE CBC W/AUTO DIFF WBC: CPT

## 2021-02-05 PROCEDURE — 80053 COMPREHEN METABOLIC PANEL: CPT

## 2021-02-05 RX ADMIN — CYANOCOBALAMIN 1000 MCG: 1000 INJECTION INTRAMUSCULAR; SUBCUTANEOUS at 08:14

## 2021-02-08 ENCOUNTER — TELEPHONE (OUTPATIENT)
Dept: FAMILY MEDICINE CLINIC | Facility: CLINIC | Age: 76
End: 2021-02-08

## 2021-02-08 NOTE — TELEPHONE ENCOUNTER
Patient informed that recent lab work shows her A1C at 7 2  Dr Lianna De Santiago says she is to aim for a 6 5

## 2021-02-15 ENCOUNTER — OFFICE VISIT (OUTPATIENT)
Dept: FAMILY MEDICINE CLINIC | Facility: CLINIC | Age: 76
End: 2021-02-15
Payer: COMMERCIAL

## 2021-02-15 VITALS
WEIGHT: 204 LBS | TEMPERATURE: 97.1 F | OXYGEN SATURATION: 98 % | BODY MASS INDEX: 38.51 KG/M2 | SYSTOLIC BLOOD PRESSURE: 144 MMHG | DIASTOLIC BLOOD PRESSURE: 82 MMHG | RESPIRATION RATE: 18 BRPM | HEART RATE: 69 BPM | HEIGHT: 61 IN

## 2021-02-15 DIAGNOSIS — E66.01 CLASS 2 SEVERE OBESITY DUE TO EXCESS CALORIES WITH SERIOUS COMORBIDITY AND BODY MASS INDEX (BMI) OF 38.0 TO 38.9 IN ADULT (HCC): ICD-10-CM

## 2021-02-15 DIAGNOSIS — I10 BENIGN ESSENTIAL HTN: ICD-10-CM

## 2021-02-15 DIAGNOSIS — E66.01 OBESITY, MORBID (HCC): ICD-10-CM

## 2021-02-15 DIAGNOSIS — E78.2 MIXED HYPERLIPIDEMIA: ICD-10-CM

## 2021-02-15 DIAGNOSIS — L98.9 PSORIASIS-LIKE SKIN DISEASE: ICD-10-CM

## 2021-02-15 DIAGNOSIS — I10 HTN (HYPERTENSION), MALIGNANT: ICD-10-CM

## 2021-02-15 DIAGNOSIS — Z12.11 SCREEN FOR COLON CANCER: ICD-10-CM

## 2021-02-15 DIAGNOSIS — L70.0 OPEN FOLLICLE COMEDO: ICD-10-CM

## 2021-02-15 DIAGNOSIS — R63.8 INCREASED BMI: Primary | ICD-10-CM

## 2021-02-15 DIAGNOSIS — L40.9 PSORIASIS: ICD-10-CM

## 2021-02-15 DIAGNOSIS — E53.8 CYANOCOBALAMIN DEFICIENCY: ICD-10-CM

## 2021-02-15 PROBLEM — R26.2 AMBULATORY DYSFUNCTION: Status: RESOLVED | Noted: 2019-12-03 | Resolved: 2021-02-15

## 2021-02-15 PROBLEM — E66.812 CLASS 2 SEVERE OBESITY DUE TO EXCESS CALORIES WITH SERIOUS COMORBIDITY AND BODY MASS INDEX (BMI) OF 38.0 TO 38.9 IN ADULT (HCC): Status: ACTIVE | Noted: 2021-02-15

## 2021-02-15 PROCEDURE — 3725F SCREEN DEPRESSION PERFORMED: CPT

## 2021-02-15 PROCEDURE — 1101F PT FALLS ASSESS-DOCD LE1/YR: CPT

## 2021-02-15 PROCEDURE — 99215 OFFICE O/P EST HI 40 MIN: CPT

## 2021-02-15 PROCEDURE — 1125F AMNT PAIN NOTED PAIN PRSNT: CPT

## 2021-02-15 PROCEDURE — 3288F FALL RISK ASSESSMENT DOCD: CPT

## 2021-02-15 PROCEDURE — G0439 PPPS, SUBSEQ VISIT: HCPCS

## 2021-02-15 PROCEDURE — 1170F FXNL STATUS ASSESSED: CPT

## 2021-02-15 PROCEDURE — 96372 THER/PROPH/DIAG INJ SC/IM: CPT

## 2021-02-15 RX ADMIN — CYANOCOBALAMIN 1000 MCG: 1000 INJECTION INTRAMUSCULAR; SUBCUTANEOUS at 16:38

## 2021-02-15 NOTE — PATIENT INSTRUCTIONS
Medicare Preventive Visit Patient Instructions  Thank you for completing your Welcome to Medicare Visit or Medicare Annual Wellness Visit today  Your next wellness visit will be due in one year (2/15/2022)  The screening/preventive services that you may require over the next 5-10 years are detailed below  Some tests may not apply to you based off risk factors and/or age  Screening tests ordered at today's visit but not completed yet may show as past due  Also, please note that scanned in results may not display below  Preventive Screenings:  Service Recommendations Previous Testing/Comments   Colorectal Cancer Screening  * Colonoscopy    * Fecal Occult Blood Test (FOBT)/Fecal Immunochemical Test (FIT)  * Fecal DNA/Cologuard Test  * Flexible Sigmoidoscopy Age: 54-65 years old   Colonoscopy: every 10 years (may be performed more frequently if at higher risk)  OR  FOBT/FIT: every 1 year  OR  Cologuard: every 3 years  OR  Sigmoidoscopy: every 5 years  Screening may be recommended earlier than age 48 if at higher risk for colorectal cancer  Also, an individualized decision between you and your healthcare provider will decide whether screening between the ages of 74-80 would be appropriate  Colonoscopy: Not on file  FOBT/FIT: Not on file  Cologuard: Not on file  Sigmoidoscopy: Not on file         Breast Cancer Screening Age: 36 years old  Frequency: every 1-2 years  Not required if history of left and right mastectomy Mammogram: Not on file       Cervical Cancer Screening Between the ages of 21-29, pap smear recommended once every 3 years  Between the ages of 33-67, can perform pap smear with HPV co-testing every 5 years     Recommendations may differ for women with a history of total hysterectomy, cervical cancer, or abnormal pap smears in past  Pap Smear: Not on file    Screening Not Indicated   Hepatitis C Screening Once for adults born between [facility]  More frequently in patients at high risk for Hepatitis C Hep C Antibody: Not on file       Diabetes Screening 1-2 times per year if you're at risk for diabetes or have pre-diabetes Fasting glucose: 204 mg/dL   A1C: 7 2 %    Screening Not Indicated  History Diabetes   Cholesterol Screening Once every 5 years if you don't have a lipid disorder  May order more often based on risk factors  Lipid panel: 09/29/2020    Screening Not Indicated  History Lipid Disorder     Other Preventive Screenings Covered by Medicare:  1  Abdominal Aortic Aneurysm (AAA) Screening: covered once if your at risk  You're considered to be at risk if you have a family history of AAA  2  Lung Cancer Screening: covers low dose CT scan once per year if you meet all of the following conditions: (1) Age 50-69; (2) No signs or symptoms of lung cancer; (3) Current smoker or have quit smoking within the last 15 years; (4) You have a tobacco smoking history of at least 30 pack years (packs per day multiplied by number of years you smoked); (5) You get a written order from a healthcare provider  3  Glaucoma Screening: covered annually if you're considered high risk: (1) You have diabetes OR (2) Family history of glaucoma OR (3)  aged 48 and older OR (3)  American aged 72 and older  3  Osteoporosis Screening: covered every 2 years if you meet one of the following conditions: (1) You're estrogen deficient and at risk for osteoporosis based off medical history and other findings; (2) Have a vertebral abnormality; (3) On glucocorticoid therapy for more than 3 months; (4) Have primary hyperparathyroidism; (5) On osteoporosis medications and need to assess response to drug therapy  · Last bone density test (DXA Scan): Not on file  5  HIV Screening: covered annually if you're between the age of 12-76  Also covered annually if you are younger than 13 and older than 72 with risk factors for HIV infection   For pregnant patients, it is covered up to 3 times per pregnancy  Immunizations:  Immunization Recommendations   Influenza Vaccine Annual influenza vaccination during flu season is recommended for all persons aged >= 6 months who do not have contraindications   Pneumococcal Vaccine (Prevnar and Pneumovax)  * Prevnar = PCV13  * Pneumovax = PPSV23   Adults 25-60 years old: 1-3 doses may be recommended based on certain risk factors  Adults 72 years old: Prevnar (PCV13) vaccine recommended followed by Pneumovax (PPSV23) vaccine  If already received PPSV23 since turning 65, then PCV13 recommended at least one year after PPSV23 dose  Hepatitis B Vaccine 3 dose series if at intermediate or high risk (ex: diabetes, end stage renal disease, liver disease)   Tetanus (Td) Vaccine - COST NOT COVERED BY MEDICARE PART B Following completion of primary series, a booster dose should be given every 10 years to maintain immunity against tetanus  Td may also be given as tetanus wound prophylaxis  Tdap Vaccine - COST NOT COVERED BY MEDICARE PART B Recommended at least once for all adults  For pregnant patients, recommended with each pregnancy  Shingles Vaccine (Shingrix) - COST NOT COVERED BY MEDICARE PART B  2 shot series recommended in those aged 48 and above     Health Maintenance Due:  There are no preventive care reminders to display for this patient  Immunizations Due:      Topic Date Due    DTaP,Tdap,and Td Vaccines (1 - Tdap) 06/16/1966     Advance Directives   What are advance directives? Advance directives are legal documents that state your wishes and plans for medical care  These plans are made ahead of time in case you lose your ability to make decisions for yourself  Advance directives can apply to any medical decision, such as the treatments you want, and if you want to donate organs  What are the types of advance directives? There are many types of advance directives, and each state has rules about how to use them   You may choose a combination of any of the following:  · Living will: This is a written record of the treatment you want  You can also choose which treatments you do not want, which to limit, and which to stop at a certain time  This includes surgery, medicine, IV fluid, and tube feedings  · Durable power of  for healthcare [facility]): This is a written record that states who you want to make healthcare choices for you when you are unable to make them for yourself  This person, called a proxy, is usually a family member or a friend  You may choose more than 1 proxy  · Do not resuscitate (DNR) order:  A DNR order is used in case your heart stops beating or you stop breathing  It is a request not to have certain forms of treatment, such as CPR  A DNR order may be included in other types of advance directives  · Medical directive: This covers the care that you want if you are in a coma, near death, or unable to make decisions for yourself  You can list the treatments you want for each condition  Treatment may include pain medicine, surgery, blood transfusions, dialysis, IV or tube feedings, and a ventilator (breathing machine)  · Values history: This document has questions about your views, beliefs, and how you feel and think about life  This information can help others choose the care that you would choose  Why are advance directives important? An advance directive helps you control your care  Although spoken wishes may be used, it is better to have your wishes written down  Spoken wishes can be misunderstood, or not followed  Treatments may be given even if you do not want them  An advance directive may make it easier for your family to make difficult choices about your care  Weight Management   Why it is important to manage your weight:  Being overweight increases your risk of health conditions such as heart disease, high blood pressure, type 2 diabetes, and certain types of cancer   It can also increase your risk for osteoarthritis, sleep apnea, and other respiratory problems  Aim for a slow, steady weight loss  Even a small amount of weight loss can lower your risk of health problems  How to lose weight safely:  A safe and healthy way to lose weight is to eat fewer calories and get regular exercise  You can lose up about 1 pound a week by decreasing the number of calories you eat by 500 calories each day  Healthy meal plan for weight management:  A healthy meal plan includes a variety of foods, contains fewer calories, and helps you stay healthy  A healthy meal plan includes the following:  · Eat whole-grain foods more often  A healthy meal plan should contain fiber  Fiber is the part of grains, fruits, and vegetables that is not broken down by your body  Whole-grain foods are healthy and provide extra fiber in your diet  Some examples of whole-grain foods are whole-wheat breads and pastas, oatmeal, brown rice, and bulgur  · Eat a variety of vegetables every day  Include dark, leafy greens such as spinach, kale, reyna greens, and mustard greens  Eat yellow and orange vegetables such as carrots, sweet potatoes, and winter squash  · Eat a variety of fruits every day  Choose fresh or canned fruit (canned in its own juice or light syrup) instead of juice  Fruit juice has very little or no fiber  · Eat low-fat dairy foods  Drink fat-free (skim) milk or 1% milk  Eat fat-free yogurt and low-fat cottage cheese  Try low-fat cheeses such as mozzarella and other reduced-fat cheeses  · Choose meat and other protein foods that are low in fat  Choose beans or other legumes such as split peas or lentils  Choose fish, skinless poultry (chicken or turkey), or lean cuts of red meat (beef or pork)  Before you cook meat or poultry, cut off any visible fat  · Use less fat and oil  Try baking foods instead of frying them  Add less fat, such as margarine, sour cream, regular salad dressing and mayonnaise to foods  Eat fewer high-fat foods   Some examples of high-fat foods include french fries, doughnuts, ice cream, and cakes  · Eat fewer sweets  Limit foods and drinks that are high in sugar  This includes candy, cookies, regular soda, and sweetened drinks  Exercise:  Exercise at least 30 minutes per day on most days of the week  Some examples of exercise include walking, biking, dancing, and swimming  You can also fit in more physical activity by taking the stairs instead of the elevator or parking farther away from stores  Ask your healthcare provider about the best exercise plan for you  © Copyright [address] Information is for End User's use only and may not be sold, redistributed or otherwise used for commercial purposes  All illustrations and images included in CareNotes® are the copyrighted property of [employer]  or [place] Kathy Savage  Chronic Hypertension   WHAT YOU NEED TO KNOW:   Hypertension is high blood pressure  Your blood pressure is the force of your blood moving against the walls of your arteries  Hypertension causes your blood pressure to get so high that your heart has to work much harder than normal  This can damage your heart  Even if you have hypertension for years, lifestyle changes, medicines, or both can help bring your blood pressure to normal   DISCHARGE INSTRUCTIONS:   Call 911 for any of the following:   · You have chest pain  · You have any of the following signs of a heart attack:      ? Squeezing, pressure, or pain in your chest    ? You may  also have any of the following:     § Discomfort or pain in your back, neck, jaw, stomach, or arm    § Shortness of breath    § Nausea or vomiting    § Lightheadedness or a sudden cold sweat    · You become confused or have difficulty speaking  · You suddenly feel lightheaded or have trouble breathing  Return to the emergency department if:   · You have a severe headache or vision loss  · You have weakness in an arm or leg      Contact your healthcare provider if: · You feel faint, dizzy, confused, or drowsy  · You have been taking your blood pressure medicine but your pressure is higher than your provider says it should be  · You have questions or concerns about your condition or care  Medicines: You may need any of the following:  · Antihypertensives  may be used to help lower your blood pressure  Several kinds of medicines are available  Your healthcare provider may change the medicine or medicines you currently take  This may be needed if your blood pressure is often high when you check it at home or you are having other problems with blood pressure control  · Diuretics  help decrease extra fluid that collects in your body  This will help lower your BP  You may urinate more often while you take this medicine  · Cholesterol medicine  helps lower your cholesterol level  A low cholesterol level helps prevent heart disease and makes it easier to control your blood pressure  · Take your medicine as directed  Contact your healthcare provider if you think your medicine is not helping or if you have side effects  Tell him or her if you are allergic to any medicine  Keep a list of the medicines, vitamins, and herbs you take  Include the amounts, and when and why you take them  Bring the list or the pill bottles to follow-up visits  Carry your medicine list with you in case of an emergency  Follow up with your healthcare provider as directed: You will need to return to have your blood pressure checked and to have other lab tests done  Write down your questions so you remember to ask them during your visits  Stages of hypertension:       · Normal blood pressure is 119/79 or lower   Your healthcare provider may only check your blood pressure each year if it stays at a normal level  · Elevated blood pressure is 120/79 to 129/79   This is sometimes called prehypertension   Your healthcare provider may suggest lifestyle changes to help lower your blood pressure to a normal level  He or she may then check it again in 3 to 6 months  · Stage 1 hypertension is 130/80  to 139/89   Your provider may recommend lifestyle changes, medication, and checks every 3 to 6 months until your blood pressure is controlled  · Stage 2 hypertension is 140/90 or higher   Your provider will recommend lifestyle changes and have you take 2 kinds of hypertension medicines  You will also need to have your blood pressure checked monthly until it is controlled  Manage chronic hypertension:   · Check your blood pressure at home  Avoid smoking, caffeine, and exercise at least 30 minutes before checking your blood pressure  Sit and rest for 5 minutes before you take your blood pressure  Extend your arm and support it on a flat surface  Your arm should be at the same level as your heart  Follow the directions that came with your blood pressure monitor  Check your blood pressure 2 times, 1 minute apart, before you take your medicine in the morning  Also check your blood pressure before your evening meal  Keep a record of your readings and bring it to your follow-up visits  Ask your healthcare provider what your blood pressure should be  · Manage any other health conditions you have  Health conditions such as diabetes can increase your risk for hypertension  Follow your healthcare provider's instructions and take all your medicines as directed  Talk to your healthcare provider about any new health conditions you have recently developed  · Ask about all medicines  Certain medicines can increase your blood pressure  Examples include oral birth control pills, decongestants, herbal supplements, and NSAIDs, such as ibuprofen  Your healthcare provider can tell you which medicines are safe for you to take  This includes prescription and over-the-counter medicines  Lifestyle changes you can make to lower your blood pressure:   Your provider may want you to make more lifestyle changes if you are having trouble controlling your blood pressure  This may feel difficult over time, especially if you think you are making good changes but your pressure is still high  It might help to focus on one new change at a time  For example, try to add 1 more day of exercise, or exercise for an extra 10 minutes on 2 days  Small changes can make a big difference  Your healthcare provider can also refer you to specialists such as a dietitian who can help you make small changes  · Limit sodium (salt) as directed  Too much sodium can affect your fluid balance  Check labels to find low-sodium or no-salt-added foods  Some low-sodium foods use potassium salts for flavor  Too much potassium can also cause health problems  Your healthcare provider will tell you how much sodium and potassium are safe for you to have in a day  He or she may recommend that you limit sodium to 2,300 mg a day  · Follow the meal plan recommended by your healthcare provider  A dietitian or your provider can give you more information on low-sodium plans or the DASH (Dietary Approaches to Stop Hypertension) eating plan  The DASH plan is low in sodium, unhealthy fats, and total fat  It is high in potassium, calcium, and fiber  · Exercise to maintain a healthy weight  Exercise at least 30 minutes per day, on most days of the week  This will help decrease your blood pressure  Ask your healthcare provider about the best exercise plan for you  · Decrease stress  This may help lower your blood pressure  Learn ways to relax, such as deep breathing or listening to music  · Limit alcohol as directed  Alcohol can increase your blood pressure  A drink of alcohol is 12 ounces of beer, 5 ounces of wine, or 1½ ounces of liquor  · Do not smoke  Nicotine and other chemicals in cigarettes and cigars can increase your blood pressure and also cause lung damage   Ask your healthcare provider for information if you currently smoke and need help to quit  E-cigarettes or smokeless tobacco still contain nicotine  Talk to your healthcare provider before you use these products  © Copyright [address] Information is for End User's use only and may not be sold, redistributed or otherwise used for commercial purposes  All illustrations and images included in CareNotes® are the copyrighted property of A D A M , Inc  or Sissy Mo   The above information is an  only  It is not intended as medical advice for individual conditions or treatments  Talk to your doctor, nurse or pharmacist before following any medical regimen to see if it is safe and effective for you

## 2021-02-15 NOTE — PROGRESS NOTES
Assessment/Plan: 67 yo well-known to me for many years presents for f/u and evaluation of the following medical issues:   · NIDDM - stable, on 4 meds, and ck's BBG 3-4 's a day--consider the FreeStyle Sensor and [place]   · Exogenous obesity--BMI 38 55  · Psoriasis of palms--doing wonderfully well on Otezla under direction of Dr Parish  · Uncontrolled hypertension --I am having significant difficulty controlling blood pressure and will, despite 4 antihypertensives, now reach out to Dr Yusef Phillips for his expertise  · Comedone left flank--1 cm--needs removal   Little refer to general surgeon Dr Patterson               Problem List Items Addressed This Visit        Cardiovascular and Mediastinum    Benign essential HTN       Musculoskeletal and Integument    Psoriasis-like skin disease     Cont Ortelza OD, and if needed BID         Psoriasis       Other    Cyanocobalamin deficiency    Mixed hyperlipidemia    Obesity, morbid ([address] )    Increased BMI - Primary    Class 2 severe obesity due to excess calories with serious comorbidity and body mass index (BMI) of 38 0 to 38 9 in [facility])      Other Visit Diagnoses     Open follicle comedo        on the L flank - refer to Dr Cecelia Mora            Subjective:      Patient ID: Francisco Leonard is a 66 y o  female      · HPI--NIDDM - stable, on 4 meds, and ck's BBG 3-4 's a day--consider the FreeStyle Sensor and [place]   · Exogenous obesity--BMI 38 55  · Psoriasis of comes--doing wonderfully well on Otezla under direction of Dr Parish  · Uncontrolled hypertension --I am having significant difficulty controlling blood pressure and will, despite 4 antihypertensives, now reach out to Dr Yusef Phillips for his expertise  Comedone left flank--1 cm--needs removal   Little refer to general surgeon Ham[place] Avila    The following portions of the patient's history were reviewed and updated as appropriate:   Past Medical History:  She has a past medical history of Benign essential hypertension, Chronic lower back pain, Diabetes mellitus ([address] ), Hyperlipidemia, and Sciatica  ,  _______________________________________________________________________  Medical Problems:  does not have any pertinent problems on file ,  _______________________________________________________________________  Past Surgical History:   has a past surgical history that includes Cholecystectomy  ,  _______________________________________________________________________  Family History:  family history includes Emphysema in her father; Leukemia in her father; Uterine cancer in her mother ,  _______________________________________________________________________  Social History:   reports that she quit smoking about 16 years ago  Her smoking use included cigarettes  She has a 30 00 pack-year smoking history  She has never used smokeless tobacco  She reports that she does not drink alcohol or use drugs  ,  _______________________________________________________________________  Allergies:  has No Known Allergies     _______________________________________________________________________  Current Outpatient Medications   Medication Sig Dispense Refill    amLODIPine (NORVASC) 5 mg tablet Take 1 tablet (5 mg total) by mouth daily 90 tablet 3    Apremilast (Otezla) 30 MG TABS Take 2 tablets by mouth daily 180 tablet 3    Canagliflozin (Invokana) 300 MG TABS Take 1 tablet (300 mg total) by mouth daily 90 tablet 1    Cholecalciferol (VITAMIN D) 125 MCG (5000 UT) CAPS Take by mouth      cyanocobalamin (VITAMIN B-12) 1000 MCG tablet Take 1 tablet (1,000 mcg total) by mouth daily 100 tablet 6    Exenatide ER (Bydureon) 2 MG PEN Inject 2 mg under the skin once a week 12 each 3    folic acid (FOLVITE) 1 mg tablet Take 1 tablet (1 mg total) by mouth daily 90 tablet 3    glimepiride (AMARYL) 2 mg tablet Take 1 tablet (2 mg total) by mouth daily with breakfast 90 tablet 3    glucose blood test strip Test sugar 2-3 times daily 90 each 6    insulin glargine (Lantus SoloStar) 100 units/mL injection pen Inject 44 Units under the skin daily at bedtime 5 pen 1    losartan-hydrochlorothiazide (HYZAAR) 100-25 MG per tablet Take 1 tablet by mouth daily 90 tablet 1    nebivolol (Bystolic) 5 mg tablet Take 1 tablet (5 mg total) by mouth daily 90 tablet 3    repaglinide (PRANDIN) 1 mg tablet Take 1 tablet (1 mg total) by mouth daily before dinner 90 tablet 3     Current Facility-Administered Medications   Medication Dose Route Frequency Provider Last Rate Last Admin    cyanocobalamin injection 1,000 mcg  1,000 mcg Intramuscular Q30 Days Delphina Half, DO   1,000 mcg at 02/15/21 1638    cyanocobalamin injection 1,000 mcg  1,000 mcg Intramuscular Q30 Days Delphina Half, DO   1,000 mcg at 10/27/20 1449    cyanocobalamin injection 1,000 mcg  1,000 mcg Intramuscular Q30 Days Delphina Half, DO   1,000 mcg at 01/05/21 0750    cyanocobalamin injection 1,000 mcg  1,000 mcg Intramuscular Q30 Days Delphina Half, DO   1,000 mcg at 12/08/20 0800     _______________________________________________________________________  Review of Systems   Constitutional: Negative for activity change (Very active and no complaints of back pain at this time--it will be recalled that she was in nursing home last December with severe sciatica inability to walk), appetite change, chills, diaphoresis, fatigue and fever  HENT: Negative for congestion, ear discharge, ear pain, facial swelling, nosebleeds, sore throat, tinnitus, trouble swallowing and voice change  Eyes: Negative for photophobia, pain, discharge, redness, itching and visual disturbance  Respiratory: Negative for apnea, cough, choking, chest tightness and shortness of breath  Denies any and all respiratory issues - SOB, orthopnea, etc    Cardiovascular: Negative for chest pain, palpitations and leg swelling          Denies any and all cardiac symptoms   Gastrointestinal: Negative for abdominal distention, abdominal pain, blood in stool, constipation, diarrhea, nausea and vomiting  Endocrine: Negative for cold intolerance, heat intolerance, polydipsia, polyphagia and polyuria  Genitourinary: Negative for decreased urine volume, difficulty urinating, dysuria, enuresis, frequency, hematuria, pelvic pain, urgency and vaginal bleeding  Musculoskeletal: Positive for arthralgias and myalgias  Negative for back pain, gait problem, joint swelling, neck pain and neck stiffness  Skin: Negative for color change and pallor  Rash:  palmar psoriasis notable on right hand but left hand has healed nicely  Love Whitakerargelia has made a huge difference a taking it b i d  Causes significant vomiting not just nausea she generally takes 30 mg once daily but on occasion 60 mg once daily  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-- on Otezla 30 mg OD (occas BID)==> vomiting is the issue with the hi dose  Currently taking OD, occas BID, but cannot miss even one day, cause will result in a flare   Allergic/Immunologic: Negative for immunocompromised state  Neurological: Negative for dizziness, seizures, facial asymmetry, light-headedness, numbness and headaches  Hematological: Negative for adenopathy  Psychiatric/Behavioral: Negative for agitation, behavioral problems, confusion, decreased concentration, dysphoric mood and hallucinations  The patient is nervous/anxious  Objective:  Vitals:    02/15/21 1450   BP: 144/82   Pulse: 69   Resp: 18   Temp: (!) 97 1 °F (36 2 °C)   SpO2: 98%   Weight: 92 5 kg (204 lb)   Height: 5' 1" (1 549 m)     Body mass index is 38 55 kg/m²  Physical Exam  Vitals signs and nursing note reviewed  Constitutional:       General: She is not in acute distress  Appearance: Normal appearance  She is well-developed  She is obese  She is not ill-appearing, toxic-appearing or diaphoretic  HENT:      Head: Normocephalic and atraumatic  Nose: Nose normal    Eyes:      General: No scleral icterus       Conjunctiva/sclera: Conjunctivae normal       Pupils: Pupils are equal, round, and reactive to light  Neck:      Musculoskeletal: Normal range of motion and neck supple  Thyroid: No thyromegaly  Trachea: No tracheal deviation  Cardiovascular:      Rate and Rhythm: Normal rate and regular rhythm  Heart sounds: Normal heart sounds  Pulmonary:      Effort: No respiratory distress  Breath sounds: Normal breath sounds  No wheezing, rhonchi or rales  Chest:      Chest wall: No tenderness  Musculoskeletal: Normal range of motion  General: No swelling, tenderness or deformity  Skin:     General: Skin is warm and dry  Coloration: Skin is not jaundiced or pale  Findings: No erythema or rash  Neurological:      General: No focal deficit present  Mental Status: She is alert and oriented to person, place, and time  Cranial Nerves: No cranial nerve deficit  Psychiatric:         Mood and Affect: Mood normal          Behavior: Behavior normal          Thought Content: Thought content normal          Judgment: Judgment normal            I have spent 60 minutes with Patient  today in which greater than 50% of this time was spent in counseling/coordination of care regarding Diagnostic results, Prognosis, Risks and benefits of tx options, Intructions for management, Patient and family education, Importance of tx compliance, Risk factor reductions and Impressions  Pt was seen from 3:10-4:30 p m this Monday afternoon

## 2021-02-15 NOTE — PROGRESS NOTES
Assessment and Plan:     Problem List Items Addressed This Visit        Musculoskeletal and Integument    Psoriasis      Other Visit Diagnoses     Increased BMI    -  Primary          Falls Plan of Care: balance, strength, and gait training instructions were provided  Assessed feet and footwear  Assessed visual acuity  Referral to podiatry was provided  Preventive health issues were discussed with patient, and age appropriate screening tests were ordered as noted in patient's After Visit Summary  Personalized health advice and appropriate referrals for health education or preventive services given if needed, as noted in patient's After Visit Summary       History of Present Illness:     Patient presents for Medicare Annual Wellness visit    Patient Care Team:  Alejandra Veneags DO as PCP - General (Family Medicine)     Problem List:     Patient Active Problem List   Diagnosis    Acute left-sided low back pain with left-sided sciatica    Controlled type 2 diabetes mellitus, with long-term current use of insulin ([address] )    Benign essential HTN    Ambulatory dysfunction    Mixed simple and mucopurulent chronic bronchitis ([address] )    Cyanocobalamin deficiency    Mixed hyperlipidemia    Psoriasis-like skin disease    Myalgia due to statin    Need for vaccination    Psoriasis    Hyperlipidemia associated with type 2 diabetes mellitus ([address] )    Depression, recurrent ([address] )    Obesity, morbid ([address] )      Past Medical and Surgical History:     Past Medical History:   Diagnosis Date    Benign essential hypertension     Chronic lower back pain     Diabetes mellitus ([address] )     Hyperlipidemia     Sciatica      Past Surgical History:   Procedure Laterality Date    CHOLECYSTECTOMY        Family History:     Family History   Problem Relation Age of Onset    Uterine cancer Mother     Emphysema Father     Leukemia Father       Social History:     E-Cigarette/Vaping    E-Cigarette Use Never User E-Cigarette/Vaping Substances    Nicotine No     THC No     CBD No     Flavoring No     Other No     Unknown No      Social History     Socioeconomic History    Marital status:      Spouse name: None    Number of children: 4    Years of education: None    Highest education level: None   Occupational History    Occupation: retired- supervisor for [employer]: Not hard at all   [place] insecurity     Worry: Never true     Inability: Never true   [employer] needs     Medical: No     Non-medical: No   Tobacco Use    Smoking status: Former Smoker     Packs/day: 1 00     Years: 30 00     Pack years: 30 00     Types: Cigarettes     Quit date: 2005     Years since quittin 1    Smokeless tobacco: Never Used   Substance and Sexual Activity    Alcohol use: Never     Frequency: Never    Drug use: Never    Sexual activity: None   Lifestyle    Physical activity     Days per week: 7 days     Minutes per session: 40 min    Stress: To some extent   Relationships    Social connections     Talks on phone: More than three times a week     Gets together: More than three times a week     Attends Shinto service: Never     Active member of club or organization: No     Attends meetings of clubs or organizations: Never     Relationship status:     Intimate partner violence     Fear of current or ex partner: No     Emotionally abused: No     Physically abused: No     Forced sexual activity: No   Other Topics Concern    None   Social History Narrative    · Smoking - how much:   1/2 PPD,   Quit age 62      · Tobacco-years of use:   36, · started at the age of 22 and quit at 62    · Do you currently or have you served in [employer]:   No      · Sexual orientation:   Heterosexual      · Exercise level:   Heavy  Goes to the gym 5 days per week     · Diet:   Regular      · General stress level:   Low      · Has smoked since age:   19 · Caffeine intake:   Occasional      · Guns present in home:   No      · Seat belts used routinely:   Yes      · Sunscreen used routinely:   Yes      · Smoke alarm in home: Yes      · Advance directive:    Yes      · Salt Intake:   none     · Live alone or with others:   alone           Medications and Allergies:     Current Outpatient Medications   Medication Sig Dispense Refill    amLODIPine (NORVASC) 5 mg tablet Take 1 tablet (5 mg total) by mouth daily 90 tablet 3    Apremilast (Otezla) 30 MG TABS Take 2 tablets by mouth daily 180 tablet 3    Canagliflozin (Invokana) 300 MG TABS Take 1 tablet (300 mg total) by mouth daily 90 tablet 1    Cholecalciferol (VITAMIN D) 125 MCG (5000 UT) CAPS Take by mouth      cyanocobalamin (VITAMIN B-12) 1000 MCG tablet Take 1 tablet (1,000 mcg total) by mouth daily 100 tablet 6    Exenatide ER (Bydureon) 2 MG PEN Inject 2 mg under the skin once a week 12 each 3    folic acid (FOLVITE) 1 mg tablet Take 1 tablet (1 mg total) by mouth daily 90 tablet 3    glimepiride (AMARYL) 2 mg tablet Take 1 tablet (2 mg total) by mouth daily with breakfast 90 tablet 3    glucose blood test strip Test sugar 2-3 times daily 90 each 6    insulin glargine (Lantus SoloStar) 100 units/mL injection pen Inject 44 Units under the skin daily at bedtime 5 pen 1    losartan-hydrochlorothiazide (HYZAAR) 100-25 MG per tablet Take 1 tablet by mouth daily 90 tablet 1    nebivolol (Bystolic) 5 mg tablet Take 1 tablet (5 mg total) by mouth daily 90 tablet 3    repaglinide (PRANDIN) 1 mg tablet Take 1 tablet (1 mg total) by mouth daily before dinner 90 tablet 3     Current Facility-Administered Medications   Medication Dose Route Frequency Provider Last Rate Last Admin    cyanocobalamin injection 1,000 mcg  1,000 mcg Intramuscular Q30 Days Alma Sánchez, DO   1,000 mcg at 02/05/21 2774    cyanocobalamin injection 1,000 mcg  1,000 mcg Intramuscular Q30 Days Alma Mohanrd, DO   1,000 mcg at 10/27/20 1449    cyanocobalamin injection 1,000 mcg  1,000 mcg Intramuscular Q30 Days Jearld Petite, DO   1,000 mcg at 01/05/21 0750    cyanocobalamin injection 1,000 mcg  1,000 mcg Intramuscular Q30 Days Jearld Petite, DO   1,000 mcg at 12/08/20 0800     No Known Allergies   Immunizations:     Immunization History   Administered Date(s) Administered    INFLUENZA 09/28/2011, 09/26/2012, 12/20/2013, 10/07/2014, 10/27/2015, 10/12/2016, 11/21/2017, 11/20/2018    Influenza, high dose seasonal 0 7 mL 10/27/2020    Pneumococcal Conjugate 13-Valent 10/27/2015    Pneumococcal Polysaccharide PPV23 08/11/2010    Zoster 09/26/2012      Health Maintenance: There are no preventive care reminders to display for this patient  Topic Date Due    DTaP,Tdap,and Td Vaccines (1 - Tdap) 06/16/1966      Medicare Health Risk Assessment:     /82   Pulse 69   Temp (!) 97 1 °F (36 2 °C)   Resp 18   Ht 5' 1" (1 549 m)   Wt 92 5 kg (204 lb)   SpO2 98%   BMI 38 55 kg/m²          Health Risk Assessment:   Patient rates overall health as good  Patient feels that their physical health rating is same  Eyesight was rated as slightly worse  Hearing was rated as slightly worse  Patient feels that their emotional and mental health rating is slightly worse  Pain experienced in the last 7 days has been none  Patient states that she has experienced no weight loss or gain in last 6 months  Patient is doing the best possible, given the circumstances of the illnesses and diseases of advance age that are present  Patient encouraged to always think positive and stay active, both mentally AND physically  Depression Screening:   PHQ-2 Score: 2  PHQ-9 Score: 3      Fall Risk Screening: In the past year, patient has experienced: no history of falling in past year      Urinary Incontinence Screening:   Patient has not leaked urine accidently in the last six months       Home Safety:  Patient does not have trouble with stairs inside or outside of their home  Patient has working smoke alarms and has working carbon monoxide detector  Home safety hazards include: none  Nutrition:   Current diet is Regular and Diabetic  Medications:   Patient is currently taking over-the-counter supplements  OTC medications include: see medication list  Patient is able to manage medications  Activities of Daily Living (ADLs)/Instrumental Activities of Daily Living (IADLs):   Walk and transfer into and out of bed and chair?: Yes  Dress and groom yourself?: Yes    Bathe or shower yourself?: Yes    Feed yourself? Yes  Do your laundry/housekeeping?: Yes  Manage your money, pay your bills and track your expenses?: Yes  Make your own meals?: Yes    Do your own shopping?: Yes    Advance Care Planning:   Living will: Yes    Durable POA for healthcare: Yes    Advanced directive: Yes    Advanced directive counseling given: Yes    Five wishes given: Yes    Patient declined ACP directive: No    End of Life Decisions reviewed with patient: Yes    Provider agrees with end of life decisions: Yes      Comments: Pt has a living will       Cognitive Screening:   Provider or family/friend/caregiver concerned regarding cognition?: No    PREVENTIVE SCREENINGS      Cardiovascular Screening:    General: Screening Not Indicated and History Lipid Disorder      Diabetes Screening:     General: Screening Not Indicated and History Diabetes      Colorectal Cancer Screening:       Due for: Cologuard      Breast Cancer Screening:       Due for: Mammogram        Cervical Cancer Screening:    General: Screening Not Indicated      Osteoporosis Screening:    General: Patient Declines      Abdominal Aortic Aneurysm (AAA) Screening:        General: Patient Declines      Lung Cancer Screening:     General: Screening Not Indicated      Hepatitis C Screening:      Hep C Screening Accepted: No       Preventive Screening Comments: Last pap smear was 3 yrs ago - Dr Claudia Enrique , but wants to  OD cataract first - ok with me   Last colonoscopy was 5 yrs -- Dr Jean Claude Quezada    Other Counseling Topics:   Alcohol use counseling, car/seat belt/driving safety, skin self-exam, sunscreen and calcium and vitamin D intake and regular weightbearing exercise         Jorje Villa DO

## 2021-02-24 LAB
LEFT EYE DIABETIC RETINOPATHY: NORMAL
RIGHT EYE DIABETIC RETINOPATHY: NORMAL

## 2021-02-26 ENCOUNTER — TELEPHONE (OUTPATIENT)
Dept: NEPHROLOGY | Facility: CLINIC | Age: 76
End: 2021-02-26

## 2021-03-02 ENCOUNTER — RA CDI HCC (OUTPATIENT)
Dept: OTHER | Facility: HOSPITAL | Age: 76
End: 2021-03-02

## 2021-03-02 NOTE — PROGRESS NOTES
[facility]  coding oppertunities          Chart reviewed, no opportunity found: CHART REVIEWED, NO OPPORTUNITY FOUND

## 2021-03-09 ENCOUNTER — OFFICE VISIT (OUTPATIENT)
Dept: FAMILY MEDICINE CLINIC | Facility: CLINIC | Age: 76
End: 2021-03-09
Payer: COMMERCIAL

## 2021-03-09 VITALS
HEIGHT: 61 IN | DIASTOLIC BLOOD PRESSURE: 68 MMHG | BODY MASS INDEX: 38.33 KG/M2 | OXYGEN SATURATION: 98 % | RESPIRATION RATE: 18 BRPM | SYSTOLIC BLOOD PRESSURE: 132 MMHG | TEMPERATURE: 96.6 F | WEIGHT: 203 LBS | HEART RATE: 74 BPM

## 2021-03-09 DIAGNOSIS — Z01.818 PRE-OP EVALUATION: Primary | ICD-10-CM

## 2021-03-09 DIAGNOSIS — E78.2 MIXED HYPERLIPIDEMIA: ICD-10-CM

## 2021-03-09 DIAGNOSIS — R63.8 INCREASED BMI: ICD-10-CM

## 2021-03-09 DIAGNOSIS — I10 BENIGN ESSENTIAL HTN: ICD-10-CM

## 2021-03-09 DIAGNOSIS — J41.8 MIXED SIMPLE AND MUCOPURULENT CHRONIC BRONCHITIS (HCC): ICD-10-CM

## 2021-03-09 DIAGNOSIS — E66.01 CLASS 2 SEVERE OBESITY WITH SERIOUS COMORBIDITY AND BODY MASS INDEX (BMI) OF 38.0 TO 38.9 IN ADULT, UNSPECIFIED OBESITY TYPE (HCC): ICD-10-CM

## 2021-03-09 DIAGNOSIS — L40.9 PSORIASIS: ICD-10-CM

## 2021-03-09 PROCEDURE — 96372 THER/PROPH/DIAG INJ SC/IM: CPT | Performed by: FAMILY MEDICINE

## 2021-03-09 PROCEDURE — 99215 OFFICE O/P EST HI 40 MIN: CPT | Performed by: FAMILY MEDICINE

## 2021-03-09 RX ORDER — OFLOXACIN 3 MG/ML
SOLUTION/ DROPS OPHTHALMIC
COMMUNITY
Start: 2021-03-01 | End: 2021-08-12

## 2021-03-09 RX ORDER — KETOROLAC TROMETHAMINE 5 MG/ML
SOLUTION OPHTHALMIC
COMMUNITY
Start: 2021-03-01 | End: 2021-11-23

## 2021-03-09 RX ORDER — NEBIVOLOL 10 MG/1
10 TABLET ORAL DAILY
Qty: 90 TABLET | Refills: 3 | Status: SHIPPED | OUTPATIENT
Start: 2021-03-09 | End: 2021-07-21

## 2021-03-09 RX ORDER — PREDNISOLONE ACETATE 10 MG/ML
SUSPENSION/ DROPS OPHTHALMIC
COMMUNITY
Start: 2021-03-01 | End: 2021-08-12

## 2021-03-09 RX ADMIN — CYANOCOBALAMIN 1000 MCG: 1000 INJECTION INTRAMUSCULAR; SUBCUTANEOUS at 14:55

## 2021-03-09 NOTE — PATIENT INSTRUCTIONS
Type 2 Diabetes in the Older Adult   WHAT YOU NEED TO KNOW:   The risk for type 2 diabetes increases as a person gets older  Type 2 diabetes means your pancreas does not make enough insulin, or your body does not use insulin well  Insulin helps move sugar out of the blood so it can be used for energy  Diabetes cannot be cured, but it can be managed  DISCHARGE INSTRUCTIONS:   Call or have someone close to you call your local emergency number (911 in the [address] [facility]) for any of the following:   · You have any of the following signs of a stroke:      ? Numbness or drooping on one side of your face     ? Weakness in an arm or leg    ? Confusion or difficulty speaking    ? Dizziness, a severe headache, or vision loss    · You have any of the following signs of a heart attack:      ? Squeezing, pressure, or pain in your chest    ? You may  also have any of the following:     § Discomfort or pain in your back, neck, jaw, stomach, or arm    § Shortness of breath    § Nausea or vomiting    § Lightheadedness or a sudden cold sweat    Return to the emergency department if:   · Your blood sugar level is higher than your goal and does not come down with treatment  · You have signs of a high blood sugar level, such as blurred or double vision  · You have signs of a high ketone level, such as fruity, sweet smelling breath, or shallow breathing  · You have symptoms of a low blood sugar level, such as trouble thinking, sweating, or a pounding heartbeat  · Your blood sugar level is lower than normal and does not improve with treatment  Call your doctor or diabetes care team if:   · You are vomiting or have diarrhea  · You have an upset stomach and cannot eat the foods on your meal plan  · You feel weak or more tired than usual     · You feel dizzy, have headaches, or are easily irritated  · Your skin is red, warm, dry, or swollen      · You have a wound that does not heal     · You have numbness in your arms or legs     · You have trouble coping with diabetes, or you feel anxious or depressed  · You have problems with your memory  · You have changes in your vision  · You have questions or concerns about your condition or care  Manage diabetes and prevent problems:  Sometimes type 2 diabetes can be managed with changes in nutrition and physical activity  · Work with your diabetes care team to create plans to meet your needs  Your diabetes care team may include a physician, nurse practitioner, and physician assistant  It may also include a diabetes nurse educator, dietitian, and an exercise specialist  Family members, or others who are close to you, may also be part of the team  You and your team will make goals and plans to manage diabetes and other health problems  For example, the plan will include how to manage medicines you may take for diabetes and for other health conditions  The plans and goals will be specific to your needs and abilities  Your plan will change as your needs and abilities change  · Manage other health issues as directed  Health issues may include high blood pressure, high cholesterol levels, and heart problems  Health issues may also include depression  Together you and your care team can create a plan to manage any other health issues  · Try to be physically active for 30 to 60 minutes most days of the week  Physical activity, such as exercise, helps keep your blood sugar level steady and lowers your risk for heart disease  Physical activity can help improve your balance and strength and lower your risk for falls  Start slowly  Activity can be done in 10-minute intervals  ? Set a goal for 30 minutes of aerobic activity at least 5 times a week  Aerobic activity helps your heart stay strong  Aerobic activity includes walking, bicycling, dancing, swimming, and raking leaves  ? Set a goal for strength training 2 times a week    Strength training helps you keep the muscles you have and build new muscles  Strength training includes lifting weights, climbing stairs, and doing yoga or radha chi     ? Stay steady on your on your feet with balancing activities  These include walking backwards, standing on one foot, and walking heel to toe in a straight line  · Maintain a healthy weight  Ask your provider what a healthy weight is for you  A healthy weight can help you control diabetes and prevent heart disease  Ask your provider to help you create a weight loss plan if you are overweight  Weight loss of 10 to 15 pounds can help make a difference in managing diabetes  Together you and your care team can set manageable weight loss goals  · Know the risks if you choose to drink alcohol  Alcohol can cause your blood sugar levels to be low if you use insulin  Alcohol can cause high blood sugar levels and weight gain if you drink too much  Women 21 years or older and men 72 years or older should limit alcohol to 1 drink a day  Men 21 to 64 years should limit alcohol to 2 drinks a day  A drink of alcohol is 12 ounces of beer, 5 ounces of wine, or 1½ ounces of liquor  · Do not smoke  Nicotine and other chemicals in cigarettes can cause lung disease and other health problems  It can also cause blood vessel damage that makes diabetes more difficult to manage  Ask your healthcare provider for information if you currently smoke and need help to quit  Do not use e-cigarettes or smokeless tobacco in place of cigarettes or to help you quit  They still contain nicotine  Diabetes education:  Diabetes education will start right away  Members of your care team teach you, your family, and caregivers the following:  · How to check your blood sugar level: You will learn when to check your blood sugar level and what the level should be  You will learn what to do if your level is too high or too low  Write down the times of your checks and your levels   Take them to all follow-up appointments  · About diabetes medicine: You and your family members will be taught how to draw up and give insulin, if needed  You will learn how much insulin you need and what time to inject insulin  You will be taught when not to give insulin  Your team will also teach you how to dispose of needles and syringes  If you need oral diabetes medicine, you will be taught about side effects  You will also be taught when to take or not take the medicine  · About nutrition:  A dietitian will help you make a meal plan to keep your blood sugar level steady  You will learn how food affects your blood sugar levels  You will also learn to keep track of sugar and starchy foods (carbohydrates)  Do not skip meals  Your blood sugar level may drop too low if you have taken insulin and do not eat  · How to prevent complications:  Diabetes that is not well controlled can lead to health problems  Examples include foot sores, retinopathy (vision loss), and peripheral neuropathy (loss of feeling in your hands and feet)  Your team will help you know when to get regular checkups, such as vision checks  They will teach you how to watch for problems and when to get a problem checked  Other ways to manage diabetes:   · Check your feet every day for sores  Look at your whole foot, including the bottom, and between and under your toes  Check for wounds, corns, and calluses  Use a mirror to see the bottom of your feet  The skin on your feet may be shiny, tight, dry, or darker than normal  Your feet may also be cold and pale  Feel your feet by running your hands along the tops, bottoms, sides, and between your toes  Redness, swelling, and warmth are signs of blood flow problems that can lead to a foot ulcer  Do not try to remove corns or calluses yourself  · Wear medical alert identification  Wear medical alert jewelry or carry a card that says you have type 2 diabetes   Ask your healthcare provider where to get these items          · Ask about vaccines  You have a higher risk for serious illness if you get the flu, pneumonia, or hepatitis  Ask your healthcare provider if you should get a flu, pneumonia, shingles, or hepatitis B vaccine, and when to get the vaccine  · Get help from family and friends  You may need help checking your blood sugar level, giving insulin injections, or preparing your meals  You may also need help to check your feet for sores  Ask your family and friends to help you with these tasks  Talk to your care team if you need someone at home to help you  Follow up with your doctor or diabetes care team as directed: You will need to return to meet with different care team members  You may need tests to monitor for problems  Write down your questions so you remember to ask them during your visits  © Copyright [address] Information is for End User's use only and may not be sold, redistributed or otherwise used for commercial purposes  All illustrations and images included in CareNotes® are the copyrighted property of A D A M , Inc  or [facility] Kathy Savage  The above information is an  only  It is not intended as medical advice for individual conditions or treatments  Talk to your doctor, nurse or pharmacist before following any medical regimen to see if it is safe and effective for you

## 2021-03-09 NOTE — PROGRESS NOTES
Assessment/Plan:  66 y o female, well-known to me for many years presents for f/u and evaluation of the following medical issues:     F/u and eval HTN:on 4 meds for HTN, and controlled well at 132/68    F/u and eval NIDDM: controlled on 4 orals and insulin:              Lantus 44 U at HS              Prandin 1 mg ac supper              Glimepride 2 mg OD in a m  Bydureon 2 mg Once wk               Invokana 300 mg OD            Pt is doing BBGs BID   And fbs running 120-140 range and ac supper it is hi 100's, on the above plan  F/u and eval HLD: refuses, tried statins and very intolerant     F/u and eval deconditioning: exercising daily --lifts wts, knee bends, etc  Even pushups    F/u and eval polypharmacy:  All med discussed with pt and carolina the Amaryl as it is long acting sulf-urea and may cause prolonged hypoglycemia    F/u Covid vacc shot:  Still waiting     F/u and eval COPD - more than ever needs the Covid and will call again today  Fu and eval psoriasis palmaris -- flares rarely, last was 3 yrs ago, IF runs out of [place]  Problem List Items Addressed This Visit        Respiratory    Mixed simple and mucopurulent chronic bronchitis (HCC) (Chronic)       Cardiovascular and Mediastinum    Benign essential HTN    Relevant Medications    nebivolol (BYSTOLIC) 10 mg tablet       Musculoskeletal and Integument    Psoriasis       Other    Mixed hyperlipidemia    Increased BMI      Other Visit Diagnoses     Pre-op evaluation    -  Primary    Bilat cataract surg on 3 24 and 4/28/21 by Dr Farideh Trinidad  No issues, no breathing issues, no cardiac issues  Class 2 severe obesity with serious comorbidity and body mass index (BMI) of 38 0 to 38 9 in adult, unspecified obesity type ([facility] [address] )                Subjective:      Patient ID: Sunita Iyer is a 66 y o  female      HPI--  F/u and eval HTN:on 4 meds for HTN, and controlled well at 132/68    F/u and eval NIDDM: controlled on 4 orals and insulin:              Lantus 44 U at HS              Prandin 1 mg ac supper              Glimepride 2 mg OD in a m  Bydureon 2 mg Once wk               Invokana 300 mg OD            Pt is doing BBGs BID   And fbs running 120-140 range and ac supper it is hi 100's, on the above plan  F/u and eval HLD: refuses, tried statins and very intolerant     F/u and eval deconditioning: exercising daily --lifts wts, knee bends, etc  Even pushups    F/u and eval polypharmacy:  All med discussed with pt and carolina the Amaryl as it is long acting sulf-urea and may cause prolonged hypoglycemia    F/u Covid vacc shot:  Still waiting     F/u and eval COPD - more than ever needs the Covid and will call again today  Fu and eval psoriasis palmaris -- flares rarely, last was 3 yrs ago, IF runs out of [place]  The following portions of the patient's history were reviewed and updated as appropriate:   Past Medical History:  She has a past medical history of Benign essential hypertension, Chronic lower back pain, Diabetes mellitus ([address] ), Hyperlipidemia, and Sciatica  ,  _______________________________________________________________________  Medical Problems:  does not have any pertinent problems on file ,  _______________________________________________________________________  Past Surgical History:   has a past surgical history that includes Cholecystectomy  ,  _______________________________________________________________________  Family History:  family history includes Emphysema in her father; Leukemia in her father; Uterine cancer in her mother ,  _______________________________________________________________________  Social History:   reports that she quit smoking about 16 years ago  Her smoking use included cigarettes  She has a 30 00 pack-year smoking history   She has never used smokeless tobacco  She reports that she does not drink alcohol or use drugs ,  _______________________________________________________________________  Allergies:  has No Known Allergies     _______________________________________________________________________  Current Outpatient Medications   Medication Sig Dispense Refill    amLODIPine (NORVASC) 5 mg tablet Take 1 tablet (5 mg total) by mouth daily 90 tablet 3    Apremilast (Otezla) 30 MG TABS Take 2 tablets by mouth daily 180 tablet 3    Canagliflozin (Invokana) 300 MG TABS Take 1 tablet (300 mg total) by mouth daily 90 tablet 1    Cholecalciferol (VITAMIN D) 125 MCG (5000 UT) CAPS Take by mouth      cyanocobalamin (VITAMIN B-12) 1000 MCG tablet Take 1 tablet (1,000 mcg total) by mouth daily 100 tablet 6    Exenatide ER (Bydureon) 2 MG PEN Inject 2 mg under the skin once a week 12 each 3    folic acid (FOLVITE) 1 mg tablet Take 1 tablet (1 mg total) by mouth daily 90 tablet 3    glimepiride (AMARYL) 2 mg tablet Take 1 tablet (2 mg total) by mouth daily with breakfast 90 tablet 3    glucose blood test strip Test sugar 2-3 times daily 90 each 6    insulin glargine (Lantus SoloStar) 100 units/mL injection pen Inject 44 Units under the skin daily at bedtime 5 pen 1    losartan-hydrochlorothiazide (HYZAAR) 100-25 MG per tablet Take 1 tablet by mouth daily 90 tablet 1    nebivolol (BYSTOLIC) 10 mg tablet Take 1 tablet (10 mg total) by mouth daily 90 tablet 3    ketorolac (ACULAR) 0 5 % ophthalmic solution       ofloxacin (OCUFLOX) 0 3 % ophthalmic solution       prednisoLONE acetate (PRED FORTE) 1 % ophthalmic suspension       repaglinide (PRANDIN) 1 mg tablet Take 1 tablet (1 mg total) by mouth daily before dinner 90 tablet 3     Current Facility-Administered Medications   Medication Dose Route Frequency Provider Last Rate Last Admin    cyanocobalamin injection 1,000 mcg  1,000 mcg Intramuscular Q30 Days Nic Steel DO   1,000 mcg at 02/15/21 1638    cyanocobalamin injection 1,000 mcg  1,000 mcg Intramuscular Q30 Days P[place]am Park, DO   1,000 mcg at 10/27/20 1449    cyanocobalamin injection 1,000 mcg  1,000 mcg Intramuscular Q30 Days P[place]am [place], DO   1,000 mcg at 01/05/21 0750    cyanocobalamin injection 1,000 mcg  1,000 mcg Intramuscular Q30 Days P[place]am [place], DO   1,000 mcg at 12/08/20 0800     _______________________________________________________________________  Review of Systems   Constitutional: Negative for activity change (Very active and no complaints of back pain at this time--it will be recalled that she was in nursing home last December with severe sciatica inability to walk), appetite change, chills, diaphoresis, fatigue and fever  HENT: Negative for congestion, ear discharge, ear pain, facial swelling, nosebleeds, sore throat, tinnitus, trouble swallowing and voice change  Eyes: Negative for photophobia, pain, discharge, redness, itching and visual disturbance  Respiratory: Negative for apnea, cough, choking, chest tightness and shortness of breath  Denies any and all respiratory issues - SOB, orthopnea, etc    Cardiovascular: Negative for chest pain, palpitations and leg swelling  Denies any and all cardiac symptoms   Gastrointestinal: Negative for abdominal distention, abdominal pain, blood in stool, constipation, diarrhea, nausea and vomiting  Endocrine: Negative for cold intolerance, heat intolerance, polydipsia, polyphagia and polyuria  Genitourinary: Negative for decreased urine volume, difficulty urinating, dysuria, enuresis, frequency, hematuria, pelvic pain, urgency and vaginal bleeding  Musculoskeletal: Positive for arthralgias and myalgias  Negative for back pain, gait problem, joint swelling, neck pain and neck stiffness  Skin: Negative for color change and pallor  Rash:  palmar psoriasis notable on right hand but left hand has healed nicely  Doni Jimmy has made a huge difference a taking it b i d   Causes significant vomiting not just nausea she generally takes 30 mg once daily but on occasion 60 mg once daily  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-- on Otezla 30 mg OD (occas BID)==> vomiting is the issue with the hi dose  Currently taking OD, occas BID, but cannot miss even one day, cause will result in a flare   Allergic/Immunologic: Negative for immunocompromised state  Neurological: Negative for dizziness, seizures, facial asymmetry, light-headedness, numbness and headaches  Hematological: Negative for adenopathy  Psychiatric/Behavioral: Negative for agitation, behavioral problems, confusion, decreased concentration, dysphoric mood and hallucinations  The patient is nervous/anxious  Objective:  Vitals:    03/09/21 1323   BP: 132/68   Pulse: 74   Resp: 18   Temp: (!) 96 6 °F (35 9 °C)   SpO2: 98%   Weight: 92 1 kg (203 lb)   Height: 5' 1" (1 549 m)     Body mass index is 38 36 kg/m²  Physical Exam  Vitals signs and nursing note reviewed  Constitutional:       General: She is not in acute distress  Appearance: Normal appearance  She is well-developed  She is obese  She is not diaphoretic  HENT:      Head: Normocephalic and atraumatic  Nose: Nose normal  No congestion or rhinorrhea  Eyes:      General: No scleral icterus  Conjunctiva/sclera: Conjunctivae normal       Pupils: Pupils are equal, round, and reactive to light  Neck:      Musculoskeletal: Normal range of motion and neck supple  No neck rigidity or muscular tenderness  Thyroid: No thyromegaly  Trachea: No tracheal deviation  Cardiovascular:      Rate and Rhythm: Normal rate and regular rhythm  Pulses: Normal pulses  Heart sounds: Normal heart sounds  Pulmonary:      Effort: Pulmonary effort is normal  No respiratory distress  Breath sounds: Normal breath sounds  No wheezing, rhonchi or rales  Chest:      Chest wall: No tenderness  Musculoskeletal: Normal range of motion  General: No tenderness or deformity  Skin:     General: Skin is warm and dry  Coloration: Skin is not pale  Findings: No erythema or rash  Neurological:      General: No focal deficit present  Mental Status: She is alert and oriented to person, place, and time  Mental status is at baseline  Cranial Nerves: No cranial nerve deficit  Psychiatric:         Mood and Affect: Mood normal          Behavior: Behavior normal          Thought Content:  Thought content normal          Judgment: Judgment normal

## 2021-03-10 ENCOUNTER — CONSULT (OUTPATIENT)
Dept: NEPHROLOGY | Facility: CLINIC | Age: 76
End: 2021-03-10
Payer: COMMERCIAL

## 2021-03-10 VITALS
DIASTOLIC BLOOD PRESSURE: 66 MMHG | BODY MASS INDEX: 38.51 KG/M2 | HEIGHT: 61 IN | WEIGHT: 204 LBS | SYSTOLIC BLOOD PRESSURE: 130 MMHG

## 2021-03-10 DIAGNOSIS — I10 BENIGN ESSENTIAL HTN: ICD-10-CM

## 2021-03-10 DIAGNOSIS — I10 RESISTANT HYPERTENSION: Primary | ICD-10-CM

## 2021-03-10 PROBLEM — I1A.0 RESISTANT HYPERTENSION: Status: ACTIVE | Noted: 2019-12-03

## 2021-03-10 PROCEDURE — 1036F TOBACCO NON-USER: CPT | Performed by: INTERNAL MEDICINE

## 2021-03-10 PROCEDURE — 1160F RVW MEDS BY RX/DR IN RCRD: CPT | Performed by: INTERNAL MEDICINE

## 2021-03-10 PROCEDURE — 99204 OFFICE O/P NEW MOD 45 MIN: CPT | Performed by: INTERNAL MEDICINE

## 2021-03-10 PROCEDURE — 3078F DIAST BP <80 MM HG: CPT | Performed by: INTERNAL MEDICINE

## 2021-03-10 PROCEDURE — 3075F SYST BP GE 130 - 139MM HG: CPT | Performed by: INTERNAL MEDICINE

## 2021-03-10 RX ORDER — AMLODIPINE BESYLATE 10 MG/1
10 TABLET ORAL
Qty: 90 TABLET | Refills: 1 | Status: SHIPPED | OUTPATIENT
Start: 2021-03-10 | End: 2021-06-17

## 2021-03-10 NOTE — LETTER
March 10, 2021     Kacie Hemphill, [address] [facility]  [address]  [place]    Patient: Adonica Mortimer   YOB: 1945   Date of Visit: 3/10/2021       Dear Dr Shara Teague: Thank you for referring Gary Saleem to me for evaluation  Below are my notes for this consultation  If you have questions, please do not hesitate to call me  I look forward to following your patient along with you  Sincerely,        Autumn Dorsey MD        CC: No Recipients  Autumn Dorsey MD  3/10/2021  9:09 AM  Sign when Signing Visit  [address] [facility],[address] 66 y o  female MRN: [id number]  Date: 03/10/21  Reason for consultation: Initial evaluation of HTN    ASSESSMENT and PLAN:  1  Resistant hypertension:  · Poly Martell' reports good BP control until a recent rise in her BP readings about a month ago  · Her current average BP is above goal despite 4 medications which qualifies for resistant hypertension  · Current regimen: Amlodipine 5 mg daily, Losartan /38 mg daily, Bystolic 10 mg daily  · For now, I recommended spacing out her BP medications - I advised her to take Losartan HCT in the AM, Bystolic at dinner time and Amlodipine at bedtime  Additionally, I increased her Amlodipine to 10 mg daily  · For work up, would recommend checking renal artery doppler, aldosterone, renin, TSH and metanephrines  · We talked about the importance of staying on a low-salt diet which she reports that she is already doing  Additionally, we spent a fair amount of time discussing weight loss as a non pharmacologic means of lowering her blood pressure  · I asked her to check her blood pressure at home twice a day for about 1 week and send her blood pressure readings in sometime in April 2021  Depending on her home average BP readings, we will make further adjustments    · Of note, her L arm BP read slightly higher than the R arm - this is something we will have to keep a close eye on as she may have vascular disease  Patient Instructions   Please move the Losartan /25 mg daily to the morning  Take the Bystolic 10 mg daily at supper  Increase the Amlodipine to 10 mg daily and take this at bedtime  Check kidney artery ultrasound  Check blood work that I ordered  Please check blood pressure twice a day for at least 1 week and send in the readings by April 2021  Follow up in 3 months  HISTORY OF PRESENT ILLNESS:  Requesting Physician: DO Mar Ann Girish Francis is a 66 y o  female who was referred for initial evaluation of hypertension  Norm Schulz has a history of hypertension, diabetes mellitus, hyperlipidemia, COPD, and obesity  She is unaware of a history of cardiac disease or cerebrovascular disease  She reports that her hypertension was diagnosed about 20-25 years ago but she started medications about 16 years ago  She denies any hospital admissions for hypertensive urgency  The highest blood pressure she recalls is an SBP in the 180s mmHg  She reports that up to a month ago, her blood pressure was under fair to good control  However, over the past month, her blood pressure has gone up and her Bystolic was increased from 5 to 10 mg daily about 2 weeks ago  She is currently averaging 150s over 70s despite the recent increase in Bystolic  Currently, she is taking amlodipine 5 mg daily, Bystolic 10 mg daily, losartan /25 mg daily, all of which, she takes at night at around 9-10 o'clock in the evening  She checks her blood pressure (using an arm cuff) twice a day and her blood pressure readings at home have been 150s/70s  She did not bring her log book today  She denies any regular NSAID use  She reports sticking to a low salt diet  PAST MEDICAL HISTORY:  1  HTN: see above  2  DM: Diagnosed about 45 years ago  No known diabetic retinopathy  She has been on insulin for about 10 years now    3  HLP: Currently on no medications - intolerant to statins  4  COPD: not on home oxygen or medications  5  Obesity  6  DJD, DDD    No known history of CAD, CHF, CVA, PAD, CHANDANA, thromboembolism, liver disease, GERD, malignancy  PAST SURGICAL HISTORY:  1  Cholecystectomy    ALLERGIES:  No Known Allergies    SOCIAL HISTORY:  · Former smoker and consumed 1 PPD x 20 years and stopped in 2009  · No alcohol abuse - rare alcohol use  · No IVDA  FAMILY HISTORY:  · Negative for CKD or ESRD       MEDICATIONS:    Current Outpatient Medications:     amLODIPine (NORVASC) 5 mg tablet, Take 1 tablet (5 mg total) by mouth daily, Disp: 90 tablet, Rfl: 3    Apremilast (Otezla) 30 MG TABS, Take 2 tablets by mouth daily, Disp: 180 tablet, Rfl: 3    Canagliflozin (Invokana) 300 MG TABS, Take 1 tablet (300 mg total) by mouth daily, Disp: 90 tablet, Rfl: 1    Cholecalciferol (VITAMIN D) 125 MCG (5000 UT) CAPS, Take by mouth, Disp: , Rfl:     cyanocobalamin (VITAMIN B-12) 1000 MCG tablet, Take 1 tablet (1,000 mcg total) by mouth daily, Disp: 100 tablet, Rfl: 6    Exenatide ER (Bydureon) 2 MG PEN, Inject 2 mg under the skin once a week, Disp: 12 each, Rfl: 3    folic acid (FOLVITE) 1 mg tablet, Take 1 tablet (1 mg total) by mouth daily, Disp: 90 tablet, Rfl: 3    glimepiride (AMARYL) 2 mg tablet, Take 1 tablet (2 mg total) by mouth daily with breakfast, Disp: 90 tablet, Rfl: 3    glucose blood test strip, Test sugar 2-3 times daily, Disp: 90 each, Rfl: 6    insulin glargine (Lantus SoloStar) 100 units/mL injection pen, Inject 44 Units under the skin daily at bedtime, Disp: 5 pen, Rfl: 1    ketorolac (ACULAR) 0 5 % ophthalmic solution, , Disp: , Rfl:     losartan-hydrochlorothiazide (HYZAAR) 100-25 MG per tablet, Take 1 tablet by mouth daily, Disp: 90 tablet, Rfl: 1    nebivolol (BYSTOLIC) 10 mg tablet, Take 1 tablet (10 mg total) by mouth daily, Disp: 90 tablet, Rfl: 3    ofloxacin (OCUFLOX) 0 3 % ophthalmic solution, , Disp: , Rfl:     prednisoLONE acetate (PRED FORTE) 1 % ophthalmic suspension, , Disp: , Rfl:     repaglinide (PRANDIN) 1 mg tablet, Take 1 tablet (1 mg total) by mouth daily before dinner, Disp: 90 tablet, Rfl: 3    Current Facility-Administered Medications:     cyanocobalamin injection 1,000 mcg, 1,000 mcg, Intramuscular, Q30 Days, Kevin Base, DO, 1,000 mcg at 02/15/21 1638    cyanocobalamin injection 1,000 mcg, 1,000 mcg, Intramuscular, Q30 Days, Kevin Base, DO, 1,000 mcg at 10/27/20 1449    cyanocobalamin injection 1,000 mcg, 1,000 mcg, Intramuscular, Q30 Days, Kevin Base, DO, 1,000 mcg at 01/05/21 0750    cyanocobalamin injection 1,000 mcg, 1,000 mcg, Intramuscular, Q30 Days, Kevin Base, DO, 1,000 mcg at 03/09/21 1455    REVIEW OF SYSTEMS:  Review of Systems   Constitutional: Negative for appetite change, chills, fatigue and fever  HENT: Positive for postnasal drip and rhinorrhea  Negative for sinus pressure and sinus pain  Eyes: Positive for visual disturbance  Respiratory: Positive for cough  Negative for shortness of breath  Cardiovascular: Negative for chest pain and leg swelling  Gastrointestinal: Negative for abdominal pain, diarrhea, nausea and vomiting  Genitourinary: Negative for dysuria and hematuria  Musculoskeletal: Negative for arthralgias and back pain  Skin: Negative for rash  Allergic/Immunologic: Negative for immunocompromised state  Neurological: Negative for dizziness and light-headedness  Hematological: Does not bruise/bleed easily  Psychiatric/Behavioral: Positive for dysphoric mood  The patient is nervous/anxious  All the systems were reviewed and were negative except as documented on the HPI  PHYSICAL EXAMINATION:  /66 (BP Location: Right arm, Patient Position: Sitting, Cuff Size: Large)   Ht 5' 1" (1 549 m)   Wt 92 5 kg (204 lb)   BMI 38 55 kg/m²   Current Weight: Weight - Scale: 92 5 kg (204 lb) Body mass index is 38 55 kg/m²    General: conscious, coherent, cooperative, not in distress  Skin: warm, dry, good turgor  Eyes: pink conjunctivae, no scleral icterus  ENT: moist lips and mucous membranes  Neck: supple, no JVD  No carotid bruits  Chest/Lungs: clear breath sounds  CVS: distinct heart sounds, normal rate, regular rhythm, no rub  Abdomen: soft, non-tender, non-distended, normoactive bowel sounds  Extremities: no edema  : no sanchez catheter  Neuro: awake, alert, oriented to time, place and person  Psych: appropriate affect       LABORATORY RESULTS:  Results for orders placed or performed in visit on 02/05/21   Comprehensive metabolic panel   Result Value Ref Range    Sodium 139 133 - 145 mmol/L    Potassium 4 0 3 5 - 5 0 mmol/L    Chloride 101 96 - 108 mmol/L    CO2 29 22 - 33 mmol/L    ANION GAP 9 4 - 13 mmol/L    BUN 23 (H) 6 - 20 mg/dL    Creatinine 0 87 0 40 - 1 10 mg/dL    Glucose, Fasting 204 (H) 70 - 105 mg/dL    Calcium 9 9 8 4 - 10 2 mg/dL    AST 11 (L) 15 - 41 U/L    ALT 25 5 - 54 U/L    Alkaline Phosphatase 98 8 35 - 140 U/L    Total Protein 6 4 6 4 - 8 3 g/dL    Albumin 4 0 3 4 - 4 8 g/dL    Total Bilirubin 0 45 0 30 - 1 20 mg/dL    eGFR 64 ml/min/1 73sq m   CBC and differential   Result Value Ref Range    WBC 9 39 4 31 - 10 16 Thousand/uL    RBC 5 22 (H) 3 81 - 5 12 Million/uL    Hemoglobin 15 3 11 5 - 15 4 g/dL    Hematocrit 46 3 (H) 34 8 - 46 1 %    MCV 89 82 - 98 fL    MCH 29 3 26 8 - 34 3 pg    MCHC 33 0 31 4 - 37 4 g/dL    RDW 13 3 11 6 - 15 1 %    MPV 10 4 8 9 - 12 7 fL    Platelets [phone] [phone] 953 Thousands/uL    Neutrophils Relative 68 43 - 75 %    Immat GRANS % 0 0 - 2 %    Lymphocytes Relative 20 14 - 44 %    Monocytes Relative 10 4 - 12 %    Eosinophils Relative 1 0 - 6 %    Basophils Relative 1 0 - 1 %    Neutrophils Absolute 6 38 1 85 - 7 62 Thousands/µL    Immature Grans Absolute 0 02 0 00 - 0 20 Thousand/uL    Lymphocytes Absolute 1 85 0 60 - 4 47 Thousands/µL    Monocytes Absolute 0 92 0 17 - 1 22 Thousand/µL Eosinophils Absolute 0 13 0 00 - 0 61 Thousand/µL    Basophils Absolute 0 09 0 00 - 0 10 Thousands/µL     IMAGING: none

## 2021-03-10 NOTE — PATIENT INSTRUCTIONS
Please move the Losartan /25 mg daily to the morning  Take the Bystolic 10 mg daily at supper  Increase the Amlodipine to 10 mg daily and take this at bedtime  Check kidney artery ultrasound  Check blood work that I ordered  Please check blood pressure twice a day for at least 1 week and send in the readings by April 2021  Follow up in 3 months

## 2021-03-10 NOTE — PROGRESS NOTES
NEPHROLOGY OUTPATIENT CONSULTATION   Francois White 66 y o  female MRN: [id number]  Date: 03/10/21  Reason for consultation: Initial evaluation of HTN    ASSESSMENT and PLAN:  1  Resistant hypertension:  · Redd Osborne' reports good BP control until a recent rise in her BP readings about a month ago  · Her current average BP is above goal despite 4 medications which qualifies for resistant hypertension  · Current regimen: Amlodipine 5 mg daily, Losartan /28 mg daily, Bystolic 10 mg daily  · For now, I recommended spacing out her BP medications - I advised her to take Losartan HCT in the AM, Bystolic at dinner time and Amlodipine at bedtime  Additionally, I increased her Amlodipine to 10 mg daily  · For work up, would recommend checking renal artery doppler, aldosterone, renin, TSH and metanephrines  · We talked about the importance of staying on a low-salt diet which she reports that she is already doing  Additionally, we spent a fair amount of time discussing weight loss as a non pharmacologic means of lowering her blood pressure  · I asked her to check her blood pressure at home twice a day for about 1 week and send her blood pressure readings in sometime in April 2021  Depending on her home average BP readings, we will make further adjustments  · Of note, her L arm BP read slightly higher than the R arm - this is something we will have to keep a close eye on as she may have vascular disease  Patient Instructions   Please move the Losartan /25 mg daily to the morning  Take the Bystolic 10 mg daily at supper  Increase the Amlodipine to 10 mg daily and take this at bedtime  Check kidney artery ultrasound  Check blood work that I ordered  Please check blood pressure twice a day for at least 1 week and send in the readings by April 2021  Follow up in 3 months         HISTORY OF PRESENT ILLNESS:  Requesting Physician: DO Mar Maldonado Eva Nguyễn is a 66 y o  female who was referred for initial evaluation of hypertension  Ru Sue has a history of hypertension, diabetes mellitus, hyperlipidemia, COPD, and obesity  She is unaware of a history of cardiac disease or cerebrovascular disease  She reports that her hypertension was diagnosed about 20-25 years ago but she started medications about 16 years ago  She denies any hospital admissions for hypertensive urgency  The highest blood pressure she recalls is an SBP in the 180s mmHg  She reports that up to a month ago, her blood pressure was under fair to good control  However, over the past month, her blood pressure has gone up and her Bystolic was increased from 5 to 10 mg daily about 2 weeks ago  She is currently averaging 150s over 70s despite the recent increase in Bystolic  Currently, she is taking amlodipine 5 mg daily, Bystolic 10 mg daily, losartan /25 mg daily, all of which, she takes at night at around 9-10 o'clock in the evening  She checks her blood pressure (using an arm cuff) twice a day and her blood pressure readings at home have been 150s/70s  She did not bring her log book today  She denies any regular NSAID use  She reports sticking to a low salt diet  PAST MEDICAL HISTORY:  1  HTN: see above  2  DM: Diagnosed about 45 years ago  No known diabetic retinopathy  She has been on insulin for about 10 years now  3  HLP: Currently on no medications - intolerant to statins  4  COPD: not on home oxygen or medications  5  Obesity  6  DJD, DDD    No known history of CAD, CHF, CVA, PAD, CHANDANA, thromboembolism, liver disease, GERD, malignancy  PAST SURGICAL HISTORY:  1  Cholecystectomy    ALLERGIES:  No Known Allergies    SOCIAL HISTORY:  · Former smoker and consumed 1 PPD x 20 years and stopped in 2009  · No alcohol abuse - rare alcohol use  · No IVDA  FAMILY HISTORY:  · Negative for CKD or ESRD       MEDICATIONS:    Current Outpatient Medications:     amLODIPine (NORVASC) 5 mg tablet, Take 1 tablet (5 mg total) by mouth daily, Disp: 90 tablet, Rfl: 3    Apremilast (Otezla) 30 MG TABS, Take 2 tablets by mouth daily, Disp: 180 tablet, Rfl: 3    Canagliflozin (Invokana) 300 MG TABS, Take 1 tablet (300 mg total) by mouth daily, Disp: 90 tablet, Rfl: 1    Cholecalciferol (VITAMIN D) 125 MCG (5000 UT) CAPS, Take by mouth, Disp: , Rfl:     cyanocobalamin (VITAMIN B-12) 1000 MCG tablet, Take 1 tablet (1,000 mcg total) by mouth daily, Disp: 100 tablet, Rfl: 6    Exenatide ER (Bydureon) 2 MG PEN, Inject 2 mg under the skin once a week, Disp: 12 each, Rfl: 3    folic acid (FOLVITE) 1 mg tablet, Take 1 tablet (1 mg total) by mouth daily, Disp: 90 tablet, Rfl: 3    glimepiride (AMARYL) 2 mg tablet, Take 1 tablet (2 mg total) by mouth daily with breakfast, Disp: 90 tablet, Rfl: 3    glucose blood test strip, Test sugar 2-3 times daily, Disp: 90 each, Rfl: 6    insulin glargine (Lantus SoloStar) 100 units/mL injection pen, Inject 44 Units under the skin daily at bedtime, Disp: 5 pen, Rfl: 1    ketorolac (ACULAR) 0 5 % ophthalmic solution, , Disp: , Rfl:     losartan-hydrochlorothiazide (HYZAAR) 100-25 MG per tablet, Take 1 tablet by mouth daily, Disp: 90 tablet, Rfl: 1    nebivolol (BYSTOLIC) 10 mg tablet, Take 1 tablet (10 mg total) by mouth daily, Disp: 90 tablet, Rfl: 3    ofloxacin (OCUFLOX) 0 3 % ophthalmic solution, , Disp: , Rfl:     prednisoLONE acetate (PRED FORTE) 1 % ophthalmic suspension, , Disp: , Rfl:     repaglinide (PRANDIN) 1 mg tablet, Take 1 tablet (1 mg total) by mouth daily before dinner, Disp: 90 tablet, Rfl: 3    Current Facility-Administered Medications:     cyanocobalamin injection 1,000 mcg, 1,000 mcg, Intramuscular, Q30 Days, Leonarda Angelucci, DO, 1,000 mcg at 02/15/21 1638    cyanocobalamin injection 1,000 mcg, 1,000 mcg, Intramuscular, Q30 Days, Leonarda Angelucci, DO, 1,000 mcg at 10/27/20 1449    cyanocobalamin injection 1,000 mcg, 1,000 mcg, Intramuscular, Q30 Days, Marii Lemostree , 1,000 mcg at 01/05/21 0750    cyanocobalamin injection 1,000 mcg, 1,000 mcg, Intramuscular, Q30 Days, Leonarda Angelucci, DO, 1,000 mcg at 03/09/21 1455    REVIEW OF SYSTEMS:  Review of Systems   Constitutional: Negative for appetite change, chills, fatigue and fever  HENT: Positive for postnasal drip and rhinorrhea  Negative for sinus pressure and sinus pain  Eyes: Positive for visual disturbance  Respiratory: Positive for cough  Negative for shortness of breath  Cardiovascular: Negative for chest pain and leg swelling  Gastrointestinal: Negative for abdominal pain, diarrhea, nausea and vomiting  Genitourinary: Negative for dysuria and hematuria  Musculoskeletal: Negative for arthralgias and back pain  Skin: Negative for rash  Allergic/Immunologic: Negative for immunocompromised state  Neurological: Negative for dizziness and light-headedness  Hematological: Does not bruise/bleed easily  Psychiatric/Behavioral: Positive for dysphoric mood  The patient is nervous/anxious  All the systems were reviewed and were negative except as documented on the HPI  PHYSICAL EXAMINATION:  /66 (BP Location: Right arm, Patient Position: Sitting, Cuff Size: Large)   Ht 5' 1" (1 549 m)   Wt 92 5 kg (204 lb)   BMI 38 55 kg/m²   Current Weight: Weight - Scale: 92 5 kg (204 lb) Body mass index is 38 55 kg/m²  General: conscious, coherent, cooperative, not in distress  Skin: warm, dry, good turgor  Eyes: pink conjunctivae, no scleral icterus  ENT: moist lips and mucous membranes  Neck: supple, no JVD  No carotid bruits  Chest/Lungs: clear breath sounds  CVS: distinct heart sounds, normal rate, regular rhythm, no rub  Abdomen: soft, non-tender, non-distended, normoactive bowel sounds  Extremities: no edema  : no sanchez catheter  Neuro: awake, alert, oriented to time, place and person  Psych: appropriate affect       LABORATORY RESULTS:  Results for orders placed or performed in visit on 02/05/21   Comprehensive metabolic panel   Result Value Ref Range    Sodium 139 133 - 145 mmol/L    Potassium 4 0 3 5 - 5 0 mmol/L    Chloride 101 96 - 108 mmol/L    CO2 29 22 - 33 mmol/L    ANION GAP 9 4 - 13 mmol/L    BUN 23 (H) 6 - 20 mg/dL    Creatinine 0 87 0 40 - 1 10 mg/dL    Glucose, Fasting 204 (H) 70 - 105 mg/dL    Calcium 9 9 8 4 - 10 2 mg/dL    AST 11 (L) 15 - 41 U/L    ALT 25 5 - 54 U/L    Alkaline Phosphatase 98 8 35 - 140 U/L    Total Protein 6 4 6 4 - 8 3 g/dL    Albumin 4 0 3 4 - 4 8 g/dL    Total Bilirubin 0 45 0 30 - 1 20 mg/dL    eGFR 64 ml/min/1 73sq m   CBC and differential   Result Value Ref Range    WBC 9 39 4 31 - 10 16 Thousand/uL    RBC 5 22 (H) 3 81 - 5 12 Million/uL    Hemoglobin 15 3 11 5 - 15 4 g/dL    Hematocrit 46 3 (H) 34 8 - 46 1 %    MCV 89 82 - 98 fL    MCH 29 3 26 8 - 34 3 pg    MCHC 33 0 31 4 - 37 4 g/dL    RDW 13 3 11 6 - 15 1 %    MPV 10 4 8 9 - 12 7 fL    Platelets 051 463 - 797 Thousands/uL    Neutrophils Relative 68 43 - 75 %    Immat GRANS % 0 0 - 2 %    Lymphocytes Relative 20 14 - 44 %    Monocytes Relative 10 4 - 12 %    Eosinophils Relative 1 0 - 6 %    Basophils Relative 1 0 - 1 %    Neutrophils Absolute 6 38 1 85 - 7 62 Thousands/µL    Immature Grans Absolute 0 02 0 00 - 0 20 Thousand/uL    Lymphocytes Absolute 1 85 0 60 - 4 47 Thousands/µL    Monocytes Absolute 0 92 0 17 - 1 22 Thousand/µL    Eosinophils Absolute 0 13 0 00 - 0 61 Thousand/µL    Basophils Absolute 0 09 0 00 - 0 10 Thousands/µL     IMAGING: none

## 2021-03-11 DIAGNOSIS — I10 BENIGN ESSENTIAL HTN: ICD-10-CM

## 2021-03-11 RX ORDER — LOSARTAN POTASSIUM AND HYDROCHLOROTHIAZIDE 25; 100 MG/1; MG/1
1 TABLET ORAL DAILY
Qty: 90 TABLET | Refills: 3 | Status: SHIPPED | OUTPATIENT
Start: 2021-03-11 | End: 2021-06-17

## 2021-03-16 ENCOUNTER — TELEPHONE (OUTPATIENT)
Dept: NEPHROLOGY | Facility: CLINIC | Age: 76
End: 2021-03-16

## 2021-03-16 NOTE — TELEPHONE ENCOUNTER
I called and left message for patient to call back and schedule 1 month bp check/ correlation w/ Ozzie Diaz or Shaye Reyna

## 2021-03-19 ENCOUNTER — HOSPITAL ENCOUNTER (OUTPATIENT)
Dept: VASCULAR ULTRASOUND | Facility: HOSPITAL | Age: 76
Discharge: HOME | End: 2021-03-19
Payer: COMMERCIAL

## 2021-03-19 ENCOUNTER — APPOINTMENT (OUTPATIENT)
Dept: LAB | Facility: HOSPITAL | Age: 76
End: 2021-03-19
Attending: INTERNAL MEDICINE
Payer: COMMERCIAL

## 2021-03-19 DIAGNOSIS — I10 RESISTANT HYPERTENSION: ICD-10-CM

## 2021-03-19 DIAGNOSIS — I10 HTN (HYPERTENSION), MALIGNANT: ICD-10-CM

## 2021-03-19 LAB — TSH SERPL DL<=0.05 MIU/L-ACNC: 3.15 UIU/ML (ref 0.34–5.6)

## 2021-03-19 PROCEDURE — 84443 ASSAY THYROID STIM HORMONE: CPT

## 2021-03-19 PROCEDURE — 82088 ASSAY OF ALDOSTERONE: CPT

## 2021-03-19 PROCEDURE — 83835 ASSAY OF METANEPHRINES: CPT

## 2021-03-19 PROCEDURE — 93975 VASCULAR STUDY: CPT

## 2021-03-19 PROCEDURE — 36415 COLL VENOUS BLD VENIPUNCTURE: CPT

## 2021-03-19 PROCEDURE — 93975 VASCULAR STUDY: CPT | Performed by: SURGERY

## 2021-03-19 PROCEDURE — 84244 ASSAY OF RENIN: CPT

## 2021-03-20 DIAGNOSIS — Z79.4 CONTROLLED TYPE 2 DIABETES MELLITUS WITHOUT COMPLICATION, WITH LONG-TERM CURRENT USE OF INSULIN (HCC): ICD-10-CM

## 2021-03-20 DIAGNOSIS — E11.9 CONTROLLED TYPE 2 DIABETES MELLITUS WITHOUT COMPLICATION, WITH LONG-TERM CURRENT USE OF INSULIN (HCC): ICD-10-CM

## 2021-03-21 RX ORDER — CANAGLIFLOZIN 300 MG/1
TABLET, FILM COATED ORAL
Qty: 90 TABLET | Refills: 3 | Status: SHIPPED | OUTPATIENT
Start: 2021-03-21 | End: 2021-10-21

## 2021-03-23 DIAGNOSIS — Z79.4 CONTROLLED TYPE 2 DIABETES MELLITUS WITHOUT COMPLICATION, WITH LONG-TERM CURRENT USE OF INSULIN (HCC): ICD-10-CM

## 2021-03-23 DIAGNOSIS — E11.9 CONTROLLED TYPE 2 DIABETES MELLITUS WITHOUT COMPLICATION, WITH LONG-TERM CURRENT USE OF INSULIN (HCC): ICD-10-CM

## 2021-03-23 RX ORDER — EXENATIDE 2 MG/.65ML
2 INJECTION, SUSPENSION, EXTENDED RELEASE SUBCUTANEOUS WEEKLY
Qty: 12 EACH | Refills: 3 | Status: SHIPPED | OUTPATIENT
Start: 2021-03-23 | End: 2021-03-31

## 2021-03-26 LAB — ALDOST SERPL-MCNC: 7.3 NG/DL (ref 0–30)

## 2021-03-27 LAB — RENIN PLAS-CCNC: 2.06 NG/ML/HR (ref 0.17–5.38)

## 2021-03-29 LAB
METANEPH FREE SERPL-MCNC: 10.8 PG/ML (ref 0–88)
NORMETANEPHRINE SERPL-MCNC: 97.4 PG/ML (ref 0–191.8)

## 2021-03-30 DIAGNOSIS — E11.9 CONTROLLED TYPE 2 DIABETES MELLITUS WITHOUT COMPLICATION, WITH LONG-TERM CURRENT USE OF INSULIN (HCC): Primary | ICD-10-CM

## 2021-03-30 DIAGNOSIS — Z79.4 CONTROLLED TYPE 2 DIABETES MELLITUS WITHOUT COMPLICATION, WITH LONG-TERM CURRENT USE OF INSULIN (HCC): Primary | ICD-10-CM

## 2021-03-30 RX ORDER — EXENATIDE 2 MG/.85ML
2 INJECTION, SUSPENSION, EXTENDED RELEASE SUBCUTANEOUS WEEKLY
Qty: 12 PEN | Refills: 3 | Status: SHIPPED | OUTPATIENT
Start: 2021-03-30 | End: 2021-03-31 | Stop reason: SDUPTHER

## 2021-03-31 DIAGNOSIS — E11.9 CONTROLLED TYPE 2 DIABETES MELLITUS WITHOUT COMPLICATION, WITH LONG-TERM CURRENT USE OF INSULIN (HCC): ICD-10-CM

## 2021-03-31 DIAGNOSIS — Z79.4 CONTROLLED TYPE 2 DIABETES MELLITUS WITHOUT COMPLICATION, WITH LONG-TERM CURRENT USE OF INSULIN (HCC): ICD-10-CM

## 2021-03-31 RX ORDER — EXENATIDE 2 MG/.85ML
2 INJECTION, SUSPENSION, EXTENDED RELEASE SUBCUTANEOUS WEEKLY
Qty: 12 PEN | Refills: 3 | Status: SHIPPED | OUTPATIENT
Start: 2021-03-31 | End: 2021-12-28

## 2021-04-01 ENCOUNTER — IMMUNIZATIONS (OUTPATIENT)
Dept: FAMILY MEDICINE CLINIC | Facility: HOSPITAL | Age: 76
End: 2021-04-01

## 2021-04-01 DIAGNOSIS — Z23 ENCOUNTER FOR IMMUNIZATION: Primary | ICD-10-CM

## 2021-04-01 PROCEDURE — 0001A SARS-COV-2 / COVID-19 MRNA VACCINE (PFIZER-BIONTECH) 30 MCG: CPT

## 2021-04-01 PROCEDURE — 91300 SARS-COV-2 / COVID-19 MRNA VACCINE (PFIZER-BIONTECH) 30 MCG: CPT

## 2021-04-06 ENCOUNTER — CLINICAL SUPPORT (OUTPATIENT)
Dept: FAMILY MEDICINE CLINIC | Facility: CLINIC | Age: 76
End: 2021-04-06
Payer: COMMERCIAL

## 2021-04-06 DIAGNOSIS — E53.8 CYANOCOBALAMIN DEFICIENCY: Primary | ICD-10-CM

## 2021-04-06 PROCEDURE — 96372 THER/PROPH/DIAG INJ SC/IM: CPT

## 2021-04-06 RX ADMIN — CYANOCOBALAMIN 1000 MCG: 1000 INJECTION INTRAMUSCULAR; SUBCUTANEOUS at 08:17

## 2021-04-12 NOTE — PROGRESS NOTES
Assessment and Plan:    Pilar Rivera was seen today for follow-up  Diagnoses and all orders for this visit:    Resistant hypertension  -     Basic metabolic panel; Future  -     doxazosin (CARDURA) 1 mg tablet; Take 1 tablet (1 mg total) by mouth daily at bedtime      Resistant Hypertension- Antihypertensive regimen includes amlodipine 10mg at dinner, bystolic 99YK daily at bedtime, and losartan-hctz 100-25mg daily AM   Avoid salt in your diet  Stay active  Avoid NSAIDs  Try to lose weight  Continue to monitor your blood pressure at home  Her home readings are elevated above goal still  Please start doxazosin 1mg at bedtime  If you become dizzy or lightheaded, call our office  Noted left arm BP > right arm BP last visit but this visit it is opposite  Secondary workup: renal artery doppler negative for MAUREEN, aldosterone 7 3, renin 2 06, metanephrines normal  BP cuff: unclear if accurate, her cuff reads lower than my office readings  Home readings on her left arm in the morning pre meds are in the 150s mostly with right arm PM readings in the 140s  We would recommend an OMRON blood pressure cuff (she currently has a [employer] brand cuff)  Follow up with Dr Tiesha Oquendo in 2 months (6/8/21 @ 10am)  Please call the office with any questions or concerns  Reason for Visit: Follow-up (BP CHECK)    HPI: 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 who is here for follow up of hypertension  At her first visit about a month ago, Dr Tiesha Oquendo increased her amlodipine and adjusted the timing of her other antihypertensives  She had glaucoma surgery about 3 weeks ago and is feeling well since then  She feels her blood pressure has improved slightly since her surgery  She feels this was due to anxiety  She denies SOB or LE edema  ROS: A complete review of systems was performed and was negative unless otherwise noted in the history of present illness  Allergies:   Patient has no known allergies      Medications: Current Outpatient Medications:     amLODIPine (NORVASC) 10 mg tablet, Take 1 tablet (10 mg total) by mouth daily at bedtime, Disp: 90 tablet, Rfl: 1    Apremilast (Otezla) 30 MG TABS, Take 2 tablets by mouth daily, Disp: 180 tablet, Rfl: 3    Cholecalciferol (VITAMIN D) 125 MCG (5000 UT) CAPS, Take by mouth, Disp: , Rfl:     cyanocobalamin (VITAMIN B-12) 1000 MCG tablet, Take 1 tablet (1,000 mcg total) by mouth daily, Disp: 100 tablet, Rfl: 6    Exenatide ER (Bydureon BCise) 2 MG/0 85ML AUIJ, Inject 2 mg under the skin once a week, Disp: 12 pen, Rfl: 3    folic acid (FOLVITE) 1 mg tablet, Take 1 tablet (1 mg total) by mouth daily, Disp: 90 tablet, Rfl: 3    glimepiride (AMARYL) 2 mg tablet, Take 1 tablet (2 mg total) by mouth daily with breakfast, Disp: 90 tablet, Rfl: 3    insulin glargine (Lantus SoloStar) 100 units/mL injection pen, Inject 44 Units under the skin daily at bedtime, Disp: 5 pen, Rfl: 1    Invokana 300 MG TABS, TAKE 1 TABLET BY MOUTH  DAILY, Disp: 90 tablet, Rfl: 3    ketorolac (ACULAR) 0 5 % ophthalmic solution, , Disp: , Rfl:     losartan-hydrochlorothiazide (HYZAAR) 100-25 MG per tablet, TAKE 1 TABLET BY MOUTH  DAILY, Disp: 90 tablet, Rfl: 3    nebivolol (BYSTOLIC) 10 mg tablet, Take 1 tablet (10 mg total) by mouth daily, Disp: 90 tablet, Rfl: 3    ofloxacin (OCUFLOX) 0 3 % ophthalmic solution, , Disp: , Rfl:     prednisoLONE acetate (PRED FORTE) 1 % ophthalmic suspension, , Disp: , Rfl:     repaglinide (PRANDIN) 1 mg tablet, Take 1 tablet (1 mg total) by mouth daily before dinner, Disp: 90 tablet, Rfl: 3    doxazosin (CARDURA) 1 mg tablet, Take 1 tablet (1 mg total) by mouth daily at bedtime, Disp: 30 tablet, Rfl: 3    glucose blood test strip, Test sugar 2-3 times daily, Disp: 90 each, Rfl: 6    Current Facility-Administered Medications:     cyanocobalamin injection 1,000 mcg, 1,000 mcg, Intramuscular, Q30 Days, Lucas Fields DO, 1,000 mcg at 04/06/21 08   cyanocobalamin injection 1,000 mcg, 1,000 mcg, Intramuscular, Q30 Days, Nani Falling, DO, 1,000 mcg at 10/27/20 1449    cyanocobalamin injection 1,000 mcg, 1,000 mcg, Intramuscular, Q30 Days, Nani Falling, DO, 1,000 mcg at 01/05/21 0750    cyanocobalamin injection 1,000 mcg, 1,000 mcg, Intramuscular, Q30 Days, Nani Falling, DO, 1,000 mcg at 03/09/21 1455    Past Medical History:   Diagnosis Date    Benign essential hypertension     Chronic lower back pain     Diabetes mellitus ([address] )     Hyperlipidemia     Sciatica      Past Surgical History:   Procedure Laterality Date    CHOLECYSTECTOMY       Family History   Problem Relation Age of Onset    Uterine cancer Mother     Emphysema Father     Leukemia Father       reports that she quit smoking about 16 years ago  Her smoking use included cigarettes  She has a 30 00 pack-year smoking history  She has never used smokeless tobacco  She reports that she does not drink alcohol or use drugs  Physical Exam:   Vitals:    04/13/21 1043 04/13/21 1044 04/13/21 1045 04/13/21 1046   BP: 152/60 118/98 132/60 121/72   BP Location: Left arm Right arm Right arm Left arm   Patient Position: Sitting Sitting Sitting Sitting   Cuff Size: Standard Standard Standard Standard   Pulse: 60   61   Weight:       Height:         Body mass index is 39 23 kg/m²  General: NAD  Neuro: AAO  Skin: no rash  Eyes: anicteric  ENMT: mm moist  Neck: no masses  Respiratory: CTAB  Cardiovascular: RRR  Extremities: no edema  Gastrointestinal: soft nt nd    Procedure:  No results found for this or any previous visit  Lab Results   Component Value Date    CALCIUM 9 9 02/05/2021    K 4 0 02/05/2021    CO2 29 02/05/2021     02/05/2021    BUN 23 (H) 02/05/2021    CREATININE 0 87 02/05/2021     I have personally reviewed the blood work as stated above and in my note  I have personally reviewed renal artery doppler imaging studies  I have personally reviewed Dr Ulisses Blood last note

## 2021-04-13 ENCOUNTER — OFFICE VISIT (OUTPATIENT)
Dept: NEPHROLOGY | Facility: CLINIC | Age: 76
End: 2021-04-13
Payer: COMMERCIAL

## 2021-04-13 VITALS
DIASTOLIC BLOOD PRESSURE: 72 MMHG | WEIGHT: 207.6 LBS | SYSTOLIC BLOOD PRESSURE: 121 MMHG | HEART RATE: 61 BPM | HEIGHT: 61 IN | BODY MASS INDEX: 39.2 KG/M2

## 2021-04-13 DIAGNOSIS — I10 RESISTANT HYPERTENSION: Primary | ICD-10-CM

## 2021-04-13 PROCEDURE — 99213 OFFICE O/P EST LOW 20 MIN: CPT | Performed by: PHYSICIAN ASSISTANT

## 2021-04-13 RX ORDER — DOXAZOSIN MESYLATE 1 MG/1
1 TABLET ORAL
Qty: 30 TABLET | Refills: 3 | Status: SHIPPED | OUTPATIENT
Start: 2021-04-13 | End: 2021-06-08 | Stop reason: SDUPTHER

## 2021-04-13 NOTE — PATIENT INSTRUCTIONS
Resistant Hypertension- Antihypertensive regimen includes amlodipine 10mg at dinner, bystolic 18TX daily at bedtime, and losartan-hctz 100-25mg daily AM   Avoid salt in your diet  Stay active  Avoid NSAIDs  Try to lose weight  Continue to monitor your blood pressure at home  Her home readings are elevated above goal still  Please start doxazosin 1mg at bedtime  If you become dizzy or lightheaded, call our office  Noted left arm BP > right arm BP last visit but this visit it is opposite  Secondary workup: renal artery doppler negative for MAUREEN, aldosterone 7 3, renin 2 06, metanephrines normal  BP cuff: unclear if accurate, her cuff reads lower than my office readings  We would recommend an OMRON blood pressure cuff  Follow up with Dr Ulises Michele in 2 months (6/8/21 @ 10am)  Please call the office with any questions or concerns

## 2021-04-22 ENCOUNTER — IMMUNIZATIONS (OUTPATIENT)
Dept: FAMILY MEDICINE CLINIC | Facility: HOSPITAL | Age: 76
End: 2021-04-22

## 2021-04-22 DIAGNOSIS — Z23 ENCOUNTER FOR IMMUNIZATION: Primary | ICD-10-CM

## 2021-04-22 PROCEDURE — 0002A SARS-COV-2 / COVID-19 MRNA VACCINE (PFIZER-BIONTECH) 30 MCG: CPT | Performed by: NURSE PRACTITIONER

## 2021-04-22 PROCEDURE — 91300 SARS-COV-2 / COVID-19 MRNA VACCINE (PFIZER-BIONTECH) 30 MCG: CPT | Performed by: NURSE PRACTITIONER

## 2021-04-26 ENCOUNTER — NURSE TRIAGE (OUTPATIENT)
Dept: OTHER | Facility: OTHER | Age: 76
End: 2021-04-26

## 2021-04-26 ENCOUNTER — OFFICE VISIT (OUTPATIENT)
Dept: FAMILY MEDICINE CLINIC | Facility: CLINIC | Age: 76
End: 2021-04-26
Payer: COMMERCIAL

## 2021-04-26 ENCOUNTER — HOSPITAL ENCOUNTER (OUTPATIENT)
Dept: RADIOLOGY | Facility: HOSPITAL | Age: 76
Discharge: HOME | End: 2021-04-26
Payer: COMMERCIAL

## 2021-04-26 ENCOUNTER — APPOINTMENT (OUTPATIENT)
Dept: LAB | Facility: CLINIC | Age: 76
End: 2021-04-26
Payer: COMMERCIAL

## 2021-04-26 ENCOUNTER — TELEPHONE (OUTPATIENT)
Dept: FAMILY MEDICINE CLINIC | Facility: CLINIC | Age: 76
End: 2021-04-26

## 2021-04-26 VITALS
HEIGHT: 61 IN | RESPIRATION RATE: 16 BRPM | SYSTOLIC BLOOD PRESSURE: 134 MMHG | HEART RATE: 57 BPM | DIASTOLIC BLOOD PRESSURE: 58 MMHG | TEMPERATURE: 97.1 F | WEIGHT: 208.6 LBS | OXYGEN SATURATION: 98 % | BODY MASS INDEX: 39.38 KG/M2

## 2021-04-26 DIAGNOSIS — B37.0 THRUSH: ICD-10-CM

## 2021-04-26 DIAGNOSIS — R06.02 SHORTNESS OF BREATH: ICD-10-CM

## 2021-04-26 DIAGNOSIS — R05.9 COUGH: ICD-10-CM

## 2021-04-26 DIAGNOSIS — R05.9 COUGH: Primary | ICD-10-CM

## 2021-04-26 DIAGNOSIS — R09.82 POST-NASAL DRIP: ICD-10-CM

## 2021-04-26 DIAGNOSIS — R13.10 DYSPHAGIA, UNSPECIFIED TYPE: ICD-10-CM

## 2021-04-26 DIAGNOSIS — J18.9 PNEUMONIA OF BOTH LUNGS DUE TO INFECTIOUS ORGANISM, UNSPECIFIED PART OF LUNG: ICD-10-CM

## 2021-04-26 PROBLEM — R26.9 UNSPECIFIED ABNORMALITIES OF GAIT AND MOBILITY: Status: ACTIVE | Noted: 2019-12-06

## 2021-04-26 PROBLEM — G89.29 OTHER CHRONIC PAIN: Status: ACTIVE | Noted: 2019-12-06

## 2021-04-26 PROBLEM — Z87.891 PERSONAL HISTORY OF NICOTINE DEPENDENCE: Status: ACTIVE | Noted: 2019-12-06

## 2021-04-26 LAB
BASOPHILS # BLD AUTO: 0.07 THOUSANDS/ΜL (ref 0–0.1)
BASOPHILS NFR BLD AUTO: 1 % (ref 0–1)
D DIMER PPP FEU-MCNC: 0.41 UG/ML FEU
EOSINOPHIL # BLD AUTO: 0.14 THOUSAND/ΜL (ref 0–0.61)
EOSINOPHIL NFR BLD AUTO: 2 % (ref 0–6)
ERYTHROCYTE [DISTWIDTH] IN BLOOD BY AUTOMATED COUNT: 13.6 % (ref 11.6–15.1)
HCT VFR BLD AUTO: 43.9 % (ref 34.8–46.1)
HGB BLD-MCNC: 13.9 G/DL (ref 11.5–15.4)
IMM GRANULOCYTES # BLD AUTO: 0.04 THOUSAND/UL (ref 0–0.2)
IMM GRANULOCYTES NFR BLD AUTO: 1 % (ref 0–2)
LYMPHOCYTES # BLD AUTO: 1.94 THOUSANDS/ΜL (ref 0.6–4.47)
LYMPHOCYTES NFR BLD AUTO: 23 % (ref 14–44)
MCH RBC QN AUTO: 29 PG (ref 26.8–34.3)
MCHC RBC AUTO-ENTMCNC: 31.7 G/DL (ref 31.4–37.4)
MCV RBC AUTO: 92 FL (ref 82–98)
MONOCYTES # BLD AUTO: 0.66 THOUSAND/ΜL (ref 0.17–1.22)
MONOCYTES NFR BLD AUTO: 8 % (ref 4–12)
NEUTROPHILS # BLD AUTO: 5.61 THOUSANDS/ΜL (ref 1.85–7.62)
NEUTS SEG NFR BLD AUTO: 65 % (ref 43–75)
NRBC BLD AUTO-RTO: 0 /100 WBCS
PLATELET # BLD AUTO: 254 THOUSANDS/UL (ref 149–390)
PMV BLD AUTO: 10.1 FL (ref 8.9–12.7)
RBC # BLD AUTO: 4.79 MILLION/UL (ref 3.81–5.12)
SARS-COV-2 RNA RESP QL NAA+PROBE: NEGATIVE
WBC # BLD AUTO: 8.46 THOUSAND/UL (ref 4.31–10.16)

## 2021-04-26 PROCEDURE — 99214 OFFICE O/P EST MOD 30 MIN: CPT

## 2021-04-26 PROCEDURE — 3078F DIAST BP <80 MM HG: CPT

## 2021-04-26 PROCEDURE — 71046 X-RAY EXAM CHEST 2 VIEWS: CPT

## 2021-04-26 PROCEDURE — 1036F TOBACCO NON-USER: CPT

## 2021-04-26 PROCEDURE — U0003 INFECTIOUS AGENT DETECTION BY NUCLEIC ACID (DNA OR RNA); SEVERE ACUTE RESPIRATORY SYNDROME CORONAVIRUS 2 (SARS-COV-2) (CORONAVIRUS DISEASE [COVID-19]), AMPLIFIED PROBE TECHNIQUE, MAKING USE OF HIGH THROUGHPUT TECHNOLOGIES AS DESCRIBED BY CMS-2020-01-R: HCPCS | Performed by: NURSE PRACTITIONER

## 2021-04-26 PROCEDURE — 3075F SYST BP GE 130 - 139MM HG: CPT

## 2021-04-26 PROCEDURE — U0005 INFEC AGEN DETEC AMPLI PROBE: HCPCS | Performed by: NURSE PRACTITIONER

## 2021-04-26 PROCEDURE — 85379 FIBRIN DEGRADATION QUANT: CPT

## 2021-04-26 PROCEDURE — 1160F RVW MEDS BY RX/DR IN RCRD: CPT

## 2021-04-26 PROCEDURE — 85025 COMPLETE CBC W/AUTO DIFF WBC: CPT

## 2021-04-26 RX ORDER — PREDNISONE 20 MG/1
20 TABLET ORAL 2 TIMES DAILY WITH MEALS
Qty: 6 TABLET | Refills: 0 | Status: SHIPPED | OUTPATIENT
Start: 2021-04-26 | End: 2021-04-29

## 2021-04-26 RX ORDER — IPRATROPIUM BROMIDE AND ALBUTEROL SULFATE 2.5; .5 MG/3ML; MG/3ML
3 SOLUTION RESPIRATORY (INHALATION)
Status: COMPLETED | OUTPATIENT
Start: 2021-04-26 | End: 2021-04-26

## 2021-04-26 RX ORDER — AMOXICILLIN AND CLAVULANATE POTASSIUM 875; 125 MG/1; MG/1
1 TABLET, FILM COATED ORAL EVERY 12 HOURS SCHEDULED
Qty: 14 TABLET | Refills: 0 | Status: SHIPPED | OUTPATIENT
Start: 2021-04-26 | End: 2021-05-03

## 2021-04-26 RX ORDER — MONTELUKAST SODIUM 10 MG/1
10 TABLET ORAL
Qty: 30 TABLET | Refills: 1 | Status: SHIPPED | OUTPATIENT
Start: 2021-04-26 | End: 2021-08-12

## 2021-04-26 RX ORDER — ALBUTEROL SULFATE 90 UG/1
2 AEROSOL, METERED RESPIRATORY (INHALATION) EVERY 6 HOURS PRN
Qty: 1 INHALER | Refills: 1 | Status: SHIPPED | OUTPATIENT
Start: 2021-04-26 | End: 2021-05-06

## 2021-04-26 RX ORDER — IPRATROPIUM BROMIDE AND ALBUTEROL SULFATE 2.5; .5 MG/3ML; MG/3ML
3 SOLUTION RESPIRATORY (INHALATION) 3 TIMES DAILY
Qty: 90 ML | Refills: 1 | Status: SHIPPED | OUTPATIENT
Start: 2021-04-26 | End: 2021-05-06

## 2021-04-26 RX ADMIN — IPRATROPIUM BROMIDE AND ALBUTEROL SULFATE 3 ML: 2.5; .5 SOLUTION RESPIRATORY (INHALATION) at 09:51

## 2021-04-26 NOTE — PATIENT INSTRUCTIONS
Oral Candidiasis   WHAT YOU NEED TO KNOW:   Oral candidiasis, or thrush, is a fungal infection that affects the inside of your mouth  DISCHARGE INSTRUCTIONS:   Return to the emergency department if:   · You have trouble swallowing and your jaw and neck are stiff  · You are dizzy, thirsty, or have a dry mouth  · You are urinating little or not at all  · You cannot eat or drink because of the pain  Contact your healthcare provider if:   · You have a fever  · You have nausea, vomiting, or diarrhea  · Your signs and symptoms get worse, even after treatment  · You have questions or concerns about your condition or care  Medicines:   · Antifungal medicine  helps kill the fungus that caused your oral candidiasis  This medicine may be a pill or a solution that you gargle  Remove dentures before you gargle  · Take your medicine as directed  Contact your healthcare provider if you think your medicine is not helping or if you have side effects  Tell him of her if you are allergic to any medicine  Keep a list of the medicines, vitamins, and herbs you take  Include the amounts, and when and why you take them  Bring the list or the pill bottles to follow-up visits  Carry your medicine list with you in case of an emergency  Prevent oral candidiasis:  Brush your teeth, gums, and tongue after you eat and before you go to sleep  Use a toothbrush with soft bristles  See your dentist for regular exams  Remove your dentures when you sleep, or at least 6 hours each day  Clean your dentures and soak them in denture   Let them air dry after soaking  Follow up with your healthcare provider as directed:  Write down your questions so you remember to ask them during your visits  © Copyright [address] Information is for End User's use only and may not be sold, redistributed or otherwise used for commercial purposes   All illustrations and images included in CareNotes® are the copyrighted property of [employer]  or [address]   The above information is an  only  It is not intended as medical advice for individual conditions or treatments  Talk to your doctor, nurse or pharmacist before following any medical regimen to see if it is safe and effective for you  Allergies   WHAT YOU NEED TO KNOW:   Allergies are an immune system reaction to a substance called an allergen  Your immune system sees the allergen as harmful and attacks it  An allergic reaction can be mild or life-threatening  A life-threatening reaction is called anaphylaxis  Anaphylaxis is a sudden, life-threatening reaction that needs immediate treatment  DISCHARGE INSTRUCTIONS:   Call 911 for signs or symptoms of anaphylaxis,  such as trouble breathing, swelling in your mouth or throat, or wheezing  You may also have itching, a rash, hives, or feel like you are going to faint  Return to the emergency department if:   · You have tingling in your hands or feet  · Your skin is red or flushed  Contact your healthcare provider if:   · You have questions or concerns about your condition or care  Medicines:   · Antihistamines  help decrease itching, sneezing, and swelling  You may take them as a pill or use drops in your nose or eyes  · Decongestants  help your nose feel less stuffy  · Topical treatments  help decrease itching or swelling  You also may be given nasal sprays or eyedrops  · Epinephrine  is used to treat a severe allergic reaction such as anaphylaxis  · Take your medicine as directed  Contact your healthcare provider if you think your medicine is not helping or if you have side effects  Tell him of her if you are allergic to any medicine  Keep a list of the medicines, vitamins, and herbs you take  Include the amounts, and when and why you take them  Bring the list or the pill bottles to follow-up visits  Carry your medicine list with you in case of an emergency      Steps to take for signs or symptoms of anaphylaxis:   · Immediately  give 1 shot of epinephrine only into the outer thigh muscle  · Leave the shot in place  as directed  Your healthcare provider may recommend you leave it in place for up to 10 seconds before you remove it  This helps make sure all of the epinephrine is delivered  · Call 911 and go to the emergency department,  even if the shot improved symptoms  Do not drive yourself  Bring the used epinephrine shot with you  Safety precautions to take if you are at risk for anaphylaxis:   · Keep 2 shots of epinephrine with you at all times  You may need a second shot, because epinephrine only works for about 20 minutes and symptoms may return  Your healthcare provider can show you and family members how to give the shot  Check the expiration date every month and replace it before it expires  · Create an action plan  Your healthcare provider can help you create a written plan that explains the allergy and an emergency plan to treat a reaction  The plan explains when to give a second epinephrine shot if symptoms return or do not improve after the first  Give copies of the action plan and emergency instructions to family members and work staff  Show them how to give a shot of epinephrine  · Be careful when you exercise  If you have had exercise-induced anaphylaxis, do not exercise right after you eat  Stop exercising right away if you start to develop any signs or symptoms of anaphylaxis  You may first feel tired, warm, or have itchy skin  Hives, swelling, and severe breathing problems may develop if you continue to exercise  · Carry medical alert identification  Wear medical alert jewelry or carry a card that explains the allergy  Ask your healthcare provider where to get these items  · Inform all healthcare providers of the allergy  This includes dentists, nurses, doctors, and surgeons  Manage allergies:   · Use nasal rinses as directed  Rinse with a saline solution daily  This will help clear allergens out of your nose  Use distilled water if possible  You can also boil tap water and let it cool before you use it  Do not use tap water that has not been boiled  · Do not smoke  Allergy symptoms may decrease if you are not around smoke  Nicotine and other chemicals in cigarettes and cigars can cause lung damage  Ask your healthcare provider for information if you currently smoke and need help to quit  E-cigarettes or smokeless tobacco still contain nicotine  Talk to your healthcare provider before you use these products  Prevent allergic reactions:   · Do not go outside when pollen counts are high if you have seasonal allergies  Your symptoms may be better if you go outside only in the morning or evening  Use your air conditioner, and change air filters often  · Avoid dust, fur, and mold  Dust and vacuum your home often  You may want to wear a mask when you vacuum  Keep pets in certain rooms, and bathe them often  Use a dehumidifier (machine that decreases moisture) to help prevent mold  · Do not use products that contain latex if you have a latex allergy  Use nonlatex gloves if you work in healthcare or in food preparation  Always tell healthcare providers about a latex allergy  · Avoid areas that attract insects if you have an insect bite or sting allergy  Areas include trash cans, gardens, and picnics  Do not wear bright clothing or strong scents when you will be outside  · Prevent an allergic reaction caused by food  You may have a reaction if your food is not prepared safely  For example, you could be served food that touched your trigger food during preparation  This is called cross-contamination  Kitchen tools can also cause cross-contamination  You may also eat baked foods that contain a trigger food you do not know about  Ask if the food contains your trigger food before you handle or eat it      Follow up with your healthcare provider as directed:  Write down your questions so you remember to ask them during your visits  When you have an allergic reaction, write down everything you were exposed to in the 2 hours before the reaction  Take that information to your next visit  © Copyright [address] Information is for End User's use only and may not be sold, redistributed or otherwise used for commercial purposes  All illustrations and images included in CareNotes® are the copyrighted property of A D A M , Inc  or [facility] Kathy Mo   The above information is an  only  It is not intended as medical advice for individual conditions or treatments  Talk to your doctor, nurse or pharmacist before following any medical regimen to see if it is safe and effective for you  Acute Bronchitis   WHAT YOU NEED TO KNOW:   Acute bronchitis is swelling and irritation in your lungs  It is usually caused by a virus and most often happens in the winter  Bronchitis may also be caused by bacteria or by a chemical irritant, such as smoke  DISCHARGE INSTRUCTIONS:   Return to the emergency department if:   · You cough up blood  · Your lips or fingernails turn blue  · You feel like you are not getting enough air when you breathe  Call your doctor if:   · Your symptoms do not go away or get worse, even after treatment  · Your cough does not get better within 4 weeks  · You have questions or concerns about your condition or care  Medicines: You may  need any of the following:  · Cough suppressants  decrease your urge to cough  · Decongestants  help loosen mucus in your lungs and make it easier to cough up  This can help you breathe easier  · Inhalers  may be given  Your healthcare provider may give you one or more inhalers to help you breathe easier and cough less  An inhaler gives your medicine to open your airways  Ask your healthcare provider to show you how to use your inhaler correctly  · Acetaminophen  decreases pain and fever  It is available without a doctor's order  Ask how much to take and how often to take it  Follow directions  Read the labels of all other medicines you are using to see if they also contain acetaminophen, or ask your doctor or pharmacist  Acetaminophen can cause liver damage if not taken correctly  Do not use more than 4 grams (4,000 milligrams) total of acetaminophen in one day  · NSAIDs  help decrease swelling and pain or fever  This medicine is available with or without a doctor's order  NSAIDs can cause stomach bleeding or kidney problems in certain people  If you take blood thinner medicine, always ask your healthcare provider if NSAIDs are safe for you  Always read the medicine label and follow directions  · Take your medicine as directed  Contact your healthcare provider if you think your medicine is not helping or if you have side effects  Tell him of her if you are allergic to any medicine  Keep a list of the medicines, vitamins, and herbs you take  Include the amounts, and when and why you take them  Bring the list or the pill bottles to follow-up visits  Carry your medicine list with you in case of an emergency  Self-care:   · Drink liquids as directed  You may need to drink more liquids than usual to stay hydrated  Ask how much liquid to drink each day and which liquids are best for you  · Use a cool mist humidifier  to increase air moisture in your home  This may make it easier for you to breathe and help decrease your cough  · Get more rest   Rest helps your body to heal  Slowly start to do more each day  Rest when you feel it is needed  · Avoid irritants in the air  Avoid chemicals, fumes, and dust  Wear a face mask if you must work around dust or fumes  Stay inside on days when air pollution levels are high  If you have allergies, stay inside when pollen counts are high   Do not use aerosol products, such as spray-on deodorant, bug spray, and hair spray  · Do not smoke or be around others who are smoking  Nicotine and other chemicals in cigarettes and cigars can cause lung damage  Ask your healthcare provider for information if you currently smoke and need help to quit  E-cigarettes or smokeless tobacco still contain nicotine  Talk to your healthcare provider before you use these products  Prevent acute bronchitis:       · Get the vaccinations you need  Ask your healthcare provider if you should get the flu or pneumonia vaccines  · Prevent the spread of germs  You can decrease your risk for acute bronchitis and other illnesses by doing the following:     ? Wash your hands often with soap and water  Carry germ-killing hand lotion or gel with you  You can use the lotion or gel to clean your hands when soap and water are not available  ? Do not touch your eyes, nose, or mouth unless you have washed your hands first     ? Always cover your mouth when you cough to prevent the spread of germs  It is best to cough into a tissue or your shirt sleeve instead of into your hand  Ask those around you to cover their mouths when they cough  ? Try to avoid people who have a cold or the flu  If you are sick, stay away from others as much as possible  Follow up with your doctor as directed:  Write down questions you have so you will remember to ask them during your follow-up visits  © Copyright [address] Information is for End User's use only and may not be sold, redistributed or otherwise used for commercial purposes  All illustrations and images included in CareNotes® are the copyrighted property of [employer]  or [employer]y Mo   The above information is an  only  It is not intended as medical advice for individual conditions or treatments  Talk to your doctor, nurse or pharmacist before following any medical regimen to see if it is safe and effective for you

## 2021-04-26 NOTE — TELEPHONE ENCOUNTER
Reason for Disposition   Sounds like a severe, unusual reaction to the triager    Answer Assessment - Initial Assessment Questions  1  MAIN CONCERN OR SYMPTOM:  "What is your main concern right now?" "What question do you have?" "What's the main symptom you're worried about?" (e g , fever, pain, redness, swelling)      Swelling in tongue, terrible taste, cold sweats last night  2  VACCINE: "What vaccination did you receive?" "Is this your first or second shot?" (e g , none; [employer], J&J, John Singh, [employer], other)      Thursday got [employer]  3  SYMPTOM ONSET: "When did the symptoms begin?" (e g , not relevant; hours, days)       3am this morning  4  SYMPTOM SEVERITY: "How bad is it?"       unsure  5  FEVER: "Is there a fever?" If so, ask: "What is it, how was it measured, and when did it start?"       denies  6  PAST REACTIONS: "Have you reacted to immunizations before?" If so, ask: "What happened?"      Nothing the 1st shot  7   OTHER SYMPTOMS: "Do you have any other symptoms?"      Denies    Protocols used: CORONAVIRUS (COVID-19) VACCINE QUESTIONS AND REACTIONS-ADULT-

## 2021-04-26 NOTE — PROGRESS NOTES
BMI Counseling: Body mass index is 39 41 kg/m²  The BMI is above normal  Nutrition recommendations include encouraging healthy choices of fruits and vegetables, consuming healthier snacks, moderation in carbohydrate intake, increasing intake of lean protein, reducing intake of saturated and trans fat and reducing intake of cholesterol  Exercise recommendations include exercising 3-5 times per week and strength training exercises  No pharmacotherapy was ordered  Assessment/Plan:    Patient of Dr Kenzie Schulz in office with shortness of breath and wheezing   Started over night   She is worried may be related to her COVID immunization she had back on 4/22/2021  States she had some post nasal dripping, had white stuff on her tongue shortly after   Which she was trying to clear  And now started with a cough   One exam she has audible wheezing of the lungs   She has had   Does have some erythema to posterior throat and thrush noted    Will send over for some blood work and x ray   And follow up tomorrow - she is schedule for cataract surgery wednesday      Did well with the duoneb in office   Ordered Augmentin for bronchitis   3 days of prednisone 20 mg twice a day for 3 days   I will follow up with x ray and labs  And discussed continuation with surgery as planned 0 she has cataract surgery coming up     [address]       XR chest pa & lateral  Status: Final result   PACS Images     Show images for XR chest pa & lateral   Study Result    CHEST      INDICATION:   R05: Cough  R06 02: Shortness of breath      COMPARISON:  None     EXAM PERFORMED/VIEWS:  XR CHEST PA & LATERAL        FINDINGS:     Cardiomediastinal silhouette appears unremarkable      Bibasilar small groundglass airspace opacities    No pneumothorax or pleural effusion      Osseous structures appear within normal limits for patient age      IMPRESSION:     Bibasilar groundglass infiltrates which may reflect Covid 19 pneumonia      The study was marked in EPIC for immediate notification            Workstation performed: [id number]        Awaiting COVID test results now and would recommend postponing the surgery for now   Will discuss with Dr Jakob Douglas      Problem List Items Addressed This Visit     None      Visit Diagnoses     Cough    -  Primary    Relevant Medications    ipratropium-albuterol (DUO-NEB) 0 5-2 5 mg/3 mL inhalation solution 3 mL (Completed)    amoxicillin-clavulanate (Augmentin) 875-125 mg per tablet    predniSONE 20 mg tablet    albuterol (Ventolin HFA) 90 mcg/act inhaler    ipratropium-albuterol (DUO-NEB) 0 5-2 5 mg/3 mL nebulizer solution    montelukast (SINGULAIR) 10 mg tablet    Other Relevant Orders    XR chest pa & lateral (Completed)    CBC and differential (Completed)    D-dimer, quantitative (Completed)    Novel Coronavirus (COVID-19), PCR [facility] Collected at [facility]    Shortness of breath        Relevant Medications    ipratropium-albuterol (DUO-NEB) 0 5-2 5 mg/3 mL inhalation solution 3 mL (Completed)    amoxicillin-clavulanate (Augmentin) 875-125 mg per tablet    predniSONE 20 mg tablet    albuterol (Ventolin HFA) 90 mcg/act inhaler    ipratropium-albuterol (DUO-NEB) 0 5-2 5 mg/3 mL nebulizer solution    montelukast (SINGULAIR) 10 mg tablet    Other Relevant Orders    XR chest pa & lateral (Completed)    CBC and differential (Completed)    D-dimer, quantitative (Completed)    Novel Coronavirus (COVID-19), PCR [facility] Collected at [facility]    Thrush        Relevant Medications    nystatin (MYCOSTATIN) 500,000 units/5 mL suspension    Post-nasal drip        Dysphagia, unspecified type        Pneumonia of both lungs due to infectious organism, unspecified part of lung        COVID like pneumonia   treatment discussed   if worseing of symptoms --> ER     Relevant Medications    ipratropium-albuterol (DUO-NEB) 0 5-2 5 mg/3 mL inhalation solution 3 mL (Completed)    amoxicillin-clavulanate (Augmentin) 875-125 mg per tablet    albuterol (Ventolin HFA) 90 mcg/act inhaler    ipratropium-albuterol (DUO-NEB) 0 5-2 5 mg/3 mL nebulizer solution    montelukast (SINGULAIR) 10 mg tablet            Subjective:      Patient ID: David Weems is a 66 y o  female  Patient of Dr Emily Orellana in office with shortness of breath and wheezing   Started over night   She is worried may be related to her COVID immunization she had back on 4/22/2021  States she had some post nasal dripping, had white stuff on her tongue shortly after   Which she was trying to clear  And now started with a cough   One exam she has audible wheezing of the lungs   She has had   Does have some erythema to posterior throat and thrush noted    Will send over for some blood work and x ray   And follow up tomorrow - she is schedule for cataract surgery wednesday            The following portions of the patient's history were reviewed and updated as appropriate:   Past Medical History:  She has a past medical history of Benign essential hypertension, Chronic lower back pain, Diabetes mellitus ([address] ), Hyperlipidemia, and Sciatica  ,  _______________________________________________________________________  Medical Problems:  does not have any pertinent problems on file ,  _______________________________________________________________________  Past Surgical History:   has a past surgical history that includes Cholecystectomy  ,  _______________________________________________________________________  Family History:  family history includes Emphysema in her father; Leukemia in her father; Uterine cancer in her mother ,  _______________________________________________________________________  Social History:   reports that she quit smoking about 16 years ago  Her smoking use included cigarettes  She has a 30 00 pack-year smoking history   She has never used smokeless tobacco  She reports that she does not drink alcohol or use drugs  ,  _______________________________________________________________________  Allergies:  has No Known Allergies     _______________________________________________________________________  Current Outpatient Medications   Medication Sig Dispense Refill    amLODIPine (NORVASC) 10 mg tablet Take 1 tablet (10 mg total) by mouth daily at bedtime 90 tablet 1    Apremilast (Otezla) 30 MG TABS Take 2 tablets by mouth daily 180 tablet 3    Cholecalciferol (VITAMIN D) 125 MCG (5000 UT) CAPS Take by mouth      cyanocobalamin (VITAMIN B-12) 1000 MCG tablet Take 1 tablet (1,000 mcg total) by mouth daily 100 tablet 6    doxazosin (CARDURA) 1 mg tablet Take 1 tablet (1 mg total) by mouth daily at bedtime 30 tablet 3    Exenatide ER (Byfloresita John) 2 MG/0 85ML AUIJ Inject 2 mg under the skin once a week 12 pen 3    folic acid (FOLVITE) 1 mg tablet Take 1 tablet (1 mg total) by mouth daily 90 tablet 3    glimepiride (AMARYL) 2 mg tablet Take 1 tablet (2 mg total) by mouth daily with breakfast 90 tablet 3    glucose blood test strip Test sugar 2-3 times daily 90 each 6    Invokana 300 MG TABS TAKE 1 TABLET BY MOUTH  DAILY 90 tablet 3    ketorolac (ACULAR) 0 5 % ophthalmic solution       losartan-hydrochlorothiazide (HYZAAR) 100-25 MG per tablet TAKE 1 TABLET BY MOUTH  DAILY 90 tablet 3    nebivolol (BYSTOLIC) 10 mg tablet Take 1 tablet (10 mg total) by mouth daily 90 tablet 3    ofloxacin (OCUFLOX) 0 3 % ophthalmic solution       prednisoLONE acetate (PRED FORTE) 1 % ophthalmic suspension       albuterol (Ventolin HFA) 90 mcg/act inhaler Inhale 2 puffs every 6 (six) hours as needed for wheezing for up to 10 days 1 Inhaler 1    amoxicillin-clavulanate (Augmentin) 875-125 mg per tablet Take 1 tablet by mouth every 12 (twelve) hours for 7 days 14 tablet 0    insulin glargine (Lantus SoloStar) 100 units/mL injection pen Inject 44 Units under the skin daily at bedtime 5 pen 1    ipratropium-albuterol (DUO-NEB) 0 5-2 5 mg/3 mL nebulizer solution Take 1 vial (3 mL total) by nebulization 3 (three) times a day for 10 days 90 mL 1    montelukast (SINGULAIR) 10 mg tablet Take 1 tablet (10 mg total) by mouth daily at bedtime 30 tablet 1    nystatin (MYCOSTATIN) 500,000 units/5 mL suspension Apply 5 mL (500,000 Units total) to the mouth or throat 4 (four) times a day for 10 days 200 mL 1    predniSONE 20 mg tablet Take 1 tablet (20 mg total) by mouth 2 (two) times a day with meals for 3 days 6 tablet 0    repaglinide (PRANDIN) 1 mg tablet Take 1 tablet (1 mg total) by mouth daily before dinner 90 tablet 3     Current Facility-Administered Medications   Medication Dose Route Frequency Provider Last Rate Last Admin    cyanocobalamin injection 1,000 mcg  1,000 mcg Intramuscular Q30 Days Louanna Alvina, DO   1,000 mcg at 04/06/21 0817    cyanocobalamin injection 1,000 mcg  1,000 mcg Intramuscular Q30 Days Louanna [place], DO   1,000 mcg at 10/27/20 1449    cyanocobalamin injection 1,000 mcg  1,000 mcg Intramuscular Q30 Days Louanna [place], DO   1,000 mcg at 01/05/21 0750    cyanocobalamin injection 1,000 mcg  1,000 mcg Intramuscular Q30 Days Louanna [place], DO   1,000 mcg at 03/09/21 1455     _______________________________________________________________________  Review of Systems   Constitutional: Positive for fatigue  Negative for chills and fever  HENT: Positive for congestion and postnasal drip  Respiratory: Positive for cough, shortness of breath and wheezing  Cardiovascular: Negative for chest pain, palpitations and leg swelling  Gastrointestinal: Negative for abdominal distention and nausea  Endocrine:        Diabetic has had some  Elevated sugars    Genitourinary: Negative for difficulty urinating and flank pain  Musculoskeletal: Negative for arthralgias and myalgias  Allergic/Immunologic: Positive for environmental allergies  Neurological: Positive for weakness  Hematological: Negative for adenopathy  Psychiatric/Behavioral: Negative for sleep disturbance and suicidal ideas  The patient is nervous/anxious  Objective:  Vitals:    04/26/21 0913   BP: 134/58   Pulse: 57   Resp: 16   Temp: (!) 97 1 °F (36 2 °C)   TempSrc: Temporal   SpO2: 98%   Weight: 94 6 kg (208 lb 9 6 oz)   Height: 5' 1" (1 549 m)     Body mass index is 39 41 kg/m²  Physical Exam  Vitals signs and nursing note reviewed  Constitutional:       Appearance: Normal appearance  Comments: BMI 39 41   HENT:      Head: Atraumatic  Nose: Congestion and rhinorrhea present  Comments: Thick post nasal drip noted and thrush      Mouth/Throat:      Pharynx: Oropharyngeal exudate and posterior oropharyngeal erythema present  Eyes:      Extraocular Movements: Extraocular movements intact  Neck:      Musculoskeletal: Normal range of motion  Cardiovascular:      Rate and Rhythm: Normal rate and regular rhythm  Pulmonary:      Breath sounds: Wheezing present  Comments: Shortness of breath coughing   Abdominal:      Palpations: Abdomen is soft  Neurological:      Mental Status: She is oriented to person, place, and time  Psychiatric:         Mood and Affect: Mood normal          Behavior: Behavior normal        D-dimer, quantitative  Order: [id number]  Status:  Final result   Visible to patient:  No (inaccessible in [facility])   Next appt:  06/08/2021 at 10:00 AM in Nephrology Kristine Knox MD)   Dx:  Shortness of breath; Cough   Ref Range & Units 4/26/21 11:39 AM   D-Dimer, Mitra Gene <0 50 ug/ml FEU 0 41    Comment: Reference and upper limits to exclude DVT and PE are the same   Do not use to exclude if clinical symptoms are present     Pregnant women:   1st trimester:  <0 22 - 1 06 ug/ml FEU   2nd trimester:  <0 22 - 1 88 ug/ml FEU   3rd trimester:   0 24 - 3 28 ug/ml FEU           CBC and differential  Order: [id number]  Status:  Final result   Visible to patient:  No (inaccessible in [employer])   Next appt:  06/08/2021 at 10:00 AM in Nephrology Lord Rosa Elena MD)   Dx:  Shortness of breath; Cough   Ref Range & Units 4/26/21 11:39 AM   WBC 4 31 - 10 16 Thousand/uL 8 46    RBC 3 81 - 5 12 Million/uL 4 79    Hemoglobin 11 5 - 15 4 g/dL 13 9    Hematocrit 34 8 - 46 1 % 43 9    MCV 82 - 98 fL 92    MCH 26 8 - 34 3 pg 29 0    MCHC 31 4 - 37 4 g/dL 31 7    RDW 11 6 - 15 1 % 13 6    MPV 8 9 - 12 7 fL 10 1    Platelets 156 - 970 Thousands/uL 254    nRBC /100 WBCs 0    Neutrophils Relative 43 - 75 % 65    Immat GRANS % 0 - 2 % 1    Lymphocytes Relative 14 - 44 % 23    Monocytes Relative 4 - 12 % 8    Eosinophils Relative 0 - 6 % 2    Basophils Relative 0 - 1 % 1    Neutrophils Absolute 1 85 - 7 62 Thousands/µL 5 61    Immature Grans Absolute 0 00 - 0 20 Thousand/uL 0 04    Lymphocytes Absolute 0 60 - 4 47 Thousands/µL 1 94    Monocytes Absolute 0 17 - 1 22 Thousand/µL 0 66    Eosinophils Absolute 0 00 - 0 61 Thousand/µL 0 14    Basophils Absolute 0 00 - 0 10 Thousands/µL 0 07          Specimen Collected: 04/26/21 11:39 AM   Last Resulted: 04/26/21 12:31 PM

## 2021-04-27 ENCOUNTER — TELEPHONE (OUTPATIENT)
Dept: FAMILY MEDICINE CLINIC | Facility: CLINIC | Age: 76
End: 2021-04-27

## 2021-04-27 NOTE — TELEPHONE ENCOUNTER
Patient states she did receive her nebulizer and is currently at home and content  She said thank you!

## 2021-04-27 NOTE — TELEPHONE ENCOUNTER
Pt called - Nebulizer treatment worked well  but insurance will not cover the treatment - what can we do to get this covered for pt    Pt gets very shakey after a treatment - it last 5-10 mins  Roni needs us to contact them - [phone]

## 2021-04-28 ENCOUNTER — TELEPHONE (OUTPATIENT)
Dept: FAMILY MEDICINE CLINIC | Facility: CLINIC | Age: 76
End: 2021-04-28

## 2021-04-28 NOTE — TELEPHONE ENCOUNTER
I have left few messages for patient in the last few days - x ray showed pneumonia she is already being treated but I had left a message for her to discuss postponing surgery  For now   I have not heard back   Called again this morning left a message     I did call the opthamologist and confirmed that her surgery was proponed   Her COVID Test was negative  The x ray suggests covid like pneumonia

## 2021-04-29 ENCOUNTER — TELEPHONE (OUTPATIENT)
Dept: FAMILY MEDICINE CLINIC | Facility: CLINIC | Age: 76
End: 2021-04-29

## 2021-04-29 NOTE — TELEPHONE ENCOUNTER
Diagnosis code needed for patient's refill of ipratrop/alb 0 5/3mg In[facility] Sl  Please send to pharmacy at [phone]

## 2021-04-30 ENCOUNTER — TELEPHONE (OUTPATIENT)
Dept: FAMILY MEDICINE CLINIC | Facility: CLINIC | Age: 76
End: 2021-04-30

## 2021-04-30 NOTE — TELEPHONE ENCOUNTER
----- Message from Dario Duque DO sent at 4/29/2021 10:21 PM EDT -----  Tell pt A1c is 7 1 -- that's ok, below 7 is better

## 2021-05-10 DIAGNOSIS — Z79.4 CONTROLLED TYPE 2 DIABETES MELLITUS WITHOUT COMPLICATION, WITH LONG-TERM CURRENT USE OF INSULIN (HCC): ICD-10-CM

## 2021-05-10 DIAGNOSIS — E11.9 CONTROLLED TYPE 2 DIABETES MELLITUS WITHOUT COMPLICATION, WITH LONG-TERM CURRENT USE OF INSULIN (HCC): ICD-10-CM

## 2021-05-10 RX ORDER — INSULIN GLARGINE 100 [IU]/ML
INJECTION, SOLUTION SUBCUTANEOUS
Qty: 30 ML | Refills: 4 | Status: SHIPPED | OUTPATIENT
Start: 2021-05-10 | End: 2021-12-28 | Stop reason: SDUPTHER

## 2021-05-11 ENCOUNTER — TELEPHONE (OUTPATIENT)
Dept: FAMILY MEDICINE CLINIC | Facility: CLINIC | Age: 76
End: 2021-05-11

## 2021-05-24 ENCOUNTER — APPOINTMENT (OUTPATIENT)
Dept: LAB | Facility: HOSPITAL | Age: 76
End: 2021-05-24
Payer: COMMERCIAL

## 2021-05-24 DIAGNOSIS — I10 RESISTANT HYPERTENSION: ICD-10-CM

## 2021-05-24 LAB
ANION GAP SERPL CALCULATED.3IONS-SCNC: 6 MMOL/L (ref 4–13)
BUN SERPL-MCNC: 20 MG/DL (ref 6–20)
CALCIUM SERPL-MCNC: 8.9 MG/DL (ref 8.4–10.2)
CHLORIDE SERPL-SCNC: 104 MMOL/L (ref 96–108)
CO2 SERPL-SCNC: 30 MMOL/L (ref 22–33)
CREAT SERPL-MCNC: 0.77 MG/DL (ref 0.4–1.1)
GFR SERPL CREATININE-BSD FRML MDRD: 75 ML/MIN/1.73SQ M
GLUCOSE P FAST SERPL-MCNC: 202 MG/DL (ref 70–105)
POTASSIUM SERPL-SCNC: 3.8 MMOL/L (ref 3.5–5)
SODIUM SERPL-SCNC: 140 MMOL/L (ref 133–145)

## 2021-05-24 PROCEDURE — 80048 BASIC METABOLIC PNL TOTAL CA: CPT

## 2021-05-24 PROCEDURE — 36415 COLL VENOUS BLD VENIPUNCTURE: CPT

## 2021-06-08 ENCOUNTER — OFFICE VISIT (OUTPATIENT)
Dept: NEPHROLOGY | Facility: CLINIC | Age: 76
End: 2021-06-08
Payer: COMMERCIAL

## 2021-06-08 VITALS
DIASTOLIC BLOOD PRESSURE: 62 MMHG | WEIGHT: 209 LBS | BODY MASS INDEX: 39.46 KG/M2 | SYSTOLIC BLOOD PRESSURE: 118 MMHG | HEIGHT: 61 IN

## 2021-06-08 DIAGNOSIS — I10 RESISTANT HYPERTENSION: ICD-10-CM

## 2021-06-08 PROCEDURE — 99213 OFFICE O/P EST LOW 20 MIN: CPT | Performed by: INTERNAL MEDICINE

## 2021-06-08 RX ORDER — DOXAZOSIN 2 MG/1
2 TABLET ORAL
Qty: 90 TABLET | Refills: 1 | Status: SHIPPED | OUTPATIENT
Start: 2021-06-08 | End: 2021-07-21

## 2021-06-08 NOTE — LETTER
June 8, 2021     [place], [address] [facility]  [address]  [address] [address]    Patient: Gloria Mercedes   YOB: 1945   Date of Visit: 6/8/2021       Dear Dr Annabel Echeverria: Thank you for referring Bossman Carlson to me for evaluation  Below are my notes for this consultation  If you have questions, please do not hesitate to call me  I look forward to following your patient along with you  Sincerely,        Willie Yepez MD        CC: No Recipients  Willie Yepez MD  6/8/2021 10:47 AM  Sign when Signing Visit  [facility] OFFICE PROGRESS NOTE   Gloria Mercedes 66 y o  female MRN: [id number]  DATE: 06/08/21  Reason for visit: Continued evaluation and management of HTN    ASSESSMENT & PLAN:  1  Resistant hypertension:   · Secondary work up has been negative  · Of note, L arm BP readings are higher than R arm  Going forward, BP monitoring should be done on the left arm  · Home BP is above goal    · Current regimen: Amlodipine 10 mg daily, Losartan /25 mg daily, Bystolic 10 mg daily  · Unclear if taking Doxazosin 1 mg daily  · In any case, will have her take Doxazosin 2 mg at bedtime and move Bystolic to AM    · Check BP x 1 week in August and December 2021       2  Discrepant BP readings in arms:  · Likely has some peripheral arterial disease  · R arm appears well perfused without signs of cyanosis  · Would simply continue to monitor  Patient Instructions   Take Doxazosin 2 mg at bedtime  Move Bystolic 10 mg daily to the morning  You can check your BP in the L arm only from here on  Do BP readings twice daily for 1 week in August 2021 and send those readings in  Follow up in 6 months  SUBJECTIVE / INTERVAL HISTORY:  Rosa Owens was last seen in the office back in March 2021  Since that time, she has been doing fairly well  There have been no recent hospitalizations or ER visits  She denies any acute complaints at this time      Home BP log brought in today - Done June 1 to June 7  Average AM readings: 136 4/68 7 mm Hg x 7 readings over 7 days  Average PM readings: 139 1/64 0 mm Hg x 7 readings over 7 days  Left arm readings are consistently 10-20 points higher than the right  PMH/PSH: HTN, DM, HLP, COPD, obesity, DJD/DDD, cholecystectomy  Previous work up:   3/19/21 renal artery doppler - technically challenging study  No MAUREEN on the R, L side appears patent but not visualized proximally  3/19/21 Aldosterone 7 3, Metanephrines 10 8, Normetanephrines 97 4, renin 2 060, TSH 3 147  ALLERGIES: No Known Allergies    REVIEW OF SYSTEMS:  Review of Systems   Constitutional: Negative for appetite change, chills, fatigue and fever  Respiratory: Positive for cough  Negative for shortness of breath  Cardiovascular: Negative for chest pain and leg swelling  Gastrointestinal: Negative for abdominal pain, diarrhea, nausea and vomiting  Genitourinary: Negative for dysuria and hematuria  Musculoskeletal: Negative for arthralgias  Neurological: Negative for dizziness and light-headedness  OBJECTIVE:  /62 (BP Location: Right arm, Patient Position: Sitting, Cuff Size: Large)   Ht 5' 1" (1 549 m)   Wt 94 8 kg (209 lb)   BMI 39 49 kg/m²   Current Weight:   Body mass index is 39 49 kg/m²  Physical Exam  Constitutional:       General: She is not in acute distress  Appearance: Normal appearance  She is well-developed  She is not diaphoretic  HENT:      Head: Normocephalic and atraumatic  Eyes:      General: No scleral icterus  Conjunctiva/sclera: Conjunctivae normal    Neck:      Vascular: No JVD  Cardiovascular:      Rate and Rhythm: Normal rate and regular rhythm  Heart sounds: Normal heart sounds  Pulmonary:      Effort: Pulmonary effort is normal       Breath sounds: Normal breath sounds  Abdominal:      General: Bowel sounds are normal       Palpations: Abdomen is soft  Musculoskeletal:      Right lower leg: No edema  Left lower leg: No edema  Skin:     General: Skin is warm and dry  Neurological:      Mental Status: She is alert and oriented to person, place, and time  Psychiatric:         Mood and Affect: Mood normal          Behavior: Behavior normal  Behavior is cooperative           Judgment: Judgment normal          Medications:  Current Outpatient Medications:     amLODIPine (NORVASC) 10 mg tablet, Take 1 tablet (10 mg total) by mouth daily at bedtime, Disp: 90 tablet, Rfl: 1    Apremilast (Otezla) 30 MG TABS, Take 2 tablets by mouth daily, Disp: 180 tablet, Rfl: 3    Cholecalciferol (VITAMIN D) 125 MCG (5000 UT) CAPS, Take by mouth, Disp: , Rfl:     cyanocobalamin (VITAMIN B-12) 1000 MCG tablet, Take 1 tablet (1,000 mcg total) by mouth daily, Disp: 100 tablet, Rfl: 6    doxazosin (CARDURA) 1 mg tablet, Take 1 tablet (1 mg total) by mouth daily at bedtime, Disp: 30 tablet, Rfl: 3    Exenatide ER (Bydureon BCi) 2 MG/0 85ML AUIJ, Inject 2 mg under the skin once a week, Disp: 12 pen, Rfl: 3    folic acid (FOLVITE) 1 mg tablet, Take 1 tablet (1 mg total) by mouth daily, Disp: 90 tablet, Rfl: 3    glimepiride (AMARYL) 2 mg tablet, Take 1 tablet (2 mg total) by mouth daily with breakfast, Disp: 90 tablet, Rfl: 3    glucose blood test strip, Test sugar 2-3 times daily, Disp: 90 each, Rfl: 6    Invokana 300 MG TABS, TAKE 1 TABLET BY MOUTH  DAILY, Disp: 90 tablet, Rfl: 3    ketorolac (ACULAR) 0 5 % ophthalmic solution, , Disp: , Rfl:     Lantus SoloStar 100 units/mL injection pen, INJECT SUBCUTANEOUSLY 44  UNITS DAILY AT BEDTIME, Disp: 30 mL, Rfl: 4    losartan-hydrochlorothiazide (HYZAAR) 100-25 MG per tablet, TAKE 1 TABLET BY MOUTH  DAILY, Disp: 90 tablet, Rfl: 3    montelukast (SINGULAIR) 10 mg tablet, Take 1 tablet (10 mg total) by mouth daily at bedtime, Disp: 30 tablet, Rfl: 1    nebivolol (BYSTOLIC) 10 mg tablet, Take 1 tablet (10 mg total) by mouth daily, Disp: 90 tablet, Rfl: 3    ofloxacin (OCUFLOX) 0 3 % ophthalmic solution, , Disp: , Rfl:     prednisoLONE acetate (PRED FORTE) 1 % ophthalmic suspension, , Disp: , Rfl:     repaglinide (PRANDIN) 1 mg tablet, Take 1 tablet (1 mg total) by mouth daily before dinner, Disp: 90 tablet, Rfl: 3    Current Facility-Administered Medications:     cyanocobalamin injection 1,000 mcg, 1,000 mcg, Intramuscular, Q30 Days, Nani Falling, DO, 1,000 mcg at 04/06/21 0817    cyanocobalamin injection 1,000 mcg, 1,000 mcg, Intramuscular, Q30 Days, Nani Falling, DO, 1,000 mcg at 10/27/20 1449    cyanocobalamin injection 1,000 mcg, 1,000 mcg, Intramuscular, Q30 Days, Nani Falling, DO, 1,000 mcg at 01/05/21 0750    cyanocobalamin injection 1,000 mcg, 1,000 mcg, Intramuscular, Q30 Days, Nani Falling, DO, 1,000 mcg at 03/09/21 1455    Laboratory Results:  Results for orders placed or performed in visit on 16/89/74   Basic metabolic panel   Result Value Ref Range    Sodium 140 133 - 145 mmol/L    Potassium 3 8 3 5 - 5 0 mmol/L    Chloride 104 96 - 108 mmol/L    CO2 30 22 - 33 mmol/L    ANION GAP 6 4 - 13 mmol/L    BUN 20 6 - 20 mg/dL    Creatinine 0 77 0 40 - 1 10 mg/dL    Glucose, Fasting 202 (H) 70 - 105 mg/dL    Calcium 8 9 8 4 - 10 2 mg/dL    eGFR 75 ml/min/1 73sq m

## 2021-06-08 NOTE — PROGRESS NOTES
NEPHROLOGY OFFICE PROGRESS NOTE   Andi Harris 66 y o  female MRN: [id number]  DATE: 06/08/21  Reason for visit: Continued evaluation and management of HTN    ASSESSMENT & PLAN:  1  Resistant hypertension:   · Secondary work up has been negative  · Of note, L arm BP readings are higher than R arm  Going forward, BP monitoring should be done on the left arm  · Home BP is above goal    · Current regimen: Amlodipine 10 mg daily, Losartan /64 mg daily, Bystolic 10 mg daily  · Unclear if taking Doxazosin 1 mg daily  · In any case, will have her take Doxazosin 2 mg at bedtime and move Bystolic to AM    · Check BP x 1 week in August and December 2021       2  Discrepant BP readings in arms:  · Likely has some peripheral arterial disease  · R arm appears well perfused without signs of cyanosis  · Would simply continue to monitor  Patient Instructions   Take Doxazosin 2 mg at bedtime  Move Bystolic 10 mg daily to the morning  You can check your BP in the L arm only from here on  Do BP readings twice daily for 1 week in August 2021 and send those readings in  Follow up in 6 months  SUBJECTIVE / INTERVAL HISTORY:  Dane Levine was last seen in the office back in March 2021  Since that time, she has been doing fairly well  There have been no recent hospitalizations or ER visits  She denies any acute complaints at this time  Home BP log brought in today - Done June 1 to June 7  Average AM readings: 136 4/68 7 mm Hg x 7 readings over 7 days  Average PM readings: 139 1/64 0 mm Hg x 7 readings over 7 days  Left arm readings are consistently 10-20 points higher than the right  PMH/PSH: HTN, DM, HLP, COPD, obesity, DJD/DDD, cholecystectomy  Previous work up:   3/19/21 renal artery doppler - technically challenging study  No MAUREEN on the R, L side appears patent but not visualized proximally       3/19/21 Aldosterone 7 3, Metanephrines 10 8, Normetanephrines 97 4, renin 2 060, TSH 3 147  ALLERGIES: No Known Allergies    REVIEW OF SYSTEMS:  Review of Systems   Constitutional: Negative for appetite change, chills, fatigue and fever  Respiratory: Positive for cough  Negative for shortness of breath  Cardiovascular: Negative for chest pain and leg swelling  Gastrointestinal: Negative for abdominal pain, diarrhea, nausea and vomiting  Genitourinary: Negative for dysuria and hematuria  Musculoskeletal: Negative for arthralgias  Neurological: Negative for dizziness and light-headedness  OBJECTIVE:  /62 (BP Location: Right arm, Patient Position: Sitting, Cuff Size: Large)   Ht 5' 1" (1 549 m)   Wt 94 8 kg (209 lb)   BMI 39 49 kg/m²   Current Weight:   Body mass index is 39 49 kg/m²  Physical Exam  Constitutional:       General: She is not in acute distress  Appearance: Normal appearance  She is well-developed  She is not diaphoretic  HENT:      Head: Normocephalic and atraumatic  Eyes:      General: No scleral icterus  Conjunctiva/sclera: Conjunctivae normal    Neck:      Vascular: No JVD  Cardiovascular:      Rate and Rhythm: Normal rate and regular rhythm  Heart sounds: Normal heart sounds  Pulmonary:      Effort: Pulmonary effort is normal       Breath sounds: Normal breath sounds  Abdominal:      General: Bowel sounds are normal       Palpations: Abdomen is soft  Musculoskeletal:      Right lower leg: No edema  Left lower leg: No edema  Skin:     General: Skin is warm and dry  Neurological:      Mental Status: She is alert and oriented to person, place, and time  Psychiatric:         Mood and Affect: Mood normal          Behavior: Behavior normal  Behavior is cooperative           Judgment: Judgment normal          Medications:  Current Outpatient Medications:     amLODIPine (NORVASC) 10 mg tablet, Take 1 tablet (10 mg total) by mouth daily at bedtime, Disp: 90 tablet, Rfl: 1    Apremilast (Otezla) 30 MG TABS, Take 2 tablets by mouth daily, Disp: 180 tablet, Rfl: 3    Cholecalciferol (VITAMIN D) 125 MCG (5000 UT) CAPS, Take by mouth, Disp: , Rfl:     cyanocobalamin (VITAMIN B-12) 1000 MCG tablet, Take 1 tablet (1,000 mcg total) by mouth daily, Disp: 100 tablet, Rfl: 6    doxazosin (CARDURA) 1 mg tablet, Take 1 tablet (1 mg total) by mouth daily at bedtime, Disp: 30 tablet, Rfl: 3    Exenatide ER (Bydureon BCise) 2 MG/0 85ML AUIJ, Inject 2 mg under the skin once a week, Disp: 12 pen, Rfl: 3    folic acid (FOLVITE) 1 mg tablet, Take 1 tablet (1 mg total) by mouth daily, Disp: 90 tablet, Rfl: 3    glimepiride (AMARYL) 2 mg tablet, Take 1 tablet (2 mg total) by mouth daily with breakfast, Disp: 90 tablet, Rfl: 3    glucose blood test strip, Test sugar 2-3 times daily, Disp: 90 each, Rfl: 6    Invokana 300 MG TABS, TAKE 1 TABLET BY MOUTH  DAILY, Disp: 90 tablet, Rfl: 3    ketorolac (ACULAR) 0 5 % ophthalmic solution, , Disp: , Rfl:     Lantus SoloStar 100 units/mL injection pen, INJECT SUBCUTANEOUSLY 44  UNITS DAILY AT BEDTIME, Disp: 30 mL, Rfl: 4    losartan-hydrochlorothiazide (HYZAAR) 100-25 MG per tablet, TAKE 1 TABLET BY MOUTH  DAILY, Disp: 90 tablet, Rfl: 3    montelukast (SINGULAIR) 10 mg tablet, Take 1 tablet (10 mg total) by mouth daily at bedtime, Disp: 30 tablet, Rfl: 1    nebivolol (BYSTOLIC) 10 mg tablet, Take 1 tablet (10 mg total) by mouth daily, Disp: 90 tablet, Rfl: 3    ofloxacin (OCUFLOX) 0 3 % ophthalmic solution, , Disp: , Rfl:     prednisoLONE acetate (PRED FORTE) 1 % ophthalmic suspension, , Disp: , Rfl:     repaglinide (PRANDIN) 1 mg tablet, Take 1 tablet (1 mg total) by mouth daily before dinner, Disp: 90 tablet, Rfl: 3    Current Facility-Administered Medications:     cyanocobalamin injection 1,000 mcg, 1,000 mcg, Intramuscular, Q30 Days, Deborah Pearson DO, 1,000 mcg at 04/06/21 0817    cyanocobalamin injection 1,000 mcg, 1,000 mcg, Intramuscular, Q30 Days, Deborah Pearson DO, 1,000 mcg at 10/27/20 1449    cyanocobalamin injection 1,000 mcg, 1,000 mcg, Intramuscular, Q30 Days, Carloyn Lands, DO, 1,000 mcg at 01/05/21 0750    cyanocobalamin injection 1,000 mcg, 1,000 mcg, Intramuscular, Q30 Days, Carloyn Lands, DO, 1,000 mcg at 03/09/21 1455    Laboratory Results:  Results for orders placed or performed in visit on 94/51/11   Basic metabolic panel   Result Value Ref Range    Sodium 140 133 - 145 mmol/L    Potassium 3 8 3 5 - 5 0 mmol/L    Chloride 104 96 - 108 mmol/L    CO2 30 22 - 33 mmol/L    ANION GAP 6 4 - 13 mmol/L    BUN 20 6 - 20 mg/dL    Creatinine 0 77 0 40 - 1 10 mg/dL    Glucose, Fasting 202 (H) 70 - 105 mg/dL    Calcium 8 9 8 4 - 10 2 mg/dL    eGFR 75 ml/min/1 73sq m

## 2021-06-08 NOTE — PATIENT INSTRUCTIONS
Take Doxazosin 2 mg at bedtime  Move Bystolic 10 mg daily to the morning  You can check your BP in the L arm only from here on  Do BP readings twice daily for 1 week in August 2021 and send those readings in  Follow up in 6 months

## 2021-06-10 ENCOUNTER — RA CDI HCC (OUTPATIENT)
Dept: OTHER | Facility: HOSPITAL | Age: 76
End: 2021-06-10

## 2021-06-10 NOTE — PROGRESS NOTES
[facility]  coding opportunities          Chart reviewed, no opportunity found: CHART REVIEWED, NO OPPORTUNITY FOUND                     Patients insurance company: [employer] (Medicare [employer]age and Commercial)

## 2021-06-17 ENCOUNTER — OFFICE VISIT (OUTPATIENT)
Dept: FAMILY MEDICINE CLINIC | Facility: CLINIC | Age: 76
End: 2021-06-17
Payer: COMMERCIAL

## 2021-06-17 VITALS
DIASTOLIC BLOOD PRESSURE: 78 MMHG | HEIGHT: 61 IN | HEART RATE: 77 BPM | SYSTOLIC BLOOD PRESSURE: 138 MMHG | WEIGHT: 210 LBS | TEMPERATURE: 95.9 F | OXYGEN SATURATION: 98 % | BODY MASS INDEX: 39.65 KG/M2 | RESPIRATION RATE: 18 BRPM

## 2021-06-17 DIAGNOSIS — I10 RESISTANT HYPERTENSION: Primary | ICD-10-CM

## 2021-06-17 DIAGNOSIS — Z01.818 PRE-OP EVALUATION: ICD-10-CM

## 2021-06-17 DIAGNOSIS — R63.8 INCREASED BMI: ICD-10-CM

## 2021-06-17 DIAGNOSIS — I73.9 CLAUDICATION OF BOTH LOWER EXTREMITIES (HCC): ICD-10-CM

## 2021-06-17 PROCEDURE — 1036F TOBACCO NON-USER: CPT | Performed by: FAMILY MEDICINE

## 2021-06-17 PROCEDURE — 3078F DIAST BP <80 MM HG: CPT | Performed by: FAMILY MEDICINE

## 2021-06-17 PROCEDURE — 99215 OFFICE O/P EST HI 40 MIN: CPT | Performed by: FAMILY MEDICINE

## 2021-06-17 PROCEDURE — 3075F SYST BP GE 130 - 139MM HG: CPT | Performed by: FAMILY MEDICINE

## 2021-06-17 PROCEDURE — 1160F RVW MEDS BY RX/DR IN RCRD: CPT | Performed by: FAMILY MEDICINE

## 2021-06-17 RX ORDER — HYDROCHLOROTHIAZIDE 12.5 MG/1
12.5 TABLET ORAL DAILY
Qty: 90 TABLET | Refills: 3 | Status: SHIPPED | OUTPATIENT
Start: 2021-06-17 | End: 2021-07-21

## 2021-06-17 NOTE — PROGRESS NOTES
Assessment/Plan: Pre-op eval for OS cataract on June 23 by Dr Citlaly Palomares on [address]  Blurry vision + allergies  F/u and eval HTN:  No issue:  Has developed few days ago, mild swelling of tongue - will stop the Cozaar and change to just HCTZ 12 5  Will cont now on the follows:  Norvasc 10 mg OD, Cardura 2mg, Bystolic 10  F/u and eval NIDDM: on 5 agents and all discussed and reviewed  F/u and eval HLD:  Cannot take statins - many times tried in past     F/u and eval deconditioning:   Needs as much exercise as can stand    F/u and eval polypharmacy: all meds reviewed and discusses    F/u and eval LE cramping and pain -- DDX is vascular insuff vs neurogenic claudication           Problem List Items Addressed This Visit        Cardiovascular and Mediastinum    Resistant hypertension - Primary    Relevant Medications    hydrochlorothiazide (HYDRODIURIL) 12 5 mg tablet       Other    Increased BMI      Other Visit Diagnoses     Pre-op evaluation        BMI 39 0-39 9,adult                Subjective:  64 y o female in NAD, has allergies, and now cleared for cataract surg on June 23     Patient ID: Andi Harris is a 64 y o  female  HPI-Pre-op eval for OS cataract on June 23 by Dr Citlaly Palomares on [address]  Blurry vision + allergies  F/u and eval HTN:  No issue:  Has developed few days ago, mild swelling of tongue - will stop the Cozaar and change to just HCTZ 12 5  Will cont now on the follows:  Norvasc 10 mg OD, Cardura 2mg, Bystolic 10  F/u and eval NIDDM: on 5 agents and all discussed and reviewed       F/u and eval HLD:  Cannot take statins - many times tried in past     F/u and eval deconditioning:   Needs as much exercise as can stand    F/u and eval polypharmacy: all meds reviewed and discusses    F/u and eval LE cramping and pain -- DDX is vascular insuff vs neurogenic claudication    The following portions of the patient's history were reviewed and updated as appropriate:   Past Medical History:  She has a past medical history of Benign essential hypertension, Chronic lower back pain, Diabetes mellitus ([address] ), Hyperlipidemia, and Sciatica  ,  _______________________________________________________________________  Medical Problems:  does not have any pertinent problems on file ,  _______________________________________________________________________  Past Surgical History:   has a past surgical history that includes Cholecystectomy  ,  _______________________________________________________________________  Family History:  family history includes Emphysema in her father; Leukemia in her father; Uterine cancer in her mother ,  _______________________________________________________________________  Social History:   reports that she quit smoking about 16 years ago  Her smoking use included cigarettes  She has a 30 00 pack-year smoking history  She has never used smokeless tobacco  She reports that she does not drink alcohol and does not use drugs  ,  _______________________________________________________________________  Allergies:  has No Known Allergies     _______________________________________________________________________  Current Outpatient Medications   Medication Sig Dispense Refill    Apremilast (Otezla) 30 MG TABS Take 2 tablets by mouth daily 180 tablet 3    Cholecalciferol (VITAMIN D) 125 MCG (5000 UT) CAPS Take by mouth      cyanocobalamin (VITAMIN B-12) 1000 MCG tablet Take 1 tablet (1,000 mcg total) by mouth daily 100 tablet 6    doxazosin (CARDURA) 2 mg tablet Take 1 tablet (2 mg total) by mouth daily at bedtime 90 tablet 1    Exenatide ER (Bydureon BCise) 2 MG/0 85ML AUIJ Inject 2 mg under the skin once a week 12 pen 3    folic acid (FOLVITE) 1 mg tablet Take 1 tablet (1 mg total) by mouth daily 90 tablet 3    glimepiride (AMARYL) 2 mg tablet Take 1 tablet (2 mg total) by mouth daily with breakfast 90 tablet 3    glucose blood test strip Test sugar 2-3 times daily 90 each 6  Invokana 300 MG TABS TAKE 1 TABLET BY MOUTH  DAILY 90 tablet 3    ketorolac (ACULAR) 0 5 % ophthalmic solution       Lantus SoloStar 100 units/mL injection pen INJECT SUBCUTANEOUSLY 44  UNITS DAILY AT BEDTIME 30 mL 4    nebivolol (BYSTOLIC) 10 mg tablet Take 1 tablet (10 mg total) by mouth daily 90 tablet 3    ofloxacin (OCUFLOX) 0 3 % ophthalmic solution       prednisoLONE acetate (PRED FORTE) 1 % ophthalmic suspension       repaglinide (PRANDIN) 1 mg tablet Take 1 tablet (1 mg total) by mouth daily before dinner 90 tablet 3    hydrochlorothiazide (HYDRODIURIL) 12 5 mg tablet Take 1 tablet (12 5 mg total) by mouth daily 90 tablet 3    montelukast (SINGULAIR) 10 mg tablet Take 1 tablet (10 mg total) by mouth daily at bedtime 30 tablet 1     Current Facility-Administered Medications   Medication Dose Route Frequency Provider Last Rate Last Admin    cyanocobalamin injection 1,000 mcg  1,000 mcg Intramuscular Q30 Days Maxwell Castor, DO   1,000 mcg at 04/06/21 0817    cyanocobalamin injection 1,000 mcg  1,000 mcg Intramuscular Q30 Days Maxwell Castor, DO   1,000 mcg at 10/27/20 1449    cyanocobalamin injection 1,000 mcg  1,000 mcg Intramuscular Q30 Days Maxwell Castor, DO   1,000 mcg at 01/05/21 0750    cyanocobalamin injection 1,000 mcg  1,000 mcg Intramuscular Q30 Days Maxwell Castor, DO   1,000 mcg at 03/09/21 1455     _______________________________________________________________________  Review of Systems   Constitutional: Positive for fatigue  Negative for chills and fever  HENT: Positive for congestion and postnasal drip  Respiratory: Positive for cough, shortness of breath and wheezing  Cardiovascular: Negative for chest pain, palpitations and leg swelling  Gastrointestinal: Negative for abdominal distention and nausea  Endocrine:        Diabetic has had some  Elevated sugars    Genitourinary: Negative for difficulty urinating and flank pain     Musculoskeletal: Negative for arthralgias and myalgias  Allergic/Immunologic: Positive for environmental allergies  Neurological: Positive for weakness  Hematological: Negative for adenopathy  Psychiatric/Behavioral: Negative for sleep disturbance and suicidal ideas  The patient is nervous/anxious  Objective:  Vitals:    06/17/21 1247   BP: 138/78   Pulse: 77   Resp: 18   Temp: (!) 95 9 °F (35 5 °C)   SpO2: 98%   Weight: 95 3 kg (210 lb)   Height: 5' 1" (1 549 m)     Body mass index is 39 68 kg/m²  Physical Exam  Vitals and nursing note reviewed  Constitutional:       General: She is not in acute distress  Appearance: Normal appearance  She is well-developed  She is obese  She is not diaphoretic  HENT:      Head: Normocephalic and atraumatic  Nose: Nose normal  No congestion or rhinorrhea  Eyes:      General: No scleral icterus  Conjunctiva/sclera: Conjunctivae normal       Pupils: Pupils are equal, round, and reactive to light  Comments: Watery and itchy eyes from the allergies   Neck:      Thyroid: No thyromegaly  Trachea: No tracheal deviation  Cardiovascular:      Rate and Rhythm: Normal rate and regular rhythm  Pulses: Normal pulses  Heart sounds: Normal heart sounds  Pulmonary:      Effort: Pulmonary effort is normal  No respiratory distress  Breath sounds: Normal breath sounds  No wheezing, rhonchi or rales  Chest:      Chest wall: No tenderness  Musculoskeletal:         General: No tenderness or deformity  Normal range of motion  Cervical back: Normal range of motion and neck supple  No rigidity  No muscular tenderness  Skin:     General: Skin is warm and dry  Coloration: Skin is not pale  Findings: No erythema or rash  Neurological:      General: No focal deficit present  Mental Status: She is alert and oriented to person, place, and time  Mental status is at baseline  Cranial Nerves: No cranial nerve deficit     Psychiatric:         Mood and Affect: Mood normal          Behavior: Behavior normal          Thought Content: Thought content normal          Judgment: Judgment normal          Time with pt was 40 min,   Kathy Horta This time was spent reviewing previous records, reviewing previous laboratory and other tests, taking history from patient, examination of patient, discussion of prognosis and treatment, ordering laboratory tests, ordering medications, and completion of the medical record

## 2021-06-17 NOTE — PATIENT INSTRUCTIONS
Chronic Hypertension   WHAT YOU NEED TO KNOW:   Hypertension is high blood pressure  Your blood pressure is the force of your blood moving against the walls of your arteries  Hypertension causes your blood pressure to get so high that your heart has to work much harder than normal  This can damage your heart  Even if you have hypertension for years, lifestyle changes, medicines, or both can help bring your blood pressure to normal   DISCHARGE INSTRUCTIONS:   Call 911 for any of the following:   · You have chest pain  · You have any of the following signs of a heart attack:      ? Squeezing, pressure, or pain in your chest    ? You may  also have any of the following:     § Discomfort or pain in your back, neck, jaw, stomach, or arm    § Shortness of breath    § Nausea or vomiting    § Lightheadedness or a sudden cold sweat    · You become confused or have difficulty speaking  · You suddenly feel lightheaded or have trouble breathing  Return to the emergency department if:   · You have a severe headache or vision loss  · You have weakness in an arm or leg  Contact your healthcare provider if:   · You feel faint, dizzy, confused, or drowsy  · You have been taking your blood pressure medicine but your pressure is higher than your provider says it should be  · You have questions or concerns about your condition or care  Medicines: You may need any of the following:  · Antihypertensives  may be used to help lower your blood pressure  Several kinds of medicines are available  Your healthcare provider may change the medicine or medicines you currently take  This may be needed if your blood pressure is often high when you check it at home or you are having other problems with blood pressure control  · Diuretics  help decrease extra fluid that collects in your body  This will help lower your BP  You may urinate more often while you take this medicine      · Cholesterol medicine  helps lower your cholesterol level  A low cholesterol level helps prevent heart disease and makes it easier to control your blood pressure  · Take your medicine as directed  Contact your healthcare provider if you think your medicine is not helping or if you have side effects  Tell him or her if you are allergic to any medicine  Keep a list of the medicines, vitamins, and herbs you take  Include the amounts, and when and why you take them  Bring the list or the pill bottles to follow-up visits  Carry your medicine list with you in case of an emergency  Follow up with your healthcare provider as directed: You will need to return to have your blood pressure checked and to have other lab tests done  Write down your questions so you remember to ask them during your visits  Stages of hypertension:       · Normal blood pressure is 119/79 or lower   Your healthcare provider may only check your blood pressure each year if it stays at a normal level  · Elevated blood pressure is 120/79 to 129/79   This is sometimes called prehypertension  Your healthcare provider may suggest lifestyle changes to help lower your blood pressure to a normal level  He or she may then check it again in 3 to 6 months  · Stage 1 hypertension is 130/80  to 139/89   Your provider may recommend lifestyle changes, medication, and checks every 3 to 6 months until your blood pressure is controlled  · Stage 2 hypertension is 140/90 or higher   Your provider will recommend lifestyle changes and have you take 2 kinds of hypertension medicines  You will also need to have your blood pressure checked monthly until it is controlled  Manage chronic hypertension:   · Check your blood pressure at home  Avoid smoking, caffeine, and exercise at least 30 minutes before checking your blood pressure  Sit and rest for 5 minutes before you take your blood pressure  Extend your arm and support it on a flat surface  Your arm should be at the same level as your heart  Follow the directions that came with your blood pressure monitor  Check your blood pressure 2 times, 1 minute apart, before you take your medicine in the morning  Also check your blood pressure before your evening meal  Keep a record of your readings and bring it to your follow-up visits  Ask your healthcare provider what your blood pressure should be  · Manage any other health conditions you have  Health conditions such as diabetes can increase your risk for hypertension  Follow your healthcare provider's instructions and take all your medicines as directed  Talk to your healthcare provider about any new health conditions you have recently developed  · Ask about all medicines  Certain medicines can increase your blood pressure  Examples include oral birth control pills, decongestants, herbal supplements, and NSAIDs, such as ibuprofen  Your healthcare provider can tell you which medicines are safe for you to take  This includes prescription and over-the-counter medicines  Lifestyle changes you can make to lower your blood pressure: Your provider may want you to make more lifestyle changes if you are having trouble controlling your blood pressure  This may feel difficult over time, especially if you think you are making good changes but your pressure is still high  It might help to focus on one new change at a time  For example, try to add 1 more day of exercise, or exercise for an extra 10 minutes on 2 days  Small changes can make a big difference  Your healthcare provider can also refer you to specialists such as a dietitian who can help you make small changes  · Limit sodium (salt) as directed  Too much sodium can affect your fluid balance  Check labels to find low-sodium or no-salt-added foods  Some low-sodium foods use potassium salts for flavor  Too much potassium can also cause health problems   Your healthcare provider will tell you how much sodium and potassium are safe for you to have in a day  He or she may recommend that you limit sodium to 2,300 mg a day  · Follow the meal plan recommended by your healthcare provider  A dietitian or your provider can give you more information on low-sodium plans or the DASH (Dietary Approaches to Stop Hypertension) eating plan  The DASH plan is low in sodium, unhealthy fats, and total fat  It is high in potassium, calcium, and fiber  · Exercise to maintain a healthy weight  Exercise at least 30 minutes per day, on most days of the week  This will help decrease your blood pressure  Ask your healthcare provider about the best exercise plan for you  · Decrease stress  This may help lower your blood pressure  Learn ways to relax, such as deep breathing or listening to music  · Limit alcohol as directed  Alcohol can increase your blood pressure  A drink of alcohol is 12 ounces of beer, 5 ounces of wine, or 1½ ounces of liquor  · Do not smoke  Nicotine and other chemicals in cigarettes and cigars can increase your blood pressure and also cause lung damage  Ask your healthcare provider for information if you currently smoke and need help to quit  E-cigarettes or smokeless tobacco still contain nicotine  Talk to your healthcare provider before you use these products  © Copyright [employer] Information is for End User's use only and may not be sold, redistributed or otherwise used for commercial purposes  All illustrations and images included in CareNotes® are the copyrighted property of A D A M [employer]  or [facility] Mo   The above information is an  only  It is not intended as medical advice for individual conditions or treatments  Talk to your doctor, nurse or pharmacist before following any medical regimen to see if it is safe and effective for you

## 2021-07-19 ENCOUNTER — TELEPHONE (OUTPATIENT)
Dept: NEPHROLOGY | Facility: CLINIC | Age: 76
End: 2021-07-19

## 2021-07-19 NOTE — TELEPHONE ENCOUNTER
I called the patient and spoke to her over the phone  She had called on Friday about tongue swelling and itching  Based on what she told me, she said that the tongue swelling happened over a week ago and she called the office but did not hear back  I apologized for this and told her that I do not see any indication on the chart that she called our office  She seemed annoyed about not getting a phone call back and I had to apologize again  In any case, she said that she stopped all her BP medications when the tongue swelling started and it has now improved and almost back to normal  Her itching is also better  Since stopping all her BP meds - she reports that her SBP has been 170s to 180s  She has not informed her PCP of the above  I advised:  - Restart Amlodipine 10 mg daily  - Call us back in 1 week with updated BP readings  - If symptoms recur, call PCP  - If symptoms do not recur, then we should slowly reintroduce her BP medications as her BP is high

## 2021-07-21 ENCOUNTER — TELEPHONE (OUTPATIENT)
Dept: NEPHROLOGY | Facility: CLINIC | Age: 76
End: 2021-07-21

## 2021-07-21 NOTE — TELEPHONE ENCOUNTER
Pt called today stating that she stopped the amlodipine because she started again with the tongue swelling and itching  Pt did not have any blood pressures to report at this time  She would like to hear back from [facility] with any recommendations

## 2021-07-21 NOTE — TELEPHONE ENCOUNTER
I called the patient and we spoke over the phone  Tongue itching and swelling started again with Amlodipine  I asked her to stop this and wait a few days until symptoms improve  Once symptoms resolve, I asked her to trial Losartan /25 mg daily and call me back if BP remains high on Losartan HCT

## 2021-07-27 ENCOUNTER — TELEPHONE (OUTPATIENT)
Dept: NEPHROLOGY | Facility: CLINIC | Age: 76
End: 2021-07-27

## 2021-07-27 NOTE — TELEPHONE ENCOUNTER
Thank you! Please have her update us with BP readings after being on the Losartan HCT for 1 week  Please add Amlodipine to her list of intolerances/allergies

## 2021-07-27 NOTE — TELEPHONE ENCOUNTER
CORRECTION:  Pt called back  Amlodipine is the medication that gave her the tongue swelling  She is now on Losartan/HCTZ 100/25 mg  QD  Pt just started Amlodipine 10 mg QD (yesterday, 7/26/21) as her tongue swelling has just subsided  BP yesterday 189/71 (L), 172/67 (R)  Today 159/76 (L), 165/73 (R)        ----- Message from Pepe Hawkins MD sent at 7/19/2021 11:38 AM EDT -----  Please call patient and get updated BP readings  She stopped all her BP medications recently and I asked her to restart Amlodipine 10 mg daily on 7/19/21 (Monday)  I would like to know her BP range to see if it is better with restarting Amlodipine

## 2021-07-28 ENCOUNTER — DOCUMENTATION (OUTPATIENT)
Dept: NEPHROLOGY | Facility: CLINIC | Age: 76
End: 2021-07-28

## 2021-07-28 RX ORDER — NEBIVOLOL 10 MG/1
10 TABLET ORAL DAILY
COMMUNITY
End: 2021-08-12 | Stop reason: SDUPTHER

## 2021-07-28 NOTE — TELEPHONE ENCOUNTER
Called pt to remind her to check Bps and send in readings with taking Losartan/HCT  Pt advises that she has stopped this medicine as it makes her too thirsty, "I can't get enough to drink when I take this"

## 2021-08-06 ENCOUNTER — RA CDI HCC (OUTPATIENT)
Dept: OTHER | Facility: HOSPITAL | Age: 76
End: 2021-08-06

## 2021-08-06 NOTE — PROGRESS NOTES
[facility]  coding opportunities          Chart reviewed, no opportunity found: CHART REVIEWED, NO OPPORTUNITY FOUND                     Patients insurance company: [facility] [address]  (Medicare Advantage and Commercial)

## 2021-08-12 ENCOUNTER — OFFICE VISIT (OUTPATIENT)
Dept: FAMILY MEDICINE CLINIC | Facility: CLINIC | Age: 76
End: 2021-08-12
Payer: COMMERCIAL

## 2021-08-12 VITALS
TEMPERATURE: 95.7 F | RESPIRATION RATE: 18 BRPM | HEART RATE: 68 BPM | OXYGEN SATURATION: 98 % | WEIGHT: 202 LBS | BODY MASS INDEX: 38.14 KG/M2 | SYSTOLIC BLOOD PRESSURE: 160 MMHG | HEIGHT: 61 IN | DIASTOLIC BLOOD PRESSURE: 80 MMHG

## 2021-08-12 DIAGNOSIS — I10 RESISTANT HYPERTENSION: Primary | ICD-10-CM

## 2021-08-12 DIAGNOSIS — Z79.899 ENCOUNTER FOR LONG-TERM (CURRENT) USE OF MEDICATIONS: ICD-10-CM

## 2021-08-12 DIAGNOSIS — Z79.899 ENCOUNTER FOR LONG-TERM CURRENT USE OF HIGH RISK MEDICATION: ICD-10-CM

## 2021-08-12 DIAGNOSIS — L98.9 PSORIASIS-LIKE SKIN DISEASE: ICD-10-CM

## 2021-08-12 DIAGNOSIS — F33.9 DEPRESSION, RECURRENT (HCC): ICD-10-CM

## 2021-08-12 DIAGNOSIS — E78.2 MIXED HYPERLIPIDEMIA: ICD-10-CM

## 2021-08-12 DIAGNOSIS — E66.01 OBESITY, MORBID (HCC): ICD-10-CM

## 2021-08-12 DIAGNOSIS — E55.9 VITAMIN D INSUFFICIENCY: ICD-10-CM

## 2021-08-12 DIAGNOSIS — T78.40XA ALLERGIC REACTION, INITIAL ENCOUNTER: ICD-10-CM

## 2021-08-12 PROCEDURE — 3288F FALL RISK ASSESSMENT DOCD: CPT | Performed by: FAMILY MEDICINE

## 2021-08-12 PROCEDURE — 1160F RVW MEDS BY RX/DR IN RCRD: CPT | Performed by: FAMILY MEDICINE

## 2021-08-12 PROCEDURE — 3079F DIAST BP 80-89 MM HG: CPT | Performed by: FAMILY MEDICINE

## 2021-08-12 PROCEDURE — 1036F TOBACCO NON-USER: CPT | Performed by: FAMILY MEDICINE

## 2021-08-12 PROCEDURE — 3725F SCREEN DEPRESSION PERFORMED: CPT | Performed by: FAMILY MEDICINE

## 2021-08-12 PROCEDURE — 99215 OFFICE O/P EST HI 40 MIN: CPT | Performed by: FAMILY MEDICINE

## 2021-08-12 PROCEDURE — 3077F SYST BP >= 140 MM HG: CPT | Performed by: FAMILY MEDICINE

## 2021-08-12 PROCEDURE — 1101F PT FALLS ASSESS-DOCD LE1/YR: CPT | Performed by: FAMILY MEDICINE

## 2021-08-12 RX ORDER — NEBIVOLOL 10 MG/1
10 TABLET ORAL DAILY
Qty: 30 TABLET | Refills: 6 | Status: SHIPPED | OUTPATIENT
Start: 2021-08-12 | End: 2021-09-15

## 2021-08-12 RX ORDER — DILTIAZEM HYDROCHLORIDE 180 MG/1
180 CAPSULE, COATED, EXTENDED RELEASE ORAL DAILY
Qty: 30 CAPSULE | Refills: 6 | Status: SHIPPED | OUTPATIENT
Start: 2021-08-12 | End: 2021-10-11 | Stop reason: HOSPADM

## 2021-08-12 NOTE — PROGRESS NOTES
Assessment/Plan:76 y o female, well-known to me for many years presents for f/u and evaluation of the following medical issues:     F/u and eval HTN:  Saw Dr Tete Rand about a mo ago and I have reviewed his 2 letters:  6/8 and July  Now OFF ALL BP meds  BP are too high  States Doxysosin makes too thirsty and vomit  Will end the Losartan6    F/u and eval NIDDM: fairly good control - eval one BBG sheet and ave fbs 108 and ave -140's  The HS are the sl hi    F/u and eval HLD: last LP Sept 2020:  Cho 216, trig 135, HDL 45, and -- refuses statin and Zetia "in all shapes and forms, etc"    F/u and eval deconditioning: Not on formal plan:  Lift wts, knee bends, etc     F/u and eval polypharmacy:  All discussion and all meds and diet reviewed              Problem List Items Addressed This Visit        Cardiovascular and Mediastinum    Resistant hypertension - Primary    Relevant Medications    nebivolol (BYSTOLIC) 10 mg tablet    diltiazem (CARDIZEM CD) 180 mg 24 hr capsule    Other Relevant Orders    CBC    UA w Reflex to Microscopic w Reflex to Culture    Comprehensive metabolic panel    Lipid panel    TSH, 3rd generation    Vitamin D 25 hydroxy    Hemoglobin A1C       Musculoskeletal and Integument    Psoriasis-like skin disease       Other    Mixed hyperlipidemia    Relevant Orders    Lipid panel    Depression, recurrent (HCC)    Obesity, morbid (HCC)    Allergic reaction      Other Visit Diagnoses     Encounter for long-term (current) use of medications        Relevant Orders    CBC    UA w Reflex to Microscopic w Reflex to Culture    Comprehensive metabolic panel    Lipid panel    TSH, 3rd generation    Vitamin D 25 hydroxy    Hemoglobin A1C    Encounter for long-term current use of high risk medication        Vitamin D insufficiency        Relevant Orders    Vitamin D 25 hydroxy            Subjective: 64 y o female with very hi BP -- /100  But by me 200/100     Patient ID: Sammi Gonzales is a 64 y o  female  HPI  64 y o female, well-known to me for many years presents for f/u and evaluation of the following medical issues:     F/u and eval HTN:  Saw Dr Mary Alice Lew about a mo ago and I have reviewed his 2 letters:  6/8 and July  Now OFF ALL BP meds  BP are too high  States Doxysosin makes too thirsty and vomit  Will end the Losartan6    F/u and eval NIDDM: fairly good control - eval one BBG sheet and ave fbs 108 and ave -140's  The HS are the sl hi    F/u and eval HLD: last LP Sept 2020:  Cho 216, trig 135, HDL 45, and -- refuses statin and Zetia "in all shapes and forms, etc"    F/u and eval deconditioning: Not on formal plan:  Lift wts, knee bends, etc     F/u and eval polypharmacy:  All discussion and all meds and diet reviewed  The following portions of the patient's history were reviewed and updated as appropriate:   Past Medical History:  She has a past medical history of Benign essential hypertension, Chronic lower back pain, Diabetes mellitus ([address] ), Hyperlipidemia, and Sciatica  ,  _______________________________________________________________________  Medical Problems:  does not have any pertinent problems on file ,  _______________________________________________________________________  Past Surgical History:   has a past surgical history that includes Cholecystectomy  ,  _______________________________________________________________________  Family History:  family history includes Emphysema in her father; Leukemia in her father; Uterine cancer in her mother ,  _______________________________________________________________________  Social History:   reports that she quit smoking about 16 years ago  Her smoking use included cigarettes  She has a 30 00 pack-year smoking history  She has never used smokeless tobacco  She reports that she does not drink alcohol and does not use drugs  ,  _______________________________________________________________________  Allergies:  is allergic to amlodipine and losartan potassium-hctz     _______________________________________________________________________  Current Outpatient Medications   Medication Sig Dispense Refill    Apremilast (Otezla) 30 MG TABS Take 2 tablets by mouth daily 180 tablet 3    Cholecalciferol (VITAMIN D) 125 MCG (5000 UT) CAPS Take by mouth      cyanocobalamin (VITAMIN B-12) 1000 MCG tablet Take 1 tablet (1,000 mcg total) by mouth daily 100 tablet 6    Exenatide ER (Bydureon BCise) 2 MG/0 85ML AUIJ Inject 2 mg under the skin once a week 12 pen 3    folic acid (FOLVITE) 1 mg tablet Take 1 tablet (1 mg total) by mouth daily 90 tablet 3    glimepiride (AMARYL) 2 mg tablet Take 1 tablet (2 mg total) by mouth daily with breakfast 90 tablet 3    glucose blood test strip Test sugar 2-3 times daily 90 each 6    Invokana 300 MG TABS TAKE 1 TABLET BY MOUTH  DAILY 90 tablet 3    ketorolac (ACULAR) 0 5 % ophthalmic solution       Lantus SoloStar 100 units/mL injection pen INJECT SUBCUTANEOUSLY 44  UNITS DAILY AT BEDTIME 30 mL 4    nebivolol (BYSTOLIC) 10 mg tablet Take 1 tablet (10 mg total) by mouth daily 30 tablet 6    repaglinide (PRANDIN) 1 mg tablet Take 1 tablet (1 mg total) by mouth daily before dinner 90 tablet 3    diltiazem (CARDIZEM CD) 180 mg 24 hr capsule Take 1 capsule (180 mg total) by mouth daily 30 capsule 6     Current Facility-Administered Medications   Medication Dose Route Frequency Provider Last Rate Last Admin    cyanocobalamin injection 1,000 mcg  1,000 mcg Intramuscular Q30 Days G Luis Eduardo Dunn DO   1,000 mcg at 04/06/21 0817    cyanocobalamin injection 1,000 mcg  1,000 mcg Intramuscular Q30 Days G Luis Eduardo Montejo DO   1,000 mcg at 10/27/20 1449    cyanocobalamin injection 1,000 mcg  1,000 mcg Intramuscular Q30 Days G Luis Eduardo Montejo DO   1,000 mcg at 01/05/21 0750    cyanocobalamin injection 1,000 mcg  1,000 mcg Intramuscular Q30 Days Fede Arambula DO   1,000 mcg at 03/09/21 1454 _______________________________________________________________________  Review of Systems   Constitutional: Positive for fatigue  Negative for chills and fever  HENT: Negative for congestion and postnasal drip  Respiratory: Negative for cough, shortness of breath and wheezing  Cardiovascular: Negative for chest pain, palpitations and leg swelling  Gastrointestinal: Negative for abdominal distention and nausea  Endocrine:        Diabetic has had some  Elevated sugars    Genitourinary: Negative for difficulty urinating and flank pain  Musculoskeletal: Negative for arthralgias, back pain, gait problem and myalgias  Skin: Negative for rash  Allergic/Immunologic: Negative for environmental allergies  Neurological: Positive for weakness  Negative for dizziness, facial asymmetry, light-headedness, numbness and headaches  Hematological: Negative for adenopathy  Psychiatric/Behavioral: Negative for sleep disturbance and suicidal ideas  The patient is nervous/anxious  Objective:  Vitals:    08/12/21 0919   BP: 160/80   Pulse: 68   Resp: 18   Temp: (!) 95 7 °F (35 4 °C)   SpO2: 98%   Weight: 91 6 kg (202 lb)   Height: 5' 1" (1 549 m)     Body mass index is 38 17 kg/m²  Physical Exam  Vitals and nursing note reviewed  Constitutional:       General: She is not in acute distress  Appearance: Normal appearance  She is well-developed  She is obese  She is not ill-appearing or diaphoretic  HENT:      Head: Normocephalic and atraumatic  Nose: Nose normal  No congestion or rhinorrhea  Eyes:      General: No scleral icterus  Conjunctiva/sclera: Conjunctivae normal       Pupils: Pupils are equal, round, and reactive to light  Comments: Watery and itchy eyes from the allergies   Neck:      Thyroid: No thyromegaly  Trachea: No tracheal deviation  Cardiovascular:      Rate and Rhythm: Normal rate and regular rhythm  Pulses: Normal pulses        Heart sounds: Normal heart sounds  Pulmonary:      Effort: Pulmonary effort is normal  No respiratory distress  Breath sounds: Normal breath sounds  No wheezing, rhonchi or rales  Chest:      Chest wall: No tenderness  Musculoskeletal:         General: No tenderness or deformity  Normal range of motion  Cervical back: Normal range of motion and neck supple  No rigidity  No muscular tenderness  Skin:     General: Skin is warm and dry  Coloration: Skin is not pale  Findings: No erythema or rash  Neurological:      General: No focal deficit present  Mental Status: She is alert and oriented to person, place, and time  Mental status is at baseline  Cranial Nerves: No cranial nerve deficit  Psychiatric:         Mood and Affect: Mood normal          Behavior: Behavior normal          Thought Content: Thought content normal          Judgment: Judgment normal         Pt seen from 950 a m to 10:31 a m  alone and in undevidided time  Very complex and MANY BP and BS sheets reviewed  Also, reviewed all of Dr Panda Alonso 's notes and med, very time consuming and detailed  D/w all the issues here  NOTE: all of the above issues discussed include chronic illness(es)  with severe exacerbations OR pose threats to life or body function if not managed/monitored effectively  I am as concerned about the long term effects of these disease states, as much as the short term effects  I spent considerable time assuring that my patient  understands  She is aware of the consequences of CVA, etc   And the importance of BP control! !   I reviewed prior internal and external notes,  consults,  hospital discharges,  and any other appropriate documents  I  have discussed the management and interpretation of them as well  Again, patient expresses understanding  Needs all labs and ordered now  Let me see her in 3-4 wks  ALSO, has a LIVING WILL and END OF LIFE issues reviewed  Given AGAIN the 5 Wishes    Long talk re POA, etc

## 2021-08-12 NOTE — PATIENT INSTRUCTIONS
Chronic Hypertension   WHAT YOU NEED TO KNOW:   Hypertension is high blood pressure  Your blood pressure is the force of your blood moving against the walls of your arteries  Hypertension causes your blood pressure to get so high that your heart has to work much harder than normal  This can damage your heart  Even if you have hypertension for years, lifestyle changes, medicines, or both can help bring your blood pressure to normal   DISCHARGE INSTRUCTIONS:   Call your local emergency number (911 in the [address] [facility]) or have someone call if:   · You have chest pain  · You have any of the following signs of a heart attack:      ? Squeezing, pressure, or pain in your chest    ? You may  also have any of the following:     § Discomfort or pain in your back, neck, jaw, stomach, or arm    § Shortness of breath    § Nausea or vomiting    § Lightheadedness or a sudden cold sweat    · You become confused or have difficulty speaking  · You suddenly feel lightheaded or have trouble breathing  Return to the emergency department if:   · You have a severe headache or vision loss  · You have weakness in an arm or leg  Call your doctor if:   · You feel faint, dizzy, confused, or drowsy  · You have been taking your blood pressure medicine but your pressure is higher than your provider says it should be  · You have questions or concerns about your condition or care  Medicines: You may need any of the following:  · Antihypertensives  may be used to help lower your blood pressure  Several kinds of medicines are available  Your healthcare provider may change the medicine or medicines you currently take  This may be needed if your blood pressure is often high when you check it at home or you are having other problems with blood pressure control  · Diuretics  help decrease extra fluid that collects in your body  This will help lower your BP  You may urinate more often while you take this medicine      · Cholesterol medicine  helps lower your cholesterol level  A low cholesterol level helps prevent heart disease and makes it easier to control your blood pressure  · Take your medicine as directed  Contact your healthcare provider if you think your medicine is not helping or if you have side effects  Tell him or her if you are allergic to any medicine  Keep a list of the medicines, vitamins, and herbs you take  Include the amounts, and when and why you take them  Bring the list or the pill bottles to follow-up visits  Carry your medicine list with you in case of an emergency  Follow up with your doctor as directed: You will need to return to have your blood pressure checked and to have other lab tests done  Write down your questions so you remember to ask them during your visits  Stages of hypertension:       · Normal blood pressure is 119/79 or lower   Your healthcare provider may only check your blood pressure each year if it stays at a normal level  · Elevated blood pressure is 120/79 to 129/79   This is sometimes called prehypertension  Your healthcare provider may suggest lifestyle changes to help lower your blood pressure to a normal level  He or she may then check it again in 3 to 6 months  · Stage 1 hypertension is 130/80  to 139/89   Your provider may recommend lifestyle changes, medication, and checks every 3 to 6 months until your blood pressure is controlled  · Stage 2 hypertension is 140/90 or higher   Your provider will recommend lifestyle changes and have you take 2 kinds of hypertension medicines  You will also need to have your blood pressure checked monthly until it is controlled  Manage chronic hypertension:   · Check your blood pressure at home  Avoid smoking, caffeine, and exercise at least 30 minutes before checking your blood pressure  Sit and rest for 5 minutes before you take your blood pressure  Extend your arm and support it on a flat surface   Your arm should be at the same level as your heart  Follow the directions that came with your blood pressure monitor  Check your blood pressure 2 times, 1 minute apart, before you take your medicine in the morning  Also check your blood pressure before your evening meal  Keep a record of your readings and bring it to your follow-up visits  Ask your healthcare provider what your blood pressure should be  · Manage any other health conditions you have  Health conditions such as diabetes can increase your risk for hypertension  Follow your healthcare provider's instructions and take all your medicines as directed  Talk to your healthcare provider about any new health conditions you have recently developed  · Ask about all medicines  Certain medicines can increase your blood pressure  Examples include oral birth control pills, decongestants, herbal supplements, and NSAIDs, such as ibuprofen  Your healthcare provider can tell you which medicines are safe for you to take  This includes prescription and over-the-counter medicines  Lifestyle changes you can make to lower your blood pressure: Your provider may want you to make more lifestyle changes if you are having trouble controlling your blood pressure  This may feel difficult over time, especially if you think you are making good changes but your pressure is still high  It might help to focus on one new change at a time  For example, try to add 1 more day of exercise, or exercise for an extra 10 minutes on 2 days  Small changes can make a big difference  Your healthcare provider can also refer you to specialists such as a dietitian who can help you make small changes  Your family members may be included in helping you learn to create lifestyle changes, such as the following:  · Limit sodium (salt) as directed  Too much sodium can affect your fluid balance  Check labels to find low-sodium or no-salt-added foods  Some low-sodium foods use potassium salts for flavor   Too much potassium can also cause health problems  Your healthcare provider will tell you how much sodium and potassium are safe for you to have in a day  He or she may recommend that you limit sodium to 2,300 mg a day  · Follow the meal plan recommended by your healthcare provider  A dietitian or your provider can give you more information on low-sodium plans or the DASH (Dietary Approaches to Stop Hypertension) eating plan  The DASH plan is low in sodium, processed sugar, unhealthy fats, and total fat  It is high in potassium, calcium, and fiber  These can be found in vegetables, fruit, and whole-grain foods  · Be physically active throughout the day  Physical activity, such as exercise, can help control your blood pressure and your weight  Be physically active for at least 30 minutes per day, on most days of the week  Include aerobic activity, such as walking or riding a bicycle  Also include strength training at least 2 times each week  Your healthcare providers can help you create a physical activity plan  · Decrease stress  This may help lower your blood pressure  Learn ways to relax, such as deep breathing or listening to music  · Limit alcohol as directed  Alcohol can increase your blood pressure  A drink of alcohol is 12 ounces of beer, 5 ounces of wine, or 1½ ounces of liquor  · Do not smoke  Nicotine and other chemicals in cigarettes and cigars can increase your blood pressure and also cause lung damage  Ask your healthcare provider for information if you currently smoke and need help to quit  E-cigarettes or smokeless tobacco still contain nicotine  Talk to your healthcare provider before you use these products  © Copyright [employer] 2021 Information is for End User's use only and may not be sold, redistributed or otherwise used for commercial purposes   All illustrations and images included in CareNotes® are the copyrighted property of [employer]  or Sissy Savage  The above information is an  only  It is not intended as medical advice for individual conditions or treatments  Talk to your doctor, nurse or pharmacist before following any medical regimen to see if it is safe and effective for you

## 2021-08-26 ENCOUNTER — APPOINTMENT (OUTPATIENT)
Dept: LAB | Facility: HOSPITAL | Age: 76
End: 2021-08-26
Payer: COMMERCIAL

## 2021-08-26 DIAGNOSIS — I10 RESISTANT HYPERTENSION: ICD-10-CM

## 2021-08-26 DIAGNOSIS — E55.9 VITAMIN D INSUFFICIENCY: ICD-10-CM

## 2021-08-26 DIAGNOSIS — Z79.899 ENCOUNTER FOR LONG-TERM (CURRENT) USE OF MEDICATIONS: ICD-10-CM

## 2021-08-26 DIAGNOSIS — E78.2 MIXED HYPERLIPIDEMIA: ICD-10-CM

## 2021-08-26 LAB
25(OH)D3 SERPL-MCNC: 45 NG/ML (ref 30–100)
ALBUMIN SERPL BCP-MCNC: 3.9 G/DL (ref 3.4–4.8)
ALP SERPL-CCNC: 90 U/L (ref 35–140)
ALT SERPL W P-5'-P-CCNC: 19 U/L (ref 5–54)
ANION GAP SERPL CALCULATED.3IONS-SCNC: 8 MMOL/L (ref 4–13)
AST SERPL W P-5'-P-CCNC: 13 U/L (ref 15–41)
BACTERIA UR QL AUTO: ABNORMAL /HPF
BILIRUB SERPL-MCNC: 0.47 MG/DL (ref 0.3–1.2)
BILIRUB UR QL STRIP: NEGATIVE
BUN SERPL-MCNC: 17 MG/DL (ref 6–20)
CALCIUM SERPL-MCNC: 9.5 MG/DL (ref 8.4–10.2)
CHLORIDE SERPL-SCNC: 103 MMOL/L (ref 96–108)
CHOLEST SERPL-MCNC: 208 MG/DL
CLARITY UR: CLEAR
CO2 SERPL-SCNC: 30 MMOL/L (ref 22–33)
COLOR UR: YELLOW
CREAT SERPL-MCNC: 0.78 MG/DL (ref 0.4–1.1)
ERYTHROCYTE [DISTWIDTH] IN BLOOD BY AUTOMATED COUNT: 13.9 % (ref 11.6–15.1)
EST. AVERAGE GLUCOSE BLD GHB EST-MCNC: 143 MG/DL
GFR SERPL CREATININE-BSD FRML MDRD: 73 ML/MIN/1.73SQ M
GLUCOSE P FAST SERPL-MCNC: 106 MG/DL (ref 70–105)
GLUCOSE UR STRIP-MCNC: ABNORMAL MG/DL
HBA1C MFR BLD: 6.6 %
HCT VFR BLD AUTO: 49.4 % (ref 34.8–46.1)
HDLC SERPL-MCNC: 46 MG/DL
HGB BLD-MCNC: 16.1 G/DL (ref 11.5–15.4)
HGB UR QL STRIP.AUTO: ABNORMAL
KETONES UR STRIP-MCNC: NEGATIVE MG/DL
LDLC SERPL CALC-MCNC: 137 MG/DL (ref 0–100)
LEUKOCYTE ESTERASE UR QL STRIP: NEGATIVE
MCH RBC QN AUTO: 28.7 PG (ref 26.8–34.3)
MCHC RBC AUTO-ENTMCNC: 32.6 G/DL (ref 31.4–37.4)
MCV RBC AUTO: 88 FL (ref 82–98)
NITRITE UR QL STRIP: NEGATIVE
NON-SQ EPI CELLS URNS QL MICRO: ABNORMAL /HPF
NONHDLC SERPL-MCNC: 162 MG/DL
PH UR STRIP.AUTO: 6 [PH]
PLATELET # BLD AUTO: 301 THOUSANDS/UL (ref 149–390)
PMV BLD AUTO: 10.5 FL (ref 8.9–12.7)
POTASSIUM SERPL-SCNC: 4.1 MMOL/L (ref 3.5–5)
PROT SERPL-MCNC: 6.7 G/DL (ref 6.4–8.3)
PROT UR STRIP-MCNC: NEGATIVE MG/DL
RBC # BLD AUTO: 5.61 MILLION/UL (ref 3.81–5.12)
RBC #/AREA URNS AUTO: ABNORMAL /HPF
SODIUM SERPL-SCNC: 141 MMOL/L (ref 133–145)
SP GR UR STRIP.AUTO: 1.02 (ref 1–1.03)
TRIGL SERPL-MCNC: 127.5 MG/DL
TSH SERPL DL<=0.05 MIU/L-ACNC: 3.14 UIU/ML (ref 0.34–5.6)
UROBILINOGEN UR QL STRIP.AUTO: 0.2 E.U./DL
WBC # BLD AUTO: 8.84 THOUSAND/UL (ref 4.31–10.16)
WBC #/AREA URNS AUTO: ABNORMAL /HPF

## 2021-08-26 PROCEDURE — 80061 LIPID PANEL: CPT

## 2021-08-26 PROCEDURE — 85027 COMPLETE CBC AUTOMATED: CPT

## 2021-08-26 PROCEDURE — 81001 URINALYSIS AUTO W/SCOPE: CPT | Performed by: FAMILY MEDICINE

## 2021-08-26 PROCEDURE — 84443 ASSAY THYROID STIM HORMONE: CPT

## 2021-08-26 PROCEDURE — 82306 VITAMIN D 25 HYDROXY: CPT

## 2021-08-26 PROCEDURE — 83036 HEMOGLOBIN GLYCOSYLATED A1C: CPT

## 2021-08-26 PROCEDURE — 36415 COLL VENOUS BLD VENIPUNCTURE: CPT

## 2021-08-26 PROCEDURE — 80053 COMPREHEN METABOLIC PANEL: CPT

## 2021-09-10 ENCOUNTER — HOSPITAL ENCOUNTER (OUTPATIENT)
Dept: VASCULAR ULTRASOUND | Facility: HOSPITAL | Age: 76
Discharge: HOME | End: 2021-09-10
Payer: COMMERCIAL

## 2021-09-10 DIAGNOSIS — I73.9 CLAUDICATION OF BOTH LOWER EXTREMITIES (HCC): ICD-10-CM

## 2021-09-10 PROCEDURE — 93923 UPR/LXTR ART STDY 3+ LVLS: CPT

## 2021-09-10 PROCEDURE — 93925 LOWER EXTREMITY STUDY: CPT

## 2021-09-13 PROCEDURE — 93922 UPR/L XTREMITY ART 2 LEVELS: CPT | Performed by: RADIOLOGY

## 2021-09-13 PROCEDURE — 93925 LOWER EXTREMITY STUDY: CPT | Performed by: RADIOLOGY

## 2021-09-15 ENCOUNTER — OFFICE VISIT (OUTPATIENT)
Dept: FAMILY MEDICINE CLINIC | Facility: CLINIC | Age: 76
End: 2021-09-15
Payer: COMMERCIAL

## 2021-09-15 VITALS
BODY MASS INDEX: 38.4 KG/M2 | WEIGHT: 203.4 LBS | SYSTOLIC BLOOD PRESSURE: 174 MMHG | DIASTOLIC BLOOD PRESSURE: 84 MMHG | TEMPERATURE: 96.4 F | HEART RATE: 74 BPM | RESPIRATION RATE: 18 BRPM | OXYGEN SATURATION: 97 % | HEIGHT: 61 IN

## 2021-09-15 DIAGNOSIS — I10 RESISTANT HYPERTENSION: Primary | ICD-10-CM

## 2021-09-15 DIAGNOSIS — R63.8 INCREASED BMI: ICD-10-CM

## 2021-09-15 DIAGNOSIS — I73.9 PVD (PERIPHERAL VASCULAR DISEASE) WITH CLAUDICATION (HCC): ICD-10-CM

## 2021-09-15 DIAGNOSIS — L98.9 PSORIASIS-LIKE SKIN DISEASE: ICD-10-CM

## 2021-09-15 DIAGNOSIS — Z79.899 ENCOUNTER FOR LONG-TERM (CURRENT) USE OF MEDICATIONS: ICD-10-CM

## 2021-09-15 PROCEDURE — 1160F RVW MEDS BY RX/DR IN RCRD: CPT | Performed by: FAMILY MEDICINE

## 2021-09-15 PROCEDURE — 1036F TOBACCO NON-USER: CPT | Performed by: FAMILY MEDICINE

## 2021-09-15 PROCEDURE — 3079F DIAST BP 80-89 MM HG: CPT | Performed by: FAMILY MEDICINE

## 2021-09-15 PROCEDURE — 99215 OFFICE O/P EST HI 40 MIN: CPT | Performed by: FAMILY MEDICINE

## 2021-09-15 PROCEDURE — 3077F SYST BP >= 140 MM HG: CPT | Performed by: FAMILY MEDICINE

## 2021-09-15 RX ORDER — CHLORTHALIDONE 25 MG/1
25 TABLET ORAL DAILY
Qty: 30 TABLET | Refills: 3 | Status: SHIPPED | OUTPATIENT
Start: 2021-09-15 | End: 2021-11-23 | Stop reason: SDUPTHER

## 2021-09-15 RX ORDER — DILTIAZEM HYDROCHLORIDE 240 MG/1
240 CAPSULE, COATED, EXTENDED RELEASE ORAL DAILY
Qty: 30 CAPSULE | Refills: 3 | Status: SHIPPED | OUTPATIENT
Start: 2021-09-15 | End: 2021-10-11 | Stop reason: HOSPADM

## 2021-09-15 NOTE — PROGRESS NOTES
Assessment/Plan: 64 y o female seen for the following med issues:      Uncontrolled HTN -- 174/84 and only taking Cardizem 180 and nothing else  Says she is "allergic" to  The Bystolic caused dry mouth (after 2 days)  PLAN at this time:  Add:  Chlorthalidone 25 mg OD and inc the Cardizem  240 mg OD    F/u and eval HTN: see above: BP by me today:  190/85 on L and 160's/78 on the R    F/u and eval NIDDM: a1c down from 7 5 to 6 6 and walking 1/2 mi / day extra  On 5 meds:  Lantus is 44U, but now tells me she is OFF the Amaryl 2 mg OD  Cont the Invokana, Bydureon,Lantus and Prandin--but tells now that she is not even taking the Prandin - just stopped  ; so only taking 3 agents and a1c   Did do the medical wt loss program      DM monitoring: pt brings in the [place] [address] for the Dexcom G6:  [phone]    F/u and eval HLD: not on statin due to leg pains  Pt adamently refuses    F/u and eval deconditioning:     Walking more now by 1/2 mi a day    F/u and eval polypharmacy: all meds reviewed and discussed  Problem List Items Addressed This Visit     None            Subjective:      Patient ID: Meka Rocha is a 64 y o  female  HPI    The following portions of the patient's history were reviewed and updated as appropriate:   Past Medical History:  She has a past medical history of Benign essential hypertension, Chronic lower back pain, Diabetes mellitus ([address] ), Hyperlipidemia, and Sciatica  ,  _______________________________________________________________________  Medical Problems:  does not have any pertinent problems on file ,  _______________________________________________________________________  Past Surgical History:   has a past surgical history that includes Cholecystectomy  ,  _______________________________________________________________________  Family History:  family history includes Emphysema in her father; Leukemia in her father; Uterine cancer in her mother ,  _______________________________________________________________________  Social History:   reports that she quit smoking about 16 years ago  Her smoking use included cigarettes  She has a 30 00 pack-year smoking history  She has never used smokeless tobacco  She reports that she does not drink alcohol and does not use drugs  ,  _______________________________________________________________________  Allergies:  is allergic to amlodipine and losartan potassium-hctz     _______________________________________________________________________  Current Outpatient Medications   Medication Sig Dispense Refill    Apremilast (Otezla) 30 MG TABS Take 2 tablets by mouth daily 180 tablet 3    Cholecalciferol (VITAMIN D) 125 MCG (5000 UT) CAPS Take by mouth      cyanocobalamin (VITAMIN B-12) 1000 MCG tablet Take 1 tablet (1,000 mcg total) by mouth daily 100 tablet 6    diltiazem (CARDIZEM CD) 180 mg 24 hr capsule Take 1 capsule (180 mg total) by mouth daily 30 capsule 6    Exenatide ER (Bydureon BCise) 2 MG/0 85ML AUIJ Inject 2 mg under the skin once a week 12 pen 3    folic acid (FOLVITE) 1 mg tablet Take 1 tablet (1 mg total) by mouth daily 90 tablet 3    glimepiride (AMARYL) 2 mg tablet Take 1 tablet (2 mg total) by mouth daily with breakfast 90 tablet 3    glucose blood test strip Test sugar 2-3 times daily 90 each 6    Invokana 300 MG TABS TAKE 1 TABLET BY MOUTH  DAILY 90 tablet 3    ketorolac (ACULAR) 0 5 % ophthalmic solution       Lantus SoloStar 100 units/mL injection pen INJECT SUBCUTANEOUSLY 44  UNITS DAILY AT BEDTIME 30 mL 4    nebivolol (BYSTOLIC) 10 mg tablet Take 1 tablet (10 mg total) by mouth daily 30 tablet 6    repaglinide (PRANDIN) 1 mg tablet Take 1 tablet (1 mg total) by mouth daily before dinner 90 tablet 3     Current Facility-Administered Medications   Medication Dose Route Frequency Provider Last Rate Last Admin    cyanocobalamin injection 1,000 mcg  1,000 mcg Intramuscular Q30 Days Tucker Rosariole, DO   1,000 mcg at 04/06/21 3227    cyanocobalamin injection 1,000 mcg  1,000 mcg Intramuscular Q30 Days Tucker Rosariole, DO   1,000 mcg at 10/27/20 1449    cyanocobalamin injection 1,000 mcg  1,000 mcg Intramuscular Q30 Days Tucker Meza, DO   1,000 mcg at 01/05/21 0750    cyanocobalamin injection 1,000 mcg  1,000 mcg Intramuscular Q30 Days Tucker Meza, DO   1,000 mcg at 03/09/21 1455     _______________________________________________________________________  Review of Systems   Constitutional: Positive for fatigue  Negative for chills and fever  HENT: Negative for congestion and postnasal drip  Respiratory: Negative for cough, shortness of breath and wheezing  Cardiovascular: Negative for chest pain, palpitations and leg swelling  Gastrointestinal: Negative for abdominal distention and nausea  Endocrine:        Diabetic has had some  Elevated sugars    Genitourinary: Negative for difficulty urinating and flank pain  Musculoskeletal: Negative for arthralgias, back pain, gait problem and myalgias  Skin: Negative for rash  Allergic/Immunologic: Negative for environmental allergies  Neurological: Positive for weakness  Negative for dizziness, facial asymmetry, light-headedness, numbness and headaches  Hematological: Negative for adenopathy  Psychiatric/Behavioral: Negative for sleep disturbance and suicidal ideas  The patient is nervous/anxious  Objective:  Vitals:    09/15/21 0835   BP: (!) 174/84   Pulse: 74   Resp: 18   Temp: (!) 96 4 °F (35 8 °C)   SpO2: 97%   Weight: 92 3 kg (203 lb 6 4 oz)   Height: 5' 1" (1 549 m)     Body mass index is 38 43 kg/m²  Physical Exam  Vitals and nursing note reviewed  Constitutional:       General: She is not in acute distress  Appearance: Normal appearance  She is well-developed  She is obese  She is not ill-appearing or diaphoretic  HENT:      Head: Normocephalic and atraumatic        Nose: Nose normal  No congestion or rhinorrhea  Eyes:      General: No scleral icterus  Conjunctiva/sclera: Conjunctivae normal       Pupils: Pupils are equal, round, and reactive to light  Comments: Watery and itchy eyes from the allergies   Neck:      Thyroid: No thyromegaly  Trachea: No tracheal deviation  Cardiovascular:      Rate and Rhythm: Normal rate and regular rhythm  Pulses: Normal pulses  Heart sounds: Normal heart sounds  Pulmonary:      Effort: Pulmonary effort is normal  No respiratory distress  Breath sounds: Normal breath sounds  No wheezing, rhonchi or rales  Chest:      Chest wall: No tenderness  Musculoskeletal:         General: No tenderness or deformity  Normal range of motion  Cervical back: Normal range of motion and neck supple  No rigidity  No muscular tenderness  Skin:     General: Skin is warm and dry  Coloration: Skin is not pale  Findings: No erythema or rash  Neurological:      General: No focal deficit present  Mental Status: She is alert and oriented to person, place, and time  Mental status is at baseline  Cranial Nerves: No cranial nerve deficit  Psychiatric:         Mood and Affect: Mood normal          Behavior: Behavior normal          Thought Content: Thought content normal          Judgment: Judgment normal          Pt seen from 8:40 to 9:25 a m  with all the above discussed  NOTE: all of the above issues discussed include chronic illness(es)  with severe exacerbations OR pose threats to life or body function if not managed/monitored effectively  I am as concerned about the long term effects of these disease states, as much as the short term effects  I spent considerable time assuring that my patient  Understands==>the importance of HTN controll, wt loss, and review of the vascular studies and theresults discussed in detail and set up vascular consult    I reviewed prior internal and external notes,  consults,  hospital discharges,  and any other appropriate documents  I  have discussed the management and interpretation of them as well  Again, patient expresses understanding

## 2021-09-15 NOTE — PATIENT INSTRUCTIONS
Chronic Hypertension   WHAT YOU NEED TO KNOW:   Hypertension is high blood pressure  Your blood pressure is the force of your blood moving against the walls of your arteries  Hypertension causes your blood pressure to get so high that your heart has to work much harder than normal  This can damage your heart  Even if you have hypertension for years, lifestyle changes, medicines, or both can help bring your blood pressure to normal   DISCHARGE INSTRUCTIONS:   Call your local emergency number (911 in the [address] [facility]) or have someone call if:   · You have chest pain  · You have any of the following signs of a heart attack:      ? Squeezing, pressure, or pain in your chest    ? You may  also have any of the following:     § Discomfort or pain in your back, neck, jaw, stomach, or arm    § Shortness of breath    § Nausea or vomiting    § Lightheadedness or a sudden cold sweat    · You become confused or have difficulty speaking  · You suddenly feel lightheaded or have trouble breathing  Return to the emergency department if:   · You have a severe headache or vision loss  · You have weakness in an arm or leg  Call your doctor if:   · You feel faint, dizzy, confused, or drowsy  · You have been taking your blood pressure medicine but your pressure is higher than your provider says it should be  · You have questions or concerns about your condition or care  Medicines: You may need any of the following:  · Antihypertensives  may be used to help lower your blood pressure  Several kinds of medicines are available  Your healthcare provider may change the medicine or medicines you currently take  This may be needed if your blood pressure is often high when you check it at home or you are having other problems with blood pressure control  · Diuretics  help decrease extra fluid that collects in your body  This will help lower your BP  You may urinate more often while you take this medicine      · Cholesterol medicine  helps lower your cholesterol level  A low cholesterol level helps prevent heart disease and makes it easier to control your blood pressure  · Take your medicine as directed  Contact your healthcare provider if you think your medicine is not helping or if you have side effects  Tell him or her if you are allergic to any medicine  Keep a list of the medicines, vitamins, and herbs you take  Include the amounts, and when and why you take them  Bring the list or the pill bottles to follow-up visits  Carry your medicine list with you in case of an emergency  Follow up with your doctor as directed: You will need to return to have your blood pressure checked and to have other lab tests done  Write down your questions so you remember to ask them during your visits  Stages of hypertension:       · Normal blood pressure is 119/79 or lower   Your healthcare provider may only check your blood pressure each year if it stays at a normal level  · Elevated blood pressure is 120/79 to 129/79   This is sometimes called prehypertension  Your healthcare provider may suggest lifestyle changes to help lower your blood pressure to a normal level  He or she may then check it again in 3 to 6 months  · Stage 1 hypertension is 130/80  to 139/89   Your provider may recommend lifestyle changes, medication, and checks every 3 to 6 months until your blood pressure is controlled  · Stage 2 hypertension is 140/90 or higher   Your provider will recommend lifestyle changes and have you take 2 kinds of hypertension medicines  You will also need to have your blood pressure checked monthly until it is controlled  Manage chronic hypertension:   · Check your blood pressure at home  Avoid smoking, caffeine, and exercise at least 30 minutes before checking your blood pressure  Sit and rest for 5 minutes before you take your blood pressure  Extend your arm and support it on a flat surface   Your arm should be at the same level as your heart  Follow the directions that came with your blood pressure monitor  Check your blood pressure 2 times, 1 minute apart, before you take your medicine in the morning  Also check your blood pressure before your evening meal  Keep a record of your readings and bring it to your follow-up visits  Ask your healthcare provider what your blood pressure should be  · Manage any other health conditions you have  Health conditions such as diabetes can increase your risk for hypertension  Follow your healthcare provider's instructions and take all your medicines as directed  Talk to your healthcare provider about any new health conditions you have recently developed  · Ask about all medicines  Certain medicines can increase your blood pressure  Examples include oral birth control pills, decongestants, herbal supplements, and NSAIDs, such as ibuprofen  Your healthcare provider can tell you which medicines are safe for you to take  This includes prescription and over-the-counter medicines  Lifestyle changes you can make to lower your blood pressure: Your provider may want you to make more lifestyle changes if you are having trouble controlling your blood pressure  This may feel difficult over time, especially if you think you are making good changes but your pressure is still high  It might help to focus on one new change at a time  For example, try to add 1 more day of exercise, or exercise for an extra 10 minutes on 2 days  Small changes can make a big difference  Your healthcare provider can also refer you to specialists such as a dietitian who can help you make small changes  Your family members may be included in helping you learn to create lifestyle changes, such as the following:  · Limit sodium (salt) as directed  Too much sodium can affect your fluid balance  Check labels to find low-sodium or no-salt-added foods  Some low-sodium foods use potassium salts for flavor   Too much potassium can also cause health problems  Your healthcare provider will tell you how much sodium and potassium are safe for you to have in a day  He or she may recommend that you limit sodium to 2,300 mg a day  · Follow the meal plan recommended by your healthcare provider  A dietitian or your provider can give you more information on low-sodium plans or the DASH (Dietary Approaches to Stop Hypertension) eating plan  The DASH plan is low in sodium, processed sugar, unhealthy fats, and total fat  It is high in potassium, calcium, and fiber  These can be found in vegetables, fruit, and whole-grain foods  · Be physically active throughout the day  Physical activity, such as exercise, can help control your blood pressure and your weight  Be physically active for at least 30 minutes per day, on most days of the week  Include aerobic activity, such as walking or riding a bicycle  Also include strength training at least 2 times each week  Your healthcare providers can help you create a physical activity plan  · Decrease stress  This may help lower your blood pressure  Learn ways to relax, such as deep breathing or listening to music  · Limit alcohol as directed  Alcohol can increase your blood pressure  A drink of alcohol is 12 ounces of beer, 5 ounces of wine, or 1½ ounces of liquor  · Do not smoke  Nicotine and other chemicals in cigarettes and cigars can increase your blood pressure and also cause lung damage  Ask your healthcare provider for information if you currently smoke and need help to quit  E-cigarettes or smokeless tobacco still contain nicotine  Talk to your healthcare provider before you use these products  © Copyright [employer] 2021 Information is for End User's use only and may not be sold, redistributed or otherwise used for commercial purposes   All illustrations and images included in CareNotes® are the copyrighted property of [employer]  or Sissy Savage  The above information is an  only  It is not intended as medical advice for individual conditions or treatments  Talk to your doctor, nurse or pharmacist before following any medical regimen to see if it is safe and effective for you

## 2021-09-16 ENCOUNTER — TELEPHONE (OUTPATIENT)
Dept: FAMILY MEDICINE CLINIC | Facility: CLINIC | Age: 76
End: 2021-09-16

## 2021-09-16 NOTE — TELEPHONE ENCOUNTER
Absolutely not--if 1 was just done in March, I do not need another 1 now  I did not realize that (and of course, the patient did tell me that!)  Please let them know that

## 2021-09-16 NOTE — TELEPHONE ENCOUNTER
Giovani Huntley from the [facility] vascular lab called    ---the patient had a vas renal artery complete done in march   ----the insurance co will not cover another one --do you still want it done   Please advise thank you

## 2021-09-20 ENCOUNTER — TELEPHONE (OUTPATIENT)
Dept: FAMILY MEDICINE CLINIC | Facility: CLINIC | Age: 76
End: 2021-09-20

## 2021-09-20 NOTE — TELEPHONE ENCOUNTER
Pt called and LM on machine - she needs to cancel her "Kidney test", it was completed in March and insurance will not cover again- pt had a Vas Renal Artery completed on 3/19/2021 - pt does have a Vas Lower Limb scheduled for 9/23 - tried to call pt to let her know she should contact the insurance company to check coverage on the testing - unable to reach pt - busy signal multiple hours       Update:  Vas Limb was completed on 9/10 - canceled appt scheduled for 9/23

## 2021-09-29 ENCOUNTER — APPOINTMENT (EMERGENCY)
Dept: RADIOLOGY | Facility: HOSPITAL | Age: 76
End: 2021-09-29
Payer: COMMERCIAL

## 2021-09-29 ENCOUNTER — HOSPITAL ENCOUNTER (OUTPATIENT)
Facility: HOSPITAL | Age: 76
Setting detail: OBSERVATION
Discharge: HOME | End: 2021-09-30
Attending: EMERGENCY MEDICINE | Admitting: STUDENT IN AN ORGANIZED HEALTH CARE EDUCATION/TRAINING PROGRAM
Payer: COMMERCIAL

## 2021-09-29 DIAGNOSIS — R06.02 SHORTNESS OF BREATH: ICD-10-CM

## 2021-09-29 DIAGNOSIS — E87.6 HYPOKALEMIA: Primary | ICD-10-CM

## 2021-09-29 DIAGNOSIS — R07.9 CHEST PAIN: ICD-10-CM

## 2021-09-29 DIAGNOSIS — I10 RESISTANT HYPERTENSION: ICD-10-CM

## 2021-09-29 DIAGNOSIS — R07.9 CHEST PAIN, UNSPECIFIED TYPE: ICD-10-CM

## 2021-09-29 LAB
ALBUMIN SERPL BCP-MCNC: 3.4 G/DL (ref 3.5–5)
ALP SERPL-CCNC: 127 U/L (ref 46–116)
ALT SERPL W P-5'-P-CCNC: 25 U/L (ref 12–78)
ANION GAP SERPL CALCULATED.3IONS-SCNC: 9 MMOL/L (ref 4–13)
AST SERPL W P-5'-P-CCNC: 10 U/L (ref 5–45)
ATRIAL RATE: 56 BPM
ATRIAL RATE: 62 BPM
ATRIAL RATE: 63 BPM
ATRIAL RATE: 66 BPM
ATRIAL RATE: 73 BPM
BASOPHILS # BLD AUTO: 0.08 THOUSANDS/ΜL (ref 0–0.1)
BASOPHILS NFR BLD AUTO: 1 % (ref 0–1)
BILIRUB SERPL-MCNC: 0.33 MG/DL (ref 0.2–1)
BUN SERPL-MCNC: 20 MG/DL (ref 5–25)
CALCIUM ALBUM COR SERPL-MCNC: 9.7 MG/DL (ref 8.3–10.1)
CALCIUM SERPL-MCNC: 9.2 MG/DL (ref 8.3–10.1)
CHLORIDE SERPL-SCNC: 99 MMOL/L (ref 100–108)
CO2 SERPL-SCNC: 29 MMOL/L (ref 21–32)
CREAT SERPL-MCNC: 1.03 MG/DL (ref 0.6–1.3)
EOSINOPHIL # BLD AUTO: 0.1 THOUSAND/ΜL (ref 0–0.61)
EOSINOPHIL NFR BLD AUTO: 1 % (ref 0–6)
ERYTHROCYTE [DISTWIDTH] IN BLOOD BY AUTOMATED COUNT: 13.6 % (ref 11.6–15.1)
GFR SERPL CREATININE-BSD FRML MDRD: 52 ML/MIN/1.73SQ M
GLUCOSE SERPL-MCNC: 159 MG/DL (ref 65–140)
GLUCOSE SERPL-MCNC: 160 MG/DL (ref 65–140)
GLUCOSE SERPL-MCNC: 186 MG/DL (ref 65–140)
GLUCOSE SERPL-MCNC: 398 MG/DL (ref 65–140)
HCT VFR BLD AUTO: 49.3 % (ref 34.8–46.1)
HGB BLD-MCNC: 16.1 G/DL (ref 11.5–15.4)
IMM GRANULOCYTES # BLD AUTO: 0.07 THOUSAND/UL (ref 0–0.2)
IMM GRANULOCYTES NFR BLD AUTO: 1 % (ref 0–2)
LYMPHOCYTES # BLD AUTO: 1.57 THOUSANDS/ΜL (ref 0.6–4.47)
LYMPHOCYTES NFR BLD AUTO: 15 % (ref 14–44)
MCH RBC QN AUTO: 28.6 PG (ref 26.8–34.3)
MCHC RBC AUTO-ENTMCNC: 32.7 G/DL (ref 31.4–37.4)
MCV RBC AUTO: 88 FL (ref 82–98)
MONOCYTES # BLD AUTO: 0.8 THOUSAND/ΜL (ref 0.17–1.22)
MONOCYTES NFR BLD AUTO: 8 % (ref 4–12)
NEUTROPHILS # BLD AUTO: 7.69 THOUSANDS/ΜL (ref 1.85–7.62)
NEUTS SEG NFR BLD AUTO: 74 % (ref 43–75)
NRBC BLD AUTO-RTO: 0 /100 WBCS
NT-PROBNP SERPL-MCNC: 26 PG/ML
P AXIS: 17 DEGREES
P AXIS: 34 DEGREES
P AXIS: 35 DEGREES
P AXIS: 39 DEGREES
P AXIS: 78 DEGREES
PLATELET # BLD AUTO: 257 THOUSANDS/UL (ref 149–390)
PMV BLD AUTO: 10.1 FL (ref 8.9–12.7)
POTASSIUM SERPL-SCNC: 2.9 MMOL/L (ref 3.5–5.3)
PR INTERVAL: 152 MS
PR INTERVAL: 156 MS
PR INTERVAL: 160 MS
PR INTERVAL: 160 MS
PR INTERVAL: 184 MS
PROT SERPL-MCNC: 6.5 G/DL (ref 6.4–8.2)
QRS AXIS: -8 DEGREES
QRS AXIS: 17 DEGREES
QRS AXIS: 33 DEGREES
QRS AXIS: 40 DEGREES
QRS AXIS: 7 DEGREES
QRSD INTERVAL: 100 MS
QRSD INTERVAL: 88 MS
QRSD INTERVAL: 90 MS
QRSD INTERVAL: 92 MS
QRSD INTERVAL: 94 MS
QT INTERVAL: 392 MS
QT INTERVAL: 404 MS
QT INTERVAL: 408 MS
QT INTERVAL: 414 MS
QT INTERVAL: 420 MS
QTC INTERVAL: 398 MS
QTC INTERVAL: 404 MS
QTC INTERVAL: 408 MS
QTC INTERVAL: 423 MS
QTC INTERVAL: 424 MS
RBC # BLD AUTO: 5.62 MILLION/UL (ref 3.81–5.12)
SODIUM SERPL-SCNC: 137 MMOL/L (ref 136–145)
T WAVE AXIS: 38 DEGREES
T WAVE AXIS: 44 DEGREES
T WAVE AXIS: 53 DEGREES
T WAVE AXIS: 54 DEGREES
T WAVE AXIS: 55 DEGREES
TROPONIN I SERPL-MCNC: <0.02 NG/ML
VENTRICULAR RATE: 54 BPM
VENTRICULAR RATE: 60 BPM
VENTRICULAR RATE: 60 BPM
VENTRICULAR RATE: 63 BPM
VENTRICULAR RATE: 70 BPM
WBC # BLD AUTO: 10.31 THOUSAND/UL (ref 4.31–10.16)

## 2021-09-29 PROCEDURE — 36415 COLL VENOUS BLD VENIPUNCTURE: CPT | Performed by: EMERGENCY MEDICINE

## 2021-09-29 PROCEDURE — 93010 ELECTROCARDIOGRAM REPORT: CPT | Performed by: INTERNAL MEDICINE

## 2021-09-29 PROCEDURE — 71045 X-RAY EXAM CHEST 1 VIEW: CPT

## 2021-09-29 PROCEDURE — 84484 ASSAY OF TROPONIN QUANT: CPT | Performed by: EMERGENCY MEDICINE

## 2021-09-29 PROCEDURE — 82948 REAGENT STRIP/BLOOD GLUCOSE: CPT

## 2021-09-29 PROCEDURE — 85025 COMPLETE CBC W/AUTO DIFF WBC: CPT | Performed by: EMERGENCY MEDICINE

## 2021-09-29 PROCEDURE — 83880 ASSAY OF NATRIURETIC PEPTIDE: CPT | Performed by: EMERGENCY MEDICINE

## 2021-09-29 PROCEDURE — 99285 EMERGENCY DEPT VISIT HI MDM: CPT | Performed by: EMERGENCY MEDICINE

## 2021-09-29 PROCEDURE — 99285 EMERGENCY DEPT VISIT HI MDM: CPT

## 2021-09-29 PROCEDURE — 80053 COMPREHEN METABOLIC PANEL: CPT | Performed by: EMERGENCY MEDICINE

## 2021-09-29 PROCEDURE — 93005 ELECTROCARDIOGRAM TRACING: CPT

## 2021-09-29 PROCEDURE — 99220 PR INITIAL OBSERVATION CARE/DAY 70 MINUTES: CPT | Performed by: STUDENT IN AN ORGANIZED HEALTH CARE EDUCATION/TRAINING PROGRAM

## 2021-09-29 PROCEDURE — 84484 ASSAY OF TROPONIN QUANT: CPT | Performed by: STUDENT IN AN ORGANIZED HEALTH CARE EDUCATION/TRAINING PROGRAM

## 2021-09-29 RX ORDER — POTASSIUM CHLORIDE 20 MEQ/1
40 TABLET, EXTENDED RELEASE ORAL ONCE
Status: COMPLETED | OUTPATIENT
Start: 2021-09-29 | End: 2021-09-29

## 2021-09-29 RX ORDER — MAGNESIUM HYDROXIDE/ALUMINUM HYDROXICE/SIMETHICONE 120; 1200; 1200 MG/30ML; MG/30ML; MG/30ML
30 SUSPENSION ORAL ONCE
Status: COMPLETED | OUTPATIENT
Start: 2021-09-29 | End: 2021-09-29

## 2021-09-29 RX ORDER — INSULIN GLARGINE 100 [IU]/ML
15 INJECTION, SOLUTION SUBCUTANEOUS ONCE
Status: DISCONTINUED | OUTPATIENT
Start: 2021-09-29 | End: 2021-09-29

## 2021-09-29 RX ORDER — HYDRALAZINE HYDROCHLORIDE 20 MG/ML
10 INJECTION INTRAMUSCULAR; INTRAVENOUS EVERY 6 HOURS PRN
Status: DISCONTINUED | OUTPATIENT
Start: 2021-09-29 | End: 2021-09-30 | Stop reason: HOSPADM

## 2021-09-29 RX ORDER — CHLORTHALIDONE 25 MG/1
25 TABLET ORAL DAILY
Status: DISCONTINUED | OUTPATIENT
Start: 2021-09-29 | End: 2021-09-30 | Stop reason: HOSPADM

## 2021-09-29 RX ORDER — ONDANSETRON 2 MG/ML
4 INJECTION INTRAMUSCULAR; INTRAVENOUS EVERY 4 HOURS PRN
Status: DISCONTINUED | OUTPATIENT
Start: 2021-09-29 | End: 2021-09-30 | Stop reason: HOSPADM

## 2021-09-29 RX ORDER — INSULIN GLARGINE 100 [IU]/ML
44 INJECTION, SOLUTION SUBCUTANEOUS
Status: DISCONTINUED | OUTPATIENT
Start: 2021-09-29 | End: 2021-09-29

## 2021-09-29 RX ORDER — HYDRALAZINE HYDROCHLORIDE 20 MG/ML
5 INJECTION INTRAMUSCULAR; INTRAVENOUS ONCE
Status: DISCONTINUED | OUTPATIENT
Start: 2021-09-29 | End: 2021-09-29

## 2021-09-29 RX ORDER — INSULIN GLARGINE 100 [IU]/ML
10 INJECTION, SOLUTION SUBCUTANEOUS
Status: DISCONTINUED | OUTPATIENT
Start: 2021-09-29 | End: 2021-09-30 | Stop reason: HOSPADM

## 2021-09-29 RX ORDER — BACLOFEN 10 MG/1
10 TABLET ORAL ONCE
Status: COMPLETED | OUTPATIENT
Start: 2021-09-29 | End: 2021-09-29

## 2021-09-29 RX ORDER — DILTIAZEM HYDROCHLORIDE 240 MG/1
240 CAPSULE, COATED, EXTENDED RELEASE ORAL DAILY
Status: DISCONTINUED | OUTPATIENT
Start: 2021-09-30 | End: 2021-09-30 | Stop reason: HOSPADM

## 2021-09-29 RX ORDER — DILTIAZEM HYDROCHLORIDE 240 MG/1
240 CAPSULE, COATED, EXTENDED RELEASE ORAL ONCE
Status: COMPLETED | OUTPATIENT
Start: 2021-09-29 | End: 2021-09-29

## 2021-09-29 RX ORDER — ASPIRIN 81 MG/1
81 TABLET, CHEWABLE ORAL DAILY
Status: DISCONTINUED | OUTPATIENT
Start: 2021-09-30 | End: 2021-09-30 | Stop reason: HOSPADM

## 2021-09-29 RX ORDER — ATORVASTATIN CALCIUM 40 MG/1
40 TABLET, FILM COATED ORAL EVERY EVENING
Status: DISCONTINUED | OUTPATIENT
Start: 2021-09-29 | End: 2021-09-29

## 2021-09-29 RX ORDER — POTASSIUM CHLORIDE 20 MEQ/1
40 TABLET, EXTENDED RELEASE ORAL EVERY 4 HOURS
Status: COMPLETED | OUTPATIENT
Start: 2021-09-29 | End: 2021-09-29

## 2021-09-29 RX ORDER — CARVEDILOL 6.25 MG/1
6.25 TABLET ORAL 2 TIMES DAILY WITH MEALS
Status: DISCONTINUED | OUTPATIENT
Start: 2021-09-29 | End: 2021-09-29

## 2021-09-29 RX ORDER — PANTOPRAZOLE SODIUM 40 MG/1
40 TABLET, DELAYED RELEASE ORAL
Status: DISCONTINUED | OUTPATIENT
Start: 2021-09-29 | End: 2021-09-30 | Stop reason: HOSPADM

## 2021-09-29 RX ADMIN — DILTIAZEM HYDROCHLORIDE 240 MG: 240 CAPSULE, EXTENDED RELEASE ORAL at 12:23

## 2021-09-29 RX ADMIN — PANTOPRAZOLE SODIUM 40 MG: 40 TABLET, DELAYED RELEASE ORAL at 19:19

## 2021-09-29 RX ADMIN — ALUMINA, MAGNESIA, AND SIMETHICONE ORAL SUSPENSION REGULAR STRENGTH 30 ML: 1200; 1200; 120 SUSPENSION ORAL at 19:20

## 2021-09-29 RX ADMIN — CHLORTHALIDONE 25 MG: 25 TABLET ORAL at 12:22

## 2021-09-29 RX ADMIN — POTASSIUM CHLORIDE 40 MEQ: 1500 TABLET, EXTENDED RELEASE ORAL at 12:23

## 2021-09-29 RX ADMIN — POTASSIUM CHLORIDE 40 MEQ: 1500 TABLET, EXTENDED RELEASE ORAL at 13:06

## 2021-09-29 RX ADMIN — POTASSIUM CHLORIDE 40 MEQ: 1500 TABLET, EXTENDED RELEASE ORAL at 17:31

## 2021-09-29 RX ADMIN — BACLOFEN 10 MG: 10 TABLET ORAL at 19:58

## 2021-09-29 RX ADMIN — APREMILAST 1 TABLET: 30 TABLET, FILM COATED ORAL at 14:23

## 2021-09-29 RX ADMIN — INSULIN GLARGINE 10 UNITS: 100 INJECTION, SOLUTION SUBCUTANEOUS at 22:47

## 2021-09-29 RX ADMIN — ENOXAPARIN SODIUM 40 MG: 40 INJECTION SUBCUTANEOUS at 12:22

## 2021-09-29 RX ADMIN — INSULIN LISPRO 1 UNITS: 100 INJECTION, SOLUTION INTRAVENOUS; SUBCUTANEOUS at 16:54

## 2021-09-30 VITALS
HEIGHT: 61 IN | OXYGEN SATURATION: 97 % | WEIGHT: 194.45 LBS | SYSTOLIC BLOOD PRESSURE: 165 MMHG | HEART RATE: 61 BPM | RESPIRATION RATE: 19 BRPM | BODY MASS INDEX: 36.71 KG/M2 | DIASTOLIC BLOOD PRESSURE: 70 MMHG | TEMPERATURE: 98.2 F

## 2021-09-30 LAB
ANION GAP SERPL CALCULATED.3IONS-SCNC: 5 MMOL/L (ref 4–13)
BASOPHILS # BLD AUTO: 0.08 THOUSANDS/ΜL (ref 0–0.1)
BASOPHILS NFR BLD AUTO: 1 % (ref 0–1)
BUN SERPL-MCNC: 23 MG/DL (ref 5–25)
CALCIUM SERPL-MCNC: 9.2 MG/DL (ref 8.3–10.1)
CHLORIDE SERPL-SCNC: 104 MMOL/L (ref 100–108)
CHOLEST SERPL-MCNC: 218 MG/DL (ref 50–200)
CO2 SERPL-SCNC: 33 MMOL/L (ref 21–32)
CREAT SERPL-MCNC: 0.99 MG/DL (ref 0.6–1.3)
EOSINOPHIL # BLD AUTO: 0.14 THOUSAND/ΜL (ref 0–0.61)
EOSINOPHIL NFR BLD AUTO: 2 % (ref 0–6)
ERYTHROCYTE [DISTWIDTH] IN BLOOD BY AUTOMATED COUNT: 13.8 % (ref 11.6–15.1)
EST. AVERAGE GLUCOSE BLD GHB EST-MCNC: 169 MG/DL
GFR SERPL CREATININE-BSD FRML MDRD: 55 ML/MIN/1.73SQ M
GLUCOSE P FAST SERPL-MCNC: 161 MG/DL (ref 65–99)
GLUCOSE SERPL-MCNC: 161 MG/DL (ref 65–140)
GLUCOSE SERPL-MCNC: 172 MG/DL (ref 65–140)
GLUCOSE SERPL-MCNC: 176 MG/DL (ref 65–140)
HBA1C MFR BLD: 7.5 %
HCT VFR BLD AUTO: 47.7 % (ref 34.8–46.1)
HDLC SERPL-MCNC: 41 MG/DL
HGB BLD-MCNC: 15.5 G/DL (ref 11.5–15.4)
IMM GRANULOCYTES # BLD AUTO: 0.07 THOUSAND/UL (ref 0–0.2)
IMM GRANULOCYTES NFR BLD AUTO: 1 % (ref 0–2)
LDLC SERPL CALC-MCNC: 151 MG/DL (ref 0–100)
LYMPHOCYTES # BLD AUTO: 1.92 THOUSANDS/ΜL (ref 0.6–4.47)
LYMPHOCYTES NFR BLD AUTO: 21 % (ref 14–44)
MAGNESIUM SERPL-MCNC: 2 MG/DL (ref 1.6–2.6)
MCH RBC QN AUTO: 28.8 PG (ref 26.8–34.3)
MCHC RBC AUTO-ENTMCNC: 32.5 G/DL (ref 31.4–37.4)
MCV RBC AUTO: 89 FL (ref 82–98)
MONOCYTES # BLD AUTO: 0.97 THOUSAND/ΜL (ref 0.17–1.22)
MONOCYTES NFR BLD AUTO: 10 % (ref 4–12)
NEUTROPHILS # BLD AUTO: 6.19 THOUSANDS/ΜL (ref 1.85–7.62)
NEUTS SEG NFR BLD AUTO: 65 % (ref 43–75)
NONHDLC SERPL-MCNC: 177 MG/DL
NRBC BLD AUTO-RTO: 0 /100 WBCS
PLATELET # BLD AUTO: 267 THOUSANDS/UL (ref 149–390)
PMV BLD AUTO: 10.1 FL (ref 8.9–12.7)
POTASSIUM SERPL-SCNC: 4.4 MMOL/L (ref 3.5–5.3)
RBC # BLD AUTO: 5.39 MILLION/UL (ref 3.81–5.12)
SODIUM SERPL-SCNC: 142 MMOL/L (ref 136–145)
TRIGL SERPL-MCNC: 132 MG/DL
WBC # BLD AUTO: 9.37 THOUSAND/UL (ref 4.31–10.16)

## 2021-09-30 PROCEDURE — 85025 COMPLETE CBC W/AUTO DIFF WBC: CPT | Performed by: STUDENT IN AN ORGANIZED HEALTH CARE EDUCATION/TRAINING PROGRAM

## 2021-09-30 PROCEDURE — 80048 BASIC METABOLIC PNL TOTAL CA: CPT | Performed by: STUDENT IN AN ORGANIZED HEALTH CARE EDUCATION/TRAINING PROGRAM

## 2021-09-30 PROCEDURE — 83036 HEMOGLOBIN GLYCOSYLATED A1C: CPT | Performed by: STUDENT IN AN ORGANIZED HEALTH CARE EDUCATION/TRAINING PROGRAM

## 2021-09-30 PROCEDURE — 82948 REAGENT STRIP/BLOOD GLUCOSE: CPT

## 2021-09-30 PROCEDURE — 83735 ASSAY OF MAGNESIUM: CPT | Performed by: STUDENT IN AN ORGANIZED HEALTH CARE EDUCATION/TRAINING PROGRAM

## 2021-09-30 PROCEDURE — 99217 PR OBSERVATION CARE DISCHARGE MANAGEMENT: CPT | Performed by: INTERNAL MEDICINE

## 2021-09-30 PROCEDURE — 80061 LIPID PANEL: CPT | Performed by: STUDENT IN AN ORGANIZED HEALTH CARE EDUCATION/TRAINING PROGRAM

## 2021-09-30 RX ORDER — ASPIRIN 81 MG/1
81 TABLET, CHEWABLE ORAL DAILY
Qty: 30 TABLET | Refills: 0 | Status: SHIPPED | OUTPATIENT
Start: 2021-10-01

## 2021-09-30 RX ADMIN — ENOXAPARIN SODIUM 40 MG: 40 INJECTION SUBCUTANEOUS at 08:58

## 2021-09-30 RX ADMIN — INSULIN LISPRO 1 UNITS: 100 INJECTION, SOLUTION INTRAVENOUS; SUBCUTANEOUS at 09:00

## 2021-09-30 RX ADMIN — PANTOPRAZOLE SODIUM 40 MG: 40 TABLET, DELAYED RELEASE ORAL at 08:57

## 2021-09-30 RX ADMIN — ASPIRIN 81 MG: 81 TABLET, CHEWABLE ORAL at 08:57

## 2021-09-30 RX ADMIN — DILTIAZEM HYDROCHLORIDE 240 MG: 240 CAPSULE, COATED, EXTENDED RELEASE ORAL at 08:58

## 2021-10-07 ENCOUNTER — TELEPHONE (OUTPATIENT)
Dept: NEPHROLOGY | Facility: CLINIC | Age: 76
End: 2021-10-07

## 2021-10-07 ENCOUNTER — HOSPITAL ENCOUNTER (OUTPATIENT)
Dept: RADIOLOGY | Facility: HOSPITAL | Age: 76
Discharge: HOME | End: 2021-10-07
Payer: COMMERCIAL

## 2021-10-07 DIAGNOSIS — R07.9 CHEST PAIN, UNSPECIFIED TYPE: ICD-10-CM

## 2021-10-07 PROCEDURE — A9502 TC99M TETROFOSMIN: HCPCS

## 2021-10-07 PROCEDURE — G1004 CDSM NDSC: HCPCS

## 2021-10-07 PROCEDURE — 93018 CV STRESS TEST I&R ONLY: CPT | Performed by: INTERNAL MEDICINE

## 2021-10-07 PROCEDURE — 93017 CV STRESS TEST TRACING ONLY: CPT

## 2021-10-07 PROCEDURE — 93016 CV STRESS TEST SUPVJ ONLY: CPT | Performed by: INTERNAL MEDICINE

## 2021-10-07 PROCEDURE — 78452 HT MUSCLE IMAGE SPECT MULT: CPT

## 2021-10-07 PROCEDURE — 78452 HT MUSCLE IMAGE SPECT MULT: CPT | Performed by: INTERNAL MEDICINE

## 2021-10-09 ENCOUNTER — NURSE TRIAGE (OUTPATIENT)
Dept: OTHER | Facility: OTHER | Age: 76
End: 2021-10-09

## 2021-10-09 ENCOUNTER — HOSPITAL ENCOUNTER (INPATIENT)
Facility: HOSPITAL | Age: 76
LOS: 1 days | Discharge: HOME | DRG: 639 | End: 2021-10-11
Attending: EMERGENCY MEDICINE | Admitting: INTERNAL MEDICINE
Payer: COMMERCIAL

## 2021-10-09 DIAGNOSIS — E11.65 TYPE 2 DIABETES MELLITUS WITH HYPERGLYCEMIA, WITH LONG-TERM CURRENT USE OF INSULIN (HCC): Primary | ICD-10-CM

## 2021-10-09 DIAGNOSIS — E87.6 HYPOKALEMIA: ICD-10-CM

## 2021-10-09 DIAGNOSIS — I10 RESISTANT HYPERTENSION: ICD-10-CM

## 2021-10-09 DIAGNOSIS — E11.65 UNCONTROLLED TYPE 2 DIABETES MELLITUS WITH HYPERGLYCEMIA (HCC): ICD-10-CM

## 2021-10-09 DIAGNOSIS — N17.9 ACUTE KIDNEY INJURY (HCC): ICD-10-CM

## 2021-10-09 DIAGNOSIS — K11.7 XEROSTOMIA: ICD-10-CM

## 2021-10-09 DIAGNOSIS — Z79.4 TYPE 2 DIABETES MELLITUS WITH HYPERGLYCEMIA, WITH LONG-TERM CURRENT USE OF INSULIN (HCC): Primary | ICD-10-CM

## 2021-10-09 DIAGNOSIS — E87.6 ACUTE HYPOKALEMIA: ICD-10-CM

## 2021-10-09 PROBLEM — E86.0 DEHYDRATION: Status: ACTIVE | Noted: 2021-10-09

## 2021-10-09 PROBLEM — I16.0 HYPERTENSIVE URGENCY: Status: ACTIVE | Noted: 2021-10-09

## 2021-10-09 LAB
ANION GAP SERPL CALCULATED.3IONS-SCNC: 12 MMOL/L (ref 4–13)
BACTERIA UR QL AUTO: NORMAL /HPF
BASE EX.OXY STD BLDV CALC-SCNC: 89.2 % (ref 60–80)
BASE EXCESS BLDV CALC-SCNC: 5 MMOL/L
BASOPHILS # BLD AUTO: 0.08 THOUSANDS/ΜL (ref 0–0.1)
BASOPHILS NFR BLD AUTO: 1 % (ref 0–1)
BILIRUB UR QL STRIP: NEGATIVE
BILIRUB UR QL STRIP: NEGATIVE
BUN SERPL-MCNC: 25 MG/DL (ref 5–25)
CALCIUM SERPL-MCNC: 10.4 MG/DL (ref 8.3–10.1)
CHLORIDE SERPL-SCNC: 96 MMOL/L (ref 100–108)
CLARITY UR: CLEAR
CLARITY UR: CLEAR
CO2 SERPL-SCNC: 30 MMOL/L (ref 21–32)
COLOR UR: ABNORMAL
COLOR UR: YELLOW
CREAT SERPL-MCNC: 1.18 MG/DL (ref 0.6–1.3)
EOSINOPHIL # BLD AUTO: 0.03 THOUSAND/ΜL (ref 0–0.61)
EOSINOPHIL NFR BLD AUTO: 0 % (ref 0–6)
ERYTHROCYTE [DISTWIDTH] IN BLOOD BY AUTOMATED COUNT: 13.4 % (ref 11.6–15.1)
GFR SERPL CREATININE-BSD FRML MDRD: 44 ML/MIN/1.73SQ M
GLUCOSE SERPL-MCNC: 352 MG/DL (ref 65–140)
GLUCOSE SERPL-MCNC: 397 MG/DL (ref 65–140)
GLUCOSE UR STRIP-MCNC: ABNORMAL MG/DL
GLUCOSE UR STRIP-MCNC: ABNORMAL MG/DL
HCO3 BLDV-SCNC: 30.3 MMOL/L (ref 24–30)
HCT VFR BLD AUTO: 51 % (ref 34.8–46.1)
HGB BLD-MCNC: 17.3 G/DL (ref 11.5–15.4)
HGB UR QL STRIP.AUTO: ABNORMAL
HGB UR QL STRIP.AUTO: ABNORMAL
IMM GRANULOCYTES # BLD AUTO: 0.07 THOUSAND/UL (ref 0–0.2)
IMM GRANULOCYTES NFR BLD AUTO: 1 % (ref 0–2)
KETONES UR STRIP-MCNC: NEGATIVE MG/DL
KETONES UR STRIP-MCNC: NEGATIVE MG/DL
LEUKOCYTE ESTERASE UR QL STRIP: ABNORMAL
LEUKOCYTE ESTERASE UR QL STRIP: ABNORMAL
LYMPHOCYTES # BLD AUTO: 2.02 THOUSANDS/ΜL (ref 0.6–4.47)
LYMPHOCYTES NFR BLD AUTO: 14 % (ref 14–44)
MCH RBC QN AUTO: 28.7 PG (ref 26.8–34.3)
MCHC RBC AUTO-ENTMCNC: 33.9 G/DL (ref 31.4–37.4)
MCV RBC AUTO: 85 FL (ref 82–98)
MONOCYTES # BLD AUTO: 1.04 THOUSAND/ΜL (ref 0.17–1.22)
MONOCYTES NFR BLD AUTO: 7 % (ref 4–12)
NEUTROPHILS # BLD AUTO: 11.28 THOUSANDS/ΜL (ref 1.85–7.62)
NEUTS SEG NFR BLD AUTO: 77 % (ref 43–75)
NITRITE UR QL STRIP: NEGATIVE
NITRITE UR QL STRIP: NEGATIVE
NON-SQ EPI CELLS URNS QL MICRO: NORMAL /HPF
NRBC BLD AUTO-RTO: 0 /100 WBCS
O2 CT BLDV-SCNC: 23 ML/DL
OTHER STN SPEC: NORMAL
PCO2 BLDV: 45.8 MM HG (ref 42–50)
PH BLDV: 7.44 [PH] (ref 7.3–7.4)
PH UR STRIP.AUTO: 5.5 [PH] (ref 4.5–8)
PH UR STRIP.AUTO: 6 [PH]
PLATELET # BLD AUTO: 259 THOUSANDS/UL (ref 149–390)
PLATELET # BLD AUTO: 343 THOUSANDS/UL (ref 149–390)
PMV BLD AUTO: 10.2 FL (ref 8.9–12.7)
PMV BLD AUTO: 9.9 FL (ref 8.9–12.7)
PO2 BLDV: 57.2 MM HG (ref 35–45)
POTASSIUM SERPL-SCNC: 3.3 MMOL/L (ref 3.5–5.3)
PROT UR STRIP-MCNC: NEGATIVE MG/DL
PROT UR STRIP-MCNC: NEGATIVE MG/DL
RBC # BLD AUTO: 6.02 MILLION/UL (ref 3.81–5.12)
RBC #/AREA URNS AUTO: NORMAL /HPF
SODIUM SERPL-SCNC: 138 MMOL/L (ref 136–145)
SP GR UR STRIP.AUTO: 1.01 (ref 1–1.03)
SP GR UR STRIP.AUTO: 1.01 (ref 1–1.03)
UROBILINOGEN UR QL STRIP.AUTO: 0.2 E.U./DL
UROBILINOGEN UR QL STRIP.AUTO: 0.2 E.U./DL
WBC # BLD AUTO: 14.52 THOUSAND/UL (ref 4.31–10.16)
WBC #/AREA URNS AUTO: NORMAL /HPF

## 2021-10-09 PROCEDURE — 80048 BASIC METABOLIC PNL TOTAL CA: CPT | Performed by: EMERGENCY MEDICINE

## 2021-10-09 PROCEDURE — 85049 AUTOMATED PLATELET COUNT: CPT | Performed by: INTERNAL MEDICINE

## 2021-10-09 PROCEDURE — 99285 EMERGENCY DEPT VISIT HI MDM: CPT | Performed by: EMERGENCY MEDICINE

## 2021-10-09 PROCEDURE — 82948 REAGENT STRIP/BLOOD GLUCOSE: CPT

## 2021-10-09 PROCEDURE — 99220 PR INITIAL OBSERVATION CARE/DAY 70 MINUTES: CPT | Performed by: INTERNAL MEDICINE

## 2021-10-09 PROCEDURE — 36415 COLL VENOUS BLD VENIPUNCTURE: CPT | Performed by: EMERGENCY MEDICINE

## 2021-10-09 PROCEDURE — 85025 COMPLETE CBC W/AUTO DIFF WBC: CPT | Performed by: EMERGENCY MEDICINE

## 2021-10-09 PROCEDURE — 82805 BLOOD GASES W/O2 SATURATION: CPT | Performed by: EMERGENCY MEDICINE

## 2021-10-09 PROCEDURE — 99285 EMERGENCY DEPT VISIT HI MDM: CPT

## 2021-10-09 PROCEDURE — 96360 HYDRATION IV INFUSION INIT: CPT

## 2021-10-09 PROCEDURE — 81001 URINALYSIS AUTO W/SCOPE: CPT | Performed by: EMERGENCY MEDICINE

## 2021-10-09 RX ORDER — CHLORTHALIDONE 25 MG/1
25 TABLET ORAL DAILY
Status: DISCONTINUED | OUTPATIENT
Start: 2021-10-10 | End: 2021-10-11 | Stop reason: HOSPADM

## 2021-10-09 RX ORDER — ONDANSETRON 2 MG/ML
4 INJECTION INTRAMUSCULAR; INTRAVENOUS EVERY 6 HOURS PRN
Status: DISCONTINUED | OUTPATIENT
Start: 2021-10-09 | End: 2021-10-11 | Stop reason: HOSPADM

## 2021-10-09 RX ORDER — DILTIAZEM HYDROCHLORIDE 180 MG/1
360 CAPSULE, COATED, EXTENDED RELEASE ORAL DAILY
Status: DISCONTINUED | OUTPATIENT
Start: 2021-10-10 | End: 2021-10-11 | Stop reason: HOSPADM

## 2021-10-09 RX ORDER — SODIUM CHLORIDE 9 MG/ML
100 INJECTION, SOLUTION INTRAVENOUS CONTINUOUS
Status: DISCONTINUED | OUTPATIENT
Start: 2021-10-09 | End: 2021-10-11 | Stop reason: HOSPADM

## 2021-10-09 RX ORDER — POTASSIUM CHLORIDE 20 MEQ/1
40 TABLET, EXTENDED RELEASE ORAL ONCE
Status: COMPLETED | OUTPATIENT
Start: 2021-10-09 | End: 2021-10-09

## 2021-10-09 RX ORDER — ASPIRIN 81 MG/1
81 TABLET, CHEWABLE ORAL DAILY
Status: DISCONTINUED | OUTPATIENT
Start: 2021-10-10 | End: 2021-10-11 | Stop reason: HOSPADM

## 2021-10-09 RX ORDER — ACETAMINOPHEN 325 MG/1
650 TABLET ORAL EVERY 6 HOURS PRN
Status: DISCONTINUED | OUTPATIENT
Start: 2021-10-09 | End: 2021-10-11 | Stop reason: HOSPADM

## 2021-10-09 RX ORDER — INSULIN GLARGINE 100 [IU]/ML
44 INJECTION, SOLUTION SUBCUTANEOUS
Status: DISCONTINUED | OUTPATIENT
Start: 2021-10-09 | End: 2021-10-11 | Stop reason: HOSPADM

## 2021-10-09 RX ADMIN — INSULIN GLARGINE 44 UNITS: 100 INJECTION, SOLUTION SUBCUTANEOUS at 20:58

## 2021-10-09 RX ADMIN — POTASSIUM CHLORIDE 40 MEQ: 1500 TABLET, EXTENDED RELEASE ORAL at 18:35

## 2021-10-09 RX ADMIN — SODIUM CHLORIDE 100 ML/HR: 0.9 INJECTION, SOLUTION INTRAVENOUS at 21:00

## 2021-10-09 RX ADMIN — SODIUM CHLORIDE 1000 ML: 0.9 INJECTION, SOLUTION INTRAVENOUS at 17:34

## 2021-10-09 RX ADMIN — APREMILAST 1 TABLET: 30 TABLET, FILM COATED ORAL at 23:58

## 2021-10-10 PROBLEM — E86.0 DEHYDRATION: Status: RESOLVED | Noted: 2021-10-09 | Resolved: 2021-10-10

## 2021-10-10 LAB
ANION GAP SERPL CALCULATED.3IONS-SCNC: 8 MMOL/L (ref 4–13)
BASOPHILS # BLD AUTO: 0.13 THOUSANDS/ΜL (ref 0–0.1)
BASOPHILS NFR BLD AUTO: 1 % (ref 0–1)
BUN SERPL-MCNC: 20 MG/DL (ref 5–25)
CALCIUM SERPL-MCNC: 9.1 MG/DL (ref 8.3–10.1)
CHLORIDE SERPL-SCNC: 101 MMOL/L (ref 100–108)
CO2 SERPL-SCNC: 32 MMOL/L (ref 21–32)
CREAT SERPL-MCNC: 0.81 MG/DL (ref 0.6–1.3)
EOSINOPHIL # BLD AUTO: 0.21 THOUSAND/ΜL (ref 0–0.61)
EOSINOPHIL NFR BLD AUTO: 2 % (ref 0–6)
ERYTHROCYTE [DISTWIDTH] IN BLOOD BY AUTOMATED COUNT: 13.6 % (ref 11.6–15.1)
GFR SERPL CREATININE-BSD FRML MDRD: 70 ML/MIN/1.73SQ M
GLUCOSE SERPL-MCNC: 135 MG/DL (ref 65–140)
GLUCOSE SERPL-MCNC: 142 MG/DL (ref 65–140)
GLUCOSE SERPL-MCNC: 166 MG/DL (ref 65–140)
GLUCOSE SERPL-MCNC: 229 MG/DL (ref 65–140)
GLUCOSE SERPL-MCNC: 93 MG/DL (ref 65–140)
GLUCOSE SERPL-MCNC: 99 MG/DL (ref 65–140)
HCT VFR BLD AUTO: 50.4 % (ref 34.8–46.1)
HGB BLD-MCNC: 16.1 G/DL (ref 11.5–15.4)
IMM GRANULOCYTES # BLD AUTO: 0.05 THOUSAND/UL (ref 0–0.2)
IMM GRANULOCYTES NFR BLD AUTO: 0 % (ref 0–2)
LYMPHOCYTES # BLD AUTO: 3.41 THOUSANDS/ΜL (ref 0.6–4.47)
LYMPHOCYTES NFR BLD AUTO: 29 % (ref 14–44)
MAGNESIUM SERPL-MCNC: 1.9 MG/DL (ref 1.6–2.6)
MCH RBC QN AUTO: 28.5 PG (ref 26.8–34.3)
MCHC RBC AUTO-ENTMCNC: 31.9 G/DL (ref 31.4–37.4)
MCV RBC AUTO: 89 FL (ref 82–98)
MONOCYTES # BLD AUTO: 1.14 THOUSAND/ΜL (ref 0.17–1.22)
MONOCYTES NFR BLD AUTO: 10 % (ref 4–12)
NEUTROPHILS # BLD AUTO: 6.79 THOUSANDS/ΜL (ref 1.85–7.62)
NEUTS SEG NFR BLD AUTO: 58 % (ref 43–75)
NRBC BLD AUTO-RTO: 0 /100 WBCS
PLATELET # BLD AUTO: 280 THOUSANDS/UL (ref 149–390)
PMV BLD AUTO: 9.8 FL (ref 8.9–12.7)
POTASSIUM SERPL-SCNC: 2.8 MMOL/L (ref 3.5–5.3)
RBC # BLD AUTO: 5.65 MILLION/UL (ref 3.81–5.12)
SODIUM SERPL-SCNC: 141 MMOL/L (ref 136–145)
WBC # BLD AUTO: 11.73 THOUSAND/UL (ref 4.31–10.16)

## 2021-10-10 PROCEDURE — 99225 PR SBSQ OBSERVATION CARE/DAY 25 MINUTES: CPT | Performed by: PHYSICIAN ASSISTANT

## 2021-10-10 PROCEDURE — G0008 ADMIN INFLUENZA VIRUS VAC: HCPCS | Performed by: INTERNAL MEDICINE

## 2021-10-10 PROCEDURE — 82948 REAGENT STRIP/BLOOD GLUCOSE: CPT

## 2021-10-10 PROCEDURE — 83735 ASSAY OF MAGNESIUM: CPT | Performed by: PHYSICIAN ASSISTANT

## 2021-10-10 PROCEDURE — 80048 BASIC METABOLIC PNL TOTAL CA: CPT | Performed by: INTERNAL MEDICINE

## 2021-10-10 PROCEDURE — 85025 COMPLETE CBC W/AUTO DIFF WBC: CPT | Performed by: INTERNAL MEDICINE

## 2021-10-10 PROCEDURE — 90662 IIV NO PRSV INCREASED AG IM: CPT | Performed by: INTERNAL MEDICINE

## 2021-10-10 RX ORDER — DILTIAZEM HYDROCHLORIDE 360 MG/1
360 CAPSULE, EXTENDED RELEASE ORAL DAILY
Qty: 30 CAPSULE | Refills: 0 | Status: SHIPPED | OUTPATIENT
Start: 2021-10-11 | End: 2021-11-23 | Stop reason: SDUPTHER

## 2021-10-10 RX ORDER — POTASSIUM CHLORIDE 14.9 MG/ML
20 INJECTION INTRAVENOUS ONCE
Status: COMPLETED | OUTPATIENT
Start: 2021-10-10 | End: 2021-10-10

## 2021-10-10 RX ORDER — POTASSIUM CHLORIDE 20 MEQ/1
20 TABLET, EXTENDED RELEASE ORAL DAILY
Qty: 30 TABLET | Refills: 0 | Status: SHIPPED | OUTPATIENT
Start: 2021-10-10 | End: 2021-12-28 | Stop reason: SDUPTHER

## 2021-10-10 RX ORDER — GLIMEPIRIDE 2 MG/1
2 TABLET ORAL ONCE
Status: COMPLETED | OUTPATIENT
Start: 2021-10-10 | End: 2021-10-10

## 2021-10-10 RX ORDER — POTASSIUM CHLORIDE 20 MEQ/1
40 TABLET, EXTENDED RELEASE ORAL ONCE
Status: COMPLETED | OUTPATIENT
Start: 2021-10-10 | End: 2021-10-10

## 2021-10-10 RX ORDER — PILOCARPINE HYDROCHLORIDE 5 MG/1
5 TABLET, FILM COATED ORAL 3 TIMES DAILY
Qty: 90 TABLET | Refills: 0 | Status: SHIPPED | OUTPATIENT
Start: 2021-10-10 | End: 2022-07-06

## 2021-10-10 RX ADMIN — ENOXAPARIN SODIUM 40 MG: 40 INJECTION SUBCUTANEOUS at 08:12

## 2021-10-10 RX ADMIN — CHLORTHALIDONE 25 MG: 25 TABLET ORAL at 09:41

## 2021-10-10 RX ADMIN — POTASSIUM CHLORIDE 20 MEQ: 14.9 INJECTION, SOLUTION INTRAVENOUS at 12:56

## 2021-10-10 RX ADMIN — INFLUENZA A VIRUS A/VICTORIA/2570/2019 IVR-215 (H1N1) ANTIGEN (FORMALDEHYDE INACTIVATED), INFLUENZA A VIRUS A/TASMANIA/503/2020 IVR-221 (H3N2) ANTIGEN (FORMALDEHYDE INACTIVATED), INFLUENZA B VIRUS B/PHUKET/3073/2013 ANTIGEN (FORMALDEHYDE INACTIVATED), AND INFLUENZA B VIRUS B/WASHINGTON/02/2019 ANTIGEN (FORMALDEHYDE INACTIVATED) 0.7 ML: 60; 60; 60; 60 INJECTION, SUSPENSION INTRAMUSCULAR at 08:12

## 2021-10-10 RX ADMIN — SODIUM CHLORIDE 100 ML/HR: 0.9 INJECTION, SOLUTION INTRAVENOUS at 10:28

## 2021-10-10 RX ADMIN — APREMILAST 1 TABLET: 30 TABLET, FILM COATED ORAL at 20:46

## 2021-10-10 RX ADMIN — POTASSIUM CHLORIDE 40 MEQ: 1500 TABLET, EXTENDED RELEASE ORAL at 10:29

## 2021-10-10 RX ADMIN — APREMILAST 1 TABLET: 30 TABLET, FILM COATED ORAL at 09:41

## 2021-10-10 RX ADMIN — DILTIAZEM HYDROCHLORIDE 360 MG: 180 CAPSULE, COATED, EXTENDED RELEASE ORAL at 08:12

## 2021-10-10 RX ADMIN — INSULIN GLARGINE 44 UNITS: 100 INJECTION, SOLUTION SUBCUTANEOUS at 22:53

## 2021-10-10 RX ADMIN — ONDANSETRON 4 MG: 2 INJECTION INTRAMUSCULAR; INTRAVENOUS at 11:48

## 2021-10-10 RX ADMIN — SODIUM CHLORIDE 100 ML/HR: 0.9 INJECTION, SOLUTION INTRAVENOUS at 00:20

## 2021-10-10 RX ADMIN — GLIMEPIRIDE 2 MG: 2 TABLET ORAL at 12:59

## 2021-10-10 RX ADMIN — POTASSIUM CHLORIDE 20 MEQ: 14.9 INJECTION, SOLUTION INTRAVENOUS at 10:29

## 2021-10-10 RX ADMIN — INSULIN LISPRO 1 UNITS: 100 INJECTION, SOLUTION INTRAVENOUS; SUBCUTANEOUS at 12:58

## 2021-10-10 RX ADMIN — ASPIRIN 81 MG: 81 TABLET, CHEWABLE ORAL at 08:12

## 2021-10-10 RX ADMIN — SODIUM CHLORIDE 100 ML/HR: 0.9 INJECTION, SOLUTION INTRAVENOUS at 20:43

## 2021-10-11 VITALS
TEMPERATURE: 97.3 F | WEIGHT: 195.55 LBS | RESPIRATION RATE: 18 BRPM | DIASTOLIC BLOOD PRESSURE: 61 MMHG | SYSTOLIC BLOOD PRESSURE: 136 MMHG | HEART RATE: 61 BPM | HEIGHT: 61 IN | BODY MASS INDEX: 36.92 KG/M2 | OXYGEN SATURATION: 92 %

## 2021-10-11 LAB
ANION GAP SERPL CALCULATED.3IONS-SCNC: 6 MMOL/L (ref 4–13)
ATRIAL RATE: 59 BPM
ATRIAL RATE: 61 BPM
ATRIAL RATE: 64 BPM
BASOPHILS # BLD AUTO: 0.09 THOUSANDS/ΜL (ref 0–0.1)
BASOPHILS NFR BLD AUTO: 1 % (ref 0–1)
BUN SERPL-MCNC: 16 MG/DL (ref 5–25)
CALCIUM SERPL-MCNC: 8.7 MG/DL (ref 8.3–10.1)
CHLORIDE SERPL-SCNC: 104 MMOL/L (ref 100–108)
CO2 SERPL-SCNC: 28 MMOL/L (ref 21–32)
CREAT SERPL-MCNC: 0.73 MG/DL (ref 0.6–1.3)
EOSINOPHIL # BLD AUTO: 0.17 THOUSAND/ΜL (ref 0–0.61)
EOSINOPHIL NFR BLD AUTO: 2 % (ref 0–6)
ERYTHROCYTE [DISTWIDTH] IN BLOOD BY AUTOMATED COUNT: 13.5 % (ref 11.6–15.1)
GFR SERPL CREATININE-BSD FRML MDRD: 79 ML/MIN/1.73SQ M
GLUCOSE SERPL-MCNC: 131 MG/DL (ref 65–140)
GLUCOSE SERPL-MCNC: 89 MG/DL (ref 65–140)
GLUCOSE SERPL-MCNC: 90 MG/DL (ref 65–140)
HCT VFR BLD AUTO: 45 % (ref 34.8–46.1)
HGB BLD-MCNC: 14.7 G/DL (ref 11.5–15.4)
IMM GRANULOCYTES # BLD AUTO: 0.06 THOUSAND/UL (ref 0–0.2)
IMM GRANULOCYTES NFR BLD AUTO: 1 % (ref 0–2)
LYMPHOCYTES # BLD AUTO: 2.74 THOUSANDS/ΜL (ref 0.6–4.47)
LYMPHOCYTES NFR BLD AUTO: 28 % (ref 14–44)
MAGNESIUM SERPL-MCNC: 1.6 MG/DL (ref 1.6–2.6)
MCH RBC QN AUTO: 28.5 PG (ref 26.8–34.3)
MCHC RBC AUTO-ENTMCNC: 32.7 G/DL (ref 31.4–37.4)
MCV RBC AUTO: 87 FL (ref 82–98)
MONOCYTES # BLD AUTO: 0.99 THOUSAND/ΜL (ref 0.17–1.22)
MONOCYTES NFR BLD AUTO: 10 % (ref 4–12)
NEUTROPHILS # BLD AUTO: 5.87 THOUSANDS/ΜL (ref 1.85–7.62)
NEUTS SEG NFR BLD AUTO: 58 % (ref 43–75)
NRBC BLD AUTO-RTO: 0 /100 WBCS
PLATELET # BLD AUTO: 265 THOUSANDS/UL (ref 149–390)
PMV BLD AUTO: 10.1 FL (ref 8.9–12.7)
POTASSIUM SERPL-SCNC: 3.1 MMOL/L (ref 3.5–5.3)
PR INTERVAL: 170 MS
PR INTERVAL: 172 MS
PR INTERVAL: 182 MS
QRS AXIS: 175 DEGREES
QRS AXIS: 177 DEGREES
QRS AXIS: 179 DEGREES
QRSD INTERVAL: 100 MS
QRSD INTERVAL: 102 MS
QRSD INTERVAL: 104 MS
QT INTERVAL: 420 MS
QT INTERVAL: 422 MS
QT INTERVAL: 434 MS
QTC INTERVAL: 424 MS
QTC INTERVAL: 429 MS
QTC INTERVAL: 433 MS
RBC # BLD AUTO: 5.15 MILLION/UL (ref 3.81–5.12)
SODIUM SERPL-SCNC: 138 MMOL/L (ref 136–145)
T WAVE AXIS: 147 DEGREES
T WAVE AXIS: 156 DEGREES
T WAVE AXIS: 162 DEGREES
TROPONIN I SERPL-MCNC: <0.02 NG/ML
VENTRICULAR RATE: 59 BPM
VENTRICULAR RATE: 61 BPM
VENTRICULAR RATE: 64 BPM
WBC # BLD AUTO: 9.92 THOUSAND/UL (ref 4.31–10.16)

## 2021-10-11 PROCEDURE — 84484 ASSAY OF TROPONIN QUANT: CPT | Performed by: NURSE PRACTITIONER

## 2021-10-11 PROCEDURE — 93005 ELECTROCARDIOGRAM TRACING: CPT

## 2021-10-11 PROCEDURE — 93010 ELECTROCARDIOGRAM REPORT: CPT | Performed by: INTERNAL MEDICINE

## 2021-10-11 PROCEDURE — 99239 HOSP IP/OBS DSCHRG MGMT >30: CPT | Performed by: PHYSICIAN ASSISTANT

## 2021-10-11 PROCEDURE — 83735 ASSAY OF MAGNESIUM: CPT | Performed by: NURSE PRACTITIONER

## 2021-10-11 PROCEDURE — 85025 COMPLETE CBC W/AUTO DIFF WBC: CPT | Performed by: NURSE PRACTITIONER

## 2021-10-11 PROCEDURE — 82948 REAGENT STRIP/BLOOD GLUCOSE: CPT

## 2021-10-11 PROCEDURE — 80048 BASIC METABOLIC PNL TOTAL CA: CPT | Performed by: NURSE PRACTITIONER

## 2021-10-11 RX ORDER — POTASSIUM CHLORIDE 20 MEQ/1
40 TABLET, EXTENDED RELEASE ORAL ONCE
Status: COMPLETED | OUTPATIENT
Start: 2021-10-11 | End: 2021-10-11

## 2021-10-11 RX ORDER — ALPRAZOLAM 0.25 MG/1
0.25 TABLET ORAL ONCE
Status: COMPLETED | OUTPATIENT
Start: 2021-10-11 | End: 2021-10-11

## 2021-10-11 RX ADMIN — ALPRAZOLAM 0.25 MG: 0.25 TABLET ORAL at 04:04

## 2021-10-11 RX ADMIN — APREMILAST 1 TABLET: 30 TABLET, FILM COATED ORAL at 09:04

## 2021-10-11 RX ADMIN — CHLORTHALIDONE 25 MG: 25 TABLET ORAL at 09:04

## 2021-10-11 RX ADMIN — ENOXAPARIN SODIUM 40 MG: 40 INJECTION SUBCUTANEOUS at 09:08

## 2021-10-11 RX ADMIN — DILTIAZEM HYDROCHLORIDE 360 MG: 180 CAPSULE, COATED, EXTENDED RELEASE ORAL at 09:03

## 2021-10-11 RX ADMIN — SODIUM CHLORIDE 100 ML/HR: 0.9 INJECTION, SOLUTION INTRAVENOUS at 06:30

## 2021-10-11 RX ADMIN — POTASSIUM CHLORIDE 40 MEQ: 1500 TABLET, EXTENDED RELEASE ORAL at 06:29

## 2021-10-11 RX ADMIN — ASPIRIN 81 MG: 81 TABLET, CHEWABLE ORAL at 09:03

## 2021-10-13 ENCOUNTER — TRANSITIONAL CARE MANAGEMENT (OUTPATIENT)
Dept: FAMILY MEDICINE CLINIC | Facility: CLINIC | Age: 76
End: 2021-10-13

## 2021-10-15 ENCOUNTER — RA CDI HCC (OUTPATIENT)
Dept: OTHER | Facility: HOSPITAL | Age: 76
End: 2021-10-15

## 2021-10-21 ENCOUNTER — OFFICE VISIT (OUTPATIENT)
Dept: FAMILY MEDICINE CLINIC | Facility: CLINIC | Age: 76
End: 2021-10-21
Payer: COMMERCIAL

## 2021-10-21 VITALS
BODY MASS INDEX: 36.17 KG/M2 | OXYGEN SATURATION: 97 % | RESPIRATION RATE: 18 BRPM | HEART RATE: 73 BPM | DIASTOLIC BLOOD PRESSURE: 80 MMHG | TEMPERATURE: 96.9 F | SYSTOLIC BLOOD PRESSURE: 170 MMHG | HEIGHT: 61 IN | WEIGHT: 191.6 LBS

## 2021-10-21 DIAGNOSIS — L98.9 PSORIASIS-LIKE SKIN DISEASE: ICD-10-CM

## 2021-10-21 DIAGNOSIS — Z79.899 ENCOUNTER FOR LONG-TERM (CURRENT) USE OF MEDICATIONS: ICD-10-CM

## 2021-10-21 DIAGNOSIS — Z79.899 ENCOUNTER FOR LONG-TERM CURRENT USE OF HIGH RISK MEDICATION: ICD-10-CM

## 2021-10-21 DIAGNOSIS — E11.65 UNCONTROLLED TYPE 2 DIABETES MELLITUS WITH HYPERGLYCEMIA (HCC): Primary | ICD-10-CM

## 2021-10-21 DIAGNOSIS — I10 RESISTANT HYPERTENSION: ICD-10-CM

## 2021-10-21 PROCEDURE — 1160F RVW MEDS BY RX/DR IN RCRD: CPT | Performed by: FAMILY MEDICINE

## 2021-10-21 PROCEDURE — 3077F SYST BP >= 140 MM HG: CPT | Performed by: FAMILY MEDICINE

## 2021-10-21 PROCEDURE — 1111F DSCHRG MED/CURRENT MED MERGE: CPT | Performed by: FAMILY MEDICINE

## 2021-10-21 PROCEDURE — 1036F TOBACCO NON-USER: CPT | Performed by: FAMILY MEDICINE

## 2021-10-21 PROCEDURE — 99496 TRANSJ CARE MGMT HIGH F2F 7D: CPT | Performed by: FAMILY MEDICINE

## 2021-10-21 PROCEDURE — 3079F DIAST BP 80-89 MM HG: CPT | Performed by: FAMILY MEDICINE

## 2021-10-21 RX ORDER — EMPAGLIFLOZIN 25 MG/1
25 TABLET, FILM COATED ORAL EVERY MORNING
Qty: 90 TABLET | Refills: 3 | Status: SHIPPED | OUTPATIENT
Start: 2021-10-21 | End: 2021-12-28 | Stop reason: SDUPTHER

## 2021-10-21 RX ORDER — FLASH GLUCOSE SCANNING READER
1 EACH MISCELLANEOUS
Qty: 1 EACH | Refills: 12 | Status: SHIPPED | OUTPATIENT
Start: 2021-10-21 | End: 2021-11-01 | Stop reason: SDUPTHER

## 2021-10-21 RX ORDER — FLASH GLUCOSE SENSOR
1 KIT MISCELLANEOUS
Qty: 1 EACH | Refills: 6 | Status: SHIPPED | OUTPATIENT
Start: 2021-10-21 | End: 2021-11-01 | Stop reason: SDUPTHER

## 2021-11-01 ENCOUNTER — TELEPHONE (OUTPATIENT)
Dept: FAMILY MEDICINE CLINIC | Facility: CLINIC | Age: 76
End: 2021-11-01

## 2021-11-01 DIAGNOSIS — E11.65 UNCONTROLLED TYPE 2 DIABETES MELLITUS WITH HYPERGLYCEMIA (HCC): ICD-10-CM

## 2021-11-01 RX ORDER — FLASH GLUCOSE SENSOR
1 KIT MISCELLANEOUS
Qty: 1 EACH | Refills: 6 | Status: SHIPPED | OUTPATIENT
Start: 2021-11-01 | End: 2021-11-10

## 2021-11-01 RX ORDER — FLASH GLUCOSE SCANNING READER
1 EACH MISCELLANEOUS
Qty: 1 EACH | Refills: 12 | Status: SHIPPED | OUTPATIENT
Start: 2021-11-01 | End: 2021-11-10

## 2021-11-09 ENCOUNTER — TELEPHONE (OUTPATIENT)
Dept: FAMILY MEDICINE CLINIC | Facility: CLINIC | Age: 76
End: 2021-11-09

## 2021-11-10 DIAGNOSIS — E11.65 UNCONTROLLED TYPE 2 DIABETES MELLITUS WITH HYPERGLYCEMIA (HCC): ICD-10-CM

## 2021-11-10 DIAGNOSIS — E11.9 CONTROLLED TYPE 2 DIABETES MELLITUS WITHOUT COMPLICATION, WITH LONG-TERM CURRENT USE OF INSULIN (HCC): Primary | ICD-10-CM

## 2021-11-10 DIAGNOSIS — Z79.4 CONTROLLED TYPE 2 DIABETES MELLITUS WITHOUT COMPLICATION, WITH LONG-TERM CURRENT USE OF INSULIN (HCC): Primary | ICD-10-CM

## 2021-11-10 RX ORDER — BLOOD-GLUCOSE SENSOR
1 EACH MISCELLANEOUS 4 TIMES DAILY
Qty: 3 EACH | Refills: 12 | Status: SHIPPED | OUTPATIENT
Start: 2021-11-10 | End: 2022-03-23

## 2021-11-10 RX ORDER — BLOOD-GLUCOSE,RECEIVER,CONT
1 EACH MISCELLANEOUS 4 TIMES DAILY
Qty: 1 EACH | Refills: 0 | Status: SHIPPED | OUTPATIENT
Start: 2021-11-10 | End: 2022-03-23

## 2021-11-10 RX ORDER — BLOOD-GLUCOSE TRANSMITTER
1 EACH MISCELLANEOUS 4 TIMES DAILY
Qty: 1 EACH | Refills: 0 | Status: SHIPPED | OUTPATIENT
Start: 2021-11-10 | End: 2022-03-23

## 2021-11-10 RX ORDER — BLOOD-GLUCOSE SENSOR
EACH MISCELLANEOUS
COMMUNITY
End: 2021-11-10 | Stop reason: SDUPTHER

## 2021-11-10 RX ORDER — BLOOD-GLUCOSE,RECEIVER,CONT
EACH MISCELLANEOUS
COMMUNITY
End: 2021-11-10

## 2021-11-10 RX ORDER — BLOOD-GLUCOSE TRANSMITTER
EACH MISCELLANEOUS
COMMUNITY
End: 2021-11-10 | Stop reason: SDUPTHER

## 2021-11-19 ENCOUNTER — TELEPHONE (OUTPATIENT)
Dept: FAMILY MEDICINE CLINIC | Facility: CLINIC | Age: 76
End: 2021-11-19

## 2021-11-23 ENCOUNTER — TELEPHONE (OUTPATIENT)
Dept: FAMILY MEDICINE CLINIC | Facility: CLINIC | Age: 76
End: 2021-11-23

## 2021-11-23 ENCOUNTER — OFFICE VISIT (OUTPATIENT)
Dept: FAMILY MEDICINE CLINIC | Facility: CLINIC | Age: 76
End: 2021-11-23
Payer: COMMERCIAL

## 2021-11-23 VITALS
WEIGHT: 196.6 LBS | DIASTOLIC BLOOD PRESSURE: 80 MMHG | RESPIRATION RATE: 18 BRPM | BODY MASS INDEX: 37.12 KG/M2 | HEART RATE: 71 BPM | SYSTOLIC BLOOD PRESSURE: 140 MMHG | TEMPERATURE: 96.8 F | HEIGHT: 61 IN | OXYGEN SATURATION: 97 %

## 2021-11-23 DIAGNOSIS — E55.9 VITAMIN D INSUFFICIENCY: ICD-10-CM

## 2021-11-23 DIAGNOSIS — Z79.899 POLYPHARMACY: ICD-10-CM

## 2021-11-23 DIAGNOSIS — Z79.899 ENCOUNTER FOR LONG-TERM (CURRENT) USE OF MEDICATIONS: Primary | ICD-10-CM

## 2021-11-23 DIAGNOSIS — I10 RESISTANT HYPERTENSION: ICD-10-CM

## 2021-11-23 DIAGNOSIS — Z79.899 ENCOUNTER FOR LONG-TERM CURRENT USE OF HIGH RISK MEDICATION: ICD-10-CM

## 2021-11-23 DIAGNOSIS — R73.9 ELEVATED BLOOD SUGAR: ICD-10-CM

## 2021-11-23 PROCEDURE — 3725F SCREEN DEPRESSION PERFORMED: CPT | Performed by: FAMILY MEDICINE

## 2021-11-23 PROCEDURE — 3079F DIAST BP 80-89 MM HG: CPT | Performed by: FAMILY MEDICINE

## 2021-11-23 PROCEDURE — 1160F RVW MEDS BY RX/DR IN RCRD: CPT | Performed by: FAMILY MEDICINE

## 2021-11-23 PROCEDURE — 3077F SYST BP >= 140 MM HG: CPT | Performed by: FAMILY MEDICINE

## 2021-11-23 PROCEDURE — 99215 OFFICE O/P EST HI 40 MIN: CPT | Performed by: FAMILY MEDICINE

## 2021-11-23 PROCEDURE — 1036F TOBACCO NON-USER: CPT | Performed by: FAMILY MEDICINE

## 2021-11-23 PROCEDURE — 1101F PT FALLS ASSESS-DOCD LE1/YR: CPT | Performed by: FAMILY MEDICINE

## 2021-11-23 PROCEDURE — 3288F FALL RISK ASSESSMENT DOCD: CPT | Performed by: FAMILY MEDICINE

## 2021-11-23 RX ORDER — DILTIAZEM HYDROCHLORIDE 360 MG/1
360 CAPSULE, EXTENDED RELEASE ORAL DAILY
Qty: 30 CAPSULE | Refills: 3 | Status: SHIPPED | OUTPATIENT
Start: 2021-11-23 | End: 2021-12-28 | Stop reason: SDUPTHER

## 2021-11-23 RX ORDER — CHLORTHALIDONE 25 MG/1
25 TABLET ORAL DAILY
Qty: 30 TABLET | Refills: 3 | Status: SHIPPED | OUTPATIENT
Start: 2021-11-23 | End: 2021-12-28 | Stop reason: SDUPTHER

## 2021-12-07 ENCOUNTER — APPOINTMENT (OUTPATIENT)
Dept: LAB | Facility: HOSPITAL | Age: 76
End: 2021-12-07
Payer: COMMERCIAL

## 2021-12-07 LAB
CREAT UR-MCNC: 48.8 MG/DL
MICROALBUMIN UR-MCNC: 28.9 MG/L (ref 0–20)
MICROALBUMIN/CREAT 24H UR: 59 MG/G CREATININE (ref 0–30)

## 2021-12-07 PROCEDURE — 82570 ASSAY OF URINE CREATININE: CPT | Performed by: FAMILY MEDICINE

## 2021-12-07 PROCEDURE — 82043 UR ALBUMIN QUANTITATIVE: CPT | Performed by: FAMILY MEDICINE

## 2021-12-21 ENCOUNTER — VBI (OUTPATIENT)
Dept: ADMINISTRATIVE | Facility: OTHER | Age: 76
End: 2021-12-21

## 2021-12-28 ENCOUNTER — OFFICE VISIT (OUTPATIENT)
Dept: FAMILY MEDICINE CLINIC | Facility: CLINIC | Age: 76
End: 2021-12-28
Payer: COMMERCIAL

## 2021-12-28 VITALS
OXYGEN SATURATION: 97 % | DIASTOLIC BLOOD PRESSURE: 60 MMHG | BODY MASS INDEX: 36.82 KG/M2 | SYSTOLIC BLOOD PRESSURE: 126 MMHG | TEMPERATURE: 96.8 F | HEIGHT: 61 IN | HEART RATE: 69 BPM | RESPIRATION RATE: 18 BRPM | WEIGHT: 195 LBS

## 2021-12-28 DIAGNOSIS — I10 RESISTANT HYPERTENSION: ICD-10-CM

## 2021-12-28 DIAGNOSIS — E78.2 MIXED HYPERLIPIDEMIA: ICD-10-CM

## 2021-12-28 DIAGNOSIS — E11.65 UNCONTROLLED TYPE 2 DIABETES MELLITUS WITH HYPERGLYCEMIA (HCC): ICD-10-CM

## 2021-12-28 DIAGNOSIS — Z79.899 POLYPHARMACY: ICD-10-CM

## 2021-12-28 DIAGNOSIS — Z79.4 CONTROLLED TYPE 2 DIABETES MELLITUS WITHOUT COMPLICATION, WITH LONG-TERM CURRENT USE OF INSULIN (HCC): ICD-10-CM

## 2021-12-28 DIAGNOSIS — E11.65 UNCONTROLLED TYPE 2 DIABETES MELLITUS WITH HYPERGLYCEMIA (HCC): Primary | ICD-10-CM

## 2021-12-28 DIAGNOSIS — E11.9 CONTROLLED TYPE 2 DIABETES MELLITUS WITHOUT COMPLICATION, WITH LONG-TERM CURRENT USE OF INSULIN (HCC): ICD-10-CM

## 2021-12-28 DIAGNOSIS — E87.6 HYPOKALEMIA: ICD-10-CM

## 2021-12-28 DIAGNOSIS — E66.01 OBESITY, MORBID (HCC): ICD-10-CM

## 2021-12-28 DIAGNOSIS — Z79.899 ENCOUNTER FOR LONG-TERM (CURRENT) USE OF MEDICATIONS: ICD-10-CM

## 2021-12-28 PROCEDURE — 3288F FALL RISK ASSESSMENT DOCD: CPT | Performed by: FAMILY MEDICINE

## 2021-12-28 PROCEDURE — 1160F RVW MEDS BY RX/DR IN RCRD: CPT | Performed by: FAMILY MEDICINE

## 2021-12-28 PROCEDURE — 1101F PT FALLS ASSESS-DOCD LE1/YR: CPT | Performed by: FAMILY MEDICINE

## 2021-12-28 PROCEDURE — 1036F TOBACCO NON-USER: CPT | Performed by: FAMILY MEDICINE

## 2021-12-28 PROCEDURE — 3078F DIAST BP <80 MM HG: CPT | Performed by: FAMILY MEDICINE

## 2021-12-28 PROCEDURE — 3725F SCREEN DEPRESSION PERFORMED: CPT | Performed by: FAMILY MEDICINE

## 2021-12-28 PROCEDURE — 3074F SYST BP LT 130 MM HG: CPT | Performed by: FAMILY MEDICINE

## 2021-12-28 PROCEDURE — 99215 OFFICE O/P EST HI 40 MIN: CPT | Performed by: FAMILY MEDICINE

## 2021-12-28 RX ORDER — EMPAGLIFLOZIN 25 MG/1
25 TABLET, FILM COATED ORAL EVERY MORNING
Qty: 90 TABLET | Refills: 3 | Status: SHIPPED | OUTPATIENT
Start: 2021-12-28 | End: 2022-01-30

## 2021-12-28 RX ORDER — INSULIN LISPRO 100 [IU]/ML
INJECTION, SOLUTION INTRAVENOUS; SUBCUTANEOUS
Qty: 30 ML | Refills: 6 | Status: SHIPPED | OUTPATIENT
Start: 2021-12-28 | End: 2021-12-28 | Stop reason: SDUPTHER

## 2021-12-28 RX ORDER — FLASH GLUCOSE SCANNING READER
EACH MISCELLANEOUS
Qty: 1 EACH | Refills: 0 | Status: SHIPPED | OUTPATIENT
Start: 2021-12-28

## 2021-12-28 RX ORDER — INSULIN LISPRO 100 [IU]/ML
INJECTION, SOLUTION INTRAVENOUS; SUBCUTANEOUS
Qty: 30 ML | Refills: 6 | Status: SHIPPED | OUTPATIENT
Start: 2021-12-28 | End: 2022-01-30

## 2021-12-28 RX ORDER — EXENATIDE 2 MG/.85ML
2 INJECTION, SUSPENSION, EXTENDED RELEASE SUBCUTANEOUS WEEKLY
Qty: 0.85 ML | Refills: 6 | Status: SHIPPED | OUTPATIENT
Start: 2021-12-28 | End: 2022-04-26

## 2021-12-28 RX ORDER — INSULIN LISPRO 100 [IU]/ML
INJECTION, SOLUTION INTRAVENOUS; SUBCUTANEOUS
COMMUNITY
End: 2022-01-30

## 2021-12-28 RX ORDER — CHLORTHALIDONE 25 MG/1
25 TABLET ORAL DAILY
Qty: 90 TABLET | Refills: 1 | Status: SHIPPED | OUTPATIENT
Start: 2021-12-28 | End: 2022-07-06

## 2021-12-28 RX ORDER — INSULIN LISPRO 100 [IU]/ML
8 INJECTION, SOLUTION INTRAVENOUS; SUBCUTANEOUS
Qty: 15 ML | Refills: 6 | Status: SHIPPED | OUTPATIENT
Start: 2021-12-28 | End: 2021-12-28 | Stop reason: SDUPTHER

## 2021-12-28 RX ORDER — DILTIAZEM HYDROCHLORIDE 360 MG/1
360 CAPSULE, EXTENDED RELEASE ORAL DAILY
Qty: 90 CAPSULE | Refills: 1 | Status: SHIPPED | OUTPATIENT
Start: 2021-12-28 | End: 2022-05-19

## 2021-12-28 RX ORDER — FLASH GLUCOSE SENSOR
KIT MISCELLANEOUS
Qty: 28 EACH | Refills: 6 | Status: SHIPPED | OUTPATIENT
Start: 2021-12-28

## 2021-12-28 RX ORDER — INSULIN GLARGINE 100 [IU]/ML
44 INJECTION, SOLUTION SUBCUTANEOUS DAILY
Qty: 30 ML | Refills: 4 | Status: SHIPPED | OUTPATIENT
Start: 2021-12-28 | End: 2022-01-30

## 2021-12-28 RX ORDER — POTASSIUM CHLORIDE 20 MEQ/1
20 TABLET, EXTENDED RELEASE ORAL DAILY
Qty: 90 TABLET | Refills: 1 | Status: SHIPPED | OUTPATIENT
Start: 2021-12-28 | End: 2022-07-06

## 2022-01-25 ENCOUNTER — NURSE TRIAGE (OUTPATIENT)
Dept: OTHER | Facility: OTHER | Age: 77
End: 2022-01-25

## 2022-01-25 ENCOUNTER — APPOINTMENT (OUTPATIENT)
Dept: LAB | Facility: HOSPITAL | Age: 77
End: 2022-01-25
Payer: COMMERCIAL

## 2022-01-25 DIAGNOSIS — E11.65 UNCONTROLLED TYPE 2 DIABETES MELLITUS WITH HYPERGLYCEMIA (HCC): Primary | ICD-10-CM

## 2022-01-25 DIAGNOSIS — E11.65 UNCONTROLLED TYPE 2 DIABETES MELLITUS WITH HYPERGLYCEMIA (HCC): ICD-10-CM

## 2022-01-25 PROCEDURE — 36415 COLL VENOUS BLD VENIPUNCTURE: CPT

## 2022-01-25 PROCEDURE — 83036 HEMOGLOBIN GLYCOSYLATED A1C: CPT

## 2022-01-25 NOTE — TELEPHONE ENCOUNTER
Regarding: Thirsty  ----- Message from [facility] sent at 1/25/2022 12:13 AM EST -----  "I am very thirsty  I drank 40 oz of water every 1 hr and 1/2 tonight and I its causing me to urinate frequently, I dont know what to do   My sugar is at 135 "

## 2022-01-25 NOTE — TELEPHONE ENCOUNTER
Reason for Disposition   Insomnia is an ongoing problem (> 2 weeks)    Answer Assessment - Initial Assessment Questions  1  DESCRIPTION: "Tell me about your sleeping problem "       Up every 90 minutes sleeping  2  ONSET: "How long have you been having trouble sleeping?" (e g , days, weeks, months)      4 months  3  RECURRENT: "Have you had sleeping problems before?"  If Yes, ask: "What happened that time?" "What helped your sleeping problem go away in the past?"       Ongoing  4  STRESS: "Is there anything in your life that is making you feel stressed or tense?"      Denies  5  PAIN: "Do you have any pain that is keeping you awake?" (e g , back pain, headache, abdominal pain)      Denies  6  CAFFEINE ABUSE: "Do you drink caffeinated beverages, and how much each day?" (e g , coffee, tea, juan alberto)      Drink tea during the day  7  ALCOHOL USE OR SUBSTANCE USE (DRUG USE): "Do you drink alcohol or use any illegal drugs?"      Denies  8   OTHER SYMPTOMS: "Do you have any other symptoms?"  (e g , difficulty breathing)      Blood sugar 135    Protocols used: INSOMNIA-ADULT-

## 2022-01-25 NOTE — TELEPHONE ENCOUNTER
Lakisha - can you fit her in Wed or Thurs end of day? I checked and there are openings?  (If I have sfaff,etc) THANKS

## 2022-01-26 ENCOUNTER — RA CDI HCC (OUTPATIENT)
Dept: OTHER | Facility: HOSPITAL | Age: 77
End: 2022-01-26

## 2022-01-26 LAB
EST. AVERAGE GLUCOSE BLD GHB EST-MCNC: 183 MG/DL
HBA1C MFR BLD: 8 %

## 2022-01-26 NOTE — PROGRESS NOTES
[facility]  coding opportunities       Chart reviewed, no opportunity found: CHART REVIEWED, NO OPPORTUNITY FOUND                        Patients insurance company: [facility] [address]  (Medicare Advantage and Commercial)

## 2022-01-27 ENCOUNTER — OFFICE VISIT (OUTPATIENT)
Dept: FAMILY MEDICINE CLINIC | Facility: CLINIC | Age: 77
End: 2022-01-27
Payer: COMMERCIAL

## 2022-01-27 VITALS
BODY MASS INDEX: 36.82 KG/M2 | HEART RATE: 76 BPM | DIASTOLIC BLOOD PRESSURE: 58 MMHG | TEMPERATURE: 97.8 F | RESPIRATION RATE: 18 BRPM | HEIGHT: 61 IN | SYSTOLIC BLOOD PRESSURE: 136 MMHG | OXYGEN SATURATION: 97 % | WEIGHT: 195 LBS

## 2022-01-27 DIAGNOSIS — Z79.899 ENCOUNTER FOR LONG-TERM (CURRENT) USE OF MEDICATIONS: ICD-10-CM

## 2022-01-27 DIAGNOSIS — Z79.899 POLYPHARMACY: ICD-10-CM

## 2022-01-27 DIAGNOSIS — Z79.4 CONTROLLED TYPE 2 DIABETES MELLITUS WITHOUT COMPLICATION, WITH LONG-TERM CURRENT USE OF INSULIN (HCC): ICD-10-CM

## 2022-01-27 DIAGNOSIS — I10 RESISTANT HYPERTENSION: ICD-10-CM

## 2022-01-27 DIAGNOSIS — E78.2 MIXED HYPERLIPIDEMIA: ICD-10-CM

## 2022-01-27 DIAGNOSIS — E66.01 OBESITY, MORBID (HCC): ICD-10-CM

## 2022-01-27 DIAGNOSIS — E11.9 CONTROLLED TYPE 2 DIABETES MELLITUS WITHOUT COMPLICATION, WITH LONG-TERM CURRENT USE OF INSULIN (HCC): ICD-10-CM

## 2022-01-27 DIAGNOSIS — E11.65 UNCONTROLLED TYPE 2 DIABETES MELLITUS WITH HYPERGLYCEMIA (HCC): Primary | ICD-10-CM

## 2022-01-27 PROCEDURE — 99215 OFFICE O/P EST HI 40 MIN: CPT | Performed by: FAMILY MEDICINE

## 2022-01-27 PROCEDURE — 3078F DIAST BP <80 MM HG: CPT | Performed by: FAMILY MEDICINE

## 2022-01-27 PROCEDURE — 3075F SYST BP GE 130 - 139MM HG: CPT | Performed by: FAMILY MEDICINE

## 2022-01-27 PROCEDURE — 1160F RVW MEDS BY RX/DR IN RCRD: CPT | Performed by: FAMILY MEDICINE

## 2022-01-27 PROCEDURE — 1036F TOBACCO NON-USER: CPT | Performed by: FAMILY MEDICINE

## 2022-01-27 RX ORDER — BLOOD SUGAR DIAGNOSTIC
STRIP MISCELLANEOUS
Qty: 100 STRIP | Refills: 3 | Status: SHIPPED | OUTPATIENT
Start: 2022-01-27 | End: 2022-07-22

## 2022-01-27 NOTE — PATIENT INSTRUCTIONS
Hypoglycemia in a Person with Diabetes   WHAT YOU NEED TO KNOW:   Hypoglycemia is a serious condition that happens when your blood glucose (sugar) level drops too low  The blood sugar level is usually too high in a person with diabetes, but the level can also drop too low  It is important to follow your diabetes management plan to keep your blood sugar level steady  DISCHARGE INSTRUCTIONS:   You or someone close to you needs to call the local emergency number (911 in the [address] [facility]) if:   · You have a seizure or pass out  · Your blood sugar is less than 50 mg/dL and does not respond to treatment  · You feel you are going to pass out  · You have trouble thinking clearly  Call your diabetes care team if:   · You have had symptoms of low blood sugar several times  · You have questions about the amount of insulin or diabetes medicine you are taking  · You have questions or concerns about your condition or care  Medicines:   · Insulin or diabetes medicine  help to keep your blood sugar under control  · Glucagon  may be needed if you have severe hypoglycemia  · Take your medicine as directed  Contact your healthcare provider if you think your medicine is not helping or if you have side effects  Tell him or her if you are allergic to any medicine  Keep a list of the medicines, vitamins, and herbs you take  Include the amounts, and when and why you take them  Bring the list or the pill bottles to follow-up visits  Carry your medicine list with you in case of an emergency  Manage hypoglycemia:   · Check your blood sugar level right away if you have symptoms of hypoglycemia  Hypoglycemia usually happens when your blood sugar level is 70 mg/dL or below  Ask your diabetes care team what blood sugar level is too low for you  · If your blood sugar level is too low, eat or drink 15 grams of fast-acting carbohydrate    Examples of this amount of fast-acting carbohydrate are 4 ounces (½ cup) of fruit juice or 4 ounces of regular soda  Other examples are 2 tablespoons of raisins or 1 tube of glucose gel  Check your blood sugar level 15 minutes later  Sit still as you wait  If the level is still low (less than 100 mg/dL), have another 15 grams of carbohydrate  When the level returns to 100 mg/dL, eat a meal if it is time  If your meal time is more than 1 hour away, eat a snack  The snack should contain carbohydrates, such as the following:     ? 3/4 cup of cereal    ? 1 cup of skim or low fat milk    ? 6 soda crackers    ? 1/2 of a turkey sandwich    ? 15 fat-free chips  This will help prevent another drop in blood sugar  Always carefully follow your diabetes care team's instructions on how to treat low blood sugar levels  · Always carry a source of fast-acting carbohydrate  If you have symptoms of hypoglycemia and you do not have a blood glucose meter, have a source of fast-acting carbohydrate anyway  Avoid carbohydrate foods that are high in fat  The fat content may make the carbohydrate take longer to increase your blood sugar level  Ask your diabetes care team if you should carry a glucagon kit  Glucagon is a medicine that is injected when you develop severe hypoglycemia and become unconscious  Check the expiration date every month and replace it before it expires  · Teach others how to help you if you have symptoms of hypoglycemia  Tell them about the symptoms of hypoglycemia  Ask them to give you a source of fast-acting carbohydrate if you cannot get it yourself  Ask them to give you a glucagon injection if you have signs of hypoglycemia and you become unconscious or have a seizure  Ask them to call the local emergency number (911 in the [address] [place] [address],[address])   This is an emergency  Tell them never to try to make you swallow anything if you faint or have a seizure  · Wear medical alert jewelry  or carry a card that says you have diabetes  Ask where to get these items         Prevent hypoglycemia:   · Take diabetes medicine as directed  Take your medicine at the right time and in the right amount  Do not  double the amount of medicine you take unless instructed by your diabetes care team      · Eat regular meals and snacks  Talk to your dietitian or diabetes care team about a meal plan that is right for you  Do not skip meals  · Check your blood sugar level as directed  Ask your diabetes care team what your blood sugar levels should be before and after you eat  Ask when and how often to check your blood sugar level  You may need to check at least 3 times each day  Record your blood sugar level results and take the record with you when you see your care team  Changes may need to be made to your medicine, food, or exercise schedules using the record  · Check your blood sugar level before you exercise  Physical activity, such as exercise, can decrease your blood sugar level  If your blood sugar level is less than 100 mg/dL, have a carbohydrate snack  Examples are 4 to 6 crackers, ½ banana, 8 ounces (1 cup) of nonfat or 1% milk, or 4 ounces (½ cup) of juice  If you will be active for more than 1 hour, you may need to check your blood sugar level every 30 minutes  Your diabetes care team may also recommend that you check your blood sugar level after your activity  · Know the risks if you choose to drink alcohol  Alcohol can cause your blood sugar levels to be low if you use insulin  Alcohol can cause high blood sugar levels and weight gain if you drink too much  Women 21 years or older and men 72 years or older should limit alcohol to 1 drink a day  Men aged 24 to 59 years should limit alcohol to 2 drinks a day  A drink of alcohol is 12 ounces of beer, 5 ounces of wine, or 1½ ounces of liquor  Follow up with your diabetes care team or specialist as directed: You may need your insulin or diabetes medicine changed if you continue to have hypoglycemia episodes   Write down your questions so you remember to ask them during your visits  © Copyright [employer] 2021 Information is for End User's use only and may not be sold, redistributed or otherwise used for commercial purposes  All illustrations and images included in CareNotes® are the copyrighted property of A D A M [employer] Inc  or Sissy Savage  The above information is an  only  It is not intended as medical advice for individual conditions or treatments  Talk to your doctor, nurse or pharmacist before following any medical regimen to see if it is safe and effective for you

## 2022-01-27 NOTE — PROGRESS NOTES
Assessment/Plan:  63 yo with DM and HTN, comes early with c/o vannesa marcial x 5-6 mos  The only time felt good was on K and saline IV, then a 4 wks later, felt poorly again  She blames it on Jardiance and had a similar reaction when on info can a while back BBG are QID and I reviewed the BBG sheet and ave fbs 150, with retrospective , 150, 102, 140, etc   The PP BS are running in hi 100's and mid-200's  Not happy with that  So, now, I will use up her present insulin Lantus supply by using 50 U OD and Lispro B,L,S 6,8,10 U  ONCE THAT IS USED UP, I will propose Humalog 75/25  40U in AM and 20 U in PM ac supper, with very much care to avoid hypoglycemia  Would go with that x 2 wks  See me in 4 wks and q4wk if needed til this is fixed  I'm trying to go with 2 insulin shots a day; currently on one Lantus and 3 'log injections, =4 shots  Additionally, we are working to get her a freestyle glucose monitor which seems to be going know where--I will try again  1  Uncontrolled type 2 diabetes mellitus with hyperglycemia ([facility] )  Comments:  See comments in my opening assessment and plan outlining my plans    2  Polypharmacy  Comments:  Patient brought in 3 bags of medications and sorted through all;  discarded those not taking etc     3  Resistant hypertension  Comments:  Long discussion--continue chlorthalidone which I am not sure she is taking and continue diltiazem 360 blood pressure 136/58 today    4  Encounter for long-term (current) use of medications  Comments:  As at each visit with her, discussed all medications and especially today went through her 3 bags of medications    5  Mixed hyperlipidemia  Comments:  Tried on several statins and does not tolerate  Will not take  Severe muscle cramps/spasms    6  Obesity, morbid ([facility] )  Comments:  BMI 36 84 with weight 195 discussed weight loss and diet to better control hypertension sugar          Subjective:      Patient ID: Candice Sharma is a 64 y o  female  HPI    The following portions of the patient's history were reviewed and updated as appropriate: She  has a past medical history of Benign essential hypertension, Chronic lower back pain, Diabetes mellitus ([facility] [address] ), Hyperlipidemia, and Sciatica  She   Patient Active Problem List    Diagnosis Date Noted    Uncontrolled type 2 diabetes mellitus with hyperglycemia ([address] ) 10/09/2021    Hypertensive urgency 10/09/2021    Chest pain, unspecified 09/29/2021    Hypokalemia 09/29/2021    Allergic reaction 08/12/2021    Increased BMI 02/15/2021    Class 2 severe obesity due to excess calories with serious comorbidity and body mass index (BMI) of 35 0 to 35 9 in adult [facility]) 02/15/2021    Depression, recurrent ([address] ) 01/11/2021    Obesity, morbid ([facility] [address] ) 01/11/2021    Myalgia due to statin 10/13/2020    Need for vaccination 10/13/2020    Psoriasis 10/13/2020    Hyperlipidemia associated with type 2 diabetes mellitus ([facility] [address] ) 10/13/2020    Mixed simple and mucopurulent chronic bronchitis ([facility] [address] ) 05/26/2020    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/2020    Mixed hyperlipidemia 05/26/2020    Psoriasis-like skin disease 05/26/2020    Unspecified abnormalities of gait and mobility 12/06/2019    Personal history of nicotine dependence 12/06/2019    Other chronic pain 12/06/2019    Acute left-sided low back pain with left-sided sciatica 12/03/2019    Controlled type 2 diabetes mellitus, with long-term current use of insulin ([facility] ) 12/03/2019    Resistant hypertension 12/03/2019     She  has a past surgical history that includes Cholecystectomy  Her family history includes Emphysema in her father; Leukemia in her father; Uterine cancer in her mother  She  reports that she quit smoking about 17 years ago  Her smoking use included cigarettes  She has a 30 00 pack-year smoking history  She has never used smokeless tobacco  She reports that she does not drink alcohol and does not use drugs    Current Outpatient Medications Medication Sig Dispense Refill    Apremilast (Otezla) 30 MG TABS Take 2 tablets by mouth daily 180 tablet 3    aspirin 81 mg chewable tablet Chew 1 tablet (81 mg total) daily 30 tablet 0    chlorthalidone 25 mg tablet Take 1 tablet (25 mg total) by mouth daily 90 tablet 1    Cholecalciferol (VITAMIN D) 125 MCG (5000 UT) CAPS Take by mouth      Continuous Blood Gluc  (Dexcom G6 ) THERESA Use 1 applicator 4 (four) times a day 1 each 0    Continuous Blood Gluc  (FreeStyle Dejuan 14 Day [place]) THERESA Check BG's 2-4x a day depending on sugar levels 1 each 0    Continuous Blood Gluc Sensor (Dexcom G6 Sensor) MISC Use 1 Units 4 (four) times a day 3 each 12    Continuous Blood Gluc Sensor (FreeStyle Dejuan 14 Day Sensor) MISC Check sugars 2-4x daily depending on sugar levels 28 each 6    Continuous Blood Gluc Transmit (Dexcom G6 Transmitter) MISC Use 1 applicator 4 (four) times a day 1 each 0    cyanocobalamin (VITAMIN B-12) 1000 MCG tablet Take 1 tablet (1,000 mcg total) by mouth daily 100 tablet 6    diltiazem (CARDIZEM CD) 360 MG 24 hr capsule Take 1 capsule (360 mg total) by mouth daily 90 capsule 1    Empagliflozin (Jardiance) 25 MG TABS Take 1 tablet (25 mg total) by mouth every morning 90 tablet 3    Exenatide ER (Bydureon BCise) 2 MG/0 85ML AUIJ Inject 2 mg under the skin once a week 0 85 mL 6    insulin glargine (Lantus SoloStar) 100 units/mL injection pen Inject 44 Units under the skin daily 30 mL 4    insulin lispro (HumaLOG KwikPen) 100 units/mL injection pen 4u before lunch, 6u before supper 30 mL 6    insulin lispro (HumaLOG) 100 units/mL injection pen Inject under the skin      potassium chloride (K-DUR,KLOR-CON) 20 mEq tablet Take 1 tablet (20 mEq total) by mouth daily 90 tablet 1    OneTouch Ultra test strip TEST 2-3 TIMES A  strip 3    pilocarpine (SALAGEN) 5 mg tablet Take 1 tablet (5 mg total) by mouth 3 (three) times a day 90 tablet 0     Current Facility-Administered Medications   Medication Dose Route Frequency Provider Last Rate Last Admin    cyanocobalamin injection 1,000 mcg  1,000 mcg Intramuscular Q30 Days Garon Side, DO   1,000 mcg at 04/06/21 0235    cyanocobalamin injection 1,000 mcg  1,000 mcg Intramuscular Q30 Days Garon Side, DO   1,000 mcg at 10/27/20 1449    cyanocobalamin injection 1,000 mcg  1,000 mcg Intramuscular Q30 Days Garon Side, DO   1,000 mcg at 01/05/21 0750    cyanocobalamin injection 1,000 mcg  1,000 mcg Intramuscular Q30 Days Garon Side, DO   1,000 mcg at 03/09/21 1455     Current Outpatient Medications on File Prior to Visit   Medication Sig    Apremilast (Otezla) 30 MG TABS Take 2 tablets by mouth daily    aspirin 81 mg chewable tablet Chew 1 tablet (81 mg total) daily    chlorthalidone 25 mg tablet Take 1 tablet (25 mg total) by mouth daily    Cholecalciferol (VITAMIN D) 125 MCG (5000 UT) CAPS Take by mouth    Continuous Blood Gluc  (Dexcom G6 ) THERESA Use 1 applicator 4 (four) times a day    Continuous Blood Gluc  (FreeStyle Dejuan 14 Day [place]) THERESA Check BG's 2-4x a day depending on sugar levels    Continuous Blood Gluc Sensor (Dexcom G6 Sensor) MISC Use 1 Units 4 (four) times a day    Continuous Blood Gluc Sensor (FreeStyle Dejuan 14 Day Sensor) MISC Check sugars 2-4x daily depending on sugar levels    Continuous Blood Gluc Transmit (Dexcom G6 Transmitter) MISC Use 1 applicator 4 (four) times a day    cyanocobalamin (VITAMIN B-12) 1000 MCG tablet Take 1 tablet (1,000 mcg total) by mouth daily    diltiazem (CARDIZEM CD) 360 MG 24 hr capsule Take 1 capsule (360 mg total) by mouth daily    Empagliflozin (Jardiance) 25 MG TABS Take 1 tablet (25 mg total) by mouth every morning    Exenatide ER (Bydureon BCise) 2 MG/0 85ML AUIJ Inject 2 mg under the skin once a week    insulin glargine (Lantus SoloStar) 100 units/mL injection pen Inject 44 Units under the skin daily    insulin lispro (HumaLOG KwikPen) 100 units/mL injection pen 4u before lunch, 6u before supper    insulin lispro (HumaLOG) 100 units/mL injection pen Inject under the skin    potassium chloride (K-DUR,KLOR-CON) 20 mEq tablet Take 1 tablet (20 mEq total) by mouth daily    [DISCONTINUED] glucose blood test strip Test sugar 2-3 times daily    pilocarpine (SALAGEN) 5 mg tablet Take 1 tablet (5 mg total) by mouth 3 (three) times a day     Current Facility-Administered Medications on File Prior to Visit   Medication    cyanocobalamin injection 1,000 mcg    cyanocobalamin injection 1,000 mcg    cyanocobalamin injection 1,000 mcg    cyanocobalamin injection 1,000 mcg     She is allergic to amlodipine and bystolic [nebivolol hcl]       Review of Systems   Constitutional: Positive for fatigue  Negative for chills and fever  HENT: Negative for congestion, postnasal drip and rhinorrhea  She has mostly Spring allergies  States Singulair not helping  Hence , end that   Respiratory: Negative for cough, shortness of breath and wheezing  Cardiovascular: Negative for chest pain, palpitations and leg swelling  Gastrointestinal: Negative for abdominal distention and nausea  Endocrine: Positive for polydipsia and polyuria  Negative for polyphagia  See BBG sheet , with QID BS's on   Needs the [facility]   Genitourinary: Negative for difficulty urinating and flank pain  Musculoskeletal: Negative for arthralgias, back pain, gait problem and myalgias  Skin: Negative for rash  Allergic/Immunologic: Negative for environmental allergies  Neurological: Positive for weakness  Negative for dizziness, facial asymmetry, light-headedness, numbness and headaches  Hematological: Negative for adenopathy  Psychiatric/Behavioral: Negative for agitation, behavioral problems, confusion, decreased concentration, sleep disturbance and suicidal ideas  The patient is nervous/anxious            Objective:      /58 (BP Location: Left arm, Patient Position: Sitting, Cuff Size: Standard)   Pulse 76   Temp 97 8 °F (36 6 °C) (Tympanic)   Resp 18   Ht 5' 1" (1 549 m)   Wt 88 5 kg (195 lb)   SpO2 97%   BMI 36 84 kg/m²          Physical Exam  Vitals and nursing note reviewed  Constitutional:       General: She is not in acute distress  Appearance: Normal appearance  She is well-developed  She is obese  She is not ill-appearing or diaphoretic  HENT:      Head: Normocephalic and atraumatic  Nose: Nose normal  No congestion or rhinorrhea  Eyes:      General: No scleral icterus  Conjunctiva/sclera: Conjunctivae normal       Pupils: Pupils are equal, round, and reactive to light  Comments: Watery and itchy eyes from the allergies   Neck:      Thyroid: No thyromegaly  Trachea: No tracheal deviation  Cardiovascular:      Rate and Rhythm: Normal rate and regular rhythm  Pulses: Normal pulses  Heart sounds: Normal heart sounds  Pulmonary:      Effort: Pulmonary effort is normal  No respiratory distress  Breath sounds: Normal breath sounds  No wheezing, rhonchi or rales  Chest:      Chest wall: No tenderness  Musculoskeletal:         General: No tenderness or deformity  Normal range of motion  Cervical back: Normal range of motion and neck supple  No rigidity  No muscular tenderness  Right lower leg: No edema  Left lower leg: No edema  Skin:     General: Skin is warm and dry  Coloration: Skin is not pale  Findings: No erythema or rash  Neurological:      General: No focal deficit present  Mental Status: She is alert and oriented to person, place, and time  Mental status is at baseline  Cranial Nerves: No cranial nerve deficit  Psychiatric:         Mood and Affect: Mood normal          Behavior: Behavior normal          Thought Content:  Thought content normal          Judgment: Judgment normal          Pt seen alone and uninterrupted from 11:50 to 12:30 pm PLUS Epic time needed to complete record  I made copies of BP and BBG - scanned in  Also, I wrote copies of present insulin regime and what I plan to do once the present supply of LANTUS is used up - scanned in and copy given to pt so there can be no mistake  ALSO, she brought in 3 bags of pills --and we together went thru all of them and I got rid of at least half, etc  Difficult case, as she is reliable, but somewhat challenging (flight of ideas, quick, etc )  Plus, formulary isses to deal with  All this takes time  NOTE: all of the above issues discussed include chronic illness(es)  with severe exacerbations OR pose threats to life or body function if not managed/monitored effectively  I am as concerned about the long term effects of these disease states, as much as the short term effects  I spent considerable time assuring that my patient  understands  I reviewed prior internal and external notes,  consults,  hospital discharges,  and any other appropriate documents  I  have discussed the management and interpretation of them as well  Again, patient expresses understanding

## 2022-01-28 ENCOUNTER — TELEPHONE (OUTPATIENT)
Dept: FAMILY MEDICINE CLINIC | Facility: CLINIC | Age: 77
End: 2022-01-28

## 2022-01-28 NOTE — TELEPHONE ENCOUNTER
Pt called and is confused about the amount of insulin she should be taking  Was seen just yesterday by Dr Matias Ring  Also, she states that [facility] told her that her insurance is not paying for her test strips and that she needs to call [employer]  Test strips OOP are very expensive  She states that she tried to call and could not get through  I did give her an alternate phone number for her to try, and asked that she please call us back when she finds out what is going on with that, as it's very important that she continues to test      Can pt please be called about the amount of insulin she is supposed to be taking?  TY

## 2022-01-28 NOTE — TELEPHONE ENCOUNTER
Dr Colan Ormond   Please review and discuss with pt when you get a chance     According to the note she is to take 50 of lantus daily  until finished and then change ? ?? and should be checking glucose 4 times a day    Flor please work on prior auth and Dr Colan Ormond please review this and reach out to pt

## 2022-01-30 DIAGNOSIS — E11.65 UNCONTROLLED TYPE 2 DIABETES MELLITUS WITH HYPERGLYCEMIA (HCC): ICD-10-CM

## 2022-01-30 DIAGNOSIS — E11.9 CONTROLLED TYPE 2 DIABETES MELLITUS WITHOUT COMPLICATION, WITH LONG-TERM CURRENT USE OF INSULIN (HCC): ICD-10-CM

## 2022-01-30 DIAGNOSIS — Z79.4 CONTROLLED TYPE 2 DIABETES MELLITUS WITHOUT COMPLICATION, WITH LONG-TERM CURRENT USE OF INSULIN (HCC): ICD-10-CM

## 2022-01-30 RX ORDER — INSULIN LISPRO 100 [IU]/ML
INJECTION, SOLUTION INTRAVENOUS; SUBCUTANEOUS
Qty: 30 ML | Refills: 6 | Status: SHIPPED | OUTPATIENT
Start: 2022-01-30 | End: 2022-02-11 | Stop reason: SDUPTHER

## 2022-01-30 RX ORDER — INSULIN GLARGINE 100 [IU]/ML
50 INJECTION, SOLUTION SUBCUTANEOUS DAILY
Qty: 30 ML | Refills: 4
Start: 2022-01-30 | End: 2022-07-06 | Stop reason: SDUPTHER

## 2022-02-07 ENCOUNTER — TELEPHONE (OUTPATIENT)
Dept: FAMILY MEDICINE CLINIC | Facility: CLINIC | Age: 77
End: 2022-02-07

## 2022-02-07 NOTE — TELEPHONE ENCOUNTER
Pt called the Rx and chart notes for the [facility] faxed to [phone] and please let pt know when it is done

## 2022-02-07 NOTE — TELEPHONE ENCOUNTER
I am really over this case here  Papers and things have been faxed and I disputed a lot about this  I do not know what more can be done besides what was sent to us and completed

## 2022-02-08 ENCOUNTER — TELEPHONE (OUTPATIENT)
Dept: FAMILY MEDICINE CLINIC | Facility: CLINIC | Age: 77
End: 2022-02-08

## 2022-02-10 ENCOUNTER — TELEPHONE (OUTPATIENT)
Dept: FAMILY MEDICINE CLINIC | Facility: CLINIC | Age: 77
End: 2022-02-10

## 2022-02-10 NOTE — TELEPHONE ENCOUNTER
Patient called in stating that she had to cancel her prescription with [facility] for her Humalog pens, it's way too expensive  Please send a new prescription for Humalog to [facility] - patient needs a 90 day supply written, the local pharmacies only do monthly, [facility] does 90 days  Please call patient with any questions      Li Peter

## 2022-02-11 DIAGNOSIS — E11.65 UNCONTROLLED TYPE 2 DIABETES MELLITUS WITH HYPERGLYCEMIA (HCC): ICD-10-CM

## 2022-02-11 RX ORDER — INSULIN LISPRO 100 [IU]/ML
INJECTION, SOLUTION INTRAVENOUS; SUBCUTANEOUS
Qty: 30 ML | Refills: 6 | Status: SHIPPED | OUTPATIENT
Start: 2022-02-11

## 2022-02-16 ENCOUNTER — RA CDI HCC (OUTPATIENT)
Dept: OTHER | Facility: HOSPITAL | Age: 77
End: 2022-02-16

## 2022-02-16 NOTE — PROGRESS NOTES
[facility]  coding opportunities       Chart reviewed, no opportunity found: CHART REVIEWED, NO OPPORTUNITY FOUND                        Patients insurance company: [facility] [address]  (Medicare Advantage and Commercial)

## 2022-02-22 ENCOUNTER — OFFICE VISIT (OUTPATIENT)
Dept: FAMILY MEDICINE CLINIC | Facility: CLINIC | Age: 77
End: 2022-02-22
Payer: COMMERCIAL

## 2022-02-22 VITALS
RESPIRATION RATE: 18 BRPM | SYSTOLIC BLOOD PRESSURE: 142 MMHG | TEMPERATURE: 96.8 F | HEART RATE: 86 BPM | HEIGHT: 61 IN | WEIGHT: 197.6 LBS | OXYGEN SATURATION: 98 % | DIASTOLIC BLOOD PRESSURE: 76 MMHG | BODY MASS INDEX: 37.31 KG/M2

## 2022-02-22 DIAGNOSIS — Z79.899 ENCOUNTER FOR LONG-TERM (CURRENT) USE OF MEDICATIONS: ICD-10-CM

## 2022-02-22 DIAGNOSIS — Z79.4 CONTROLLED TYPE 2 DIABETES MELLITUS WITHOUT COMPLICATION, WITH LONG-TERM CURRENT USE OF INSULIN (HCC): ICD-10-CM

## 2022-02-22 DIAGNOSIS — F33.9 DEPRESSION, RECURRENT (HCC): ICD-10-CM

## 2022-02-22 DIAGNOSIS — J41.8 MIXED SIMPLE AND MUCOPURULENT CHRONIC BRONCHITIS (HCC): ICD-10-CM

## 2022-02-22 DIAGNOSIS — I10 RESISTANT HYPERTENSION: ICD-10-CM

## 2022-02-22 DIAGNOSIS — E11.65 UNCONTROLLED TYPE 2 DIABETES MELLITUS WITH HYPERGLYCEMIA (HCC): Primary | ICD-10-CM

## 2022-02-22 DIAGNOSIS — R73.9 ELEVATED BLOOD SUGAR: ICD-10-CM

## 2022-02-22 DIAGNOSIS — E11.9 CONTROLLED TYPE 2 DIABETES MELLITUS WITHOUT COMPLICATION, WITH LONG-TERM CURRENT USE OF INSULIN (HCC): ICD-10-CM

## 2022-02-22 PROBLEM — I74.3 EMBOLISM AND THROMBOSIS OF ARTERIES OF THE LOWER EXTREMITIES (HCC): Status: ACTIVE | Noted: 2022-02-22

## 2022-02-22 PROCEDURE — 1003F LEVEL OF ACTIVITY ASSESS: CPT | Performed by: FAMILY MEDICINE

## 2022-02-22 PROCEDURE — 99214 OFFICE O/P EST MOD 30 MIN: CPT | Performed by: FAMILY MEDICINE

## 2022-02-22 PROCEDURE — 1036F TOBACCO NON-USER: CPT | Performed by: FAMILY MEDICINE

## 2022-02-22 PROCEDURE — 3078F DIAST BP <80 MM HG: CPT | Performed by: FAMILY MEDICINE

## 2022-02-22 PROCEDURE — 1160F RVW MEDS BY RX/DR IN RCRD: CPT | Performed by: FAMILY MEDICINE

## 2022-02-22 PROCEDURE — 3077F SYST BP >= 140 MM HG: CPT | Performed by: FAMILY MEDICINE

## 2022-02-22 RX ORDER — LOSARTAN POTASSIUM 100 MG/1
100 TABLET ORAL DAILY
Qty: 90 TABLET | Refills: 3 | Status: SHIPPED | OUTPATIENT
Start: 2022-02-22 | End: 2022-07-06

## 2022-02-22 NOTE — PATIENT INSTRUCTIONS
Mediterranean Diet   AMBULATORY CARE:   A Mediterranean diet  is a meal plan that includes foods that are commonly eaten in countries that border [place]  This meal plan may provide several health benefits  These include losing or maintaining weight, and decreasing blood pressure, blood sugar, and cholesterol levels  It may also help protect against certain health conditions such as heart disease, cancer, type 2 diabetes, and Alzheimer disease  Work with a dietitian to develop a meal plan that is right for you  Foods to include in the [address] [facility] diet:   · Include fruits and vegetables in each meal   Eat a variety of fresh fruits and vegetables  · Choose whole grains every day  These foods include whole-grain breads, pastas, and cereals  It also includes brown rice, quinoa, and millet  · Use unsaturated fats instead of saturated fats  Cook with olive or canola oil  Limit saturated fats, such as butter, margarine, and shortening  Saturated fat is an unhealthy fat that can increase your cholesterol levels  · Choose plant foods, poultry, and fish as your main sources of protein  ? Eat plant-based foods that provide protein,  such as lentils, beans, chickpeas, nuts, and seeds  Choose mostly plant-based foods in place of meat on most days of the week  ? Eat protein foods high in omega-3 fats  Fish high in omega-3 fats include salmon, trout, and tuna  Include these types of fish 1 or 2 times each week  Limit fish high in mercury, such as shark, swordfish, tilefish, and giuseppe mackerel  Omega-3 fats are also found in walnuts and flaxseed  ? Choose poultry (chicken or turkey)  without skin instead of red meat  Red meat is high in saturated fat  Limit eggs and high-fat meats, such as alberto, sausage, and hot dogs  · Choose low-fat dairy foods  such as nonfat or 1% milk, or low-fat almond, cashew, or soy milk   Other examples include low-fat cheese, yogurt, and cottage cheese  · Limit sweets  Limit your intake of high-sugar foods, such as soda, desserts, and candy  · Talk to your healthcare provider about alcohol  Studies have shown that moderate intake of wine may reduce the risk of heart disease  A moderate amount of wine is 1 serving for women and men 65 years and older each day  Two servings is recommended for men 24to 59years of age each day  A serving of wine is 5 ounces  Other things you need to know if you follow the Mediterranean diet:   · Include foods high in iron and vitamin C   Plant-based foods that are high in iron include spinach, beans, tofu, and artichoke  Eat a serving of vitamin C with any iron-rich food to help your body absorb more iron  Examples include oranges, strawberries, cantaloupe, broccoli, and yellow peppers  · Get regular physical activity  The Mediterranean diet will have the most benefit if you get regular physical activity  Get 30 minutes of physical activity at least 5 days a week  Choose physical activities that increase your heart rate  Examples include walking, hiking, swimming, and riding a bike  Ask your healthcare provider about the best exercise plan for you  © Copyright [employer] 2021 Information is for End User's use only and may not be sold, redistributed or otherwise used for commercial purposes  All illustrations and images included in CareNotes® are the copyrighted property of [email or web address]  or Sissy Savage  The above information is an  only  It is not intended as medical advice for individual conditions or treatments  Talk to your doctor, nurse or pharmacist before following any medical regimen to see if it is safe and effective for you

## 2022-02-22 NOTE — PROGRESS NOTES
Assessment/Plan: 65 yo female, now on the 2d patch Freestyle Monitor, currently taking the following:  Lantus 50 U q a m  and 'log 5, 10, and 15, but BBG's too hi, so NOW inc to 10 , 20 U and 15 U at supper  AND:  HTN: 142/76 taking  Chlorthalidone 25, and Diltiazem 360 mg OD and with BP by ME = 160/70, will add Losartan 100 mg 1/2 tab OD then inc to 1 tab OD to get BP <130/80    BMI Counseling: Body mass index is 37 34 kg/m²  The BMI is above normal  Nutrition recommendations include decreasing portion sizes, encouraging healthy choices of fruits and vegetables, decreasing fast food intake, consuming healthier snacks, limiting drinks that contain sugar, moderation in carbohydrate intake, increasing intake of lean protein and reducing intake of saturated and trans fat  Exercise recommendations include moderate physical activity 150 minutes/week and exercising 3-5 times per week  No pharmacotherapy was ordered  Rationale for BMI follow-up plan is due to patient being overweight or obese  1  Uncontrolled type 2 diabetes mellitus with hyperglycemia ([address] )    2  Controlled type 2 diabetes mellitus without complication, with long-term current use of insulin ([facility])    3  Elevated blood sugar    4  Resistant hypertension  -     losartan (COZAAR) 100 MG tablet; Take 1 tablet (100 mg total) by mouth daily    5  Mixed simple and mucopurulent chronic bronchitis ([address] )    6  Depression, recurrent [facility]  Assessment & Plan:  Contrtolled and doing much better  7  Encounter for long-term (current) use of medications        Subjective:      Patient ID: Claritza Faustin is a 64 y o  female  HPI    The following portions of the patient's history were reviewed and updated as appropriate: She  has a past medical history of Benign essential hypertension, Chronic lower back pain, Diabetes mellitus ([address] ), Hyperlipidemia, and Sciatica    She   Patient Active Problem List    Diagnosis Date Noted    Embolism and thrombosis of arteries of the lower extremities ([facility] ) 02/22/2022    Uncontrolled type 2 diabetes mellitus with hyperglycemia ([facility] ) 10/09/2021    Hypertensive urgency 10/09/2021    Chest pain, unspecified 09/29/2021    Hypokalemia 09/29/2021    Allergic reaction 08/12/2021    Increased BMI 02/15/2021    Class 2 severe obesity due to excess calories with serious comorbidity and body mass index (BMI) of 35 0 to 35 9 in adult [facility]) 02/15/2021    Depression, recurrent ([facility] ) 01/11/2021    Obesity, morbid ([place] [address] ) 01/11/2021    Myalgia due to statin 10/13/2020    Need for vaccination 10/13/2020    Psoriasis 10/13/2020    Hyperlipidemia associated with type 2 diabetes mellitus ([facility] ) 10/13/2020    Mixed simple and mucopurulent chronic bronchitis ([place] [address] ) 05/26/2020    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/2020    Mixed hyperlipidemia 05/26/2020    Psoriasis-like skin disease 05/26/2020    Unspecified abnormalities of gait and mobility 12/06/2019    Personal history of nicotine dependence 12/06/2019    Other chronic pain 12/06/2019    Acute left-sided low back pain with left-sided sciatica 12/03/2019    Controlled type 2 diabetes mellitus, with long-term current use of insulin ([facility] ) 12/03/2019    Resistant hypertension 12/03/2019     She  has a past surgical history that includes Cholecystectomy  Her family history includes Emphysema in her father; Leukemia in her father; Uterine cancer in her mother  She  reports that she quit smoking about 17 years ago  Her smoking use included cigarettes  She has a 30 00 pack-year smoking history  She has never used smokeless tobacco  She reports that she does not drink alcohol and does not use drugs    Current Outpatient Medications   Medication Sig Dispense Refill    Apremilast (Otezla) 30 MG TABS Take 2 tablets by mouth daily 180 tablet 3    aspirin 81 mg chewable tablet Chew 1 tablet (81 mg total) daily 30 tablet 0    chlorthalidone 25 mg tablet Take 1 tablet (25 mg total) by mouth daily 90 tablet 1    Cholecalciferol (VITAMIN D) 125 MCG (5000 UT) CAPS Take by mouth      Continuous Blood Gluc  (Dexcom G6 ) THERESA Use 1 applicator 4 (four) times a day 1 each 0    Continuous Blood Gluc  (FreeStyle Dejuan 14 Day [place]) THERESA Check BG's 2-4x a day depending on sugar levels 1 each 0    Continuous Blood Gluc Sensor (Dexcom G6 Sensor) MISC Use 1 Units 4 (four) times a day 3 each 12    Continuous Blood Gluc Sensor (FreeStyle Dejuan 14 Day Sensor) MISC Check sugars 2-4x daily depending on sugar levels 28 each 6    Continuous Blood Gluc Transmit (Dexcom G6 Transmitter) MISC Use 1 applicator 4 (four) times a day 1 each 0    cyanocobalamin (VITAMIN B-12) 1000 MCG tablet Take 1 tablet (1,000 mcg total) by mouth daily 100 tablet 6    diltiazem (CARDIZEM CD) 360 MG 24 hr capsule Take 1 capsule (360 mg total) by mouth daily 90 capsule 1    Exenatide ER (Bydureon BCise) 2 MG/0 85ML AUIJ Inject 2 mg under the skin once a week 0 85 mL 6    insulin glargine (Lantus SoloStar) 100 units/mL injection pen Inject 50 Units under the skin daily 30 mL 4    insulin lispro (HumaLOG KwikPen) 100 units/mL injection pen Take 5 U ac b'fastr, 10u before lunch, 15u before supper 30 mL 6    losartan (COZAAR) 100 MG tablet Take 1 tablet (100 mg total) by mouth daily 90 tablet 3    OneTouch Ultra test strip TEST 2-3 TIMES A  strip 3    pilocarpine (SALAGEN) 5 mg tablet Take 1 tablet (5 mg total) by mouth 3 (three) times a day 90 tablet 0    potassium chloride (K-DUR,KLOR-CON) 20 mEq tablet Take 1 tablet (20 mEq total) by mouth daily 90 tablet 1     Current Facility-Administered Medications   Medication Dose Route Frequency Provider Last Rate Last Admin    cyanocobalamin injection 1,000 mcg  1,000 mcg Intramuscular Q30 Days TAMIR Benton, DO   1,000 mcg at 04/06/21 0817    cyanocobalamin injection 1,000 mcg  1,000 mcg Intramuscular Q30 Days Jamie Esopsito, DO   1,000 mcg at 10/27/20 3002  cyanocobalamin injection 1,000 mcg  1,000 mcg Intramuscular Q30 Days [place], DO   1,000 mcg at 01/05/21 0750    cyanocobalamin injection 1,000 mcg  1,000 mcg Intramuscular Q30 Days Nino [place], DO   1,000 mcg at 03/09/21 1635     Current Outpatient Medications on File Prior to Visit   Medication Sig    Apremilast (Otezla) 30 MG TABS Take 2 tablets by mouth daily    aspirin 81 mg chewable tablet Chew 1 tablet (81 mg total) daily    chlorthalidone 25 mg tablet Take 1 tablet (25 mg total) by mouth daily    Cholecalciferol (VITAMIN D) 125 MCG (5000 UT) CAPS Take by mouth    Continuous Blood Gluc  (Dexcom G6 ) THERESA Use 1 applicator 4 (four) times a day    Continuous Blood Gluc  (FreeStyle Dejuan 14 Day [place]) THERESA Check BG's 2-4x a day depending on sugar levels    Continuous Blood Gluc Sensor (Dexcom G6 Sensor) MISC Use 1 Units 4 (four) times a day    Continuous Blood Gluc Sensor (FreeStyle Dejuan 14 Day Sensor) MISC Check sugars 2-4x daily depending on sugar levels    Continuous Blood Gluc Transmit (Dexcom G6 Transmitter) MISC Use 1 applicator 4 (four) times a day    cyanocobalamin (VITAMIN B-12) 1000 MCG tablet Take 1 tablet (1,000 mcg total) by mouth daily    diltiazem (CARDIZEM CD) 360 MG 24 hr capsule Take 1 capsule (360 mg total) by mouth daily    Exenatide ER (Bydureon BCise) 2 MG/0 85ML AUIJ Inject 2 mg under the skin once a week    insulin glargine (Lantus SoloStar) 100 units/mL injection pen Inject 50 Units under the skin daily    insulin lispro (HumaLOG KwikPen) 100 units/mL injection pen Take 5 U ac b'fastr, 10u before lunch, 15u before supper    OneTouch Ultra test strip TEST 2-3 TIMES A DAY    pilocarpine (SALAGEN) 5 mg tablet Take 1 tablet (5 mg total) by mouth 3 (three) times a day    potassium chloride (K-DUR,KLOR-CON) 20 mEq tablet Take 1 tablet (20 mEq total) by mouth daily     Current Facility-Administered Medications on File Prior to Visit Medication    cyanocobalamin injection 1,000 mcg    cyanocobalamin injection 1,000 mcg    cyanocobalamin injection 1,000 mcg    cyanocobalamin injection 1,000 mcg     She is allergic to amlodipine and bystolic [nebivolol hcl]       Review of Systems   Constitutional: Positive for fatigue  Negative for chills and fever  HENT: Negative for congestion, postnasal drip and rhinorrhea  She has mostly Spring allergies  States Singulair not helping  Hence , end that   Respiratory: Negative for cough, shortness of breath and wheezing  Cardiovascular: Negative for chest pain, palpitations and leg swelling  Gastrointestinal: Negative for abdominal distention and nausea  Endocrine: Positive for polydipsia and polyuria  Negative for polyphagia  See BBG sheet , with QID BS's on   Needs the [facility][note 3]   Genitourinary: Negative for difficulty urinating and flank pain  Musculoskeletal: Negative for arthralgias, back pain, gait problem and myalgias  Skin: Negative for rash  Allergic/Immunologic: Negative for environmental allergies  Neurological: Positive for weakness  Negative for dizziness, facial asymmetry, light-headedness, numbness and headaches  Hematological: Negative for adenopathy  Psychiatric/Behavioral: Negative for agitation, behavioral problems, confusion, decreased concentration, sleep disturbance and suicidal ideas  The patient is nervous/anxious  Objective:      /76   Pulse 86   Temp (!) 96 8 °F (36 °C)   Resp 18   Ht 5' 1" (1 549 m)   Wt 89 6 kg (197 lb 9 6 oz)   SpO2 98%   BMI 37 34 kg/m²          Physical Exam  Vitals and nursing note reviewed  Constitutional:       General: She is not in acute distress  Appearance: Normal appearance  She is well-developed  She is obese  She is not ill-appearing or diaphoretic  HENT:      Head: Normocephalic and atraumatic  Nose: Nose normal  No congestion or rhinorrhea     Eyes:      General: No scleral icterus  Conjunctiva/sclera: Conjunctivae normal       Pupils: Pupils are equal, round, and reactive to light  Comments: Watery and itchy eyes from the allergies   Neck:      Thyroid: No thyromegaly  Trachea: No tracheal deviation  Cardiovascular:      Rate and Rhythm: Normal rate and regular rhythm  Pulses: Normal pulses  Heart sounds: Normal heart sounds  Pulmonary:      Effort: Pulmonary effort is normal  No respiratory distress  Breath sounds: Normal breath sounds  No wheezing, rhonchi or rales  Chest:      Chest wall: No tenderness  Musculoskeletal:         General: No tenderness or deformity  Normal range of motion  Cervical back: Normal range of motion and neck supple  No rigidity  No muscular tenderness  Right lower leg: No edema  Left lower leg: No edema  Skin:     General: Skin is warm and dry  Coloration: Skin is not pale  Findings: No erythema or rash  Neurological:      General: No focal deficit present  Mental Status: She is alert and oriented to person, place, and time  Mental status is at baseline  Cranial Nerves: No cranial nerve deficit  Psychiatric:         Mood and Affect: Mood normal          Behavior: Behavior normal          Thought Content: Thought content normal          Judgment: Judgment normal          30 min with pt in eval and discussing all the above and below:    NOTE: all of the above issues discussed include chronic illness(es)  with severe exacerbations OR pose threats to life or body function if not managed/monitored effectively  I am as concerned about the long term effects of these disease states, as much as the short term effects  I spent considerable time assuring that my patient  understands  I reviewed prior internal and external notes,  consults,  hospital discharges,  and any other appropriate documents  I  have discussed the management and interpretation of them as well    Again, patient expresses understanding

## 2022-03-23 ENCOUNTER — OFFICE VISIT (OUTPATIENT)
Dept: FAMILY MEDICINE CLINIC | Facility: CLINIC | Age: 77
End: 2022-03-23
Payer: COMMERCIAL

## 2022-03-23 VITALS
TEMPERATURE: 97.4 F | HEART RATE: 75 BPM | OXYGEN SATURATION: 98 % | DIASTOLIC BLOOD PRESSURE: 74 MMHG | WEIGHT: 203 LBS | RESPIRATION RATE: 18 BRPM | SYSTOLIC BLOOD PRESSURE: 134 MMHG | HEIGHT: 61 IN | BODY MASS INDEX: 38.33 KG/M2

## 2022-03-23 DIAGNOSIS — Z79.899 ENCOUNTER FOR LONG-TERM (CURRENT) USE OF MEDICATIONS: ICD-10-CM

## 2022-03-23 DIAGNOSIS — Z00.00 ENCOUNTER FOR MEDICARE ANNUAL WELLNESS EXAM: ICD-10-CM

## 2022-03-23 DIAGNOSIS — E11.65 UNCONTROLLED TYPE 2 DIABETES MELLITUS WITH HYPERGLYCEMIA (HCC): Primary | ICD-10-CM

## 2022-03-23 DIAGNOSIS — Z79.4 CONTROLLED TYPE 2 DIABETES MELLITUS WITHOUT COMPLICATION, WITH LONG-TERM CURRENT USE OF INSULIN (HCC): ICD-10-CM

## 2022-03-23 DIAGNOSIS — Z12.31 SCREENING MAMMOGRAM, ENCOUNTER FOR: ICD-10-CM

## 2022-03-23 DIAGNOSIS — E11.9 CONTROLLED TYPE 2 DIABETES MELLITUS WITHOUT COMPLICATION, WITH LONG-TERM CURRENT USE OF INSULIN (HCC): ICD-10-CM

## 2022-03-23 PROCEDURE — 1101F PT FALLS ASSESS-DOCD LE1/YR: CPT | Performed by: FAMILY MEDICINE

## 2022-03-23 PROCEDURE — 3288F FALL RISK ASSESSMENT DOCD: CPT | Performed by: FAMILY MEDICINE

## 2022-03-23 PROCEDURE — 3725F SCREEN DEPRESSION PERFORMED: CPT | Performed by: FAMILY MEDICINE

## 2022-03-23 PROCEDURE — 1090F PRES/ABSN URINE INCON ASSESS: CPT | Performed by: FAMILY MEDICINE

## 2022-03-23 PROCEDURE — 1036F TOBACCO NON-USER: CPT | Performed by: FAMILY MEDICINE

## 2022-03-23 PROCEDURE — 3078F DIAST BP <80 MM HG: CPT | Performed by: FAMILY MEDICINE

## 2022-03-23 PROCEDURE — G0439 PPPS, SUBSEQ VISIT: HCPCS | Performed by: FAMILY MEDICINE

## 2022-03-23 PROCEDURE — 1170F FXNL STATUS ASSESSED: CPT | Performed by: FAMILY MEDICINE

## 2022-03-23 PROCEDURE — 1003F LEVEL OF ACTIVITY ASSESS: CPT | Performed by: FAMILY MEDICINE

## 2022-03-23 PROCEDURE — 3075F SYST BP GE 130 - 139MM HG: CPT | Performed by: FAMILY MEDICINE

## 2022-03-23 PROCEDURE — 99214 OFFICE O/P EST MOD 30 MIN: CPT | Performed by: FAMILY MEDICINE

## 2022-03-23 PROCEDURE — 1125F AMNT PAIN NOTED PAIN PRSNT: CPT | Performed by: FAMILY MEDICINE

## 2022-03-23 PROCEDURE — 1160F RVW MEDS BY RX/DR IN RCRD: CPT | Performed by: FAMILY MEDICINE

## 2022-03-23 NOTE — PROGRESS NOTES
Diabetic Foot Exam    Patient's shoes and socks removed  Right Foot/Ankle   Right Foot Inspection  Skin Exam: skin normal and skin intact  No dry skin, no warmth, no callus, no erythema, no maceration, no abnormal color, no pre-ulcer, no ulcer and no callus  Toe Exam: ROM and strength within normal limits  No swelling, no tenderness, erythema and  no right toe deformity    Sensory   Vibration: intact  Proprioception: intact  Monofilament testing: intact    Vascular  Capillary refills: < 3 seconds  The right DP pulse is 1+  The right PT pulse is 1+  Left Foot/Ankle  Left Foot Inspection  Skin Exam: skin normal and skin intact  No dry skin, no warmth, no erythema, no maceration, normal color, no pre-ulcer, no ulcer and no callus  Toe Exam: ROM and strength within normal limits  No swelling, no tenderness, no erythema and no left toe deformity  Sensory   Vibration: intact  Proprioception: intact  Monofilament testing: intact    Vascular  Capillary refills: < 3 seconds  The left DP pulse is 1+  The left PT pulse is 0       Assign Risk Category  No deformity present  No loss of protective sensation  No weak pulses  Risk: 0

## 2022-03-23 NOTE — PATIENT INSTRUCTIONS
Medicare Preventive Visit Patient Instructions  Thank you for completing your Welcome to Medicare Visit or Medicare Annual Wellness Visit today  Your next wellness visit will be due in one year (3/24/2023)  The screening/preventive services that you may require over the next 5-10 years are detailed below  Some tests may not apply to you based off risk factors and/or age  Screening tests ordered at today's visit but not completed yet may show as past due  Also, please note that scanned in results may not display below  Preventive Screenings:  Service Recommendations Previous Testing/Comments   Colorectal Cancer Screening  * Colonoscopy    * Fecal Occult Blood Test (FOBT)/Fecal Immunochemical Test (FIT)  * Fecal DNA/Cologuard Test  * Flexible Sigmoidoscopy Age: 54-65 years old   Colonoscopy: every 10 years (may be performed more frequently if at higher risk)  OR  FOBT/FIT: every 1 year  OR  Cologuard: every 3 years  OR  Sigmoidoscopy: every 5 years  Screening may be recommended earlier than age 48 if at higher risk for colorectal cancer  Also, an individualized decision between you and your healthcare provider will decide whether screening between the ages of 74-80 would be appropriate  Colonoscopy: Not on file  FOBT/FIT: Not on file  Cologuard: 03/08/2021  Sigmoidoscopy: Not on file          Breast Cancer Screening Age: 36 years old  Frequency: every 1-2 years  Not required if history of left and right mastectomy Mammogram: 01/06/2013        Cervical Cancer Screening Between the ages of 21-29, pap smear recommended once every 3 years  Between the ages of 33-67, can perform pap smear with HPV co-testing every 5 years     Recommendations may differ for women with a history of total hysterectomy, cervical cancer, or abnormal pap smears in past  Pap Smear: Not on file    Screening Not Indicated   Hepatitis C Screening Once for adults born between [facility]  More frequently in patients at high risk for Hepatitis C Hep C Antibody: Not on file    Screening Current   Diabetes Screening 1-2 times per year if you're at risk for diabetes or have pre-diabetes Fasting glucose: 161 mg/dL   A1C: 8 0 %    Screening Not Indicated  History Diabetes   Cholesterol Screening Once every 5 years if you don't have a lipid disorder  May order more often based on risk factors  Lipid panel: 09/30/2021    Screening Not Indicated  History Lipid Disorder     Other Preventive Screenings Covered by Medicare:  1  Abdominal Aortic Aneurysm (AAA) Screening: covered once if your at risk  You're considered to be at risk if you have a family history of AAA  2  Lung Cancer Screening: covers low dose CT scan once per year if you meet all of the following conditions: (1) Age 50-69; (2) No signs or symptoms of lung cancer; (3) Current smoker or have quit smoking within the last 15 years; (4) You have a tobacco smoking history of at least 30 pack years (packs per day multiplied by number of years you smoked); (5) You get a written order from a healthcare provider  3  Glaucoma Screening: covered annually if you're considered high risk: (1) You have diabetes OR (2) Family history of glaucoma OR (3)  aged 48 and older OR (3)  American aged 72 and older  3  Osteoporosis Screening: covered every 2 years if you meet one of the following conditions: (1) You're estrogen deficient and at risk for osteoporosis based off medical history and other findings; (2) Have a vertebral abnormality; (3) On glucocorticoid therapy for more than 3 months; (4) Have primary hyperparathyroidism; (5) On osteoporosis medications and need to assess response to drug therapy  · Last bone density test (DXA Scan): Not on file  5  HIV Screening: covered annually if you're between the age of 12-76  Also covered annually if you are younger than 13 and older than 72 with risk factors for HIV infection   For pregnant patients, it is covered up to 3 times per pregnancy  Immunizations:  Immunization Recommendations   Influenza Vaccine Annual influenza vaccination during flu season is recommended for all persons aged >= 6 months who do not have contraindications   Pneumococcal Vaccine (Prevnar and Pneumovax)  * Prevnar = PCV13  * Pneumovax = PPSV23   Adults 25-60 years old: 1-3 doses may be recommended based on certain risk factors  Adults 72 years old: Prevnar (PCV13) vaccine recommended followed by Pneumovax (PPSV23) vaccine  If already received PPSV23 since turning 65, then PCV13 recommended at least one year after PPSV23 dose  Hepatitis B Vaccine 3 dose series if at intermediate or high risk (ex: diabetes, end stage renal disease, liver disease)   Tetanus (Td) Vaccine - COST NOT COVERED BY MEDICARE PART B Following completion of primary series, a booster dose should be given every 10 years to maintain immunity against tetanus  Td may also be given as tetanus wound prophylaxis  Tdap Vaccine - COST NOT COVERED BY MEDICARE PART B Recommended at least once for all adults  For pregnant patients, recommended with each pregnancy  Shingles Vaccine (Shingrix) - COST NOT COVERED BY MEDICARE PART B  2 shot series recommended in those aged 48 and above     Health Maintenance Due:      Topic Date Due    Hepatitis C Screening  11/23/2022 (Originally 1945)     Immunizations Due:  There are no preventive care reminders to display for this patient  Advance Directives   What are advance directives? Advance directives are legal documents that state your wishes and plans for medical care  These plans are made ahead of time in case you lose your ability to make decisions for yourself  Advance directives can apply to any medical decision, such as the treatments you want, and if you want to donate organs  What are the types of advance directives? There are many types of advance directives, and each state has rules about how to use them   You may choose a combination of any of the following:  · Living will: This is a written record of the treatment you want  You can also choose which treatments you do not want, which to limit, and which to stop at a certain time  This includes surgery, medicine, IV fluid, and tube feedings  · Durable power of  for healthcare [facility]): This is a written record that states who you want to make healthcare choices for you when you are unable to make them for yourself  This person, called a proxy, is usually a family member or a friend  You may choose more than 1 proxy  · Do not resuscitate (DNR) order:  A DNR order is used in case your heart stops beating or you stop breathing  It is a request not to have certain forms of treatment, such as CPR  A DNR order may be included in other types of advance directives  · Medical directive: This covers the care that you want if you are in a coma, near death, or unable to make decisions for yourself  You can list the treatments you want for each condition  Treatment may include pain medicine, surgery, blood transfusions, dialysis, IV or tube feedings, and a ventilator (breathing machine)  · Values history: This document has questions about your views, beliefs, and how you feel and think about life  This information can help others choose the care that you would choose  Why are advance directives important? An advance directive helps you control your care  Although spoken wishes may be used, it is better to have your wishes written down  Spoken wishes can be misunderstood, or not followed  Treatments may be given even if you do not want them  An advance directive may make it easier for your family to make difficult choices about your care  Weight Management   Why it is important to manage your weight:  Being overweight increases your risk of health conditions such as heart disease, high blood pressure, type 2 diabetes, and certain types of cancer   It can also increase your risk for osteoarthritis, sleep apnea, and other respiratory problems  Aim for a slow, steady weight loss  Even a small amount of weight loss can lower your risk of health problems  How to lose weight safely:  A safe and healthy way to lose weight is to eat fewer calories and get regular exercise  You can lose up about 1 pound a week by decreasing the number of calories you eat by 500 calories each day  Healthy meal plan for weight management:  A healthy meal plan includes a variety of foods, contains fewer calories, and helps you stay healthy  A healthy meal plan includes the following:  · Eat whole-grain foods more often  A healthy meal plan should contain fiber  Fiber is the part of grains, fruits, and vegetables that is not broken down by your body  Whole-grain foods are healthy and provide extra fiber in your diet  Some examples of whole-grain foods are whole-wheat breads and pastas, oatmeal, brown rice, and bulgur  · Eat a variety of vegetables every day  Include dark, leafy greens such as spinach, kale, reyna greens, and mustard greens  Eat yellow and orange vegetables such as carrots, sweet potatoes, and winter squash  · Eat a variety of fruits every day  Choose fresh or canned fruit (canned in its own juice or light syrup) instead of juice  Fruit juice has very little or no fiber  · Eat low-fat dairy foods  Drink fat-free (skim) milk or 1% milk  Eat fat-free yogurt and low-fat cottage cheese  Try low-fat cheeses such as mozzarella and other reduced-fat cheeses  · Choose meat and other protein foods that are low in fat  Choose beans or other legumes such as split peas or lentils  Choose fish, skinless poultry (chicken or turkey), or lean cuts of red meat (beef or pork)  Before you cook meat or poultry, cut off any visible fat  · Use less fat and oil  Try baking foods instead of frying them  Add less fat, such as margarine, sour cream, regular salad dressing and mayonnaise to foods   Eat fewer high-fat foods  Some examples of high-fat foods include french fries, doughnuts, ice cream, and cakes  · Eat fewer sweets  Limit foods and drinks that are high in sugar  This includes candy, cookies, regular soda, and sweetened drinks  Exercise:  Exercise at least 30 minutes per day on most days of the week  Some examples of exercise include walking, biking, dancing, and swimming  You can also fit in more physical activity by taking the stairs instead of the elevator or parking farther away from stores  Ask your healthcare provider about the best exercise plan for you  © Copyright [employer] 2018 Information is for End User's use only and may not be sold, redistributed or otherwise used for commercial purposes   All illustrations and images included in CareNotes® are the copyrighted property of A D A M , Inc  or [address] [place] [address]

## 2022-03-23 NOTE — PROGRESS NOTES
Assessment/Plan:76 y o female, in NAD, but trying to regulate here insulin and controll BS's  Prob is:  Lantus 50 U at HS and lispro dose varies, BBG's a are not consistent and she doesn't know why  Will, after all discussion, try this:      Lantus 40 U OD at HS   Lispro (log) 5U ac B and 10 U ac lunch and NO ac S  Avoid the hypoglycemia nocturnal    Depression Screening and Follow-up Plan: Clincally patient does not have depression  No treatment is required  1  Uncontrolled type 2 diabetes mellitus with hyperglycemia (HCC)  Comments:  BS are too hi  She using the FreeStyle 2 and most BBGs are too hi in the mid-200  s, but occas a 67 and hypogly at nite    2  Encounter for Medicare annual wellness exam    3  Controlled type 2 diabetes mellitus without complication, with long-term current use of insulin (HCC)  Comments:  Adjusting insulin now  4  Encounter for long-term (current) use of medications    5  Screening mammogram, encounter for  -     Mammo screening bilateral w 3d & cad; Future; Expected date: 04/23/2022          Subjective:      Patient ID: Kwame Chang is a 64 y o  female  HPI    The following portions of the patient's history were reviewed and updated as appropriate: She  has a past medical history of Benign essential hypertension, Chronic lower back pain, Diabetes mellitus ([address] ), Hyperlipidemia, and Sciatica    She   Patient Active Problem List    Diagnosis Date Noted    Embolism and thrombosis of arteries of the lower extremities ([address] ) 02/22/2022    Uncontrolled type 2 diabetes mellitus with hyperglycemia ([address] ) 10/09/2021    Hypertensive urgency 10/09/2021    Chest pain, unspecified 09/29/2021    Hypokalemia 09/29/2021    Allergic reaction 08/12/2021    Increased BMI 02/15/2021    Class 2 severe obesity due to excess calories with serious comorbidity and body mass index (BMI) of 35 0 to 35 9 in adult [facility]) 02/15/2021    Depression, recurrent ([address] ) 01/11/2021    Obesity, morbid ([facility] ) 01/11/2021    Myalgia due to statin 10/13/2020    Need for vaccination 10/13/2020    Psoriasis 10/13/2020    Hyperlipidemia associated with type 2 diabetes mellitus ([place] [id number] ) 10/13/2020    Mixed simple and mucopurulent chronic bronchitis ([place] [address] ) 05/26/2020    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/2020    Mixed hyperlipidemia 05/26/2020    Psoriasis-like skin disease 05/26/2020    Unspecified abnormalities of gait and mobility 12/06/2019    Personal history of nicotine dependence 12/06/2019    Other chronic pain 12/06/2019    Acute left-sided low back pain with left-sided sciatica 12/03/2019    Controlled type 2 diabetes mellitus, with long-term current use of insulin ([place] [id number] ) 12/03/2019    Resistant hypertension 12/03/2019     She  has a past surgical history that includes Cholecystectomy  Her family history includes Emphysema in her father; Leukemia in her father; Uterine cancer in her mother  She  reports that she quit smoking about 17 years ago  Her smoking use included cigarettes  She has a 30 00 pack-year smoking history  She has never used smokeless tobacco  She reports that she does not drink alcohol and does not use drugs    Current Outpatient Medications   Medication Sig Dispense Refill    Apremilast (Otezla) 30 MG TABS Take 2 tablets by mouth daily 180 tablet 3    aspirin 81 mg chewable tablet Chew 1 tablet (81 mg total) daily 30 tablet 0    chlorthalidone 25 mg tablet Take 1 tablet (25 mg total) by mouth daily 90 tablet 1    Cholecalciferol (VITAMIN D) 125 MCG (5000 UT) CAPS Take by mouth      Continuous Blood Gluc  (FreeStyle Dejuan 14 Day [place]) THERESA Check BG's 2-4x a day depending on sugar levels 1 each 0    Continuous Blood Gluc Sensor (FreeStyle Dejuan 14 Day Sensor) MISC Check sugars 2-4x daily depending on sugar levels 28 each 6    cyanocobalamin (VITAMIN B-12) 1000 MCG tablet Take 1 tablet (1,000 mcg total) by mouth daily 100 tablet 6    diltiazem (CARDIZEM CD) 360 MG 24 hr capsule Take 1 capsule (360 mg total) by mouth daily 90 capsule 1    Exenatide ER (Bydureon BCise) 2 MG/0 85ML AUIJ Inject 2 mg under the skin once a week 0 85 mL 6    insulin glargine (Lantus SoloStar) 100 units/mL injection pen Inject 50 Units under the skin daily 30 mL 4    insulin lispro (HumaLOG KwikPen) 100 units/mL injection pen Take 5 U ac b'fastr, 10u before lunch, 15u before supper 30 mL 6    losartan (COZAAR) 100 MG tablet Take 1 tablet (100 mg total) by mouth daily 90 tablet 3    OneTouch Ultra test strip TEST 2-3 TIMES A  strip 3    pilocarpine (SALAGEN) 5 mg tablet Take 1 tablet (5 mg total) by mouth 3 (three) times a day 90 tablet 0    potassium chloride (K-DUR,KLOR-CON) 20 mEq tablet Take 1 tablet (20 mEq total) by mouth daily 90 tablet 1     Current Facility-Administered Medications   Medication Dose Route Frequency Provider Last Rate Last Admin    cyanocobalamin injection 1,000 mcg  1,000 mcg Intramuscular Q30 Days Saundrae Poster, DO   1,000 mcg at 04/06/21 5570    cyanocobalamin injection 1,000 mcg  1,000 mcg Intramuscular Q30 Days Saundrae Poster, DO   1,000 mcg at 10/27/20 1449    cyanocobalamin injection 1,000 mcg  1,000 mcg Intramuscular Q30 Days Saundrae Poster, DO   1,000 mcg at 01/05/21 0750    cyanocobalamin injection 1,000 mcg  1,000 mcg Intramuscular Q30 Days Saundrae Poster, DO   1,000 mcg at 03/09/21 1455     Current Outpatient Medications on File Prior to Visit   Medication Sig    Apremilast (Otezla) 30 MG TABS Take 2 tablets by mouth daily    aspirin 81 mg chewable tablet Chew 1 tablet (81 mg total) daily    chlorthalidone 25 mg tablet Take 1 tablet (25 mg total) by mouth daily    Cholecalciferol (VITAMIN D) 125 MCG (5000 UT) CAPS Take by mouth    Continuous Blood Gluc  (FreeStyle Dejuan 14 Day [place]) THERESA Check BG's 2-4x a day depending on sugar levels    Continuous Blood Gluc Sensor (FreeStyle Dejuan 14 Day Sensor) MISC Check sugars 2-4x daily depending on sugar levels    cyanocobalamin (VITAMIN B-12) 1000 MCG tablet Take 1 tablet (1,000 mcg total) by mouth daily    diltiazem (CARDIZEM CD) 360 MG 24 hr capsule Take 1 capsule (360 mg total) by mouth daily    Exenatide ER (Bydureon BCise) 2 MG/0 85ML AUIJ Inject 2 mg under the skin once a week    insulin glargine (Lantus SoloStar) 100 units/mL injection pen Inject 50 Units under the skin daily    insulin lispro (HumaLOG KwikPen) 100 units/mL injection pen Take 5 U ac b'fastr, 10u before lunch, 15u before supper    losartan (COZAAR) 100 MG tablet Take 1 tablet (100 mg total) by mouth daily    OneTouch Ultra test strip TEST 2-3 TIMES A DAY    pilocarpine (SALAGEN) 5 mg tablet Take 1 tablet (5 mg total) by mouth 3 (three) times a day    potassium chloride (K-DUR,KLOR-CON) 20 mEq tablet Take 1 tablet (20 mEq total) by mouth daily     Current Facility-Administered Medications on File Prior to Visit   Medication    cyanocobalamin injection 1,000 mcg    cyanocobalamin injection 1,000 mcg    cyanocobalamin injection 1,000 mcg    cyanocobalamin injection 1,000 mcg     She is allergic to amlodipine and bystolic [nebivolol hcl]       Review of Systems   Constitutional: Positive for fatigue  Negative for chills and fever  HENT: Negative for congestion, postnasal drip and rhinorrhea  She has mostly Spring allergies  States Singulair not helping  Hence , end that   Respiratory: Negative for cough, shortness of breath and wheezing  Cardiovascular: Negative for chest pain, palpitations and leg swelling  Gastrointestinal: Negative for abdominal distention and nausea  Endocrine: Positive for polydipsia and polyuria  Negative for polyphagia  See BBG sheet , with QID BS's on   Needs the FreeStyle   Genitourinary: Negative for difficulty urinating and flank pain  Musculoskeletal: Negative for arthralgias, back pain, gait problem and myalgias  Skin: Negative for rash  Allergic/Immunologic: Negative for environmental allergies  Neurological: Positive for weakness  Negative for dizziness, facial asymmetry, light-headedness, numbness and headaches  Hematological: Negative for adenopathy  Psychiatric/Behavioral: Negative for agitation, behavioral problems, confusion, decreased concentration, sleep disturbance and suicidal ideas  The patient is nervous/anxious  Objective:      /74   Pulse 75   Temp (!) 97 4 °F (36 3 °C)   Resp 18   Ht 5' 1" (1 549 m)   Wt 92 1 kg (203 lb)   SpO2 98%   BMI 38 36 kg/m²          Physical Exam  Vitals and nursing note reviewed  Constitutional:       General: She is not in acute distress  Appearance: Normal appearance  She is well-developed  She is obese  She is not ill-appearing or diaphoretic  HENT:      Head: Normocephalic and atraumatic  Nose: Nose normal  No congestion or rhinorrhea  Eyes:      General: No scleral icterus  Conjunctiva/sclera: Conjunctivae normal       Pupils: Pupils are equal, round, and reactive to light  Comments: Watery and itchy eyes from the allergies   Neck:      Thyroid: No thyromegaly  Trachea: No tracheal deviation  Cardiovascular:      Rate and Rhythm: Normal rate and regular rhythm  Pulses: Normal pulses  Heart sounds: Normal heart sounds  Pulmonary:      Effort: Pulmonary effort is normal  No respiratory distress  Breath sounds: Normal breath sounds  No wheezing, rhonchi or rales  Chest:      Chest wall: No tenderness  Musculoskeletal:         General: No tenderness or deformity  Normal range of motion  Cervical back: Normal range of motion and neck supple  No rigidity  No muscular tenderness  Right lower leg: No edema  Left lower leg: No edema  Skin:     General: Skin is warm and dry  Coloration: Skin is not pale  Findings: No erythema or rash  Neurological:      General: No focal deficit present        Mental Status: She is alert and oriented to person, place, and time  Mental status is at baseline  Cranial Nerves: No cranial nerve deficit  Psychiatric:         Mood and Affect: Mood normal          Behavior: Behavior normal          Thought Content: Thought content normal          Judgment: Judgment normal          Pt seen from 12:57 pm to 1:47 pm and all the above and Rx issues addressed  Considerable time spent re variable BBG's and uncontrolled DM  Long discussion, etc      NOTE: all of the above issues discussed include chronic illness(es)  with severe exacerbations OR pose threats to life or body function if not managed/monitored effectively  I am as concerned about the long term effects of these disease states, as much as the short term effects  I spent considerable time assuring that my patient  understands  I reviewed prior internal and external notes,  consults,  hospital discharges,  and any other appropriate documents  I  have discussed the management and interpretation of them as well  Again, patient expresses understanding

## 2022-03-23 NOTE — PROGRESS NOTES
Assessment and Plan:     Problem List Items Addressed This Visit     None           Preventive health issues were discussed with patient, and age appropriate screening tests were ordered as noted in patient's After Visit Summary  Personalized health advice and appropriate referrals for health education or preventive services given if needed, as noted in patient's After Visit Summary       History of Present Illness:     Patient presents for Medicare Annual Wellness visit    Patient Care Team:  Barney Andre DO as PCP - General (Family Medicine)     Problem List:     Patient Active Problem List   Diagnosis    Acute left-sided low back pain with left-sided sciatica    Controlled type 2 diabetes mellitus, with long-term current use of insulin ([facility])    Resistant hypertension    Mixed simple and mucopurulent chronic bronchitis ([address] )    Cyanocobalamin deficiency    Mixed hyperlipidemia    Psoriasis-like skin disease    Myalgia due to statin    Need for vaccination    Psoriasis    Hyperlipidemia associated with type 2 diabetes mellitus ([facility]    Depression, recurrent ([facility]    Obesity, morbid ([facility]    Increased BMI    Class 2 severe obesity due to excess calories with serious comorbidity and body mass index (BMI) of 35 0 to 35 9 in adult [facility])    Unspecified abnormalities of gait and mobility    Personal history of nicotine dependence    Other chronic pain    Allergic reaction    Chest pain, unspecified    Hypokalemia    Uncontrolled type 2 diabetes mellitus with hyperglycemia ([facility]    Hypertensive urgency    Embolism and thrombosis of arteries of the lower extremities ([facility] )      Past Medical and Surgical History:     Past Medical History:   Diagnosis Date    Benign essential hypertension     Chronic lower back pain     Diabetes mellitus ([address] )     Hyperlipidemia     Sciatica      Past Surgical History:   Procedure Laterality Date    CHOLECYSTECTOMY        Family History:     Family History Problem Relation Age of Onset    Uterine cancer Mother     Emphysema Father     Leukemia Father       Social History:     Social History     Socioeconomic History    Marital status:      Spouse name: None    Number of children: 4    Years of education: None    Highest education level: None   Occupational History    Occupation: retired- supervisor for [employer] Use    Smoking status: Former Smoker     Packs/day: 1 00     Years: 30 00     Pack years: 30 00     Types: Cigarettes     Quit date: 2005     Years since quittin 2    Smokeless tobacco: Never Used   Vaping Use    Vaping Use: Never used   Substance and Sexual Activity    Alcohol use: Never    Drug use: Never    Sexual activity: None   Other Topics Concern    None   Social History Narrative    · Smoking - how much:   / PPD,  Quit age 62      · Tobacco-years of use:   36, · started at the age of 22 and quit at 62    · Do you currently or have you served in [employer]:   No      · Sexual orientation:   Heterosexual      · Exercise level:   Heavy  Goes to the gym 5 days per week     · Diet:   Regular      · General stress level:   Low      · Has smoked since age:   19      · Caffeine intake:   Occasional      · Guns present in home:   No      · Seat belts used routinely:   Yes      · Sunscreen used routinely:   Yes      · Smoke alarm in home: Yes      · Advance directive: Yes      · Salt Intake:   none     · Live alone or with others:   alone          Social Determinants of Health     Financial Resource Strain: Low Risk     Difficulty of Paying Living Expenses: Not hard at all   Food Insecurity: No Food Insecurity    Worried About Running Out of Food in the Last Year: Never true    Roya of Food in the Last Year: Never true   Transportation Needs: No Transportation Needs    Lack of Transportation (Medical): No    Lack of Transportation (Non-Medical):  No   Physical Activity: Not on file   Stress: Not on file   Social Connections: Not on file   Intimate Partner Violence: Not on file   Housing Stability: Not on file      Medications and Allergies:     Current Outpatient Medications   Medication Sig Dispense Refill    Apremilast (Otezla) 30 MG TABS Take 2 tablets by mouth daily 180 tablet 3    aspirin 81 mg chewable tablet Chew 1 tablet (81 mg total) daily 30 tablet 0    chlorthalidone 25 mg tablet Take 1 tablet (25 mg total) by mouth daily 90 tablet 1    Cholecalciferol (VITAMIN D) 125 MCG (5000 UT) CAPS Take by mouth      Continuous Blood Gluc  (FreeStyle Dejuan 14 Day Chilcoot) THERESA Check BG's 2-4x a day depending on sugar levels 1 each 0    Continuous Blood Gluc Sensor (FreeStyle Dejuan 14 Day Sensor) MISC Check sugars 2-4x daily depending on sugar levels 28 each 6    cyanocobalamin (VITAMIN B-12) 1000 MCG tablet Take 1 tablet (1,000 mcg total) by mouth daily 100 tablet 6    diltiazem (CARDIZEM CD) 360 MG 24 hr capsule Take 1 capsule (360 mg total) by mouth daily 90 capsule 1    Exenatide ER (Bydureon BCise) 2 MG/0 85ML AUIJ Inject 2 mg under the skin once a week 0 85 mL 6    insulin glargine (Lantus SoloStar) 100 units/mL injection pen Inject 50 Units under the skin daily 30 mL 4    insulin lispro (HumaLOG KwikPen) 100 units/mL injection pen Take 5 U ac b'fastr, 10u before lunch, 15u before supper 30 mL 6    losartan (COZAAR) 100 MG tablet Take 1 tablet (100 mg total) by mouth daily 90 tablet 3    OneTouch Ultra test strip TEST 2-3 TIMES A  strip 3    pilocarpine (SALAGEN) 5 mg tablet Take 1 tablet (5 mg total) by mouth 3 (three) times a day 90 tablet 0    potassium chloride (K-DUR,KLOR-CON) 20 mEq tablet Take 1 tablet (20 mEq total) by mouth daily 90 tablet 1     Current Facility-Administered Medications   Medication Dose Route Frequency Provider Last Rate Last Admin    cyanocobalamin injection 1,000 mcg  1,000 mcg Intramuscular Q30 Days Caden Medicine, DO   1,000 mcg at 04/06/21 2681  cyanocobalamin injection 1,000 mcg  1,000 mcg Intramuscular Q30 Days [place] Drafts, DO   1,000 mcg at 10/27/20 1449    cyanocobalamin injection 1,000 mcg  1,000 mcg Intramuscular Q30 Days TAMIR Montejo, DO   1,000 mcg at 01/05/21 0750    cyanocobalamin injection 1,000 mcg  1,000 mcg Intramuscular Q30 Days [place] Drafts, DO   1,000 mcg at 03/09/21 1455     Allergies   Allergen Reactions    Amlodipine Tongue Swelling    Bystolic [Nebivolol Hcl] Allergic Rhinitis      Immunizations:     Immunization History   Administered Date(s) Administered    COVID-19 PFIZER VACCINE 0 3 ML IM 04/01/2021, 04/22/2021, 11/09/2021    INFLUENZA 09/28/2011, 09/26/2012, 12/20/2013, 10/07/2014, 10/27/2015, 10/12/2016, 11/21/2017, 11/20/2018    Influenza, high dose seasonal 0 7 mL 10/27/2020, 10/10/2021    Influenza, seasonal, injectable 10/01/2019    Pneumococcal Conjugate 13-Valent 10/27/2015    Pneumococcal Polysaccharide PPV23 08/11/2010, 10/01/2019    Zoster 09/26/2012      Health Maintenance:         Topic Date Due    Hepatitis C Screening  11/23/2022 (Originally 1945)     There are no preventive care reminders to display for this patient  Medicare Health Risk Assessment:     /74   Pulse 75   Temp (!) 97 4 °F (36 3 °C)   Resp 18   Ht 5' 1" (1 549 m)   Wt 92 1 kg (203 lb)   SpO2 98%   BMI 38 36 kg/m²      Za Delgado is here for her Subsequent Wellness visit  Health Risk Assessment:   Patient rates overall health as good  Patient feels that their physical health rating is same  Patient is satisfied with their life  Eyesight was rated as slightly worse  Hearing was rated as slightly worse  Patient feels that their emotional and mental health rating is same  Patients states they are never, rarely angry  Patient states they are never, rarely unusually tired/fatigued  Pain experienced in the last 7 days has been none  Patient states that she has experienced no weight loss or gain in last 6 months  Depression Screening:   PHQ-9 Score: 0      Fall Risk Screening: In the past year, patient has experienced: no history of falling in past year      Urinary Incontinence Screening:   Patient has not leaked urine accidently in the last six months  No leaking and stays dry most of time    Home Safety:  Patient does not have trouble with stairs inside or outside of their home  Patient has working smoke alarms and has working carbon monoxide detector  Home safety hazards include: none  Nutrition:   Current diet is Regular  Medications:   Patient is currently taking over-the-counter supplements  OTC medications include: see medication list  Patient is able to manage medications  Activities of Daily Living (ADLs)/Instrumental Activities of Daily Living (IADLs):   Walk and transfer into and out of bed and chair?: Yes  Dress and groom yourself?: Yes    Bathe or shower yourself?: Yes    Feed yourself? Yes  Do your laundry/housekeeping?: Yes  Manage your money, pay your bills and track your expenses?: Yes  Make your own meals?: Yes    Do your own shopping?: Yes    Previous Hospitalizations:   Any hospitalizations or ED visits within the last 12 months?: Yes    How many hospitalizations have you had in the last year?: 1-2    Advance Care Planning:   Living will: Yes    Durable POA for healthcare: Yes    Advanced directive: Yes    Advanced directive counseling given: Yes    Five wishes given: Yes    Patient declined ACP directive: No    End of Life Decisions reviewed with patient: Yes    Provider agrees with end of life decisions: Yes      Comments: Has living will already and will bring in       Cognitive Screening:   Provider or family/friend/caregiver concerned regarding cognition?: No    PREVENTIVE SCREENINGS      Cardiovascular Screening:    General: Screening Not Indicated, History Lipid Disorder and Risks and Benefits Discussed      Diabetes Screening:     General: Screening Not Indicated, History Diabetes, Risks and Benefits Discussed and Screening Current      Colorectal Cancer Screening:     General: Risks and Benefits Discussed and Screening Current      Breast Cancer Screening:     General: Risks and Benefits Discussed    Due for: Mammogram        Cervical Cancer Screening:    General: Screening Not Indicated and Risks and Benefits Discussed      Osteoporosis Screening:    General: Risks and Benefits Discussed      Abdominal Aortic Aneurysm (AAA) Screening:        General: Risks and Benefits Discussed, Patient Declines and Screening Not Indicated      Lung Cancer Screening:     General: Screening Not Indicated and Risks and Benefits Discussed      Hepatitis C Screening:    General: Screening Current    Hep C Screening Accepted: No       Preventive Screening Comments: Sees Dr Lilian Esquivel - Pap was 2 yrs  Will wait 5 yrs total   Needs mammo and I ordered that  Screening, Brief Intervention, and Referral to Treatment (SBIRT)    Screening  Typical number of drinks in a day: 0  Typical number of drinks in a week: 0  Interpretation: Low risk drinking behavior  Single Item Drug Screening:  How often have you used an illegal drug (including marijuana) or a prescription medication for non-medical reasons in the past year? never    Single Item Drug Screen Score: 0  Interpretation: Negative screen for possible drug use disorder    Brief Intervention  Alcohol & drug use screenings were reviewed  No concerns regarding substance use disorder identified  Healthy alcohol use/limits discussed  Other Counseling Topics:   Car/seat belt/driving safety, skin self-exam, sunscreen and calcium and vitamin D intake and regular weightbearing exercise         Sandhya Florentino,

## 2022-04-01 ENCOUNTER — HOSPITAL ENCOUNTER (EMERGENCY)
Facility: HOSPITAL | Age: 77
Discharge: HOME | End: 2022-04-01
Attending: EMERGENCY MEDICINE | Admitting: EMERGENCY MEDICINE
Payer: COMMERCIAL

## 2022-04-01 ENCOUNTER — APPOINTMENT (EMERGENCY)
Dept: RADIOLOGY | Facility: HOSPITAL | Age: 77
End: 2022-04-01
Payer: COMMERCIAL

## 2022-04-01 VITALS
SYSTOLIC BLOOD PRESSURE: 122 MMHG | OXYGEN SATURATION: 92 % | HEART RATE: 72 BPM | TEMPERATURE: 98.1 F | DIASTOLIC BLOOD PRESSURE: 56 MMHG | RESPIRATION RATE: 20 BRPM

## 2022-04-01 DIAGNOSIS — J44.1 COPD WITH ACUTE EXACERBATION (HCC): Primary | ICD-10-CM

## 2022-04-01 LAB
ATRIAL RATE: 125 BPM
GLUCOSE SERPL-MCNC: 155 MG/DL (ref 65–140)
P AXIS: 60 DEGREES
PR INTERVAL: 148 MS
QRS AXIS: 12 DEGREES
QRSD INTERVAL: 86 MS
QT INTERVAL: 412 MS
QTC INTERVAL: 407 MS
T WAVE AXIS: 31 DEGREES
VENTRICULAR RATE: 59 BPM

## 2022-04-01 PROCEDURE — 94760 N-INVAS EAR/PLS OXIMETRY 1: CPT

## 2022-04-01 PROCEDURE — 93005 ELECTROCARDIOGRAM TRACING: CPT

## 2022-04-01 PROCEDURE — 71045 X-RAY EXAM CHEST 1 VIEW: CPT

## 2022-04-01 PROCEDURE — 99291 CRITICAL CARE FIRST HOUR: CPT | Performed by: EMERGENCY MEDICINE

## 2022-04-01 PROCEDURE — 82948 REAGENT STRIP/BLOOD GLUCOSE: CPT

## 2022-04-01 PROCEDURE — 94644 CONT INHLJ TX 1ST HOUR: CPT

## 2022-04-01 PROCEDURE — 93010 ELECTROCARDIOGRAM REPORT: CPT | Performed by: INTERNAL MEDICINE

## 2022-04-01 PROCEDURE — 94664 DEMO&/EVAL PT USE INHALER: CPT

## 2022-04-01 PROCEDURE — 99285 EMERGENCY DEPT VISIT HI MDM: CPT

## 2022-04-01 RX ORDER — SODIUM CHLORIDE FOR INHALATION 0.9 %
12 VIAL, NEBULIZER (ML) INHALATION ONCE
Status: COMPLETED | OUTPATIENT
Start: 2022-04-01 | End: 2022-04-01

## 2022-04-01 RX ORDER — BENZONATATE 100 MG/1
100 CAPSULE ORAL ONCE
Status: COMPLETED | OUTPATIENT
Start: 2022-04-01 | End: 2022-04-01

## 2022-04-01 RX ORDER — BENZONATATE 100 MG/1
100 CAPSULE ORAL EVERY 8 HOURS
Qty: 20 CAPSULE | Refills: 3 | Status: SHIPPED | OUTPATIENT
Start: 2022-04-01 | End: 2022-04-08

## 2022-04-01 RX ORDER — DOXYCYCLINE HYCLATE 100 MG/1
100 CAPSULE ORAL ONCE
Status: COMPLETED | OUTPATIENT
Start: 2022-04-01 | End: 2022-04-01

## 2022-04-01 RX ORDER — BUDESONIDE 1 MG/2ML
1 INHALANT ORAL 4 TIMES DAILY
Qty: 56 ML | Refills: 0 | Status: SHIPPED | OUTPATIENT
Start: 2022-04-01 | End: 2022-07-06

## 2022-04-01 RX ORDER — ACETAMINOPHEN 325 MG/1
650 TABLET ORAL ONCE
Status: COMPLETED | OUTPATIENT
Start: 2022-04-01 | End: 2022-04-01

## 2022-04-01 RX ORDER — DOXYCYCLINE HYCLATE 100 MG/1
100 CAPSULE ORAL 2 TIMES DAILY
Qty: 13 CAPSULE | Refills: 0 | Status: SHIPPED | OUTPATIENT
Start: 2022-04-01 | End: 2022-04-08

## 2022-04-01 RX ADMIN — BENZONATATE 100 MG: 100 CAPSULE ORAL at 09:34

## 2022-04-01 RX ADMIN — IPRATROPIUM BROMIDE 1 MG: 0.5 SOLUTION RESPIRATORY (INHALATION) at 09:39

## 2022-04-01 RX ADMIN — ACETAMINOPHEN 650 MG: 325 TABLET, FILM COATED ORAL at 09:35

## 2022-04-01 RX ADMIN — ISODIUM CHLORIDE 12 ML: 0.03 SOLUTION RESPIRATORY (INHALATION) at 09:39

## 2022-04-01 RX ADMIN — DOXYCYCLINE 100 MG: 100 CAPSULE ORAL at 12:32

## 2022-04-01 RX ADMIN — ALBUTEROL SULFATE 10 MG: 2.5 SOLUTION RESPIRATORY (INHALATION) at 09:38

## 2022-04-01 NOTE — DISCHARGE INSTRUCTIONS
Diagnosis: copd exacerbation     - activity as tolerated     - for cough - tessalon 1 capsule 3 times a day as needed     - please keep hydrated     -  doxycycline- antibiotic- starting tonight with dinner- 1 tablet 2 times a day with meals    - continue with albuterol nebulizer  4 times per day for next 3-5 days- then as needed    - for next week budesonide nebulizer-  2 ml  4 times a day for next week -- an inhaled steroid - do not mix with nebulized albuterol     - please call your primary doctor when you get home to schedule an appointment for a recheck within 1 week     - please  return to  the er for any increasing difficulty breathing over next several days to week- getting  worse not better with increasing difficulty walking do to your breathing or any new/ worsening/concerning symptoms to you

## 2022-04-01 NOTE — ED PROVIDER NOTES
History  Chief Complaint   Patient presents with    Shortness of Breath     PT c/o "ratteling, wheezing, congestion, stuffiness, and trouble breathing since last night" PT has a history of COPD and asthma     63 y/o history of COPD, diabetes presenting with cough and shortness of breath  Patient states for the past week she has had a cough and congestion that has been increasing  Yesterday night she started coughing much worse to the point where she actually had threw up a bit  Since then she has had increasing shortness of breath  Came to the ED to be evaluated  Feels similar to previous COPD exacerbations  No chest pain, fevers, chills, diarrhea, urinary symptoms  Fully vaccinated against [id number] with no known exposures  Used her albuterol inhaler at 3:00 a m  (6 hours ago) no other medications today          Prior to Admission Medications   Prescriptions Last Dose Informant Patient Reported? Taking?    Apremilast (Otezla) 30 MG TABS  Self No No   Sig: Take 2 tablets by mouth daily   Cholecalciferol (VITAMIN D) 125 MCG (5000 UT) CAPS  Self Yes No   Sig: Take by mouth   Continuous Blood Gluc  (FreeStyle Dejuan 14 Day [place]) THERESA   No No   Sig: Check BG's 2-4x a day depending on sugar levels   Continuous Blood Gluc Sensor (FreeStyle Dejuan 14 Day Sensor) MISC   No No   Sig: Check sugars 2-4x daily depending on sugar levels   Exenatide ER (Bydureon BCise) 2 MG/0 85ML AUIJ   No No   Sig: Inject 2 mg under the skin once a week   OneTouch Ultra test strip   No No   Sig: TEST 2-3 TIMES A DAY   aspirin 81 mg chewable tablet   No No   Sig: Chew 1 tablet (81 mg total) daily   chlorthalidone 25 mg tablet   No No   Sig: Take 1 tablet (25 mg total) by mouth daily   cyanocobalamin (VITAMIN B-12) 1000 MCG tablet  Self No No   Sig: Take 1 tablet (1,000 mcg total) by mouth daily   diltiazem (CARDIZEM CD) 360 MG 24 hr capsule   No No   Sig: Take 1 capsule (360 mg total) by mouth daily   insulin glargine (Lantus SoloStar) 100 units/mL injection pen   No No   Sig: Inject 50 Units under the skin daily   insulin lispro (HumaLOG KwikPen) 100 units/mL injection pen   No No   Sig: Take 5 U ac b'fastr, 10u before lunch, 15u before supper   losartan (COZAAR) 100 MG tablet   No No   Sig: Take 1 tablet (100 mg total) by mouth daily   pilocarpine (SALAGEN) 5 mg tablet   No No   Sig: Take 1 tablet (5 mg total) by mouth 3 (three) times a day   potassium chloride (K-DUR,KLOR-CON) 20 mEq tablet   No No   Sig: Take 1 tablet (20 mEq total) by mouth daily      Facility-Administered Medications Last Administration Doses Remaining   cyanocobalamin injection 1,000 mcg 2021  8:17 AM    cyanocobalamin injection 1,000 mcg 10/27/2020  2:49 PM    cyanocobalamin injection 1,000 mcg 2021  7:50 AM    cyanocobalamin injection 1,000 mcg 3/9/2021  2:55 PM           Past Medical History:   Diagnosis Date    Asthma     Benign essential hypertension     Chronic lower back pain     [facility] [address] [facility]     Diabetes mellitus [facility] [address] [facility]     Hyperlipidemia     Sciatica        Past Surgical History:   Procedure Laterality Date    CHOLECYSTECTOMY         Family History   Problem Relation Age of Onset    Uterine cancer Mother     Emphysema Father     Leukemia Father      I have reviewed and agree with the history as documented  E-Cigarette/Vaping    E-Cigarette Use Never User      E-Cigarette/Vaping Substances    Nicotine No     THC No     CBD No     Flavoring No     Other No     Unknown No      Social History     Tobacco Use    Smoking status: Former Smoker     Packs/day: 1 00     Years: 30 00     Pack years: 30 00     Types: Cigarettes     Quit date: 2005     Years since quittin 2    Smokeless tobacco: Never Used   Vaping Use    Vaping Use: Never used   Substance Use Topics    Alcohol use: Never    Drug use: Never        Review of Systems   Constitutional: Positive for fatigue   Negative for activity change, fever and unexpected weight change  HENT: Positive for congestion  Negative for postnasal drip and rhinorrhea  Eyes: Negative for visual disturbance  Respiratory: Positive for cough and shortness of breath  Negative for chest tightness  Cardiovascular: Negative for chest pain, palpitations and leg swelling  Gastrointestinal: Negative for abdominal pain, blood in stool, constipation, diarrhea, nausea and vomiting  Endocrine: Negative for cold intolerance and heat intolerance  Genitourinary: Negative for difficulty urinating and hematuria  Skin: Negative for color change and wound  Neurological: Negative for dizziness and syncope  Psychiatric/Behavioral: Negative for dysphoric mood  The patient is not nervous/anxious  All other systems reviewed and are negative  Physical Exam  ED Triage Vitals   Temperature Pulse Respirations Blood Pressure SpO2   04/01/22 0746 04/01/22 0746 04/01/22 0746 04/01/22 0746 04/01/22 0746   98 1 °F (36 7 °C) 65 (!) 24 (!) 198/91 96 %      Temp Source Heart Rate Source Patient Position - Orthostatic VS BP Location FiO2 (%)   04/01/22 0746 04/01/22 0746 04/01/22 0746 04/01/22 0746 --   Oral Monitor Lying Right arm       Pain Score       04/01/22 1234       No Pain             Orthostatic Vital Signs  Vitals:    04/01/22 0746 04/01/22 0900 04/01/22 1030 04/01/22 1130   BP: (!) 198/91   122/56   Pulse: 65 60 64 72   Patient Position - Orthostatic VS: Lying   Lying       Physical Exam  Vitals and nursing note reviewed  Constitutional:       General: She is not in acute distress  Appearance: Normal appearance  She is ill-appearing  She is not diaphoretic  HENT:      Head: Normocephalic and atraumatic  Right Ear: External ear normal       Left Ear: External ear normal       Nose: Nose normal       Mouth/Throat:      Mouth: Mucous membranes are moist    Eyes:      General: No scleral icterus       Extraocular Movements: Extraocular movements intact  Conjunctiva/sclera: Conjunctivae normal    Cardiovascular:      Rate and Rhythm: Regular rhythm  Bradycardia present  Pulses: Normal pulses  Radial pulses are 2+ on the right side  Dorsalis pedis pulses are 2+ on the right side and 2+ on the left side  Heart sounds: Normal heart sounds, S1 normal and S2 normal  No murmur heard  Pulmonary:      Effort: Pulmonary effort is normal  No respiratory distress  Breath sounds: Transmitted upper airway sounds present  No stridor  Examination of the right-upper field reveals wheezing  Examination of the left-upper field reveals wheezing  Examination of the right-lower field reveals rhonchi  Examination of the left-lower field reveals rhonchi  Wheezing and rhonchi present  Abdominal:      General: Bowel sounds are normal       Palpations: Abdomen is soft  Tenderness: There is no abdominal tenderness  Musculoskeletal:         General: Normal range of motion  Cervical back: Normal range of motion  Skin:     General: Skin is warm and dry  Coloration: Skin is not jaundiced  Neurological:      General: No focal deficit present  Mental Status: She is alert and oriented to person, place, and time     Psychiatric:         Mood and Affect: Mood normal          ED Medications  Medications   benzonatate (TESSALON PERLES) capsule 100 mg (100 mg Oral Given 4/1/22 0934)   acetaminophen (TYLENOL) tablet 650 mg (650 mg Oral Given 4/1/22 0935)   albuterol inhalation solution 10 mg (10 mg Nebulization Given 4/1/22 0938)   ipratropium (ATROVENT) 0 02 % inhalation solution 1 mg (1 mg Nebulization Given 4/1/22 0939)   sodium chloride 0 9 % inhalation solution 12 mL (12 mL Nebulization Given 4/1/22 0939)   doxycycline hyclate (VIBRAMYCIN) capsule 100 mg (100 mg Oral Given 4/1/22 1232)       Diagnostic Studies  Results Reviewed     Procedure Component Value Units Date/Time    Fingerstick Glucose (POCT) [[id number]] (Abnormal) Collected: 04/01/22 1004    Lab Status: Final result Updated: 04/01/22 1007     POC Glucose 155 mg/dl                  XR chest 1 view portable   Final Result by Merary Price MD (04/01 595)      No acute cardiopulmonary disease  Workstation performed: [id number]               Procedures  ECG 12 Lead Documentation Only    Date/Time: 4/1/2022 7:55 AM  Performed by: Ankit Doherty MD  Authorized by: Ankit Doherty MD     Patient location:  ED  Previous ECG:     Previous ECG:  Compared to current    Similarity:  No change  Interpretation:     Interpretation: non-specific    Rate:     ECG rate:  59    ECG rate assessment: bradycardic    Rhythm:     Rhythm: sinus bradycardia    Ectopy:     Ectopy: none    QRS:     QRS axis:  Normal    QRS intervals:  Normal  Conduction:     Conduction: normal    ST segments:     ST segments:  Normal  T waves:     T waves: normal            ED Course  ED Course as of 04/01/22 1609   Fri Apr 01, 2022   1035 SpO2: 100 %                                       MDM  Number of Diagnoses or Management Options  COPD with acute exacerbation [facility]): new and requires workup  Diagnosis management comments: Initial impression:  Shortness of breath with cough feels very similar to previous exacerbations of COPD  In the context of congestion and cough likely has URI versus pneumonia  Unlikely to be ACS given that it has been slowly increasing shortness of breath without chest pain, diaphoresis, vomiting  COVID less likely without fever and fully vaccinated    Initial work up:  Chest x-ray, empiric albuterol    Final impression:  Patient overall did better after albuterol treatment  Not having severe dyspnea  Got patient up and walked her around and she was able to walk without any issue  Chest x-ray was within normal limits  Overall patient's presentation is consistent with acute COPD exacerbation    Does not require hospitalization or further evaluation or treatment in the ED  Will discharge patient with some Tessalon Perles, budesonide, doxycycline  Instructed patient to return if new or severe symptoms  Disposition  Final diagnoses:   COPD with acute exacerbation ([address] )     Time reflects when diagnosis was documented in both MDM as applicable and the Disposition within this note     Time User Action Codes Description Comment    4/1/2022 12:14 PM Mikaela Jorgensen Add [J44 1] COPD with acute exacerbation [facility])       ED Disposition     ED Disposition Condition Date/Time Comment    Discharge Stable Fri Apr 1, 2022 12:13 PM Francisco Leonard discharge to home/self care  Follow-up Information    None         Discharge Medication List as of 4/1/2022 12:24 PM      START taking these medications    Details   benzonatate (TESSALON PERLES) 100 mg capsule Take 1 capsule (100 mg total) by mouth every 8 (eight) hours for 20 doses, Starting Fri 4/1/2022, Until Fri 4/8/2022, Print      budesonide (Pulmicort) 1 MG/2ML nebulizer solution Take 2 mL (1 mg total) by nebulization 4 (four) times a day for 7 days Rinse mouth after use   fro acute copd exacerbation - 1 mg/ 2 ml via nebulizer 4 times per day for 1 week, Starting Fri 4/1/2022, Until Fri 4/8/2022, Normal      doxycycline hyclate (VIBRAMYCIN) 100 mg capsule Take 1 capsule (100 mg total) by mouth 2 (two) times a day for 13 doses Take with meals, Starting Fri 4/1/2022, Until Fri 4/8/2022, Print         CONTINUE these medications which have NOT CHANGED    Details   Apremilast (Otezla) 30 MG TABS Take 2 tablets by mouth daily, Starting Tue 10/13/2020, Normal      aspirin 81 mg chewable tablet Chew 1 tablet (81 mg total) daily, Starting Fri 10/1/2021, Normal      chlorthalidone 25 mg tablet Take 1 tablet (25 mg total) by mouth daily, Starting Tue 12/28/2021, Normal      Cholecalciferol (VITAMIN D) 125 MCG (5000 UT) CAPS Take by mouth, Historical Med      Continuous Blood Gluc  (FreeStyle Dejuan 14 Day [place]) THERESA Check BG's 2-4x a day depending on sugar levels, Print      Continuous Blood Gluc Sensor (FreeStyle Dejuan 14 Day Sensor) MISC Check sugars 2-4x daily depending on sugar levels, Print      cyanocobalamin (VITAMIN B-12) 1000 MCG tablet Take 1 tablet (1,000 mcg total) by mouth daily, Starting Mon 1/11/2021, No Print      diltiazem (CARDIZEM CD) 360 MG 24 hr capsule Take 1 capsule (360 mg total) by mouth daily, Starting Tue 12/28/2021, Until Thu 1/27/2022, Normal      Exenatide ER (Bydureon BCise) 2 MG/0 85ML AUIJ Inject 2 mg under the skin once a week, Starting Tue 12/28/2021, Normal      insulin glargine (Lantus SoloStar) 100 units/mL injection pen Inject 50 Units under the skin daily, Starting Sun 1/30/2022, No Print      insulin lispro (HumaLOG KwikPen) 100 units/mL injection pen Take 5 U ac b'fastr, 10u before lunch, 15u before supper, Normal      losartan (COZAAR) 100 MG tablet Take 1 tablet (100 mg total) by mouth daily, Starting Tue 2/22/2022, Normal      OneTouch Ultra test strip TEST 2-3 TIMES A DAY, Normal      pilocarpine (SALAGEN) 5 mg tablet Take 1 tablet (5 mg total) by mouth 3 (three) times a day, Starting Sun 10/10/2021, Until Tue 11/23/2021, Normal      potassium chloride (K-DUR,KLOR-CON) 20 mEq tablet Take 1 tablet (20 mEq total) by mouth daily, Starting Tue 12/28/2021, Until Thu 1/27/2022, Normal           No discharge procedures on file  PDMP Review       Value Time User    PDMP Reviewed  Yes 12/4/2019 10:57 AM Compa Steele PA-C           ED Provider  Attending physically available and evaluated Maxwell Bolanos I managed the patient along with the ED Attending      Electronically Signed by         Muriel Paget, MD  04/01/22 8772

## 2022-04-04 ENCOUNTER — TELEPHONE (OUTPATIENT)
Dept: FAMILY MEDICINE CLINIC | Facility: CLINIC | Age: 77
End: 2022-04-04

## 2022-04-05 NOTE — ED ATTENDING ATTESTATION
4/1/2022  IFarhad MD, saw and evaluated the patient  I have discussed the patient with the resident/non-physician practitioner and agree with the resident's/non-physician practitioner's findings, Plan of Care, and MDM as documented in the resident's/non-physician practitioner's note, except where noted  All available labs and Radiology studies were reviewed  I was present for key portions of any procedure(s) performed by the resident/non-physician practitioner and I was immediately available to provide assistance  At this point I agree with the current assessment done in the Emergency Department    I have conducted an independent evaluation of this patient a history and physical is as follows:see h and p above- agree with resident tx plan/ dispo    ED Course  ED Course as of 04/05/22 1234   Fri Apr 01, 2022   0906 Er cxr portable- compared to previous 4/26/21- no sign changes- no  free/sq air- no infiltrate/ptx/ pulm edema/ -pleural effusions   0925 Er md note- pt has not taken any of her meds this am -- last took alb neb at 3 am    1207 - er md note repeat oral temp 98 7---  pt re-evaluated by er md- decreased bilateral faint expir wheezing-- tolerated ambulation with er md in er down gray  with no complaints at baseline- pulse ox 93-95 % on ra-- discussed disposition --  with patient- pt feels comfortable going home -- with not add on oral steroids do to  recent elevated blood sugars- pt has nebulizer at home -- will give rx for budesonide inhaled for next week - -- pt given  strict d/c instructions and reasons when to return to  the er - she verbalizes understanding          Critical Care Time  Procedures

## 2022-04-05 NOTE — ED PROCEDURE NOTE
PROCEDURE  CriticalCare Time  Performed by: Luis A Brennan MD  Authorized by: Luis A Brennan MD     Critical care provider statement:     Critical care time (minutes):  35    Critical care start time:  4/1/2022 10:00 AM    Critical care end time:  4/1/2022 10:35 AM    Critical care time was exclusive of:  Separately billable procedures and treating other patients and teaching time    Critical care was necessary to treat or prevent imminent or life-threatening deterioration of the following conditions:  Respiratory failure (acute copd exacerbation  with hour long breathing tx )    Critical care was time spent personally by me on the following activities:  Examination of patient, evaluation of patient's response to treatment, development of treatment plan with patient or surrogate, obtaining history from patient or surrogate, review of old charts, re-evaluation of patient's condition, ordering and review of radiographic studies and ordering and performing treatments and interventions    I assumed direction of critical care for this patient from another provider in my specialty: no    Comments:      cruzl pt re-assessments and re-evaluations by er md and discussions about disposition with pt         Luis A Brennan MD  04/05/22 1340

## 2022-04-26 ENCOUNTER — OFFICE VISIT (OUTPATIENT)
Dept: FAMILY MEDICINE CLINIC | Facility: CLINIC | Age: 77
End: 2022-04-26
Payer: COMMERCIAL

## 2022-04-26 VITALS
OXYGEN SATURATION: 98 % | RESPIRATION RATE: 18 BRPM | WEIGHT: 198 LBS | TEMPERATURE: 97.5 F | HEART RATE: 70 BPM | DIASTOLIC BLOOD PRESSURE: 62 MMHG | BODY MASS INDEX: 37.38 KG/M2 | SYSTOLIC BLOOD PRESSURE: 132 MMHG | HEIGHT: 61 IN

## 2022-04-26 DIAGNOSIS — E11.9 CONTROLLED TYPE 2 DIABETES MELLITUS WITHOUT COMPLICATION, WITH LONG-TERM CURRENT USE OF INSULIN (HCC): Primary | ICD-10-CM

## 2022-04-26 DIAGNOSIS — L98.9 PSORIASIS-LIKE SKIN DISEASE: ICD-10-CM

## 2022-04-26 DIAGNOSIS — R06.02 SHORTNESS OF BREATH: ICD-10-CM

## 2022-04-26 DIAGNOSIS — Z79.4 CONTROLLED TYPE 2 DIABETES MELLITUS WITHOUT COMPLICATION, WITH LONG-TERM CURRENT USE OF INSULIN (HCC): Primary | ICD-10-CM

## 2022-04-26 DIAGNOSIS — R73.9 ELEVATED BLOOD SUGAR: ICD-10-CM

## 2022-04-26 DIAGNOSIS — E11.65 UNCONTROLLED TYPE 2 DIABETES MELLITUS WITH HYPERGLYCEMIA (HCC): ICD-10-CM

## 2022-04-26 LAB — SL AMB POCT HEMOGLOBIN AIC: 8.2 (ref ?–6.5)

## 2022-04-26 PROCEDURE — 99214 OFFICE O/P EST MOD 30 MIN: CPT | Performed by: FAMILY MEDICINE

## 2022-04-26 PROCEDURE — 1160F RVW MEDS BY RX/DR IN RCRD: CPT | Performed by: FAMILY MEDICINE

## 2022-04-26 PROCEDURE — 83036 HEMOGLOBIN GLYCOSYLATED A1C: CPT | Performed by: FAMILY MEDICINE

## 2022-04-26 PROCEDURE — 3288F FALL RISK ASSESSMENT DOCD: CPT | Performed by: FAMILY MEDICINE

## 2022-04-26 PROCEDURE — 3725F SCREEN DEPRESSION PERFORMED: CPT | Performed by: FAMILY MEDICINE

## 2022-04-26 PROCEDURE — 3078F DIAST BP <80 MM HG: CPT | Performed by: FAMILY MEDICINE

## 2022-04-26 PROCEDURE — 3075F SYST BP GE 130 - 139MM HG: CPT | Performed by: FAMILY MEDICINE

## 2022-04-26 PROCEDURE — 1101F PT FALLS ASSESS-DOCD LE1/YR: CPT | Performed by: FAMILY MEDICINE

## 2022-04-26 NOTE — PROGRESS NOTES
Assessment/Plan:   65 Yo female, in ER Apr 1st  For asthmatic bronchitis  Better, but not fully  Will resume the Neb's BID  And the Budesonide BID          1  Controlled type 2 diabetes mellitus without complication, with long-term current use of insulin (HCC)  Comments:  A1c today is 8 2 in offcie  Orders:  -     POCT hemoglobin A1c    2  Uncontrolled type 2 diabetes mellitus with hyperglycemia (HCC)  Comments:  BBG's re very fluctuating  Not good  Prob the steroids from ER visit affecting it  3  Shortness of breath  Comments:  Asthmatic bronchitis of Apr 1 st reviewed  4  Elevated blood sugar    5  Psoriasis-like skin disease  Comments:  Now taking Otezla BID (finally BID, this is a first)  Will see Dr Abimael Moses tomorrow - q6mo        Subjective:      Patient ID: Pricila Manuel is a 64 y o  female  HPI    The following portions of the patient's history were reviewed and updated as appropriate: She  has a past medical history of Asthma, Benign essential hypertension, Chronic lower back pain, COPD (chronic obstructive pulmonary disease) ([address] ), Diabetes mellitus ([address] ), Hyperlipidemia, and Sciatica    She   Patient Active Problem List    Diagnosis Date Noted    Embolism and thrombosis of arteries of the lower extremities ([address] ) 02/22/2022    Uncontrolled type 2 diabetes mellitus with hyperglycemia ([address] ) 10/09/2021    Hypertensive urgency 10/09/2021    Chest pain, unspecified 09/29/2021    Hypokalemia 09/29/2021    Allergic reaction 08/12/2021    Increased BMI 02/15/2021    Class 2 severe obesity due to excess calories with serious comorbidity and body mass index (BMI) of 35 0 to 35 9 in adult [facility]) 02/15/2021    Depression, recurrent ([address] ) 01/11/2021    Obesity, morbid ([address] ) 01/11/2021    Myalgia due to statin 10/13/2020    Need for vaccination 10/13/2020    Psoriasis 10/13/2020    Hyperlipidemia associated with type 2 diabetes mellitus ([address] ) 10/13/2020    Mixed simple and mucopurulent chronic bronchitis ([address] ) 05/26/2020    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/2020    Mixed hyperlipidemia 05/26/2020    Psoriasis-like skin disease 05/26/2020    Unspecified abnormalities of gait and mobility 12/06/2019    Personal history of nicotine dependence 12/06/2019    Other chronic pain 12/06/2019    Acute left-sided low back pain with left-sided sciatica 12/03/2019    Controlled type 2 diabetes mellitus, with long-term current use of insulin ([facility] ) 12/03/2019    Resistant hypertension 12/03/2019     She  has a past surgical history that includes Cholecystectomy  Her family history includes Emphysema in her father; Leukemia in her father; Uterine cancer in her mother  She  reports that she quit smoking about 17 years ago  Her smoking use included cigarettes  She has a 30 00 pack-year smoking history  She has never used smokeless tobacco  She reports that she does not drink alcohol and does not use drugs  Current Outpatient Medications   Medication Sig Dispense Refill    Apremilast (Otezla) 30 MG TABS Take 2 tablets by mouth daily 180 tablet 3    aspirin 81 mg chewable tablet Chew 1 tablet (81 mg total) daily 30 tablet 0    budesonide (Pulmicort) 1 MG/2ML nebulizer solution Take 2 mL (1 mg total) by nebulization 4 (four) times a day for 7 days Rinse mouth after use   fro acute copd exacerbation - 1 mg/ 2 ml via nebulizer 4 times per day for 1 week 56 mL 0    chlorthalidone 25 mg tablet Take 1 tablet (25 mg total) by mouth daily 90 tablet 1    Cholecalciferol (VITAMIN D) 125 MCG (5000 UT) CAPS Take by mouth      Continuous Blood Gluc  (FreeStyle Dejuan 14 Day [place]) THERESA Check BG's 2-4x a day depending on sugar levels 1 each 0    Continuous Blood Gluc Sensor (FreeStyle Dejuan 14 Day Sensor) MISC Check sugars 2-4x daily depending on sugar levels 28 each 6    cyanocobalamin (VITAMIN B-12) 1000 MCG tablet Take 1 tablet (1,000 mcg total) by mouth daily 100 tablet 6    diltiazem (CARDIZEM CD) 360 MG 24 hr capsule Take 1 capsule (360 mg total) by mouth daily 90 capsule 1    insulin glargine (Lantus SoloStar) 100 units/mL injection pen Inject 50 Units under the skin daily 30 mL 4    insulin lispro (HumaLOG KwikPen) 100 units/mL injection pen Take 5 U ac b'fastr, 10u before lunch, 15u before supper 30 mL 6    losartan (COZAAR) 100 MG tablet Take 1 tablet (100 mg total) by mouth daily 90 tablet 3    OneTouch Ultra test strip TEST 2-3 TIMES A  strip 3    pilocarpine (SALAGEN) 5 mg tablet Take 1 tablet (5 mg total) by mouth 3 (three) times a day 90 tablet 0    potassium chloride (K-DUR,KLOR-CON) 20 mEq tablet Take 1 tablet (20 mEq total) by mouth daily 90 tablet 1     Current Facility-Administered Medications   Medication Dose Route Frequency Provider Last Rate Last Admin    cyanocobalamin injection 1,000 mcg  1,000 mcg Intramuscular Q30 Days Georgia Mcintyre, DO   1,000 mcg at 04/06/21 1656    cyanocobalamin injection 1,000 mcg  1,000 mcg Intramuscular Q30 Days [place], DO   1,000 mcg at 10/27/20 1449    cyanocobalamin injection 1,000 mcg  1,000 mcg Intramuscular Q30 Days [place], DO   1,000 mcg at 01/05/21 0750    cyanocobalamin injection 1,000 mcg  1,000 mcg Intramuscular Q30 Days Georgia Mcintyre, DO   1,000 mcg at 03/09/21 1455     Current Outpatient Medications on File Prior to Visit   Medication Sig    Apremilast (Otezla) 30 MG TABS Take 2 tablets by mouth daily    aspirin 81 mg chewable tablet Chew 1 tablet (81 mg total) daily    budesonide (Pulmicort) 1 MG/2ML nebulizer solution Take 2 mL (1 mg total) by nebulization 4 (four) times a day for 7 days Rinse mouth after use   fro acute copd exacerbation - 1 mg/ 2 ml via nebulizer 4 times per day for 1 week    chlorthalidone 25 mg tablet Take 1 tablet (25 mg total) by mouth daily    Cholecalciferol (VITAMIN D) 125 MCG (5000 UT) CAPS Take by mouth    Continuous Blood Gluc  (FreeStyle Dejuan 14 Day [place]) THERESA Check BG's 2-4x a day depending on sugar levels    Continuous Blood Gluc Sensor (FreeStyle Dejuan 14 Day Sensor) MISC Check sugars 2-4x daily depending on sugar levels    cyanocobalamin (VITAMIN B-12) 1000 MCG tablet Take 1 tablet (1,000 mcg total) by mouth daily    diltiazem (CARDIZEM CD) 360 MG 24 hr capsule Take 1 capsule (360 mg total) by mouth daily    insulin glargine (Lantus SoloStar) 100 units/mL injection pen Inject 50 Units under the skin daily    insulin lispro (HumaLOG KwikPen) 100 units/mL injection pen Take 5 U ac b'fastr, 10u before lunch, 15u before supper    losartan (COZAAR) 100 MG tablet Take 1 tablet (100 mg total) by mouth daily    OneTouch Ultra test strip TEST 2-3 TIMES A DAY    pilocarpine (SALAGEN) 5 mg tablet Take 1 tablet (5 mg total) by mouth 3 (three) times a day    potassium chloride (K-DUR,KLOR-CON) 20 mEq tablet Take 1 tablet (20 mEq total) by mouth daily    [DISCONTINUED] Exenatide ER (Bydureon BCi) 2 MG/0 85ML AUIJ Inject 2 mg under the skin once a week     Current Facility-Administered Medications on File Prior to Visit   Medication    cyanocobalamin injection 1,000 mcg    cyanocobalamin injection 1,000 mcg    cyanocobalamin injection 1,000 mcg    cyanocobalamin injection 1,000 mcg     She is allergic to amlodipine and bystolic [nebivolol hcl]       Review of Systems   Constitutional: Positive for fatigue  Negative for chills and fever  HENT: Negative for congestion, postnasal drip and rhinorrhea  She has mostly Spring allergies  States Singulair not helping  Hence , end that   Respiratory: Negative for cough, shortness of breath and wheezing  Cardiovascular: Negative for chest pain, palpitations and leg swelling  Gastrointestinal: Negative for abdominal distention and nausea  Endocrine: Positive for polydipsia and polyuria  Negative for polyphagia          See BBG sheet , with QID BS's on   Needs the FreeStyle   Genitourinary: Negative for difficulty urinating and flank pain  Musculoskeletal: Negative for arthralgias, back pain, gait problem and myalgias  Skin: Negative for rash  Allergic/Immunologic: Negative for environmental allergies  Neurological: Positive for weakness  Negative for dizziness, facial asymmetry, light-headedness, numbness and headaches  Hematological: Negative for adenopathy  Psychiatric/Behavioral: Negative for agitation, behavioral problems, confusion, decreased concentration, sleep disturbance and suicidal ideas  The patient is nervous/anxious  Objective:      /62   Pulse 70   Temp 97 5 °F (36 4 °C)   Resp 18   Ht 5' 1" (1 549 m)   Wt 89 8 kg (198 lb)   SpO2 98%   BMI 37 41 kg/m²          Physical Exam  Vitals and nursing note reviewed  Constitutional:       General: She is not in acute distress  Appearance: Normal appearance  She is well-developed  She is obese  She is not ill-appearing or diaphoretic  HENT:      Head: Normocephalic and atraumatic  Nose: Nose normal  No congestion or rhinorrhea  Eyes:      General: No scleral icterus  Conjunctiva/sclera: Conjunctivae normal       Pupils: Pupils are equal, round, and reactive to light  Comments: Watery and itchy eyes from the allergies   Neck:      Thyroid: No thyromegaly  Trachea: No tracheal deviation  Cardiovascular:      Rate and Rhythm: Normal rate and regular rhythm  Pulses: Normal pulses  Heart sounds: Normal heart sounds  Pulmonary:      Effort: Pulmonary effort is normal  No respiratory distress  Breath sounds: Normal breath sounds  No wheezing, rhonchi or rales  Chest:      Chest wall: No tenderness  Musculoskeletal:         General: No tenderness or deformity  Normal range of motion  Cervical back: Normal range of motion and neck supple  No rigidity  No muscular tenderness  Right lower leg: No edema  Left lower leg: No edema  Skin:     General: Skin is warm and dry        Coloration: Skin is not pale  Findings: No erythema or rash  Neurological:      General: No focal deficit present  Mental Status: She is alert and oriented to person, place, and time  Mental status is at baseline  Cranial Nerves: No cranial nerve deficit  Psychiatric:         Mood and Affect: Mood normal          Behavior: Behavior normal          Thought Content: Thought content normal          Judgment: Judgment normal          30 min with pt and all the above issues  This time was spent reviewing previous records, reviewing previous laboratory and other tests, taking history from patient, examination of patient, discussion of prognosis and treatment, ordering laboratory tests, ordering medications, and completion of the medical record

## 2022-05-18 DIAGNOSIS — I10 RESISTANT HYPERTENSION: ICD-10-CM

## 2022-05-19 RX ORDER — DILTIAZEM HYDROCHLORIDE 360 MG/1
CAPSULE, EXTENDED RELEASE ORAL
Qty: 90 CAPSULE | Refills: 3 | Status: SHIPPED | OUTPATIENT
Start: 2022-05-19

## 2022-06-06 ENCOUNTER — OFFICE VISIT (OUTPATIENT)
Dept: FAMILY MEDICINE CLINIC | Facility: CLINIC | Age: 77
End: 2022-06-06
Payer: COMMERCIAL

## 2022-06-06 VITALS
WEIGHT: 205.2 LBS | RESPIRATION RATE: 16 BRPM | BODY MASS INDEX: 38.74 KG/M2 | OXYGEN SATURATION: 97 % | TEMPERATURE: 97 F | SYSTOLIC BLOOD PRESSURE: 190 MMHG | HEIGHT: 61 IN | HEART RATE: 60 BPM | DIASTOLIC BLOOD PRESSURE: 70 MMHG

## 2022-06-06 DIAGNOSIS — I10 RESISTANT HYPERTENSION: Primary | ICD-10-CM

## 2022-06-06 DIAGNOSIS — Z79.899 ENCOUNTER FOR LONG-TERM (CURRENT) USE OF MEDICATIONS: ICD-10-CM

## 2022-06-06 DIAGNOSIS — E11.65 UNCONTROLLED TYPE 2 DIABETES MELLITUS WITH HYPERGLYCEMIA (HCC): ICD-10-CM

## 2022-06-06 PROCEDURE — 3078F DIAST BP <80 MM HG: CPT | Performed by: FAMILY MEDICINE

## 2022-06-06 PROCEDURE — 99214 OFFICE O/P EST MOD 30 MIN: CPT | Performed by: FAMILY MEDICINE

## 2022-06-06 PROCEDURE — 1160F RVW MEDS BY RX/DR IN RCRD: CPT | Performed by: FAMILY MEDICINE

## 2022-06-06 PROCEDURE — 3077F SYST BP >= 140 MM HG: CPT | Performed by: FAMILY MEDICINE

## 2022-06-06 PROCEDURE — 1036F TOBACCO NON-USER: CPT | Performed by: FAMILY MEDICINE

## 2022-06-06 NOTE — PROGRESS NOTES
Assessment/Plan:  64 y o female, soon to be 75 yo in 2 wks, , doing the freestyle meter and liking it very much  Blood sugars fluctuate from 70-64 AFib the [id number] in the morning and then dry eyes to mid 200s sometimes low 300s at bedtime  They are somewhat erratic low she notes that it in our to it can drop from 350 down to 63  Currently she is taking Lantus  She has been eating fresh fruit copiously and occasionally cookies  Especially if her blood sugars drop low  She is currently taking Lantus about 40 units daily a m , for lispro Humalog Humalog 2-4 units a c --does vary cardiac MRI oral are  Anemia has had at suppertime she takes 12-15 units of Humalog   The NEW PLAN  For DM is:   Taking Lantus 20 units subQ same time every day, add Glyxambi 10/5 once daily and continue lispro Humalog 2-4 units a c  meals  And limit the daytime natural fruits     Thirst seems to be a prob at nightime  Awake q 2 hr  Don't sleep during the daytime  F/u and eval HTN:  This is major problem as blood pressure today 190/70 by AMA and 190 /72 by me  Luz Marina did not take any of her blood pressure medication today and that is chlorthalidone diltiazem and losartan  She will go on those immediately and monitor her lower abdomen I will she will let me know if blood pressure stays above 140 immediately  F/u and eval RLE, sl numb 2o to tripping over a wire on the walking path - 3 wks ago  Know abrasion now  1  Resistant hypertension    2  Uncontrolled type 2 diabetes mellitus with hyperglycemia ([address] )    3  Encounter for long-term (current) use of medications        Subjective:      Patient ID: Brandie Magdaleno is a 64 y o  female      HPI    The following portions of the patient's history were reviewed and updated as appropriate: She  has a past medical history of Asthma, Benign essential hypertension, Chronic lower back pain, COPD (chronic obstructive pulmonary disease) ([address] ), Diabetes mellitus ([address] ), Hyperlipidemia, and Sciatica  She   Patient Active Problem List    Diagnosis Date Noted    Embolism and thrombosis of arteries of the lower extremities ([facility] ) 02/22/2022    Uncontrolled type 2 diabetes mellitus with hyperglycemia ([facility] ) 10/09/2021    Hypertensive urgency 10/09/2021    Chest pain, unspecified 09/29/2021    Hypokalemia 09/29/2021    Allergic reaction 08/12/2021    Increased BMI 02/15/2021    Class 2 severe obesity due to excess calories with serious comorbidity and body mass index (BMI) of 35 0 to 35 9 in adult [facility]) 02/15/2021    Depression, recurrent ([facility] ) 01/11/2021    Obesity, morbid ([place] [address] ) 01/11/2021    Myalgia due to statin 10/13/2020    Need for vaccination 10/13/2020    Psoriasis 10/13/2020    Hyperlipidemia associated with type 2 diabetes mellitus ([facility] ) 10/13/2020    Mixed simple and mucopurulent chronic bronchitis ([place] [address] ) 05/26/2020    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/2020    Mixed hyperlipidemia 05/26/2020    Psoriasis-like skin disease 05/26/2020    Unspecified abnormalities of gait and mobility 12/06/2019    Personal history of nicotine dependence 12/06/2019    Other chronic pain 12/06/2019    Acute left-sided low back pain with left-sided sciatica 12/03/2019    Controlled type 2 diabetes mellitus, with long-term current use of insulin ([facility] ) 12/03/2019    Resistant hypertension 12/03/2019     She  has a past surgical history that includes Cholecystectomy  Her family history includes Emphysema in her father; Leukemia in her father; Uterine cancer in her mother  She  reports that she quit smoking about 17 years ago  Her smoking use included cigarettes  She has a 30 00 pack-year smoking history  She has never used smokeless tobacco  She reports that she does not drink alcohol and does not use drugs    Current Outpatient Medications   Medication Sig Dispense Refill    Apremilast (Otezla) 30 MG TABS Take 2 tablets by mouth daily 180 tablet 3    aspirin 81 mg chewable tablet Chew 1 tablet (81 mg total) daily 30 tablet 0    budesonide (Pulmicort) 1 MG/2ML nebulizer solution Take 2 mL (1 mg total) by nebulization 4 (four) times a day for 7 days Rinse mouth after use   fro acute copd exacerbation - 1 mg/ 2 ml via nebulizer 4 times per day for 1 week 56 mL 0    chlorthalidone 25 mg tablet Take 1 tablet (25 mg total) by mouth daily 90 tablet 1    Cholecalciferol (VITAMIN D) 125 MCG (5000 UT) CAPS Take by mouth      Continuous Blood Gluc  (FreeStyle Dejuan 14 Day [place]) THERESA Check BG's 2-4x a day depending on sugar levels 1 each 0    diltiazem (CARDIZEM CD) 360 MG 24 hr capsule TAKE 1 CAPSULE BY MOUTH  DAILY 90 capsule 3    insulin glargine (Lantus SoloStar) 100 units/mL injection pen Inject 50 Units under the skin daily 30 mL 4    insulin lispro (HumaLOG KwikPen) 100 units/mL injection pen Take 5 U ac b'fastr, 10u before lunch, 15u before supper 30 mL 6    losartan (COZAAR) 100 MG tablet Take 1 tablet (100 mg total) by mouth daily 90 tablet 3    OneTouch Ultra test strip TEST 2-3 TIMES A  strip 3    potassium chloride (K-DUR,KLOR-CON) 20 mEq tablet Take 1 tablet (20 mEq total) by mouth daily 90 tablet 1    Continuous Blood Gluc Sensor (FreeStyle Dejuan 14 Day Sensor) MISC Check sugars 2-4x daily depending on sugar levels 28 each 6    cyanocobalamin (VITAMIN B-12) 1000 MCG tablet Take 1 tablet (1,000 mcg total) by mouth daily (Patient not taking: Reported on 6/6/2022) 100 tablet 6    pilocarpine (SALAGEN) 5 mg tablet Take 1 tablet (5 mg total) by mouth 3 (three) times a day 90 tablet 0     Current Facility-Administered Medications   Medication Dose Route Frequency Provider Last Rate Last Admin    cyanocobalamin injection 1,000 mcg  1,000 mcg Intramuscular Q30 Days TAMIR De Santiago, DO   1,000 mcg at 04/06/21 0817    cyanocobalamin injection 1,000 mcg  1,000 mcg Intramuscular Q30 Days Kayleen Singh, DO   1,000 mcg at 10/27/20 1449    cyanocobalamin injection 1,000 mcg  1,000 mcg Intramuscular Q30 Days Rashmi Cashing, DO   1,000 mcg at 01/05/21 0750    cyanocobalamin injection 1,000 mcg  1,000 mcg Intramuscular Q30 Days [place] Cashing, DO   1,000 mcg at 03/09/21 6155     Current Outpatient Medications on File Prior to Visit   Medication Sig    Apremilast (Otezla) 30 MG TABS Take 2 tablets by mouth daily    aspirin 81 mg chewable tablet Chew 1 tablet (81 mg total) daily    budesonide (Pulmicort) 1 MG/2ML nebulizer solution Take 2 mL (1 mg total) by nebulization 4 (four) times a day for 7 days Rinse mouth after use   fro acute copd exacerbation - 1 mg/ 2 ml via nebulizer 4 times per day for 1 week    chlorthalidone 25 mg tablet Take 1 tablet (25 mg total) by mouth daily    Cholecalciferol (VITAMIN D) 125 MCG (5000 UT) CAPS Take by mouth    Continuous Blood Gluc  (FreeStyle Dejuan 14 Day [place]) THERESA Check BG's 2-4x a day depending on sugar levels    diltiazem (CARDIZEM CD) 360 MG 24 hr capsule TAKE 1 CAPSULE BY MOUTH  DAILY    insulin glargine (Lantus SoloStar) 100 units/mL injection pen Inject 50 Units under the skin daily    insulin lispro (HumaLOG KwikPen) 100 units/mL injection pen Take 5 U ac b'fastr, 10u before lunch, 15u before supper    losartan (COZAAR) 100 MG tablet Take 1 tablet (100 mg total) by mouth daily    OneTouch Ultra test strip TEST 2-3 TIMES A DAY    potassium chloride (K-DUR,KLOR-CON) 20 mEq tablet Take 1 tablet (20 mEq total) by mouth daily    Continuous Blood Gluc Sensor (FreeStyle Dejuan 14 Day Sensor) MISC Check sugars 2-4x daily depending on sugar levels    cyanocobalamin (VITAMIN B-12) 1000 MCG tablet Take 1 tablet (1,000 mcg total) by mouth daily (Patient not taking: Reported on 6/6/2022)    pilocarpine (SALAGEN) 5 mg tablet Take 1 tablet (5 mg total) by mouth 3 (three) times a day     Current Facility-Administered Medications on File Prior to Visit   Medication    cyanocobalamin injection 1,000 mcg    cyanocobalamin injection 1,000 mcg    cyanocobalamin injection 1,000 mcg    cyanocobalamin injection 1,000 mcg     She is allergic to amlodipine and bystolic [nebivolol hcl]       Review of Systems   Constitutional: Positive for fatigue  Negative for chills and fever  HENT: Negative for congestion, postnasal drip and rhinorrhea  She has mostly Spring allergies  States Singulair not helping  Hence , end that   Respiratory: Negative for cough, shortness of breath and wheezing  Cardiovascular: Negative for chest pain, palpitations and leg swelling  Gastrointestinal: Negative for abdominal distention and nausea  Endocrine: Positive for polydipsia and polyuria  Negative for polyphagia  See BBG sheet , with QID BS's on   Needs the Freeyle   Genitourinary: Negative for difficulty urinating and flank pain  Musculoskeletal: Negative for arthralgias, back pain, gait problem and myalgias  Skin: Negative for rash  Allergic/Immunologic: Negative for environmental allergies  Neurological: Negative for dizziness, facial asymmetry, weakness, light-headedness, numbness and headaches  Hematological: Negative for adenopathy  Psychiatric/Behavioral: Negative for agitation, behavioral problems, confusion, decreased concentration, sleep disturbance and suicidal ideas  The patient is nervous/anxious  Objective:      BP (!) 190/70 (BP Location: Left arm, Patient Position: Sitting, Cuff Size: Standard)   Pulse 60   Temp (!) 97 °F (36 1 °C) (Temporal)   Resp 16   Ht 5' 1" (1 549 m)   Wt 93 1 kg (205 lb 3 2 oz)   SpO2 97%   BMI 38 77 kg/m²          Physical Exam  Vitals and nursing note reviewed  Constitutional:       General: She is not in acute distress  Appearance: Normal appearance  She is well-developed  She is obese  She is not ill-appearing or diaphoretic  HENT:      Head: Normocephalic and atraumatic  Nose: Nose normal  No congestion or rhinorrhea     Eyes:      General: No scleral icterus  Conjunctiva/sclera: Conjunctivae normal       Pupils: Pupils are equal, round, and reactive to light  Comments: Watery and itchy eyes from the allergies   Neck:      Thyroid: No thyromegaly  Trachea: No tracheal deviation  Cardiovascular:      Rate and Rhythm: Normal rate and regular rhythm  Pulses: Normal pulses  Heart sounds: Normal heart sounds  Pulmonary:      Effort: Pulmonary effort is normal  No respiratory distress  Breath sounds: Normal breath sounds  No wheezing, rhonchi or rales  Chest:      Chest wall: No tenderness  Musculoskeletal:         General: No tenderness or deformity  Normal range of motion  Cervical back: Normal range of motion and neck supple  No rigidity  No muscular tenderness  Right lower leg: No edema  Left lower leg: No edema  Skin:     General: Skin is warm and dry  Coloration: Skin is not pale  Findings: No erythema or rash  Neurological:      General: No focal deficit present  Mental Status: She is alert and oriented to person, place, and time  Mental status is at baseline  Cranial Nerves: No cranial nerve deficit  Psychiatric:         Mood and Affect: Mood normal          Behavior: Behavior normal          Thought Content: Thought content normal          Judgment: Judgment normal          30 min with pt and all the above issues addressed and discussed in detail  She is using the FreeStyle monitor and now loves it  This time was spent reviewing previous records, reviewing previous laboratory and other tests, taking history from patient, examination of patient, discussion of prognosis and treatment, ordering laboratory tests, ordering medications, and completion of the medical record

## 2022-06-13 ENCOUNTER — TELEPHONE (OUTPATIENT)
Dept: FAMILY MEDICINE CLINIC | Facility: CLINIC | Age: 77
End: 2022-06-13

## 2022-06-13 DIAGNOSIS — E11.65 UNCONTROLLED TYPE 2 DIABETES MELLITUS WITH HYPERGLYCEMIA (HCC): Primary | ICD-10-CM

## 2022-06-13 NOTE — TELEPHONE ENCOUNTER
Patient called to say she cannot take the new medicine as it makes her too thirsty, so much that she cannot sleep so she has stopped it

## 2022-06-13 NOTE — TELEPHONE ENCOUNTER
I looked into her chart and see that that new drug I ordered is Glyxambi  Tell her to use then stop the Glyxambi--tube at--and replace it with Glucotrol XL 10 mg once daily  Mag, can you send that to pharmacy    Number 30  count, 1 tablet daily

## 2022-06-14 ENCOUNTER — TELEPHONE (OUTPATIENT)
Dept: FAMILY MEDICINE CLINIC | Facility: CLINIC | Age: 77
End: 2022-06-14

## 2022-06-14 NOTE — TELEPHONE ENCOUNTER
Patient called and said she cannot take the new medicine as it gives her bad side effects  I tried to reach her back to spell out the medicine, but could not get her back on the phone    She also said her sugar is up to 161

## 2022-06-15 RX ORDER — GLIPIZIDE 5 MG/1
5 TABLET ORAL
Qty: 180 TABLET | Refills: 3 | Status: SHIPPED | OUTPATIENT
Start: 2022-06-15 | End: 2022-07-06

## 2022-06-15 NOTE — TELEPHONE ENCOUNTER
I was able to get her on the phone finally  The medicine was for sugar  She spelled it out  Glyxambi  She said it makes her so thirsty that it keeps her up at night    She has stopped taking it

## 2022-06-15 NOTE — TELEPHONE ENCOUNTER
Sent in new medication and called and left voicemail for patient to stop prior medication and bring back the samples for you

## 2022-07-06 ENCOUNTER — OFFICE VISIT (OUTPATIENT)
Dept: FAMILY MEDICINE CLINIC | Facility: CLINIC | Age: 77
End: 2022-07-06
Payer: COMMERCIAL

## 2022-07-06 VITALS
BODY MASS INDEX: 39.2 KG/M2 | DIASTOLIC BLOOD PRESSURE: 80 MMHG | HEART RATE: 76 BPM | WEIGHT: 207.6 LBS | OXYGEN SATURATION: 98 % | HEIGHT: 61 IN | RESPIRATION RATE: 16 BRPM | SYSTOLIC BLOOD PRESSURE: 190 MMHG

## 2022-07-06 DIAGNOSIS — Z79.899 ENCOUNTER FOR LONG-TERM (CURRENT) USE OF MEDICATIONS: ICD-10-CM

## 2022-07-06 DIAGNOSIS — I10 RESISTANT HYPERTENSION: Primary | ICD-10-CM

## 2022-07-06 DIAGNOSIS — E11.65 UNCONTROLLED TYPE 2 DIABETES MELLITUS WITH HYPERGLYCEMIA (HCC): ICD-10-CM

## 2022-07-06 DIAGNOSIS — E87.6 HYPOKALEMIA: ICD-10-CM

## 2022-07-06 DIAGNOSIS — I10 RESISTANT HYPERTENSION: ICD-10-CM

## 2022-07-06 DIAGNOSIS — E11.9 CONTROLLED TYPE 2 DIABETES MELLITUS WITHOUT COMPLICATION, WITH LONG-TERM CURRENT USE OF INSULIN (HCC): ICD-10-CM

## 2022-07-06 DIAGNOSIS — Z79.4 CONTROLLED TYPE 2 DIABETES MELLITUS WITHOUT COMPLICATION, WITH LONG-TERM CURRENT USE OF INSULIN (HCC): ICD-10-CM

## 2022-07-06 PROCEDURE — 3079F DIAST BP 80-89 MM HG: CPT | Performed by: FAMILY MEDICINE

## 2022-07-06 PROCEDURE — 99214 OFFICE O/P EST MOD 30 MIN: CPT | Performed by: FAMILY MEDICINE

## 2022-07-06 PROCEDURE — 1160F RVW MEDS BY RX/DR IN RCRD: CPT | Performed by: FAMILY MEDICINE

## 2022-07-06 PROCEDURE — 3077F SYST BP >= 140 MM HG: CPT | Performed by: FAMILY MEDICINE

## 2022-07-06 RX ORDER — CHLORTHALIDONE 25 MG/1
37.5 TABLET ORAL DAILY
Qty: 135 TABLET | Refills: 1 | Status: SHIPPED | OUTPATIENT
Start: 2022-07-06 | End: 2022-10-18 | Stop reason: SDUPTHER

## 2022-07-06 RX ORDER — POTASSIUM CHLORIDE 20 MEQ/1
20 TABLET, EXTENDED RELEASE ORAL 2 TIMES DAILY
Qty: 180 TABLET | Refills: 1 | Status: SHIPPED | OUTPATIENT
Start: 2022-07-06 | End: 2022-10-18

## 2022-07-06 RX ORDER — VALSARTAN 160 MG/1
160 TABLET ORAL DAILY
Qty: 90 TABLET | Refills: 3 | Status: SHIPPED | OUTPATIENT
Start: 2022-07-06 | End: 2022-10-18 | Stop reason: SDUPTHER

## 2022-07-06 RX ORDER — INSULIN GLARGINE 100 [IU]/ML
50 INJECTION, SOLUTION SUBCUTANEOUS DAILY
Qty: 45 ML | Refills: 3 | Status: SHIPPED | OUTPATIENT
Start: 2022-07-06 | End: 2022-10-18

## 2022-07-06 NOTE — PROGRESS NOTES
Assessment/Plan: 76 y o female, in NAD, seen in f/u of DM and HTN:  BP by me this a m  is 160/66 R arm, by /80 while taking chlorthalidone 25, diltiazem 360 and Losartan 100==>PLAN:  D/c the losartan and ADD:  Valsartan 160 mg OD, may need to go to the 320 mg OD dose??     2  NIDDM - BS better, but still too hi on Lantus 44 U  PLAN: increase to 48 U and valarie in 2 wks  Cont BBG at least BID  1  Resistant hypertension  Comments:  See CCL  BP too hi  d/c the losartan and change to Valsartan 160 mg OD (may need to inc o 320?? )  Orders:  -     valsartan (DIOVAN) 160 mg tablet; Take 1 tablet (160 mg total) by mouth daily  -     chlorthalidone 25 mg tablet; Take 1 5 tablets (37 5 mg total) by mouth daily    2  Controlled type 2 diabetes mellitus without complication, with long-term current use of insulin ([facility])  Comments:  see my notes elsewhere currently taking Lantus 44 U to 48 units daily, and keep the lispro insulin 5,10,15 units before meals as it is  Orders:  -     insulin glargine (Lantus SoloStar) 100 units/mL injection pen; Inject 50 Units under the skin daily    3  Uncontrolled type 2 diabetes mellitus with hyperglycemia ([address] )    4  Encounter for long-term (current) use of medications  Comments: All meds reviewed and discussed  Ranjith HTN and DM    5  Resistant hypertension  Comments:  Generally better - only on 2 meds:  Chlor 25 and Diltiazem 360 mg OD   No cardiac issues  Orders:  -     valsartan (DIOVAN) 160 mg tablet; Take 1 tablet (160 mg total) by mouth daily  -     chlorthalidone 25 mg tablet; Take 1 5 tablets (37 5 mg total) by mouth daily    6  Hypokalemia  Comments:  taking KCl supplements  Orders:  -     potassium chloride (K-DUR,KLOR-CON) 20 mEq tablet; Take 1 tablet (20 mEq total) by mouth 2 (two) times a day        Subjective:      Patient ID: Dick Moseley is a 76 y o  female      HPI    The following portions of the patient's history were reviewed and updated as appropriate: She  has a past medical history of Asthma, Benign essential hypertension, Chronic lower back pain, COPD (chronic obstructive pulmonary disease) ([facility] ), Diabetes mellitus ([facility] ), Hyperlipidemia, and Sciatica  She   Patient Active Problem List    Diagnosis Date Noted    Embolism and thrombosis of arteries of the lower extremities ([facility] ) 02/22/2022    Uncontrolled type 2 diabetes mellitus with hyperglycemia ([facility] ) 10/09/2021    Hypertensive urgency 10/09/2021    Chest pain, unspecified 09/29/2021    Hypokalemia 09/29/2021    Allergic reaction 08/12/2021    Increased BMI 02/15/2021    Class 2 severe obesity due to excess calories with serious comorbidity and body mass index (BMI) of 35 0 to 35 9 in adult [facility]) 02/15/2021    Depression, recurrent ([facility] ) 01/11/2021    Obesity, morbid ([facility] ) 01/11/2021    Myalgia due to statin 10/13/2020    Need for vaccination 10/13/2020    Psoriasis 10/13/2020    Hyperlipidemia associated with type 2 diabetes mellitus ([facility] ) 10/13/2020    Mixed simple and mucopurulent chronic bronchitis ([facility] ) 05/26/2020    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/2020    Mixed hyperlipidemia 05/26/2020    Psoriasis-like skin disease 05/26/2020    Unspecified abnormalities of gait and mobility 12/06/2019    Personal history of nicotine dependence 12/06/2019    Other chronic pain 12/06/2019    Acute left-sided low back pain with left-sided sciatica 12/03/2019    Controlled type 2 diabetes mellitus, with long-term current use of insulin ([facility] ) 12/03/2019    Resistant hypertension 12/03/2019     She  has a past surgical history that includes Cholecystectomy  Her family history includes Emphysema in her father; Leukemia in her father; Uterine cancer in her mother  She  reports that she quit smoking about 17 years ago  Her smoking use included cigarettes  She has a 30 00 pack-year smoking history   She has never used smokeless tobacco  She reports that she does not drink alcohol and does not use drugs   Current Outpatient Medications   Medication Sig Dispense Refill    Apremilast (Otezla) 30 MG TABS Take 2 tablets by mouth daily 180 tablet 3    aspirin 81 mg chewable tablet Chew 1 tablet (81 mg total) daily 30 tablet 0    budesonide (Pulmicort) 1 MG/2ML nebulizer solution Take 2 mL (1 mg total) by nebulization 4 (four) times a day for 7 days Rinse mouth after use   fro acute copd exacerbation - 1 mg/ 2 ml via nebulizer 4 times per day for 1 week 56 mL 0    chlorthalidone 25 mg tablet Take 1 5 tablets (37 5 mg total) by mouth daily 135 tablet 1    Continuous Blood Gluc  (FreeStyle Dejuan 14 Day [place]) THERESA Check BG's 2-4x a day depending on sugar levels 1 each 0    Continuous Blood Gluc Sensor (FreeStyle Dejuan 14 Day Sensor) MISC Check sugars 2-4x daily depending on sugar levels 28 each 6    diltiazem (CARDIZEM CD) 360 MG 24 hr capsule TAKE 1 CAPSULE BY MOUTH  DAILY 90 capsule 3    insulin glargine (Lantus SoloStar) 100 units/mL injection pen Inject 50 Units under the skin daily 45 mL 3    insulin lispro (HumaLOG KwikPen) 100 units/mL injection pen Take 5 U ac b'fastr, 10u before lunch, 15u before supper 30 mL 6    OneTouch Ultra test strip TEST 2-3 TIMES A  strip 3    potassium chloride (K-DUR,KLOR-CON) 20 mEq tablet Take 1 tablet (20 mEq total) by mouth 2 (two) times a day 180 tablet 1    valsartan (DIOVAN) 160 mg tablet Take 1 tablet (160 mg total) by mouth daily 90 tablet 3    Cholecalciferol (VITAMIN D) 125 MCG (5000 UT) CAPS Take by mouth (Patient not taking: Reported on 7/6/2022)      cyanocobalamin (VITAMIN B-12) 1000 MCG tablet Take 1 tablet (1,000 mcg total) by mouth daily (Patient not taking: Reported on 6/6/2022) 100 tablet 6     Current Facility-Administered Medications   Medication Dose Route Frequency Provider Last Rate Last Admin    cyanocobalamin injection 1,000 mcg  1,000 mcg Intramuscular Q30 Days Jamie Esposito DO   1,000 mcg at 04/06/21 0817    cyanocobalamin injection 1,000 mcg  1,000 mcg Intramuscular Q30 Days Charlaine Jacky, DO   1,000 mcg at 10/27/20 1449    cyanocobalamin injection 1,000 mcg  1,000 mcg Intramuscular Q30 Days [facility], DO   1,000 mcg at 01/05/21 0750    cyanocobalamin injection 1,000 mcg  1,000 mcg Intramuscular Q30 Days [facility], DO   1,000 mcg at 03/09/21 1455     Current Outpatient Medications on File Prior to Visit   Medication Sig    Apremilast (Otezla) 30 MG TABS Take 2 tablets by mouth daily    aspirin 81 mg chewable tablet Chew 1 tablet (81 mg total) daily    budesonide (Pulmicort) 1 MG/2ML nebulizer solution Take 2 mL (1 mg total) by nebulization 4 (four) times a day for 7 days Rinse mouth after use   fro acute copd exacerbation - 1 mg/ 2 ml via nebulizer 4 times per day for 1 week    Continuous Blood Gluc  (FreeStyle Dejuan 14 Day [place]) THERESA Check BG's 2-4x a day depending on sugar levels    Continuous Blood Gluc Sensor (FreeStyle Dejuan 14 Day Sensor) MISC Check sugars 2-4x daily depending on sugar levels    diltiazem (CARDIZEM CD) 360 MG 24 hr capsule TAKE 1 CAPSULE BY MOUTH  DAILY    insulin lispro (HumaLOG KwikPen) 100 units/mL injection pen Take 5 U ac b'fastr, 10u before lunch, 15u before supper    OneTouch Ultra test strip TEST 2-3 TIMES A DAY    [DISCONTINUED] chlorthalidone 25 mg tablet Take 1 tablet (25 mg total) by mouth daily    [DISCONTINUED] insulin glargine (Lantus SoloStar) 100 units/mL injection pen Inject 50 Units under the skin daily    [DISCONTINUED] losartan (COZAAR) 100 MG tablet Take 1 tablet (100 mg total) by mouth daily    [DISCONTINUED] potassium chloride (K-DUR,KLOR-CON) 20 mEq tablet Take 1 tablet (20 mEq total) by mouth daily    Cholecalciferol (VITAMIN D) 125 MCG (5000 UT) CAPS Take by mouth (Patient not taking: Reported on 7/6/2022)    cyanocobalamin (VITAMIN B-12) 1000 MCG tablet Take 1 tablet (1,000 mcg total) by mouth daily (Patient not taking: Reported on 6/6/2022)    [DISCONTINUED] glipiZIDE (GLUCOTROL) 5 mg tablet Take 1 tablet (5 mg total) by mouth 2 (two) times a day before meals (Patient not taking: Reported on 7/6/2022)    [DISCONTINUED] pilocarpine (SALAGEN) 5 mg tablet Take 1 tablet (5 mg total) by mouth 3 (three) times a day (Patient not taking: Reported on 7/6/2022)     Current Facility-Administered Medications on File Prior to Visit   Medication    cyanocobalamin injection 1,000 mcg    cyanocobalamin injection 1,000 mcg    cyanocobalamin injection 1,000 mcg    cyanocobalamin injection 1,000 mcg     She is allergic to amlodipine, bystolic [nebivolol hcl], and glipizide       Review of Systems   Constitutional: Positive for fatigue  Negative for chills and fever  HENT: Negative for congestion, postnasal drip and rhinorrhea  She has mostly Spring allergies  States Singulair not helping  Hence , end that   Respiratory: Negative for cough, shortness of breath and wheezing  Cardiovascular: Negative for chest pain, palpitations and leg swelling  Gastrointestinal: Negative for abdominal distention and nausea  Endocrine: Positive for polydipsia and polyuria  Negative for polyphagia  See BBG sheet , with QID BS's on   Needs the [employer]yle   Genitourinary: Negative for difficulty urinating and flank pain  Musculoskeletal: Negative for arthralgias, back pain, gait problem and myalgias  Skin: Negative for rash  Allergic/Immunologic: Negative for environmental allergies  Neurological: Negative for dizziness, facial asymmetry, weakness, light-headedness, numbness and headaches  Hematological: Negative for adenopathy  Psychiatric/Behavioral: Negative for agitation, behavioral problems, confusion, decreased concentration, sleep disturbance and suicidal ideas  The patient is nervous/anxious            Objective:      BP (!) 190/80 (BP Location: Left arm, Patient Position: Sitting, Cuff Size: Large)   Pulse 76   Resp 16   Ht 5' 1" (1 549 m)   Wt 94 2 kg (207 lb 9 6 oz)   SpO2 98%   BMI 39 23 kg/m²          Physical Exam  Vitals and nursing note reviewed  Constitutional:       General: She is not in acute distress  Appearance: Normal appearance  She is well-developed  She is obese  She is not ill-appearing or diaphoretic  HENT:      Head: Normocephalic and atraumatic  Nose: Nose normal  No congestion or rhinorrhea  Eyes:      General: No scleral icterus  Conjunctiva/sclera: Conjunctivae normal       Pupils: Pupils are equal, round, and reactive to light  Comments: Watery and itchy eyes from the allergies   Neck:      Thyroid: No thyromegaly  Trachea: No tracheal deviation  Cardiovascular:      Rate and Rhythm: Normal rate and regular rhythm  Pulses: Normal pulses  Heart sounds: Normal heart sounds  Pulmonary:      Effort: Pulmonary effort is normal  No respiratory distress  Breath sounds: Normal breath sounds  No wheezing, rhonchi or rales  Chest:      Chest wall: No tenderness  Musculoskeletal:         General: No tenderness or deformity  Normal range of motion  Cervical back: Normal range of motion and neck supple  No rigidity  No muscular tenderness  Right lower leg: No edema  Left lower leg: No edema  Skin:     General: Skin is warm and dry  Coloration: Skin is not pale  Findings: No erythema or rash  Neurological:      General: No focal deficit present  Mental Status: She is alert and oriented to person, place, and time  Mental status is at baseline  Cranial Nerves: No cranial nerve deficit  Psychiatric:         Mood and Affect: Mood normal          Behavior: Behavior normal          Thought Content: Thought content normal          Judgment: Judgment normal          35-40 min with pt, all issues above addressed       This time was spent reviewing previous records, reviewing previous laboratory and other tests, taking history from patient, examination of patient, discussion of prognosis and treatment, ordering laboratory tests, ordering medications, and completion of the medical record

## 2022-07-22 DIAGNOSIS — E11.9 CONTROLLED TYPE 2 DIABETES MELLITUS WITHOUT COMPLICATION, WITH LONG-TERM CURRENT USE OF INSULIN (HCC): ICD-10-CM

## 2022-07-22 DIAGNOSIS — Z79.4 CONTROLLED TYPE 2 DIABETES MELLITUS WITHOUT COMPLICATION, WITH LONG-TERM CURRENT USE OF INSULIN (HCC): ICD-10-CM

## 2022-07-22 RX ORDER — BLOOD SUGAR DIAGNOSTIC
STRIP MISCELLANEOUS
Qty: 100 STRIP | Refills: 3 | Status: SHIPPED | OUTPATIENT
Start: 2022-07-22

## 2022-08-22 ENCOUNTER — OFFICE VISIT (OUTPATIENT)
Dept: FAMILY MEDICINE CLINIC | Facility: CLINIC | Age: 77
End: 2022-08-22
Payer: COMMERCIAL

## 2022-08-22 VITALS
OXYGEN SATURATION: 96 % | HEART RATE: 80 BPM | DIASTOLIC BLOOD PRESSURE: 58 MMHG | HEIGHT: 61 IN | SYSTOLIC BLOOD PRESSURE: 130 MMHG | BODY MASS INDEX: 39.27 KG/M2 | TEMPERATURE: 97.7 F | WEIGHT: 208 LBS | RESPIRATION RATE: 18 BRPM

## 2022-08-22 DIAGNOSIS — I74.3 EMBOLISM AND THROMBOSIS OF ARTERIES OF THE LOWER EXTREMITIES (HCC): ICD-10-CM

## 2022-08-22 DIAGNOSIS — E87.6 HYPOKALEMIA: ICD-10-CM

## 2022-08-22 DIAGNOSIS — E55.9 VITAMIN D INSUFFICIENCY: ICD-10-CM

## 2022-08-22 DIAGNOSIS — R53.83 FATIGUE, UNSPECIFIED TYPE: ICD-10-CM

## 2022-08-22 DIAGNOSIS — E11.65 UNCONTROLLED TYPE 2 DIABETES MELLITUS WITH HYPERGLYCEMIA (HCC): ICD-10-CM

## 2022-08-22 DIAGNOSIS — I10 RESISTANT HYPERTENSION: Primary | ICD-10-CM

## 2022-08-22 LAB — SL AMB POCT HEMOGLOBIN AIC: 8.1 (ref ?–6.5)

## 2022-08-22 PROCEDURE — 83036 HEMOGLOBIN GLYCOSYLATED A1C: CPT | Performed by: FAMILY MEDICINE

## 2022-08-22 PROCEDURE — 99214 OFFICE O/P EST MOD 30 MIN: CPT | Performed by: FAMILY MEDICINE

## 2022-08-22 PROCEDURE — 3078F DIAST BP <80 MM HG: CPT | Performed by: FAMILY MEDICINE

## 2022-08-22 PROCEDURE — 1160F RVW MEDS BY RX/DR IN RCRD: CPT | Performed by: FAMILY MEDICINE

## 2022-08-22 PROCEDURE — 3075F SYST BP GE 130 - 139MM HG: CPT | Performed by: FAMILY MEDICINE

## 2022-08-22 NOTE — PATIENT INSTRUCTIONS

## 2022-08-22 NOTE — PROGRESS NOTES
Assessment/Plan:77 y o female, in NAD, but with fluctuating BS's     Very hard to regulate, as lots of different combinations have been tried  Long talk re Rx plan and the combinations available and what she has been on  A1c NOW is 8 1%, down from 8 3%  PLAN:  Cont the 50 U of Lantus OD and add:  Log 6 or 8 U EVERY ac supper  1  Resistant hypertension  -     CBC; Future  -     UA w Reflex to Microscopic w Reflex to Culture  -     Comprehensive metabolic panel; Future  -     Lipid panel; Future  -     TSH, 3rd generation; Future  -     Vitamin D 25 hydroxy; Future    2  Uncontrolled type 2 diabetes mellitus with hyperglycemia (HCC)  -     CBC; Future  -     UA w Reflex to Microscopic w Reflex to Culture  -     Comprehensive metabolic panel; Future  -     Lipid panel; Future  -     TSH, 3rd generation; Future  -     Vitamin D 25 hydroxy; Future  -     POCT hemoglobin A1c    3  Hypokalemia  -     Comprehensive metabolic panel; Future    4  Vitamin D insufficiency  -     Vitamin D 25 hydroxy; Future    5  Fatigue, unspecified type  -     TSH, 3rd generation; Future    6  Embolism and thrombosis of arteries of the lower extremities (HCC)        Subjective:      Patient ID: Kumar Sheldon is a 76 y o  female  HPI    The following portions of the patient's history were reviewed and updated as appropriate: She  has a past medical history of Asthma, Benign essential hypertension, Chronic lower back pain, COPD (chronic obstructive pulmonary disease) ([address] ), Diabetes mellitus ([address] ), Hyperlipidemia, and Sciatica    She   Patient Active Problem List    Diagnosis Date Noted    Embolism and thrombosis of arteries of the lower extremities ([address] ) 02/22/2022    Uncontrolled type 2 diabetes mellitus with hyperglycemia ([address] ) 10/09/2021    Hypertensive urgency 10/09/2021    Chest pain, unspecified 09/29/2021    Hypokalemia 09/29/2021    Allergic reaction 08/12/2021    Increased BMI 02/15/2021    Class 2 severe obesity due to excess calories with serious comorbidity and body mass index (BMI) of 35 0 to 35 9 in adult [facility]) 02/15/2021    Depression, recurrent ([facility] ) 01/11/2021    Obesity, morbid ([facility] ) 01/11/2021    Myalgia due to statin 10/13/2020    Need for vaccination 10/13/2020    Psoriasis 10/13/2020    Hyperlipidemia associated with type 2 diabetes mellitus ([facility] ) 10/13/2020    Mixed simple and mucopurulent chronic bronchitis ([facility] ) 05/26/2020    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/2020    Mixed hyperlipidemia 05/26/2020    Psoriasis-like skin disease 05/26/2020    Unspecified abnormalities of gait and mobility 12/06/2019    Personal history of nicotine dependence 12/06/2019    Other chronic pain 12/06/2019    Acute left-sided low back pain with left-sided sciatica 12/03/2019    Controlled type 2 diabetes mellitus, with long-term current use of insulin ([place] [id number] ) 12/03/2019    Resistant hypertension 12/03/2019     She  has a past surgical history that includes Cholecystectomy  Her family history includes Emphysema in her father; Leukemia in her father; Uterine cancer in her mother  She  reports that she quit smoking about 17 years ago  Her smoking use included cigarettes  She has a 30 00 pack-year smoking history  She has never used smokeless tobacco  She reports that she does not drink alcohol and does not use drugs    Current Outpatient Medications   Medication Sig Dispense Refill    Apremilast (Otezla) 30 MG TABS Take 2 tablets by mouth daily 180 tablet 3    aspirin 81 mg chewable tablet Chew 1 tablet (81 mg total) daily 30 tablet 0    chlorthalidone 25 mg tablet Take 1 5 tablets (37 5 mg total) by mouth daily 135 tablet 1    Continuous Blood Gluc  (FreeStyle Dejuan 14 Day [place]) THERESA Check BG's 2-4x a day depending on sugar levels 1 each 0    Continuous Blood Gluc Sensor (FreeStyle Dejuan 14 Day Sensor) MISC Check sugars 2-4x daily depending on sugar levels 28 each 6    diltiazem (CARDIZEM CD) 360 MG 24 hr capsule TAKE 1 CAPSULE BY MOUTH  DAILY 90 capsule 3    insulin glargine (Lantus SoloStar) 100 units/mL injection pen Inject 50 Units under the skin daily 45 mL 3    OneTouch Ultra test strip TEST 2-3 TIMES DAILY 100 strip 3    potassium chloride (K-DUR,KLOR-CON) 20 mEq tablet Take 1 tablet (20 mEq total) by mouth 2 (two) times a day 180 tablet 1    valsartan (DIOVAN) 160 mg tablet Take 1 tablet (160 mg total) by mouth daily 90 tablet 3    budesonide (Pulmicort) 1 MG/2ML nebulizer solution Take 2 mL (1 mg total) by nebulization 4 (four) times a day for 7 days Rinse mouth after use   fro acute copd exacerbation - 1 mg/ 2 ml via nebulizer 4 times per day for 1 week (Patient not taking: Reported on 8/22/2022) 56 mL 0    Cholecalciferol (VITAMIN D) 125 MCG (5000 UT) CAPS Take by mouth (Patient not taking: No sig reported)      cyanocobalamin (VITAMIN B-12) 1000 MCG tablet Take 1 tablet (1,000 mcg total) by mouth daily (Patient not taking: No sig reported) 100 tablet 6    insulin lispro (HumaLOG KwikPen) 100 units/mL injection pen Take 5 U ac b'fastr, 10u before lunch, 15u before supper (Patient not taking: Reported on 8/22/2022) 30 mL 6     Current Facility-Administered Medications   Medication Dose Route Frequency Provider Last Rate Last Admin    cyanocobalamin injection 1,000 mcg  1,000 mcg Intramuscular Q30 Days Margrette Rough, DO   1,000 mcg at 04/06/21 5243    cyanocobalamin injection 1,000 mcg  1,000 mcg Intramuscular Q30 Days TAMIR Montejo, DO   1,000 mcg at 10/27/20 1449    cyanocobalamin injection 1,000 mcg  1,000 mcg Intramuscular Q30 Days Margrette Rough, DO   1,000 mcg at 01/05/21 0750    cyanocobalamin injection 1,000 mcg  1,000 mcg Intramuscular Q30 Days Margrette Rough, DO   1,000 mcg at 03/09/21 1455     Current Outpatient Medications on File Prior to Visit   Medication Sig    Apremilast (Otezla) 30 MG TABS Take 2 tablets by mouth daily    aspirin 81 mg chewable tablet Chew 1 tablet (81 mg total) daily    chlorthalidone 25 mg tablet Take 1 5 tablets (37 5 mg total) by mouth daily    Continuous Blood Gluc  (FreeStyle Dejuan 14 Day [place]) THERESA Check BG's 2-4x a day depending on sugar levels    Continuous Blood Gluc Sensor (FreeStyle Dejuan 14 Day Sensor) MISC Check sugars 2-4x daily depending on sugar levels    diltiazem (CARDIZEM CD) 360 MG 24 hr capsule TAKE 1 CAPSULE BY MOUTH  DAILY    insulin glargine (Lantus SoloStar) 100 units/mL injection pen Inject 50 Units under the skin daily    OneTouch Ultra test strip TEST 2-3 TIMES DAILY    potassium chloride (K-DUR,KLOR-CON) 20 mEq tablet Take 1 tablet (20 mEq total) by mouth 2 (two) times a day    valsartan (DIOVAN) 160 mg tablet Take 1 tablet (160 mg total) by mouth daily    budesonide (Pulmicort) 1 MG/2ML nebulizer solution Take 2 mL (1 mg total) by nebulization 4 (four) times a day for 7 days Rinse mouth after use  fro acute copd exacerbation - 1 mg/ 2 ml via nebulizer 4 times per day for 1 week (Patient not taking: Reported on 8/22/2022)    Cholecalciferol (VITAMIN D) 125 MCG (5000 UT) CAPS Take by mouth (Patient not taking: No sig reported)    cyanocobalamin (VITAMIN B-12) 1000 MCG tablet Take 1 tablet (1,000 mcg total) by mouth daily (Patient not taking: No sig reported)    insulin lispro (HumaLOG KwikPen) 100 units/mL injection pen Take 5 U ac b'fastr, 10u before lunch, 15u before supper (Patient not taking: Reported on 8/22/2022)     Current Facility-Administered Medications on File Prior to Visit   Medication    cyanocobalamin injection 1,000 mcg    cyanocobalamin injection 1,000 mcg    cyanocobalamin injection 1,000 mcg    cyanocobalamin injection 1,000 mcg     She is allergic to amlodipine, bystolic [nebivolol hcl], and glipizide       Review of Systems   Constitutional: Positive for fatigue  Negative for chills and fever  HENT: Negative for congestion, postnasal drip and rhinorrhea           She has mostly Spring allergies  States Singulair not helping  Hence , end that   Respiratory: Negative for cough, shortness of breath and wheezing  Cardiovascular: Negative for chest pain, palpitations and leg swelling  Gastrointestinal: Negative for abdominal distention and nausea  Endocrine: Positive for polydipsia and polyuria  Negative for polyphagia  See BBG sheet , with QID BS's on   [facility]   Genitourinary: Negative for difficulty urinating and flank pain  Musculoskeletal: Negative for arthralgias, back pain, gait problem and myalgias  Skin: Negative for rash  Allergic/Immunologic: Negative for environmental allergies  Neurological: Negative for dizziness, facial asymmetry, weakness, light-headedness, numbness and headaches  Hematological: Negative for adenopathy  Psychiatric/Behavioral: Negative for agitation, behavioral problems, confusion, decreased concentration, sleep disturbance and suicidal ideas  The patient is nervous/anxious  Objective:      /58 (BP Location: Right arm, Patient Position: Sitting, Cuff Size: Standard)   Pulse 80   Temp 97 7 °F (36 5 °C) (Temporal)   Resp 18   Ht 5' 1" (1 549 m)   Wt 94 3 kg (208 lb)   SpO2 96%   BMI 39 30 kg/m²          Physical Exam  Vitals and nursing note reviewed  Constitutional:       General: She is not in acute distress  Appearance: Normal appearance  She is well-developed  She is obese  She is not ill-appearing or diaphoretic  HENT:      Head: Normocephalic and atraumatic  Nose: Nose normal  No congestion or rhinorrhea  Eyes:      General: No scleral icterus  Conjunctiva/sclera: Conjunctivae normal       Pupils: Pupils are equal, round, and reactive to light  Comments: Watery and itchy eyes from the allergies   Neck:      Thyroid: No thyromegaly  Trachea: No tracheal deviation  Cardiovascular:      Rate and Rhythm: Normal rate and regular rhythm  Pulses: Normal pulses        Heart sounds: Normal heart sounds  Pulmonary:      Effort: Pulmonary effort is normal  No respiratory distress  Breath sounds: Normal breath sounds  No wheezing, rhonchi or rales  Chest:      Chest wall: No tenderness  Musculoskeletal:         General: No tenderness or deformity  Normal range of motion  Cervical back: Normal range of motion and neck supple  No rigidity  No muscular tenderness  Right lower leg: No edema  Left lower leg: No edema  Skin:     General: Skin is warm and dry  Coloration: Skin is not pale  Findings: No erythema or rash  Neurological:      General: No focal deficit present  Mental Status: She is alert and oriented to person, place, and time  Mental status is at baseline  Cranial Nerves: No cranial nerve deficit  Psychiatric:         Mood and Affect: Mood normal          Behavior: Behavior normal          Thought Content: Thought content normal          Judgment: Judgment normal          30 min with pt and all the above discussed and reviewed --will NOW try this regieme:  Lantus 50 U OD and add: log 6 U ac supper DAILY  There are other meds or combinations that can be used, but I'm trying to keep this simple  Hence only on insulin  This time was spent reviewing previous records, reviewing previous laboratory and other tests, taking history from patient, examination of patient, discussion of prognosis and treatment, ordering laboratory tests, ordering medications, and completion of the medical record

## 2022-08-23 ENCOUNTER — TELEPHONE (OUTPATIENT)
Dept: FAMILY MEDICINE CLINIC | Facility: CLINIC | Age: 77
End: 2022-08-23

## 2022-08-23 NOTE — TELEPHONE ENCOUNTER
Patient called and said she needs [employer] sensors and she has been out of them for a month now  [employer] needs chart notes faxed to them by noon or patient will be put on a list and will have to wait another 2 weeks    Fax# [phone]

## 2022-08-24 ENCOUNTER — TELEPHONE (OUTPATIENT)
Dept: FAMILY MEDICINE CLINIC | Facility: CLINIC | Age: 77
End: 2022-08-24

## 2022-09-07 ENCOUNTER — NURSE TRIAGE (OUTPATIENT)
Dept: OTHER | Facility: OTHER | Age: 77
End: 2022-09-07

## 2022-09-07 ENCOUNTER — TELEPHONE (OUTPATIENT)
Dept: FAMILY MEDICINE CLINIC | Facility: CLINIC | Age: 77
End: 2022-09-07

## 2022-09-07 DIAGNOSIS — J02.9 PHARYNGITIS, UNSPECIFIED ETIOLOGY: Primary | ICD-10-CM

## 2022-09-07 RX ORDER — AMOXICILLIN 875 MG/1
875 TABLET, COATED ORAL 2 TIMES DAILY
Qty: 10 TABLET | Refills: 0 | Status: SHIPPED | OUTPATIENT
Start: 2022-09-07 | End: 2022-09-12

## 2022-09-07 NOTE — TELEPHONE ENCOUNTER
Pt would like to know if you could prescribe her an antibiotic for a sore throat  She also stated she woke up with some neck pain and thinks she slept it on the wrong way

## 2022-09-07 NOTE — TELEPHONE ENCOUNTER
Reason for Disposition   [1] SEVERE neck pain (e g , excruciating, unable to do any normal activities) AND [2] not improved after 2 hours of pain medicine    Answer Assessment - Initial Assessment Questions  1  ONSET: "When did the pain begin?"       Last night  2  LOCATION: "Where does it hurt?"       Above the middle of the neck  3  PATTERN "Does the pain come and go, or has it been constant since it started?"       constant  4  SEVERITY: "How bad is the pain?"  (Scale 1-10; or mild, moderate, severe)    - NO PAIN (0): no pain or only slight stiffness     - MILD (1-3): doesn't interfere with normal activities     - MODERATE (4-7): interferes with normal activities or awakens from sleep     - SEVERE (8-10):  excruciating pain, unable to do any normal activities       6/10  5  RADIATION: "Does the pain go anywhere else, shoot into your arms?"      denies  6  CORD SYMPTOMS: "Any weakness or numbness of the arms or legs?"      denies  7  CAUSE: "What do you think is causing the neck pain?"      unsure  8  NECK OVERUSE: "Any recent activities that involved turning or twisting the neck?"      denies  9   OTHER SYMPTOMS: "Do you have any other symptoms?" (e g , headache, fever, chest pain, difficulty breathing, neck swelling)      Cough, ear, throat pain, headache    Protocols used: NECK PAIN OR STIFFNESS-ADULT-

## 2022-09-07 NOTE — TELEPHONE ENCOUNTER
Regarding: my neck hurts  ----- Message from [facility] sent at 9/7/2022  5:35 AM EDT -----  "My neck, ear and throat hurts and I have a cough "

## 2022-09-08 ENCOUNTER — APPOINTMENT (OUTPATIENT)
Dept: RADIOLOGY | Facility: HOSPITAL | Age: 77
End: 2022-09-08
Payer: COMMERCIAL

## 2022-09-08 ENCOUNTER — HOSPITAL ENCOUNTER (EMERGENCY)
Facility: HOSPITAL | Age: 77
Discharge: HOME | End: 2022-09-08
Attending: EMERGENCY MEDICINE
Payer: COMMERCIAL

## 2022-09-08 VITALS
DIASTOLIC BLOOD PRESSURE: 55 MMHG | RESPIRATION RATE: 20 BRPM | WEIGHT: 200.4 LBS | HEIGHT: 61 IN | OXYGEN SATURATION: 93 % | SYSTOLIC BLOOD PRESSURE: 105 MMHG | BODY MASS INDEX: 37.84 KG/M2 | HEART RATE: 68 BPM | TEMPERATURE: 97.6 F

## 2022-09-08 DIAGNOSIS — J44.1 COPD EXACERBATION (HCC): Primary | ICD-10-CM

## 2022-09-08 PROCEDURE — 99284 EMERGENCY DEPT VISIT MOD MDM: CPT | Performed by: EMERGENCY MEDICINE

## 2022-09-08 PROCEDURE — 94640 AIRWAY INHALATION TREATMENT: CPT

## 2022-09-08 PROCEDURE — 71045 X-RAY EXAM CHEST 1 VIEW: CPT

## 2022-09-08 PROCEDURE — 99285 EMERGENCY DEPT VISIT HI MDM: CPT

## 2022-09-08 RX ORDER — ALBUTEROL SULFATE 2.5 MG/3ML
5 SOLUTION RESPIRATORY (INHALATION) ONCE
Status: COMPLETED | OUTPATIENT
Start: 2022-09-08 | End: 2022-09-08

## 2022-09-08 RX ORDER — PREDNISONE 20 MG/1
60 TABLET ORAL DAILY
Qty: 15 TABLET | Refills: 0 | Status: SHIPPED | OUTPATIENT
Start: 2022-09-08 | End: 2022-09-13

## 2022-09-08 RX ORDER — PREDNISONE 20 MG/1
40 TABLET ORAL ONCE
Status: COMPLETED | OUTPATIENT
Start: 2022-09-08 | End: 2022-09-08

## 2022-09-08 RX ORDER — ACETAMINOPHEN 325 MG/1
975 TABLET ORAL ONCE
Status: COMPLETED | OUTPATIENT
Start: 2022-09-08 | End: 2022-09-08

## 2022-09-08 RX ORDER — AZITHROMYCIN 250 MG/1
TABLET, FILM COATED ORAL
Qty: 6 TABLET | Refills: 0 | Status: SHIPPED | OUTPATIENT
Start: 2022-09-08 | End: 2022-09-12

## 2022-09-08 RX ADMIN — ACETAMINOPHEN 975 MG: 325 TABLET, FILM COATED ORAL at 10:20

## 2022-09-08 RX ADMIN — ALBUTEROL SULFATE 5 MG: 2.5 SOLUTION RESPIRATORY (INHALATION) at 11:15

## 2022-09-08 RX ADMIN — IPRATROPIUM BROMIDE 0.5 MG: 0.5 SOLUTION RESPIRATORY (INHALATION) at 11:15

## 2022-09-08 RX ADMIN — IPRATROPIUM BROMIDE 0.5 MG: 0.5 SOLUTION RESPIRATORY (INHALATION) at 10:20

## 2022-09-08 RX ADMIN — PREDNISONE 40 MG: 20 TABLET ORAL at 10:20

## 2022-09-08 RX ADMIN — ALBUTEROL SULFATE 5 MG: 2.5 SOLUTION RESPIRATORY (INHALATION) at 10:20

## 2022-09-08 NOTE — ED PROVIDER NOTES
History  Chief Complaint   Patient presents with    Shortness of Breath    Sore Throat     Patient arrives to the ER from home with complaints of ear pain, sore throat, and shortness of breath  -fever/chills  Hx COPD/asthma      Adelene Hammans is a 76 y o  female who presents with shortness of breath with a cough starting yesterday  She denies fevers, chills  She has a chronic cough and chronic sob (history of copd)  She has some environmental allergies and she states that the maple trees have been giving her some issues  Cough is not productive  She also reports a headache as well  She has not taken anything at home to treat either the shortess of breath at home  History provided by:  Patient   used: No    Shortness of Breath  Severity:  Moderate  Onset quality:  Gradual  Duration:  2 days  Timing:  Constant  Progression:  Unchanged  Chronicity:  Recurrent  Context: activity and known allergens    Relieved by:  Nothing  Worsened by: Activity  Ineffective treatments:  None tried  Associated symptoms: cough, headaches and wheezing    Associated symptoms: no chest pain, no diaphoresis, no fever, no rash, no sore throat and no vomiting    Risk factors: no family hx of DVT and no tobacco use (quit 15 years ago)        Prior to Admission Medications   Prescriptions Last Dose Informant Patient Reported? Taking?    Apremilast (Otezla) 30 MG TABS  Self No No   Sig: Take 2 tablets by mouth daily   Cholecalciferol (VITAMIN D) 125 MCG (5000 UT) CAPS   Yes No   Sig: Take by mouth   Patient not taking: No sig reported   Continuous Blood Gluc  (FreeStyle Dejuan 14 Day [place]) THERESA   No No   Sig: Check BG's 2-4x a day depending on sugar levels   Continuous Blood Gluc Sensor (FreeStyle Dejuan 14 Day Sensor) MISC   No No   Sig: Check sugars 2-4x daily depending on sugar levels   OneTouch Ultra test strip   No No   Sig: TEST 2-3 TIMES DAILY   amoxicillin (AMOXIL) 875 mg tablet   No No   Sig: Take 1 tablet (875 mg total) by mouth 2 (two) times a day for 5 days   aspirin 81 mg chewable tablet   No No   Sig: Chew 1 tablet (81 mg total) daily   budesonide (Pulmicort) 1 MG/2ML nebulizer solution   No No   Sig: Take 2 mL (1 mg total) by nebulization 4 (four) times a day for 7 days Rinse mouth after use   fro acute copd exacerbation - 1 mg/ 2 ml via nebulizer 4 times per day for 1 week   Patient not taking: Reported on 8/22/2022   chlorthalidone 25 mg tablet   No No   Sig: Take 1 5 tablets (37 5 mg total) by mouth daily   cyanocobalamin (VITAMIN B-12) 1000 MCG tablet   No No   Sig: Take 1 tablet (1,000 mcg total) by mouth daily   Patient not taking: No sig reported   diltiazem (CARDIZEM CD) 360 MG 24 hr capsule   No No   Sig: TAKE 1 CAPSULE BY MOUTH  DAILY   insulin glargine (Lantus SoloStar) 100 units/mL injection pen   No No   Sig: Inject 50 Units under the skin daily   insulin lispro (HumaLOG KwikPen) 100 units/mL injection pen   No No   Sig: Take 5 U ac b'fastr, 10u before lunch, 15u before supper   Patient not taking: Reported on 8/22/2022   potassium chloride (K-DUR,KLOR-CON) 20 mEq tablet   No No   Sig: Take 1 tablet (20 mEq total) by mouth 2 (two) times a day   valsartan (DIOVAN) 160 mg tablet   No No   Sig: Take 1 tablet (160 mg total) by mouth daily      Facility-Administered Medications Last Administration Doses Remaining   cyanocobalamin injection 1,000 mcg 4/6/2021  8:17 AM    cyanocobalamin injection 1,000 mcg 10/27/2020  2:49 PM    cyanocobalamin injection 1,000 mcg 1/5/2021  7:50 AM    cyanocobalamin injection 1,000 mcg 3/9/2021  2:55 PM           Past Medical History:   Diagnosis Date    Asthma     Benign essential hypertension     Chronic lower back pain     COPD (chronic obstructive pulmonary disease) (HCC)     Diabetes mellitus ([facility] )     Hyperlipidemia     Sciatica        Past Surgical History:   Procedure Laterality Date    CHOLECYSTECTOMY         Family History   Problem Relation Age of Onset    Uterine cancer Mother     Emphysema Father     Leukemia Father      I have reviewed and agree with the history as documented  E-Cigarette/Vaping    E-Cigarette Use Never User      E-Cigarette/Vaping Substances    Nicotine No     THC No     CBD No     Flavoring No     Other No     Unknown No      Social History     Tobacco Use    Smoking status: Former Smoker     Packs/day: 1 00     Years: 30 00     Pack years: 30 00     Types: Cigarettes     Quit date: 2005     Years since quittin 6    Smokeless tobacco: Never Used   Vaping Use    Vaping Use: Never used   Substance Use Topics    Alcohol use: Never    Drug use: Never       Review of Systems   Constitutional: Negative for chills, diaphoresis and fever  HENT: Negative for sore throat  Respiratory: Positive for cough, shortness of breath and wheezing  Cardiovascular: Negative for chest pain and palpitations  Gastrointestinal: Negative for diarrhea, nausea and vomiting  Genitourinary: Negative for dysuria and frequency  Skin: Negative for rash  Neurological: Positive for headaches  All other systems reviewed and are negative  Physical Exam  Physical Exam  Vitals and nursing note reviewed  Constitutional:       General: She is in acute distress (mild)  Appearance: She is well-developed  HENT:      Head: Normocephalic and atraumatic  Eyes:      Pupils: Pupils are equal, round, and reactive to light  Neck:      Vascular: No JVD  Cardiovascular:      Rate and Rhythm: Normal rate and regular rhythm  Heart sounds: Normal heart sounds  No murmur heard  No friction rub  No gallop  Pulmonary:      Effort: Pulmonary effort is normal  No respiratory distress  Breath sounds: Wheezing present  No rales  Chest:      Chest wall: No tenderness  Musculoskeletal:         General: No tenderness  Normal range of motion  Cervical back: Normal range of motion     Skin:     General: Skin is warm and dry  Neurological:      General: No focal deficit present  Mental Status: She is alert and oriented to person, place, and time  Psychiatric:         Behavior: Behavior normal          Thought Content: Thought content normal          Judgment: Judgment normal          Vital Signs  ED Triage Vitals   Temperature Pulse Respirations Blood Pressure SpO2   09/08/22 0948 09/08/22 0948 09/08/22 0948 09/08/22 0948 09/08/22 0948   97 6 °F (36 4 °C) 65 22 153/68 97 %      Temp Source Heart Rate Source Patient Position - Orthostatic VS BP Location FiO2 (%)   09/08/22 0948 09/08/22 0948 09/08/22 1024 09/08/22 0948 --   Oral Monitor Lying Left arm       Pain Score       09/08/22 1020       Med Not Given for Pain - for MAR use only           Vitals:    09/08/22 0948 09/08/22 1024 09/08/22 1030 09/08/22 1100   BP: 153/68 152/66 149/67 142/65   Pulse: 65 57 56 67   Patient Position - Orthostatic VS:  Lying           Visual Acuity      ED Medications  Medications   predniSONE tablet 40 mg (40 mg Oral Given 9/8/22 1020)   albuterol inhalation solution 5 mg (5 mg Nebulization Given 9/8/22 1020)   ipratropium (ATROVENT) 0 02 % inhalation solution 0 5 mg (0 5 mg Nebulization Given 9/8/22 1020)   acetaminophen (TYLENOL) tablet 975 mg (975 mg Oral Given 9/8/22 1020)   albuterol inhalation solution 5 mg (5 mg Nebulization Given 9/8/22 1115)   ipratropium (ATROVENT) 0 02 % inhalation solution 0 5 mg (0 5 mg Nebulization Given 9/8/22 1115)       Diagnostic Studies  Results Reviewed     None                 XR chest 1 view portable   Final Result by Marlene Espinoza MD (09/08 1034)      No acute cardiopulmonary disease  Workstation performed: [id number]                    Procedures  Procedures         ED Course  ED Course as of 09/08/22 1214   Thu Sep 08, 2022   1107 Patient states she feels somewhat better but she still has diffuse wheezing on exam   I will order an additional nebulizer treatment      1211 She is feeling better after the 2nd treatment  Her exam is much improved as well with only some slight wheezes in the left lower lung field  SBIRT 22yo+    Flowsheet Row Most Recent Value   SBIRT (23 yo +)    In order to provide better care to our patients, we are screening all of our patients for alcohol and drug use  Would it be okay to ask you these screening questions? Unable to answer at this time Filed at: 09/08/2022 4044                    [facility]  Number of Diagnoses or Management Options  COPD exacerbation [facility]): new and requires workup  Diagnosis management comments: Background: 76 y o  female presents with shortness of breath and wheezing in the setting of known COPD  Plan: chest xray to rule out pneumonia (less likely), nebulizer and steroid therapies          Amount and/or Complexity of Data Reviewed  Tests in the radiology section of CPT®: ordered and reviewed  Independent visualization of images, tracings, or specimens: yes    Risk of Complications, Morbidity, and/or Mortality  Management options: high    Patient Progress  Patient progress: improved      Disposition  Final diagnoses:   COPD exacerbation ([address] )     Time reflects when diagnosis was documented in both MDM as applicable and the Disposition within this note     Time User Action Codes Description Comment    9/8/2022 12:12 PM Therese Herr Add [J44 1] COPD exacerbation [facility])       ED Disposition     ED Disposition   Discharge    Condition   Stable    Date/Time   Thu Sep 8, 2022 12:12 PM    [address] discharge to home/self care                 Follow-up Information     Follow up With Specialties Details Why Contact Info    Ramon Saavedra DO Family Medicine Schedule an appointment as soon as possible for a visit in 1 week  [address] [place] [address] [place]  [phone]            Patient's Medications   Discharge Prescriptions    AZITHROMYCIN (ZITHROMAX) 250 MG TABLET    Take first 2 tablets together on day one, then 1 tablet every day thereafter until finished  Start Date: 9/8/2022  End Date: 9/12/2022       Order Dose: --       Quantity: 6 tablet    Refills: 0    PREDNISONE 20 MG TABLET    Take 3 tablets (60 mg total) by mouth daily for 5 days       Start Date: 9/8/2022  End Date: 9/13/2022       Order Dose: 60 mg       Quantity: 15 tablet    Refills: 0       No discharge procedures on file      PDMP Review       Value Time User    PDMP Reviewed  Yes 12/4/2019 10:57 AM Juvencio Bryant PA-C          ED Provider  Electronically Signed by           Eriberto Murillo MD  09/08/22

## 2022-09-14 ENCOUNTER — APPOINTMENT (OUTPATIENT)
Dept: LAB | Facility: HOSPITAL | Age: 77
End: 2022-09-14
Payer: COMMERCIAL

## 2022-09-14 DIAGNOSIS — E55.9 VITAMIN D INSUFFICIENCY: ICD-10-CM

## 2022-09-14 DIAGNOSIS — E87.6 HYPOKALEMIA: ICD-10-CM

## 2022-09-14 DIAGNOSIS — I10 RESISTANT HYPERTENSION: ICD-10-CM

## 2022-09-14 DIAGNOSIS — E11.65 UNCONTROLLED TYPE 2 DIABETES MELLITUS WITH HYPERGLYCEMIA (HCC): ICD-10-CM

## 2022-09-14 DIAGNOSIS — R53.83 FATIGUE, UNSPECIFIED TYPE: ICD-10-CM

## 2022-09-14 LAB
25(OH)D3 SERPL-MCNC: 25.8 NG/ML (ref 30–100)
ALBUMIN SERPL BCP-MCNC: 4 G/DL (ref 3.5–5)
ALP SERPL-CCNC: 121 U/L (ref 34–104)
ALT SERPL W P-5'-P-CCNC: 21 U/L (ref 7–52)
ANION GAP SERPL CALCULATED.3IONS-SCNC: 7 MMOL/L (ref 4–13)
AST SERPL W P-5'-P-CCNC: 10 U/L (ref 13–39)
BACTERIA UR QL AUTO: ABNORMAL /HPF
BILIRUB SERPL-MCNC: 0.41 MG/DL (ref 0.2–1)
BILIRUB UR QL STRIP: NEGATIVE
BUN SERPL-MCNC: 28 MG/DL (ref 5–25)
CALCIUM SERPL-MCNC: 9.6 MG/DL (ref 8.4–10.2)
CHLORIDE SERPL-SCNC: 99 MMOL/L (ref 96–108)
CHOLEST SERPL-MCNC: 238 MG/DL
CLARITY UR: CLEAR
CO2 SERPL-SCNC: 29 MMOL/L (ref 21–32)
COLOR UR: YELLOW
CREAT SERPL-MCNC: 0.88 MG/DL (ref 0.6–1.3)
ERYTHROCYTE [DISTWIDTH] IN BLOOD BY AUTOMATED COUNT: 13.9 % (ref 11.6–15.1)
GFR SERPL CREATININE-BSD FRML MDRD: 62 ML/MIN/1.73SQ M
GLUCOSE P FAST SERPL-MCNC: 264 MG/DL (ref 65–99)
GLUCOSE UR STRIP-MCNC: ABNORMAL MG/DL
HCT VFR BLD AUTO: 46.2 % (ref 34.8–46.1)
HDLC SERPL-MCNC: 69 MG/DL
HGB BLD-MCNC: 15.6 G/DL (ref 11.5–15.4)
HGB UR QL STRIP.AUTO: ABNORMAL
KETONES UR STRIP-MCNC: NEGATIVE MG/DL
LDLC SERPL CALC-MCNC: 152 MG/DL (ref 0–100)
LEUKOCYTE ESTERASE UR QL STRIP: ABNORMAL
MCH RBC QN AUTO: 29.4 PG (ref 26.8–34.3)
MCHC RBC AUTO-ENTMCNC: 33.8 G/DL (ref 31.4–37.4)
MCV RBC AUTO: 87 FL (ref 82–98)
NITRITE UR QL STRIP: NEGATIVE
NON-SQ EPI CELLS URNS QL MICRO: ABNORMAL /HPF
NONHDLC SERPL-MCNC: 169 MG/DL
PH UR STRIP.AUTO: 6 [PH]
PLATELET # BLD AUTO: 360 THOUSANDS/UL (ref 149–390)
PMV BLD AUTO: 10.9 FL (ref 8.9–12.7)
POTASSIUM SERPL-SCNC: 4.3 MMOL/L (ref 3.5–5.3)
PROT SERPL-MCNC: 6.6 G/DL (ref 6.4–8.4)
PROT UR STRIP-MCNC: ABNORMAL MG/DL
RBC # BLD AUTO: 5.3 MILLION/UL (ref 3.81–5.12)
RBC #/AREA URNS AUTO: ABNORMAL /HPF
SODIUM SERPL-SCNC: 135 MMOL/L (ref 135–147)
SP GR UR STRIP.AUTO: 1.02 (ref 1–1.03)
TRIGL SERPL-MCNC: 84 MG/DL
TSH SERPL DL<=0.05 MIU/L-ACNC: 0.86 UIU/ML (ref 0.45–4.5)
UROBILINOGEN UR QL STRIP.AUTO: 0.2 E.U./DL
WBC # BLD AUTO: 15.19 THOUSAND/UL (ref 4.31–10.16)
WBC #/AREA URNS AUTO: ABNORMAL /HPF

## 2022-09-14 PROCEDURE — 84443 ASSAY THYROID STIM HORMONE: CPT

## 2022-09-14 PROCEDURE — 80053 COMPREHEN METABOLIC PANEL: CPT

## 2022-09-14 PROCEDURE — 36415 COLL VENOUS BLD VENIPUNCTURE: CPT

## 2022-09-14 PROCEDURE — 85027 COMPLETE CBC AUTOMATED: CPT

## 2022-09-14 PROCEDURE — 80061 LIPID PANEL: CPT

## 2022-09-14 PROCEDURE — 81001 URINALYSIS AUTO W/SCOPE: CPT | Performed by: FAMILY MEDICINE

## 2022-09-14 PROCEDURE — 82306 VITAMIN D 25 HYDROXY: CPT

## 2022-09-15 ENCOUNTER — OFFICE VISIT (OUTPATIENT)
Dept: FAMILY MEDICINE CLINIC | Facility: CLINIC | Age: 77
End: 2022-09-15
Payer: COMMERCIAL

## 2022-09-15 VITALS
HEART RATE: 53 BPM | OXYGEN SATURATION: 97 % | BODY MASS INDEX: 38.71 KG/M2 | WEIGHT: 205 LBS | SYSTOLIC BLOOD PRESSURE: 150 MMHG | TEMPERATURE: 97.9 F | DIASTOLIC BLOOD PRESSURE: 62 MMHG | HEIGHT: 61 IN

## 2022-09-15 DIAGNOSIS — Z79.899 ENCOUNTER FOR LONG-TERM (CURRENT) USE OF MEDICATIONS: ICD-10-CM

## 2022-09-15 DIAGNOSIS — Z78.9 STATIN INTOLERANCE: ICD-10-CM

## 2022-09-15 DIAGNOSIS — E55.9 VITAMIN D INSUFFICIENCY: ICD-10-CM

## 2022-09-15 DIAGNOSIS — E11.65 UNCONTROLLED TYPE 2 DIABETES MELLITUS WITH HYPERGLYCEMIA (HCC): ICD-10-CM

## 2022-09-15 DIAGNOSIS — J40 BRONCHITIS WITH ACUTE WHEEZING: Primary | ICD-10-CM

## 2022-09-15 PROCEDURE — 3077F SYST BP >= 140 MM HG: CPT | Performed by: FAMILY MEDICINE

## 2022-09-15 PROCEDURE — 3078F DIAST BP <80 MM HG: CPT | Performed by: FAMILY MEDICINE

## 2022-09-15 PROCEDURE — 99214 OFFICE O/P EST MOD 30 MIN: CPT | Performed by: FAMILY MEDICINE

## 2022-09-15 PROCEDURE — 1160F RVW MEDS BY RX/DR IN RCRD: CPT | Performed by: FAMILY MEDICINE

## 2022-09-15 RX ORDER — DEXTROMETHORPHAN HYDROBROMIDE AND PROMETHAZINE HYDROCHLORIDE 15; 6.25 MG/5ML; MG/5ML
5 SOLUTION ORAL 4 TIMES DAILY PRN
Qty: 180 ML | Refills: 1 | Status: SHIPPED | OUTPATIENT
Start: 2022-09-15 | End: 2022-09-20

## 2022-09-15 NOTE — PROGRESS NOTES
Assessment/Plan:   1st ov post-[facility] ER bout 4 days ago for bad thick phlegm, "couldn't cough it up"  Also, RROM of neck -- pain in back of head  Reviewed emergency room note of 09/08/2022 noting she was discharged on Z-David and prednisone 60 mg daily x5 days  1  Bronchitis with acute wheezing  Comments: To emergency room on 09/08 with asthmatic bronchitis doing better now long talk with respect to follow-up--fluids nebulizer DuoNeb promethazine DM  Orders:  -     Promethazine-DM (PHENERGAN-DM) 6 25-15 mg/5 mL oral syrup; Take 5 mL by mouth 4 (four) times a day as needed for cough for up to 5 days    2  Encounter for long-term (current) use of medications  Comments: All medications reviewed and discussed--tolerating excessive has intolerance to statins and despite cholesterol 238 and  cannot take statins    3  Uncontrolled type 2 diabetes mellitus with hyperglycemia (HCC)  Comments:  One 914--A1c 8 1, blood sugar 264--just coming off prednisone 60 mg daily    4  Statin intolerance  Comments:  Once again discussed use of statins but she cannot tolerate due to leg cramps    5  Vitamin D insufficiency  Comments:  09/14/2022 vitamin-D 25--down from 45--check if you are taking 5000 International Units daily--if not do it  Subjective:      Patient ID: Candice Sharma is a 76 y o  female  HPI    The following portions of the patient's history were reviewed and updated as appropriate: She  has a past medical history of Asthma, Benign essential hypertension, Chronic lower back pain, COPD (chronic obstructive pulmonary disease) ([address] ), Diabetes mellitus ([address] ), Hyperlipidemia, and Sciatica    She   Patient Active Problem List    Diagnosis Date Noted    Embolism and thrombosis of arteries of the lower extremities ([address] ) 02/22/2022    Uncontrolled type 2 diabetes mellitus with hyperglycemia ([address] ) 10/09/2021    Hypertensive urgency 10/09/2021    Chest pain, unspecified 09/29/2021    Hypokalemia 09/29/2021    Allergic reaction 08/12/2021    Increased BMI 02/15/2021    Class 2 severe obesity due to excess calories with serious comorbidity and body mass index (BMI) of 35 0 to 35 9 in adult [facility]) 02/15/2021    Depression, recurrent ([facility] ) 01/11/2021    Obesity, morbid ([facility] ) 01/11/2021    Myalgia due to statin 10/13/2020    Need for vaccination 10/13/2020    Psoriasis 10/13/2020    Hyperlipidemia associated with type 2 diabetes mellitus ([facility] ) 10/13/2020    Mixed simple and mucopurulent chronic bronchitis ([place] [address] ) 05/26/2020    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/2020    Mixed hyperlipidemia 05/26/2020    Psoriasis-like skin disease 05/26/2020    Unspecified abnormalities of gait and mobility 12/06/2019    Personal history of nicotine dependence 12/06/2019    Other chronic pain 12/06/2019    Acute left-sided low back pain with left-sided sciatica 12/03/2019    Controlled type 2 diabetes mellitus, with long-term current use of insulin ([facility] ) 12/03/2019    Resistant hypertension 12/03/2019     She  has a past surgical history that includes Cholecystectomy  Her family history includes Emphysema in her father; Leukemia in her father; Uterine cancer in her mother  She  reports that she quit smoking about 17 years ago  Her smoking use included cigarettes  She has a 30 00 pack-year smoking history  She has never used smokeless tobacco  She reports that she does not drink alcohol and does not use drugs    Current Outpatient Medications   Medication Sig Dispense Refill    Apremilast (Otezla) 30 MG TABS Take 2 tablets by mouth daily 180 tablet 3    aspirin 81 mg chewable tablet Chew 1 tablet (81 mg total) daily 30 tablet 0    chlorthalidone 25 mg tablet Take 1 5 tablets (37 5 mg total) by mouth daily 135 tablet 1    Cholecalciferol (VITAMIN D) 125 MCG (5000 UT) CAPS Take by mouth      Continuous Blood Gluc  (FreeStyle Dejuan 14 Day [place]) THERESA Check BG's 2-4x a day depending on sugar levels 1 each 0    Continuous Blood Gluc Sensor (FreeStyle Dejuan 14 Day Sensor) MISC Check sugars 2-4x daily depending on sugar levels 28 each 6    diltiazem (CARDIZEM CD) 360 MG 24 hr capsule TAKE 1 CAPSULE BY MOUTH  DAILY 90 capsule 3    insulin glargine (Lantus SoloStar) 100 units/mL injection pen Inject 50 Units under the skin daily 45 mL 3    insulin lispro (HumaLOG KwikPen) 100 units/mL injection pen Take 5 U ac b'fastr, 10u before lunch, 15u before supper 30 mL 6    OneTouch Ultra test strip TEST 2-3 TIMES DAILY 100 strip 3    Promethazine-DM (PHENERGAN-DM) 6 25-15 mg/5 mL oral syrup Take 5 mL by mouth 4 (four) times a day as needed for cough for up to 5 days 180 mL 1    cyanocobalamin (VITAMIN B-12) 1000 MCG tablet Take 1 tablet (1,000 mcg total) by mouth daily (Patient not taking: No sig reported) 100 tablet 6    potassium chloride (K-DUR,KLOR-CON) 20 mEq tablet Take 1 tablet (20 mEq total) by mouth 2 (two) times a day 180 tablet 1    valsartan (DIOVAN) 160 mg tablet Take 1 tablet (160 mg total) by mouth daily 90 tablet 3     Current Facility-Administered Medications   Medication Dose Route Frequency Provider Last Rate Last Admin    cyanocobalamin injection 1,000 mcg  1,000 mcg Intramuscular Q30 Days Vicki Nissen, DO   1,000 mcg at 04/06/21 0190    cyanocobalamin injection 1,000 mcg  1,000 mcg Intramuscular Q30 Days Vicki Nissen, DO   1,000 mcg at 10/27/20 1449    cyanocobalamin injection 1,000 mcg  1,000 mcg Intramuscular Q30 Days Vicki Nissen, DO   1,000 mcg at 01/05/21 0750    cyanocobalamin injection 1,000 mcg  1,000 mcg Intramuscular Q30 Days Vicki Nissen, DO   1,000 mcg at 03/09/21 1455     Current Outpatient Medications on File Prior to Visit   Medication Sig    Apremilast (Otezla) 30 MG TABS Take 2 tablets by mouth daily    aspirin 81 mg chewable tablet Chew 1 tablet (81 mg total) daily    chlorthalidone 25 mg tablet Take 1 5 tablets (37 5 mg total) by mouth daily    Cholecalciferol (VITAMIN D) 125 MCG (5000 UT) CAPS Take by mouth    Continuous Blood Gluc  (FreeStyle Dejuan 14 Day [place]) THERESA Check BG's 2-4x a day depending on sugar levels    Continuous Blood Gluc Sensor (FreeStyle Dejuan 14 Day Sensor) MISC Check sugars 2-4x daily depending on sugar levels    diltiazem (CARDIZEM CD) 360 MG 24 hr capsule TAKE 1 CAPSULE BY MOUTH  DAILY    insulin glargine (Lantus SoloStar) 100 units/mL injection pen Inject 50 Units under the skin daily    insulin lispro (HumaLOG KwikPen) 100 units/mL injection pen Take 5 U ac b'fastr, 10u before lunch, 15u before supper    OneTouch Ultra test strip TEST 2-3 TIMES DAILY    cyanocobalamin (VITAMIN B-12) 1000 MCG tablet Take 1 tablet (1,000 mcg total) by mouth daily (Patient not taking: No sig reported)    potassium chloride (K-DUR,KLOR-CON) 20 mEq tablet Take 1 tablet (20 mEq total) by mouth 2 (two) times a day    valsartan (DIOVAN) 160 mg tablet Take 1 tablet (160 mg total) by mouth daily    [DISCONTINUED] budesonide (Pulmicort) 1 MG/2ML nebulizer solution Take 2 mL (1 mg total) by nebulization 4 (four) times a day for 7 days Rinse mouth after use  fro acute copd exacerbation - 1 mg/ 2 ml via nebulizer 4 times per day for 1 week     Current Facility-Administered Medications on File Prior to Visit   Medication    cyanocobalamin injection 1,000 mcg    cyanocobalamin injection 1,000 mcg    cyanocobalamin injection 1,000 mcg    cyanocobalamin injection 1,000 mcg     She is allergic to amlodipine, bystolic [nebivolol hcl], and glipizide       Review of Systems   Constitutional: Positive for fatigue  Negative for chills and fever  HENT: Negative for congestion, postnasal drip and rhinorrhea  She has mostly Spring allergies  States Singulair not helping  Hence , end that   Respiratory: Negative for cough, shortness of breath and wheezing  Cardiovascular: Negative for chest pain, palpitations and leg swelling     Gastrointestinal: Negative for abdominal distention and nausea  Endocrine: Positive for polydipsia and polyuria  Negative for polyphagia  See BBG sheet , with QID BS's on   Needs the Freeyle   Genitourinary: Negative for difficulty urinating and flank pain  Musculoskeletal: Negative for arthralgias, back pain, gait problem and myalgias  Skin: Negative for rash  Allergic/Immunologic: Negative for environmental allergies  Neurological: Negative for dizziness, facial asymmetry, weakness, light-headedness, numbness and headaches  Hematological: Negative for adenopathy  Psychiatric/Behavioral: Negative for agitation, behavioral problems, confusion, decreased concentration, sleep disturbance and suicidal ideas  The patient is nervous/anxious  Objective:      /62 (BP Location: Right arm, Patient Position: Sitting, Cuff Size: Adult)   Pulse (!) 53   Temp 97 9 °F (36 6 °C) (Tympanic)   Ht 5' 1" (1 549 m)   Wt 93 kg (205 lb)   SpO2 97%   BMI 38 73 kg/m²          Physical Exam  Vitals and nursing note reviewed  Constitutional:       General: She is not in acute distress  Appearance: Normal appearance  She is well-developed  She is obese  She is not ill-appearing or diaphoretic  HENT:      Head: Normocephalic and atraumatic  Nose: Nose normal  No congestion or rhinorrhea  Eyes:      General: No scleral icterus  Conjunctiva/sclera: Conjunctivae normal       Pupils: Pupils are equal, round, and reactive to light  Comments: Watery and itchy eyes from the allergies   Neck:      Thyroid: No thyromegaly  Trachea: No tracheal deviation  Cardiovascular:      Rate and Rhythm: Normal rate and regular rhythm  Pulses: Normal pulses  Heart sounds: Normal heart sounds  Pulmonary:      Effort: Pulmonary effort is normal  No respiratory distress  Breath sounds: Wheezing (to ER on 9/8/22 and better now) present  No rhonchi or rales  Chest:      Chest wall: No tenderness  Musculoskeletal:         General: No tenderness or deformity  Normal range of motion  Cervical back: Normal range of motion and neck supple  No rigidity  No muscular tenderness  Right lower leg: No edema  Left lower leg: No edema  Skin:     General: Skin is warm and dry  Coloration: Skin is not pale  Findings: No erythema or rash  Neurological:      General: No focal deficit present  Mental Status: She is alert and oriented to person, place, and time  Mental status is at baseline  Cranial Nerves: No cranial nerve deficit  Psychiatric:         Mood and Affect: Mood normal          Behavior: Behavior normal          Thought Content: Thought content normal          Judgment: Judgment normal              This time was spent reviewing previous records, reviewing previous laboratory and other tests, taking history from patient, examination of patient, discussion of prognosis and treatment, ordering laboratory tests, ordering medications, and completion of the medical record

## 2022-10-12 ENCOUNTER — RA CDI HCC (OUTPATIENT)
Dept: OTHER | Facility: HOSPITAL | Age: 77
End: 2022-10-12

## 2022-10-12 NOTE — PROGRESS NOTES
[facility]  coding opportunities       Chart reviewed, no opportunity found:   [address]        Patients Insurance     Medicare Insurance: [employer] Advantage

## 2022-10-18 ENCOUNTER — OFFICE VISIT (OUTPATIENT)
Dept: FAMILY MEDICINE CLINIC | Facility: CLINIC | Age: 77
End: 2022-10-18
Payer: COMMERCIAL

## 2022-10-18 VITALS
BODY MASS INDEX: 40.02 KG/M2 | RESPIRATION RATE: 17 BRPM | SYSTOLIC BLOOD PRESSURE: 130 MMHG | HEART RATE: 66 BPM | TEMPERATURE: 97 F | HEIGHT: 61 IN | OXYGEN SATURATION: 97 % | DIASTOLIC BLOOD PRESSURE: 58 MMHG | WEIGHT: 212 LBS

## 2022-10-18 DIAGNOSIS — I10 RESISTANT HYPERTENSION: ICD-10-CM

## 2022-10-18 DIAGNOSIS — I10 RESISTANT HYPERTENSION: Primary | ICD-10-CM

## 2022-10-18 DIAGNOSIS — Z79.899 ENCOUNTER FOR LONG-TERM (CURRENT) USE OF MEDICATIONS: ICD-10-CM

## 2022-10-18 DIAGNOSIS — E11.65 UNCONTROLLED TYPE 2 DIABETES MELLITUS WITH HYPERGLYCEMIA (HCC): ICD-10-CM

## 2022-10-18 DIAGNOSIS — Z23 FLU VACCINE NEED: ICD-10-CM

## 2022-10-18 DIAGNOSIS — R73.9 ELEVATED BLOOD SUGAR: ICD-10-CM

## 2022-10-18 PROCEDURE — 99215 OFFICE O/P EST HI 40 MIN: CPT

## 2022-10-18 PROCEDURE — 90662 IIV NO PRSV INCREASED AG IM: CPT

## 2022-10-18 PROCEDURE — G0008 ADMIN INFLUENZA VIRUS VAC: HCPCS

## 2022-10-18 RX ORDER — CHLORTHALIDONE 25 MG/1
37.5 TABLET ORAL DAILY
Qty: 135 TABLET | Refills: 3 | Status: SHIPPED | OUTPATIENT
Start: 2022-10-18 | End: 2023-01-16

## 2022-10-18 RX ORDER — VALSARTAN 160 MG/1
160 TABLET ORAL DAILY
Qty: 90 TABLET | Refills: 3 | Status: SHIPPED | OUTPATIENT
Start: 2022-10-18 | End: 2022-10-18

## 2022-10-18 NOTE — PATIENT INSTRUCTIONS
What to Do if Your Blood Sugar is Low   AMBULATORY CARE:   Low blood sugar levels  (hypoglycemia) can happen with Type 1 and Type 2 diabetes  Low levels are more likely to happen if you use insulin  Hypoglycemia can cause you to have falls, accidents, and injuries  A blood sugar level that gets too low can lead to seizures, coma, and death  Learn to recognize the symptoms early so you can get treatment quickly  When your blood sugar is low you may feel:  · Sweaty    · Nervous or shaky    · Anxious or irritable    · Confused    · A fast, pounding heartbeat    · Extremely hungry    Have someone call your local emergency number (911 in the [address] [facility] [address] [facility] [address]) if:   · You cannot be woken  · You have a seizure  Call your doctor if:   · You have symptoms of a low blood sugar level, such as trouble thinking, sweating, or a pounding heartbeat  · Your blood sugar level is lower than normal and it does not improve with treatment  · You often have lower blood sugar levels than your target goals  · You have trouble coping with your illness, or you feel anxious or depressed  · You have questions or concerns about your condition or care  What to do if you have symptoms of low blood sugar:   · Check your blood sugar level, if possible  Your blood sugar level is too low if it is at or below 70 mg/dL  · Eat or drink 15 grams of fast-acting carbohydrate  Fast-acting carbohydrates will raise your blood sugar level quickly  Examples of 15 grams of fast-acting carbohydrates:     ? 4 ounces (½ cup) of fruit juice     ? 4 ounces of regular soda    ? 2 tablespoons of raisins     ? 1 tube of glucose gel or 3 to 4 glucose tablets       · Check your blood sugar level 15 minutes later  If the level is still low (less than 100 mg/dL), eat another 15 grams of carbohydrate  When the level returns to 100 mg/dL, eat a snack or meal that contains carbohydrates  This will help prevent another drop in blood sugar      · Teach people close to you how to use your glucagon kit  Your blood sugar may be too low for you to be awake  People need to know when and how to use your kit  Prevent low blood sugar levels:  Prevent low blood sugar by knowing what increases your risk  Ask your healthcare provider for ways to prevent low blood sugar levels  Any of the following can increase your risk of low blood sugar:  · Fasting for tests or procedures    · During or after intense exercise    · Late or postponed meals    · Sleeping (you may need a bedtime snack)     · Drinking alcohol if you use insulin or insulin releasing pills    Follow up with your doctor as directed:  Write down your questions so you remember to ask them during your visits  © Copyright [employer] 2022 Information is for End User's use only and may not be sold, redistributed or otherwise used for commercial purposes  All illustrations and images included in CareNotes® are the copyrighted property of [employer]  or [employer]   The above information is an  only  It is not intended as medical advice for individual conditions or treatments  Talk to your doctor, nurse or pharmacist before following any medical regimen to see if it is safe and effective for you

## 2022-10-18 NOTE — PROGRESS NOTES
Assessment/Plan:78 Yo female, in NAD, and very happy and pleased that her BP is better controlled at 130/58 by MA and 150-160/80          1  Resistant hypertension  Comments:  Generally better - only on 2 meds:  Chlor 25 and Diltiazem 360 mg OD and Valsartan 160 mg OD  No cardiac issues  Orders:  -     chlorthalidone 25 mg tablet; Take 1 5 tablets (37 5 mg total) by mouth daily    2  Resistant hypertension  Comments:  See CCL  BP too hi  d/c the losartan and change to Valsartan 160 mg OD (may need to inc o 320?? )  Orders:  -     chlorthalidone 25 mg tablet; Take 1 5 tablets (37 5 mg total) by mouth daily    3  Uncontrolled type 2 diabetes mellitus with hyperglycemia ([facility] [address] )  Comments:  Patient would like to resume the info can 300 mg 1/2 tablet once daily like to start  She will monitor blood sugars carefully  Taking log 2 - 4- 6 U B,L, S   Orders:  -     Canagliflozin (Invokana) 300 MG TABS; Take 1 tablet (300 mg total) by mouth daily before breakfast    4  Encounter for long-term (current) use of medications  Comments:  Uses the FreeStyle Aminta    5  Elevated blood sugar    6  Flu vaccine need  -     influenza vaccine, high-dose, PF 0 7 mL (FLUZONE HIGH-DOSE)        Subjective:      Patient ID: Rosa Maria Mullins is a 76 y o  female  HPI    The following portions of the patient's history were reviewed and updated as appropriate: She  has a past medical history of Asthma, Benign essential hypertension, Chronic lower back pain, COPD (chronic obstructive pulmonary disease) ([address] ), Diabetes mellitus ([address] ), Hyperlipidemia, and Sciatica    She   Patient Active Problem List    Diagnosis Date Noted   • Embolism and thrombosis of arteries of the lower extremities ([address] ) 02/22/2022   • Uncontrolled type 2 diabetes mellitus with hyperglycemia ([address] ) 10/09/2021   • Hypertensive urgency 10/09/2021   • Chest pain, unspecified 09/29/2021   • Hypokalemia 09/29/2021   • Allergic reaction 08/12/2021   • Increased BMI 02/15/2021   • Class 2 severe obesity due to excess calories with serious comorbidity and body mass index (BMI) of 35 0 to 35 9 in adult [facility]) 02/15/2021   • Depression, recurrent ([place] [address] ) 01/11/2021   • Obesity, morbid ([place] [address] ) 01/11/2021   • Myalgia due to statin 10/13/2020   • Need for vaccination 10/13/2020   • Psoriasis 10/13/2020   • Hyperlipidemia associated with type 2 diabetes mellitus ([place] [address] ) 10/13/2020   • Mixed simple and mucopurulent chronic bronchitis ([place] [address] ) 05/26/2020   • 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/2020   • Mixed hyperlipidemia 05/26/2020   • Psoriasis-like skin disease 05/26/2020   • Unspecified abnormalities of gait and mobility 12/06/2019   • Personal history of nicotine dependence 12/06/2019   • Other chronic pain 12/06/2019   • Acute left-sided low back pain with left-sided sciatica 12/03/2019   • Controlled type 2 diabetes mellitus, with long-term current use of insulin ([place] [address] ) 12/03/2019   • Resistant hypertension 12/03/2019     She  has a past surgical history that includes Cholecystectomy  Her family history includes Emphysema in her father; Leukemia in her father; Uterine cancer in her mother  She  reports that she quit smoking about 17 years ago  Her smoking use included cigarettes  She has a 30 00 pack-year smoking history  She has never used smokeless tobacco  She reports that she does not drink alcohol and does not use drugs    Current Outpatient Medications   Medication Sig Dispense Refill   • Apremilast (Otezla) 30 MG TABS Take 2 tablets by mouth daily 180 tablet 3   • aspirin 81 mg chewable tablet Chew 1 tablet (81 mg total) daily 30 tablet 0   • Canagliflozin (Invokana) 300 MG TABS Take 1 tablet (300 mg total) by mouth daily before breakfast 90 tablet 3   • chlorthalidone 25 mg tablet Take 1 5 tablets (37 5 mg total) by mouth daily 135 tablet 3   • Cholecalciferol (VITAMIN D) 125 MCG (5000 UT) CAPS Take by mouth     • Continuous Blood Gluc  (FreeStyle Dejuan 14 Day [place]) THERESA Check BG's 2-4x a day depending on sugar levels 1 each 0   • Continuous Blood Gluc Sensor (FreeStyle Dejuan 14 Day Sensor) MISC Check sugars 2-4x daily depending on sugar levels 28 each 6   • diltiazem (CARDIZEM CD) 360 MG 24 hr capsule TAKE 1 CAPSULE BY MOUTH  DAILY 90 capsule 3   • insulin glargine (Lantus SoloStar) 100 units/mL injection pen Inject 50 Units under the skin daily 45 mL 3   • insulin lispro (HumaLOG KwikPen) 100 units/mL injection pen Take 5 U ac b'fastr, 10u before lunch, 15u before supper 30 mL 6   • potassium chloride (K-DUR,KLOR-CON) 20 mEq tablet Take 1 tablet (20 mEq total) by mouth 2 (two) times a day 180 tablet 1   • cyanocobalamin (VITAMIN B-12) 1000 MCG tablet Take 1 tablet (1,000 mcg total) by mouth daily (Patient not taking: No sig reported) 100 tablet 6   • OneTouch Ultra test strip TEST 2-3 TIMES DAILY 100 strip 3     Current Facility-Administered Medications   Medication Dose Route Frequency Provider Last Rate Last Admin   • cyanocobalamin injection 1,000 mcg  1,000 mcg Intramuscular Q30 Days [place], DO   1,000 mcg at 04/06/21 4329   • cyanocobalamin injection 1,000 mcg  1,000 mcg Intramuscular Q30 Days [place], DO   1,000 mcg at 10/27/20 1449   • cyanocobalamin injection 1,000 mcg  1,000 mcg Intramuscular Q30 Days [place], DO   1,000 mcg at 01/05/21 0756   • cyanocobalamin injection 1,000 mcg  1,000 mcg Intramuscular Q30 Days [place] Leer, DO   1,000 mcg at 03/09/21 1455     Current Outpatient Medications on File Prior to Visit   Medication Sig   • Apremilast (Otezla) 30 MG TABS Take 2 tablets by mouth daily   • aspirin 81 mg chewable tablet Chew 1 tablet (81 mg total) daily   • Cholecalciferol (VITAMIN D) 125 MCG (5000 UT) CAPS Take by mouth   • Continuous Blood Gluc  (FreeStyle Dejuan 14 Day [place]) THERESA Check BG's 2-4x a day depending on sugar levels   • Continuous Blood Gluc Sensor (FreeStyle Dejuan 14 Day Sensor) MISC Check sugars 2-4x daily depending on sugar levels   • diltiazem (CARDIZEM CD) 360 MG 24 hr capsule TAKE 1 CAPSULE BY MOUTH  DAILY   • insulin glargine (Lantus SoloStar) 100 units/mL injection pen Inject 50 Units under the skin daily   • insulin lispro (HumaLOG KwikPen) 100 units/mL injection pen Take 5 U ac b'fastr, 10u before lunch, 15u before supper   • potassium chloride (K-DUR,KLOR-CON) 20 mEq tablet Take 1 tablet (20 mEq total) by mouth 2 (two) times a day   • [DISCONTINUED] chlorthalidone 25 mg tablet Take 1 5 tablets (37 5 mg total) by mouth daily   • [DISCONTINUED] valsartan (DIOVAN) 160 mg tablet Take 1 tablet (160 mg total) by mouth daily   • cyanocobalamin (VITAMIN B-12) 1000 MCG tablet Take 1 tablet (1,000 mcg total) by mouth daily (Patient not taking: No sig reported)   • OneTouch Ultra test strip TEST 2-3 TIMES DAILY     Current Facility-Administered Medications on File Prior to Visit   Medication   • cyanocobalamin injection 1,000 mcg   • cyanocobalamin injection 1,000 mcg   • cyanocobalamin injection 1,000 mcg   • cyanocobalamin injection 1,000 mcg     She is allergic to amlodipine, bystolic [nebivolol hcl], and glipizide       Review of Systems   Constitutional: Positive for fatigue  Negative for chills and fever  HENT: Negative for congestion, postnasal drip and rhinorrhea  She has mostly Spring allergies  States Singulair not helping  Hence , end that   Respiratory: Negative for cough, shortness of breath and wheezing  Cardiovascular: Negative for chest pain, palpitations and leg swelling  Gastrointestinal: Negative for abdominal distention and nausea  Endocrine: Positive for polydipsia and polyuria  Negative for polyphagia  See BBG sheet , with QID BS's on   Using the FreeStyle disc and tracking BBG daily  Genitourinary: Negative for difficulty urinating and flank pain  Musculoskeletal: Negative for arthralgias, back pain, gait problem and myalgias  Skin: Negative for rash     Allergic/Immunologic: Negative for environmental allergies  Neurological: Negative for dizziness, facial asymmetry, weakness, light-headedness, numbness and headaches  Hematological: Negative for adenopathy  Psychiatric/Behavioral: Negative for agitation, behavioral problems, confusion, decreased concentration, sleep disturbance and suicidal ideas  The patient is nervous/anxious  Objective:      /58 (BP Location: Right arm, Patient Position: Sitting, Cuff Size: Standard)   Pulse 66   Temp (!) 97 °F (36 1 °C) (Temporal)   Resp 17   Ht 5' 1" (1 549 m)   Wt 96 2 kg (212 lb)   SpO2 97%   BMI 40 06 kg/m²          Physical Exam  Vitals and nursing note reviewed  Constitutional:       General: She is not in acute distress  Appearance: Normal appearance  She is well-developed  She is obese  She is not ill-appearing or diaphoretic  HENT:      Head: Normocephalic and atraumatic  Nose: Nose normal  No congestion or rhinorrhea  Eyes:      General: No scleral icterus  Conjunctiva/sclera: Conjunctivae normal       Pupils: Pupils are equal, round, and reactive to light  Comments: Watery and itchy eyes from the allergies   Neck:      Thyroid: No thyromegaly  Trachea: No tracheal deviation  Cardiovascular:      Rate and Rhythm: Normal rate and regular rhythm  Pulses: Normal pulses  Heart sounds: Normal heart sounds  Pulmonary:      Effort: Pulmonary effort is normal  No respiratory distress  Breath sounds: Wheezing (to ER on 9/8/22 and better now) present  No rhonchi or rales  Chest:      Chest wall: No tenderness  Musculoskeletal:         General: No tenderness or deformity  Normal range of motion  Cervical back: Normal range of motion and neck supple  No rigidity  No muscular tenderness  Right lower leg: No edema  Left lower leg: No edema  Skin:     General: Skin is warm and dry  Coloration: Skin is not pale  Findings: No erythema or rash     Neurological: General: No focal deficit present  Mental Status: She is alert and oriented to person, place, and time  Mental status is at baseline  Cranial Nerves: No cranial nerve deficit  Psychiatric:         Mood and Affect: Mood normal          Behavior: Behavior normal          Thought Content: Thought content normal          Judgment: Judgment normal          50 min with pt and all the above discussed in depth - complex case  Add back Invokana 300 mg, to start 1/2 tab OD  Many issues discussed during this office visit  Many questions answered in this rather complex case  Of chief concern is the wide fluctuation of her blood sugars as they can drop from 350 all the way down to 55  She does have the [facility] which is a great asset  Trying to get her to iron out her eating habits and medication usage  For now will continue Lantus 50 units every morning, log insulin 2, 4, 6 units be for breakfast, lunch, and supper respectively, and she wants to add info can 300 mg once daily but will start 150 mg once daily by breaking that tablet in half  We talked about hypoglycemia as well as hyperglycemia, eating habits, diet, exercise, sleep pattern, all of  Information sheets given hypoglycemia    This time was spent reviewing previous records, reviewing previous laboratory and other tests, taking history from patient, examination of patient, discussion of prognosis and treatment, ordering laboratory tests, ordering medications, and completion of the medical record

## 2022-11-15 ENCOUNTER — OFFICE VISIT (OUTPATIENT)
Dept: FAMILY MEDICINE CLINIC | Facility: CLINIC | Age: 77
End: 2022-11-15

## 2022-11-15 VITALS
SYSTOLIC BLOOD PRESSURE: 158 MMHG | WEIGHT: 211 LBS | OXYGEN SATURATION: 97 % | HEART RATE: 61 BPM | HEIGHT: 61 IN | BODY MASS INDEX: 39.84 KG/M2 | DIASTOLIC BLOOD PRESSURE: 62 MMHG | TEMPERATURE: 97.8 F

## 2022-11-15 DIAGNOSIS — E87.6 HYPOKALEMIA: ICD-10-CM

## 2022-11-15 DIAGNOSIS — I10 RESISTANT HYPERTENSION: ICD-10-CM

## 2022-11-15 RX ORDER — CHLORTHALIDONE 25 MG/1
37.5 TABLET ORAL DAILY
Qty: 135 TABLET | Refills: 3 | Status: SHIPPED | OUTPATIENT
Start: 2022-11-15 | End: 2023-02-13

## 2022-11-15 RX ORDER — POTASSIUM CHLORIDE 20 MEQ/1
20 TABLET, EXTENDED RELEASE ORAL 2 TIMES DAILY
Qty: 180 TABLET | Refills: 1 | Status: SHIPPED | OUTPATIENT
Start: 2022-11-15 | End: 2022-12-15

## 2022-11-15 RX ORDER — VALSARTAN 160 MG/1
160 TABLET ORAL DAILY
Qty: 90 TABLET | Refills: 3 | Status: SHIPPED | OUTPATIENT
Start: 2022-11-15

## 2022-11-15 NOTE — PROGRESS NOTES
Assessment/Plan:77 y o female, in NAD, alert and feeling very well, and BBG's better:  Taking Lantus 50 U at HS and Invokana 300 mg,  lispro 5u, 15U and 10 U  (B,L, and S)  Uses the FreeStyle and FBS about 150 and supper about hi 200's  The prob was nocturnal hypoglycemia        RE:  HTN:  Taking Chlor 25, Diltiazem 360 mg HS, and Valsartan 160 mg OD          1  Hypokalemia  Comments:  taking KCl supplements  Orders:  -     potassium chloride (K-DUR,KLOR-CON) 20 mEq tablet; Take 1 tablet (20 mEq total) by mouth 2 (two) times a day    2  Resistant hypertension  Comments:  See CCL  BP too hi  d/c the losartan and change to Valsartan 160 mg OD (may need to inc o 320?? )  Orders:  -     valsartan (DIOVAN) 160 mg tablet; Take 1 tablet (160 mg total) by mouth daily  -     chlorthalidone 25 mg tablet; Take 1 5 tablets (37 5 mg total) by mouth daily    3  Resistant hypertension  Comments:  Generally better - only on 2 meds:  Chlor 25 and Diltiazem 360 mg OD and Valsartan 160 mg OD  No cardiac issues  Orders:  -     valsartan (DIOVAN) 160 mg tablet; Take 1 tablet (160 mg total) by mouth daily  -     chlorthalidone 25 mg tablet; Take 1 5 tablets (37 5 mg total) by mouth daily        Subjective:      Patient ID: Lyle Lao is a 76 y o  female  HPI    The following portions of the patient's history were reviewed and updated as appropriate: She  has a past medical history of Asthma, Benign essential hypertension, Chronic lower back pain, COPD (chronic obstructive pulmonary disease) ([address] ), Diabetes mellitus ([address] ), Hyperlipidemia, and Sciatica    She   Patient Active Problem List    Diagnosis Date Noted   • Embolism and thrombosis of arteries of the lower extremities ([address] ) 02/22/2022   • Uncontrolled type 2 diabetes mellitus with hyperglycemia ([address] ) 10/09/2021   • Hypertensive urgency 10/09/2021   • Chest pain, unspecified 09/29/2021   • Hypokalemia 09/29/2021   • Allergic reaction 08/12/2021   • Increased BMI 02/15/2021   • Class 2 severe obesity due to excess calories with serious comorbidity and body mass index (BMI) of 35 0 to 35 9 in adult [facility]) 02/15/2021   • Depression, recurrent ([place] [address] ) 01/11/2021   • Obesity, morbid ([place] [address] ) 01/11/2021   • Myalgia due to statin 10/13/2020   • Need for vaccination 10/13/2020   • Psoriasis 10/13/2020   • Hyperlipidemia associated with type 2 diabetes mellitus ([place] [address] ) 10/13/2020   • Mixed simple and mucopurulent chronic bronchitis ([place] [address] ) 05/26/2020   • 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/2020   • Mixed hyperlipidemia 05/26/2020   • Psoriasis-like skin disease 05/26/2020   • Unspecified abnormalities of gait and mobility 12/06/2019   • Personal history of nicotine dependence 12/06/2019   • Other chronic pain 12/06/2019   • Acute left-sided low back pain with left-sided sciatica 12/03/2019   • Controlled type 2 diabetes mellitus, with long-term current use of insulin ([place] [address] ) 12/03/2019   • Resistant hypertension 12/03/2019     She  has a past surgical history that includes Cholecystectomy  Her family history includes Emphysema in her father; Leukemia in her father; Uterine cancer in her mother  She  reports that she quit smoking about 17 years ago  Her smoking use included cigarettes  She has a 30 00 pack-year smoking history  She has never used smokeless tobacco  She reports that she does not drink alcohol and does not use drugs    Current Outpatient Medications   Medication Sig Dispense Refill   • Apremilast (Otezla) 30 MG TABS Take 2 tablets by mouth daily 180 tablet 3   • aspirin 81 mg chewable tablet Chew 1 tablet (81 mg total) daily 30 tablet 0   • Canagliflozin (Invokana) 300 MG TABS Take 1 tablet (300 mg total) by mouth daily before breakfast 90 tablet 3   • chlorthalidone 25 mg tablet Take 1 5 tablets (37 5 mg total) by mouth daily 135 tablet 3   • Cholecalciferol (VITAMIN D) 125 MCG (5000 UT) CAPS Take by mouth     • Continuous Blood Gluc  (FreeStyle Dejuan 14 Day [place]) THERESA Check BG's 2-4x a day depending on sugar levels 1 each 0   • Continuous Blood Gluc Sensor (FreeStyle Dejuan 14 Day Sensor) MISC Check sugars 2-4x daily depending on sugar levels 28 each 6   • diltiazem (CARDIZEM CD) 360 MG 24 hr capsule TAKE 1 CAPSULE BY MOUTH  DAILY 90 capsule 3   • insulin glargine (Lantus SoloStar) 100 units/mL injection pen Inject 50 Units under the skin daily 45 mL 3   • insulin lispro (HumaLOG KwikPen) 100 units/mL injection pen Take 5 U ac b'fastr, 10u before lunch, 15u before supper 30 mL 6   • OneTouch Ultra test strip TEST 2-3 TIMES DAILY 100 strip 3   • potassium chloride (K-DUR,KLOR-CON) 20 mEq tablet Take 1 tablet (20 mEq total) by mouth 2 (two) times a day 180 tablet 1   • valsartan (DIOVAN) 160 mg tablet Take 1 tablet (160 mg total) by mouth daily 90 tablet 3     Current Facility-Administered Medications   Medication Dose Route Frequency Provider Last Rate Last Admin   • cyanocobalamin injection 1,000 mcg  1,000 mcg Intramuscular Q30 Days Mancel Buggy, DO   1,000 mcg at 04/06/21 3428   • cyanocobalamin injection 1,000 mcg  1,000 mcg Intramuscular Q30 Days Mancel Buggy, DO   1,000 mcg at 10/27/20 1449   • cyanocobalamin injection 1,000 mcg  1,000 mcg Intramuscular Q30 Days Mancel Buggy, DO   1,000 mcg at 01/05/21 9582   • cyanocobalamin injection 1,000 mcg  1,000 mcg Intramuscular Q30 Days Mancel Buggy, DO   1,000 mcg at 03/09/21 1455     Current Outpatient Medications on File Prior to Visit   Medication Sig   • Apremilast (Otezla) 30 MG TABS Take 2 tablets by mouth daily   • aspirin 81 mg chewable tablet Chew 1 tablet (81 mg total) daily   • Canagliflozin (Invokana) 300 MG TABS Take 1 tablet (300 mg total) by mouth daily before breakfast   • Cholecalciferol (VITAMIN D) 125 MCG (5000 UT) CAPS Take by mouth   • Continuous Blood Gluc  (FreeStyle Dejuan 14 Day [place]) THERESA Check BG's 2-4x a day depending on sugar levels   • Continuous Blood Gluc Sensor (FreeStyle Dejuan 14 Day Sensor) MISC Check sugars 2-4x daily depending on sugar levels   • diltiazem (CARDIZEM CD) 360 MG 24 hr capsule TAKE 1 CAPSULE BY MOUTH  DAILY   • insulin glargine (Lantus SoloStar) 100 units/mL injection pen Inject 50 Units under the skin daily   • insulin lispro (HumaLOG KwikPen) 100 units/mL injection pen Take 5 U ac b'fastr, 10u before lunch, 15u before supper   • OneTouch Ultra test strip TEST 2-3 TIMES DAILY   • [DISCONTINUED] chlorthalidone 25 mg tablet Take 1 5 tablets (37 5 mg total) by mouth daily   • [DISCONTINUED] potassium chloride (K-DUR,KLOR-CON) 20 mEq tablet Take 1 tablet (20 mEq total) by mouth 2 (two) times a day   • [DISCONTINUED] cyanocobalamin (VITAMIN B-12) 1000 MCG tablet Take 1 tablet (1,000 mcg total) by mouth daily (Patient not taking: No sig reported)     Current Facility-Administered Medications on File Prior to Visit   Medication   • cyanocobalamin injection 1,000 mcg   • cyanocobalamin injection 1,000 mcg   • cyanocobalamin injection 1,000 mcg   • cyanocobalamin injection 1,000 mcg     She is allergic to amlodipine, bystolic [nebivolol hcl], and glipizide       Review of Systems   Constitutional: Positive for fatigue  Negative for chills and fever  HENT: Negative for congestion, postnasal drip and rhinorrhea  She has mostly Spring allergies  States Singulair not helping  Hence , end that   Respiratory: Negative for cough, shortness of breath and wheezing  Cardiovascular: Negative for chest pain, palpitations and leg swelling  Gastrointestinal: Negative for abdominal distention and nausea  Endocrine: Negative for polydipsia, polyphagia and polyuria  See BBG sheet , with QID BS's on   Using the FreeStyle disc and tracking BBG daily  Genitourinary: Negative for difficulty urinating and flank pain  Musculoskeletal: Negative for arthralgias, back pain, gait problem and myalgias  Skin: Negative for rash  Allergic/Immunologic: Negative for environmental allergies  Neurological: Negative for dizziness, facial asymmetry, weakness, light-headedness, numbness and headaches  Hematological: Negative for adenopathy  Psychiatric/Behavioral: Negative for agitation, behavioral problems, confusion, decreased concentration, sleep disturbance and suicidal ideas  The patient is not nervous/anxious  Objective:      /62 (BP Location: Right arm, Patient Position: Sitting, Cuff Size: Adult)   Pulse 61   Temp 97 8 °F (36 6 °C) (Tympanic)   Ht 5' 1" (1 549 m)   Wt 95 7 kg (211 lb)   SpO2 97%   BMI 39 87 kg/m²          Physical Exam  Vitals and nursing note reviewed  Constitutional:       General: She is not in acute distress  Appearance: Normal appearance  She is well-developed  She is obese  She is not ill-appearing or diaphoretic  HENT:      Head: Normocephalic and atraumatic  Nose: Nose normal  No congestion or rhinorrhea  Eyes:      General: No scleral icterus  Conjunctiva/sclera: Conjunctivae normal       Pupils: Pupils are equal, round, and reactive to light  Comments: Watery and itchy eyes from the allergies   Neck:      Thyroid: No thyromegaly  Trachea: No tracheal deviation  Cardiovascular:      Rate and Rhythm: Normal rate and regular rhythm  Pulses: Normal pulses  Heart sounds: Normal heart sounds  Pulmonary:      Effort: Pulmonary effort is normal  No respiratory distress  Breath sounds: Wheezing (to ER on 9/8/22 and better now) present  No rhonchi or rales  Chest:      Chest wall: No tenderness  Musculoskeletal:         General: No tenderness or deformity  Normal range of motion  Cervical back: Normal range of motion and neck supple  No rigidity  No muscular tenderness  Right lower leg: No edema  Left lower leg: No edema  Skin:     General: Skin is warm and dry  Coloration: Skin is not pale  Findings: No erythema or rash  Neurological:      General: No focal deficit present  Mental Status: She is alert and oriented to person, place, and time  Mental status is at baseline  Cranial Nerves: No cranial nerve deficit  Psychiatric:         Mood and Affect: Mood normal          Behavior: Behavior normal          Thought Content:  Thought content normal          Judgment: Judgment normal

## 2022-11-22 ENCOUNTER — TELEPHONE (OUTPATIENT)
Dept: ADMINISTRATIVE | Facility: HOSPITAL | Age: 77
End: 2022-11-22

## 2022-11-22 NOTE — TELEPHONE ENCOUNTER
Pt lvm on prescription line requesting last office visit to be sent over with doctors signature  I had reached out to pt to get a better understanding of what she needed  Pt stated this was going to be for     BILLY FREE STYLE 2 SENSOR A 90 DAY QUANTITY  She didn't seem quite sure of what the pharmacy was asking for but she told me it was [facility] requesting this information  I reached out to [employer] to see if they sent us paperwork they needed to be filled out or any additional information regarding this matter and they stated they do not see anything processing through their system since 2021 for pt  I reached out to [address] and spoke with rep Berenice Tadeo and she stated pt had contacted them to see if it will be covered by insurance of what she told the pt no  Berenice Tadeo did state the they could send a request to the provider but it does not look like they did so and she is not sure if the pt did want them to or what happened because the note on their end, ended with that  She also ran a price check while I was on the phone and it is not covered  If pt was to pay out of pocket it would be 87 50 for 1 sensor 90 day supply

## 2023-01-04 ENCOUNTER — OFFICE VISIT (OUTPATIENT)
Dept: FAMILY MEDICINE CLINIC | Facility: CLINIC | Age: 78
End: 2023-01-04

## 2023-01-04 VITALS
HEIGHT: 61 IN | TEMPERATURE: 97.5 F | HEART RATE: 61 BPM | SYSTOLIC BLOOD PRESSURE: 142 MMHG | BODY MASS INDEX: 40.1 KG/M2 | DIASTOLIC BLOOD PRESSURE: 60 MMHG | OXYGEN SATURATION: 96 % | WEIGHT: 212.4 LBS

## 2023-01-04 DIAGNOSIS — E11.65 UNCONTROLLED TYPE 2 DIABETES MELLITUS WITH HYPERGLYCEMIA (HCC): Primary | ICD-10-CM

## 2023-01-04 DIAGNOSIS — I10 RESISTANT HYPERTENSION: ICD-10-CM

## 2023-01-04 DIAGNOSIS — J44.1 COPD EXACERBATION (HCC): ICD-10-CM

## 2023-01-04 DIAGNOSIS — E66.01 OBESITY, MORBID (HCC): ICD-10-CM

## 2023-01-04 DIAGNOSIS — L40.9 PSORIASIS: ICD-10-CM

## 2023-01-04 RX ORDER — APREMILAST 30 MG/1
30 TABLET, FILM COATED ORAL 2 TIMES DAILY
Qty: 180 TABLET | Refills: 1 | Status: SHIPPED | OUTPATIENT
Start: 2023-01-04

## 2023-01-04 NOTE — PROGRESS NOTES
Assessment/Plan:  76 y o female, in NAD,  Feels better, Thinks she has the right combinatin of meds now:    Lantus 50 U OD and H'log 5 U ac lunch, nd Invokana 300 mg at HS (midnight)    BBG 's - finally got the  FreeStyle meter 3 days ago, the series 2  Discussed  It is the nocturnal  Hypoglycemia that is the issue  HTN - controlled at 140/60-- I got the BP at 150/60 range  1  Uncontrolled type 2 diabetes mellitus with hyperglycemia ([facility] )  Comments:  Just rec'd the FreeStyle 2 series and I reviewed BBG's  Improving  Long discussin re insulin and med's  Orders:  -     Comprehensive metabolic panel; Future; Expected date: 01/11/2023  -     Hemoglobin A1C; Standing  -     CBC and differential; Future; Expected date: 01/05/2023  -     Hemoglobin A1C    2  Resistant hypertension  Comments:  much better controlled  Discussed lo salt, etc   Orders:  -     Comprehensive metabolic panel; Future; Expected date: 01/11/2023  -     Hemoglobin A1C; Standing  -     CBC and differential; Future; Expected date: 01/05/2023  -     Hemoglobin A1C    3  Psoriasis  Comments:  on apremilast (Otezla) 30 mg 2 OD (occas one a day)-- still plagued with hand rash-->BUT normal skin now! Orders:  -     Apremilast (Otezla) 30 MG TABS; Take 1 tablet by mouth 2 (two) times a day    4  COPD exacerbation ([address] )  Comments:  stable and controlled  5  Obesity, morbid ([facility] )          Subjective:      Patient ID: Clyde Urban is a 76 y o  female  HPI    The following portions of the patient's history were reviewed and updated as appropriate: She  has a past medical history of Asthma, Benign essential hypertension, Chronic lower back pain, COPD (chronic obstructive pulmonary disease) ([address] ), Diabetes mellitus ([address] ), Hyperlipidemia, and Sciatica    She   Patient Active Problem List    Diagnosis Date Noted   • Embolism and thrombosis of arteries of the lower extremities ([address] ) 02/22/2022   • Uncontrolled type 2 diabetes mellitus with hyperglycemia ([facility] ) 10/09/2021   • Hypertensive urgency 10/09/2021   • Chest pain, unspecified 09/29/2021   • Hypokalemia 09/29/2021   • Allergic reaction 08/12/2021   • Increased BMI 02/15/2021   • Class 2 severe obesity due to excess calories with serious comorbidity and body mass index (BMI) of 35 0 to 35 9 in adult [facility]) 02/15/2021   • Depression, recurrent ([place] [address] ) 01/11/2021   • Obesity, morbid ([place] [address] ) 01/11/2021   • Myalgia due to statin 10/13/2020   • Need for vaccination 10/13/2020   • Psoriasis 10/13/2020   • Hyperlipidemia associated with type 2 diabetes mellitus ([place] [address] ) 10/13/2020   • Mixed simple and mucopurulent chronic bronchitis ([place] [address] ) 05/26/2020   • 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/2020   • Mixed hyperlipidemia 05/26/2020   • Psoriasis-like skin disease 05/26/2020   • Unspecified abnormalities of gait and mobility 12/06/2019   • Personal history of nicotine dependence 12/06/2019   • Other chronic pain 12/06/2019   • Acute left-sided low back pain with left-sided sciatica 12/03/2019   • Controlled type 2 diabetes mellitus, with long-term current use of insulin ([place] [address] ) 12/03/2019   • Resistant hypertension 12/03/2019     She  has a past surgical history that includes Cholecystectomy  Her family history includes Emphysema in her father; Leukemia in her father; Uterine cancer in her mother  She  reports that she quit smoking about 18 years ago  Her smoking use included cigarettes  She has a 30 00 pack-year smoking history  She has never used smokeless tobacco  She reports that she does not drink alcohol and does not use drugs    Current Outpatient Medications   Medication Sig Dispense Refill   • Apremilast (Otezla) 30 MG TABS Take 1 tablet by mouth 2 (two) times a day 180 tablet 1   • aspirin 81 mg chewable tablet Chew 1 tablet (81 mg total) daily 30 tablet 0   • Canagliflozin (Invokana) 300 MG TABS Take 1 tablet (300 mg total) by mouth daily before breakfast 90 tablet 3   • chlorthalidone 25 mg tablet Take 1 5 tablets (37 5 mg total) by mouth daily 135 tablet 3   • Cholecalciferol (VITAMIN D) 125 MCG (5000 UT) CAPS Take by mouth     • Continuous Blood Gluc  (FreeStyle Dejuan 14 Day [place]) THERESA Check BG's 2-4x a day depending on sugar levels 1 each 0   • Continuous Blood Gluc Sensor (FreeStyle Dejuan 14 Day Sensor) MISC Check sugars 2-4x daily depending on sugar levels 28 each 6   • diltiazem (CARDIZEM CD) 360 MG 24 hr capsule TAKE 1 CAPSULE BY MOUTH  DAILY 90 capsule 3   • insulin glargine (Lantus SoloStar) 100 units/mL injection pen Inject 50 Units under the skin daily 45 mL 3   • insulin lispro (HumaLOG KwikPen) 100 units/mL injection pen Take 5 U ac b'fastr, 10u before lunch, 15u before supper 30 mL 6   • OneTouch Ultra test strip TEST 2-3 TIMES DAILY 100 strip 3   • potassium chloride (K-DUR,KLOR-CON) 20 mEq tablet Take 1 tablet (20 mEq total) by mouth 2 (two) times a day 180 tablet 1   • valsartan (DIOVAN) 160 mg tablet Take 1 tablet (160 mg total) by mouth daily 90 tablet 3     Current Facility-Administered Medications   Medication Dose Route Frequency Provider Last Rate Last Admin   • cyanocobalamin injection 1,000 mcg  1,000 mcg Intramuscular Q30 Days Margrette Rough, DO   1,000 mcg at 04/06/21 4916   • cyanocobalamin injection 1,000 mcg  1,000 mcg Intramuscular Q30 Days Margrette Rough, DO   1,000 mcg at 10/27/20 1449   • cyanocobalamin injection 1,000 mcg  1,000 mcg Intramuscular Q30 Days Margrette Rough, DO   1,000 mcg at 01/05/21 0238   • cyanocobalamin injection 1,000 mcg  1,000 mcg Intramuscular Q30 Days Margrette Rough, DO   1,000 mcg at 03/09/21 1455     Current Outpatient Medications on File Prior to Visit   Medication Sig   • aspirin 81 mg chewable tablet Chew 1 tablet (81 mg total) daily   • Canagliflozin (Invokana) 300 MG TABS Take 1 tablet (300 mg total) by mouth daily before breakfast   • chlorthalidone 25 mg tablet Take 1 5 tablets (37 5 mg total) by mouth daily   • Cholecalciferol (VITAMIN D) 125 MCG (5000 UT) CAPS Take by mouth   • Continuous Blood Gluc  (FreeStyle Dejuan 14 Day [place]) THERESA Check BG's 2-4x a day depending on sugar levels   • Continuous Blood Gluc Sensor (FreeStyle Dejuan 14 Day Sensor) MISC Check sugars 2-4x daily depending on sugar levels   • diltiazem (CARDIZEM CD) 360 MG 24 hr capsule TAKE 1 CAPSULE BY MOUTH  DAILY   • insulin glargine (Lantus SoloStar) 100 units/mL injection pen Inject 50 Units under the skin daily   • insulin lispro (HumaLOG KwikPen) 100 units/mL injection pen Take 5 U ac b'fastr, 10u before lunch, 15u before supper   • OneTouch Ultra test strip TEST 2-3 TIMES DAILY   • potassium chloride (K-DUR,KLOR-CON) 20 mEq tablet Take 1 tablet (20 mEq total) by mouth 2 (two) times a day   • valsartan (DIOVAN) 160 mg tablet Take 1 tablet (160 mg total) by mouth daily   • [DISCONTINUED] Apremilast (Otezla) 30 MG TABS Take 2 tablets by mouth daily     Current Facility-Administered Medications on File Prior to Visit   Medication   • cyanocobalamin injection 1,000 mcg   • cyanocobalamin injection 1,000 mcg   • cyanocobalamin injection 1,000 mcg   • cyanocobalamin injection 1,000 mcg     She is allergic to amlodipine, bystolic [nebivolol hcl], and glipizide       Review of Systems   Constitutional: Negative for chills, fatigue and fever  HENT: Negative for congestion, dental problem, mouth sores, nosebleeds, postnasal drip and rhinorrhea  She has mostly Spring allergies  States Singulair not helping  Hence , end that   Respiratory: Negative for cough, shortness of breath and wheezing  Cardiovascular: Negative for chest pain, palpitations and leg swelling  Gastrointestinal: Negative for abdominal distention and nausea  Endocrine: Negative for polydipsia, polyphagia and polyuria  See BBG sheet , with QID BS's on   Using the FreeStyle disc and tracking BBG daily  Genitourinary: Negative for difficulty urinating and flank pain     Musculoskeletal: Negative for arthralgias, back pain, gait problem and myalgias  Skin: Negative for rash  Allergic/Immunologic: Negative for environmental allergies  Neurological: Negative for dizziness, facial asymmetry, weakness, light-headedness, numbness and headaches  Hematological: Negative for adenopathy  Psychiatric/Behavioral: Negative for agitation, behavioral problems, confusion, decreased concentration, sleep disturbance and suicidal ideas  The patient is not nervous/anxious  Objective:      /60 (BP Location: Left arm, Patient Position: Sitting, Cuff Size: Standard)   Pulse 61   Temp 97 5 °F (36 4 °C) (Tympanic Core)   Ht 5' 1" (1 549 m)   Wt 96 3 kg (212 lb 6 4 oz)   SpO2 96%   BMI 40 13 kg/m²          Physical Exam  Vitals and nursing note reviewed  Constitutional:       General: She is not in acute distress  Appearance: Normal appearance  She is well-developed  She is obese  She is not ill-appearing or diaphoretic  HENT:      Head: Normocephalic and atraumatic  Nose: Nose normal  No congestion or rhinorrhea  Eyes:      General: No scleral icterus  Conjunctiva/sclera: Conjunctivae normal       Pupils: Pupils are equal, round, and reactive to light  Comments: Watery and itchy eyes from the allergies   Neck:      Thyroid: No thyromegaly  Trachea: No tracheal deviation  Cardiovascular:      Rate and Rhythm: Normal rate and regular rhythm  Pulses: Normal pulses  Heart sounds: Normal heart sounds  Pulmonary:      Effort: Pulmonary effort is normal  No respiratory distress  Breath sounds: No wheezing (to ER on 9/8/22 and better now), rhonchi or rales  Chest:      Chest wall: No tenderness  Musculoskeletal:         General: No tenderness or deformity  Normal range of motion  Cervical back: Normal range of motion and neck supple  No rigidity  No muscular tenderness  Right lower leg: No edema  Left lower leg: No edema     Skin:     General: Skin is warm and dry  Coloration: Skin is not pale  Findings: No erythema or rash  Neurological:      General: No focal deficit present  Mental Status: She is alert and oriented to person, place, and time  Mental status is at baseline  Cranial Nerves: No cranial nerve deficit  Psychiatric:         Mood and Affect: Mood normal          Behavior: Behavior normal          Thought Content: Thought content normal          Judgment: Judgment normal          30 min with pt + Epic documentionation  Doing much better  This time was spent reviewing previous records, reviewing previous laboratory and other tests, taking history from patient, examination of patient, discussion of prognosis and treatment, ordering laboratory tests, ordering medications, and completion of the medical record

## 2023-01-18 DIAGNOSIS — E11.65 UNCONTROLLED TYPE 2 DIABETES MELLITUS WITH HYPERGLYCEMIA (HCC): ICD-10-CM

## 2023-01-20 RX ORDER — INSULIN LISPRO 100 [IU]/ML
INJECTION, SOLUTION INTRAVENOUS; SUBCUTANEOUS
Qty: 30 ML | Refills: 3 | Status: SHIPPED | OUTPATIENT
Start: 2023-01-20

## 2023-01-30 ENCOUNTER — APPOINTMENT (OUTPATIENT)
Dept: LAB | Facility: HOSPITAL | Age: 78
End: 2023-01-30

## 2023-01-30 DIAGNOSIS — I10 RESISTANT HYPERTENSION: ICD-10-CM

## 2023-01-30 DIAGNOSIS — E11.65 UNCONTROLLED TYPE 2 DIABETES MELLITUS WITH HYPERGLYCEMIA (HCC): ICD-10-CM

## 2023-01-30 LAB
ALBUMIN SERPL BCP-MCNC: 3.8 G/DL (ref 3.5–5)
ALP SERPL-CCNC: 93 U/L (ref 34–104)
ALT SERPL W P-5'-P-CCNC: 16 U/L (ref 7–52)
ANION GAP SERPL CALCULATED.3IONS-SCNC: 8 MMOL/L (ref 4–13)
AST SERPL W P-5'-P-CCNC: 10 U/L (ref 13–39)
BASOPHILS # BLD AUTO: 0.17 THOUSANDS/ÂΜL (ref 0–0.1)
BASOPHILS NFR BLD AUTO: 2 % (ref 0–1)
BILIRUB SERPL-MCNC: 0.41 MG/DL (ref 0.2–1)
BUN SERPL-MCNC: 18 MG/DL (ref 5–25)
CALCIUM SERPL-MCNC: 9.5 MG/DL (ref 8.4–10.2)
CHLORIDE SERPL-SCNC: 102 MMOL/L (ref 96–108)
CO2 SERPL-SCNC: 29 MMOL/L (ref 21–32)
CREAT SERPL-MCNC: 0.74 MG/DL (ref 0.6–1.3)
EOSINOPHIL # BLD AUTO: 0.2 THOUSAND/ÂΜL (ref 0–0.61)
EOSINOPHIL NFR BLD AUTO: 2 % (ref 0–6)
ERYTHROCYTE [DISTWIDTH] IN BLOOD BY AUTOMATED COUNT: 13 % (ref 11.6–15.1)
EST. AVERAGE GLUCOSE BLD GHB EST-MCNC: 197 MG/DL
GFR SERPL CREATININE-BSD FRML MDRD: 77 ML/MIN/1.73SQ M
GLUCOSE P FAST SERPL-MCNC: 154 MG/DL (ref 65–99)
HBA1C MFR BLD: 8.5 %
HCT VFR BLD AUTO: 49.4 % (ref 34.8–46.1)
HGB BLD-MCNC: 15.7 G/DL (ref 11.5–15.4)
IMM GRANULOCYTES # BLD AUTO: 0.03 THOUSAND/UL (ref 0–0.2)
IMM GRANULOCYTES NFR BLD AUTO: 0 % (ref 0–2)
LYMPHOCYTES # BLD AUTO: 2.76 THOUSANDS/ÂΜL (ref 0.6–4.47)
LYMPHOCYTES NFR BLD AUTO: 28 % (ref 14–44)
MCH RBC QN AUTO: 29.1 PG (ref 26.8–34.3)
MCHC RBC AUTO-ENTMCNC: 31.8 G/DL (ref 31.4–37.4)
MCV RBC AUTO: 92 FL (ref 82–98)
MONOCYTES # BLD AUTO: 0.88 THOUSAND/ÂΜL (ref 0.17–1.22)
MONOCYTES NFR BLD AUTO: 9 % (ref 4–12)
NEUTROPHILS # BLD AUTO: 5.73 THOUSANDS/ÂΜL (ref 1.85–7.62)
NEUTS SEG NFR BLD AUTO: 59 % (ref 43–75)
NRBC BLD AUTO-RTO: 0 /100 WBCS
PLATELET # BLD AUTO: 265 THOUSANDS/UL (ref 149–390)
PMV BLD AUTO: 10.2 FL (ref 8.9–12.7)
POTASSIUM SERPL-SCNC: 3.9 MMOL/L (ref 3.5–5.3)
PROT SERPL-MCNC: 6.2 G/DL (ref 6.4–8.4)
RBC # BLD AUTO: 5.4 MILLION/UL (ref 3.81–5.12)
SODIUM SERPL-SCNC: 139 MMOL/L (ref 135–147)
WBC # BLD AUTO: 9.77 THOUSAND/UL (ref 4.31–10.16)

## 2023-02-14 ENCOUNTER — TELEPHONE (OUTPATIENT)
Dept: FAMILY MEDICINE CLINIC | Facility: CLINIC | Age: 78
End: 2023-02-14

## 2023-02-14 NOTE — TELEPHONE ENCOUNTER
----- Message from Tan Singer sent at 2/2/2023 10:21 AM EST -----    ----- Message -----  From: Christo Nieves DO  Sent: 2/1/2023  10:14 PM EST  To: Shi Jacobs MA    Pls call pt and tell her labs are good!  ----- Message -----  From: Lab, Background User  Sent: 1/30/2023   9:56 AM EST  To: Christo Nieves DO

## 2023-02-20 ENCOUNTER — RA CDI HCC (OUTPATIENT)
Dept: OTHER | Facility: HOSPITAL | Age: 78
End: 2023-02-20

## 2023-02-20 NOTE — PROGRESS NOTES
[facility]  coding opportunities          Chart Reviewed number of suggestions sent to Provider: 1   [phone]      Patients Insurance     Medicare Insurance: [employer] Advantage

## 2023-02-24 ENCOUNTER — OFFICE VISIT (OUTPATIENT)
Dept: FAMILY MEDICINE CLINIC | Facility: CLINIC | Age: 78
End: 2023-02-24

## 2023-02-24 VITALS
HEART RATE: 57 BPM | WEIGHT: 215.6 LBS | TEMPERATURE: 98.1 F | HEIGHT: 61 IN | OXYGEN SATURATION: 97 % | BODY MASS INDEX: 40.7 KG/M2 | SYSTOLIC BLOOD PRESSURE: 150 MMHG | DIASTOLIC BLOOD PRESSURE: 56 MMHG

## 2023-02-24 DIAGNOSIS — L40.9 PSORIASIS: ICD-10-CM

## 2023-02-24 DIAGNOSIS — E55.9 VITAMIN D INSUFFICIENCY: ICD-10-CM

## 2023-02-24 DIAGNOSIS — Z79.899 ENCOUNTER FOR LONG-TERM CURRENT USE OF HIGH RISK MEDICATION: ICD-10-CM

## 2023-02-24 DIAGNOSIS — Z79.899 ENCOUNTER FOR LONG-TERM (CURRENT) USE OF MEDICATIONS: ICD-10-CM

## 2023-02-24 DIAGNOSIS — J44.1 COPD EXACERBATION (HCC): ICD-10-CM

## 2023-02-24 DIAGNOSIS — E66.01 OBESITY, MORBID (HCC): ICD-10-CM

## 2023-02-24 DIAGNOSIS — F33.9 DEPRESSION, RECURRENT (HCC): ICD-10-CM

## 2023-02-24 DIAGNOSIS — I10 RESISTANT HYPERTENSION: ICD-10-CM

## 2023-02-24 DIAGNOSIS — I74.3 EMBOLISM AND THROMBOSIS OF ARTERIES OF THE LOWER EXTREMITIES (HCC): ICD-10-CM

## 2023-02-24 DIAGNOSIS — E11.65 UNCONTROLLED TYPE 2 DIABETES MELLITUS WITH HYPERGLYCEMIA (HCC): ICD-10-CM

## 2023-02-24 RX ORDER — VALSARTAN 320 MG/1
320 TABLET ORAL DAILY
Qty: 90 TABLET | Refills: 1 | Status: SHIPPED | OUTPATIENT
Start: 2023-02-24

## 2023-02-24 NOTE — PATIENT INSTRUCTIONS
Type 2 Diabetes Management for Adults   AMBULATORY CARE:   Type 2 diabetes  is a disease that affects how your body uses glucose (sugar)  Either your body cannot make enough insulin, or it cannot use the insulin correctly  It is important to keep diabetes controlled to prevent damage to your heart, blood vessels, and other organs  Management will help you feel well and enjoy your daily activities  Your diabetes care team providers can help you make a plan to fit diabetes care into your schedule  Your plan can change over time to fit your needs and your family's needs  Have someone call your local emergency number (911 in the [address]) if:   • You cannot be woken  • You have signs of diabetic ketoacidosis:     ? confusion, fatigue    ? vomiting    ? rapid heartbeat    ? fruity smelling breath    ? extreme thirst    ? dry mouth and skin    • You have any of the following signs of a heart attack:      ? Squeezing, pressure, or pain in your chest    ? You may  also have any of the following:     - Discomfort or pain in your back, neck, jaw, stomach, or arm    - Shortness of breath    - Nausea or vomiting    - Lightheadedness or a sudden cold sweat    • You have any of the following signs of a stroke:      ? Numbness or drooping on one side of your face     ? Weakness in an arm or leg    ? Confusion or difficulty speaking    ? Dizziness, a severe headache, or vision loss    Call your doctor or diabetes care team provider if:   • You have a sore or wound that will not heal     • You have a change in the amount you urinate  • Your blood sugar levels are higher than your target goals  • You often have lower blood sugar levels than your target goals  • Your skin is red, dry, warm, or swollen  • You have trouble coping with diabetes, or you feel anxious or depressed  • You have questions or concerns about your condition or care      What you need to know about high blood sugar levels:  High blood sugar levels may not cause any symptoms  You may feel more thirsty or urinate more often than usual  Over time, high blood sugar levels can damage your nerves, blood vessels, tissues, and organs  The following can increase your blood sugar levels:  • Large meals or large amounts of carbohydrates at one time    • Less physical activity    • Stress    • Illness    • A lower dose of diabetes medicine or insulin, or a late dose    What you need to know about low blood sugar levels:  Symptoms include feeling shaky, dizzy, irritable, or confused  You can prevent symptoms by keeping your blood sugar levels from going too low  • Treat a low blood sugar level right away:      ? Drink 4 ounces of juice or have 1 tube of glucose gel  ? Check your blood sugar level again 10 to 15 minutes later  ? When the level goes back to normal, eat a meal or snack to prevent another decrease  • Keep glucose gel, raisins, or hard candy with you at all times to treat a low blood sugar level  • Your blood sugar level can get too low if you take diabetes medicine or insulin and do not eat enough food  • If you use insulin, check your blood sugar level before you exercise  ? If your blood sugar level is below 100 mg/dL, eat 4 crackers or 2 ounces of raisins, or drink 4 ounces of juice  ? Check your level every 30 minutes if you exercise longer than 1 hour  ? You may need a snack during or after exercise  What you can do to manage your blood sugar levels:   • Check your blood sugar levels as directed and as needed  Several items are available to use to check your levels  You may need to check by testing a drop of blood in a glucose monitor  You may instead be given a continuous glucose monitoring (CGM) device  The device is worn at all times  The CGM checks your blood sugar level every 5 minutes  It sends results to an electronic device such as a smart phone  A CGM can be used with or without an insulin pump   You and your diabetes care team providers will decide on the best method for you  The goal for blood sugar levels before meals  is between 80 and 130 mg/dL and 2 hours after eating  is lower than 180 mg/dL  • Make healthy food choices  Work with a dietitian to develop a meal plan that works for you and your schedule  A dietitian can help you learn how to eat the right amount of carbohydrates during your meals and snacks  Carbohydrates can raise your blood sugar level if you eat too many at one time  Examples of foods that contain carbohydrates are breads, cereals, rice, pasta, and sweets  • Eat high-fiber foods as directed  Fiber helps improve blood sugar levels  Fiber also lowers your risk for heart disease and other problems diabetes can cause  Examples of high-fiber foods include vegetables, whole-grain bread, and beans such as elmore beans  Your dietitian can tell you how much fiber to have each day  • Get regular physical activity  Physical activity can help you get to your target blood sugar level goal and manage your weight  Get at least 150 minutes of moderate to vigorous aerobic physical activity each week  Do not miss more than 2 days in a row  Do not sit longer than 30 minutes at a time  Your healthcare provider can help you create an activity plan  The plan can include the best activities for you and can help you build your strength and endurance  • Maintain a healthy weight  Ask your team what a healthy weight is for you  A healthy weight can help you control diabetes and prevent heart disease  Ask your team to help you create a weight loss plan, if needed  Weight loss can help make a difference in managing diabetes  Your team will help you set a weight-loss goal, such as 10 to 15 pounds, or 5% of your extra weight  Together you and your team can set manageable weight loss goals  • Take your diabetes medicine or insulin as directed    You may need diabetes medicine, insulin, or both to help control your blood sugar levels  Your healthcare provider will teach you how and when to take your diabetes medicine or insulin  You will also be taught about side effects oral diabetes medicine can cause  Insulin may be injected or given through a pump or pen  You and your providers will decide on the best method for you:    ? An insulin pump  is an implanted device that gives your insulin 24 hours a day  An insulin pump prevents the need for multiple insulin injections in a day  ? An insulin pen  is a device prefilled with the right amount of insulin  ? You and your family members will be taught how to draw up and give insulin  if this is the best method for you  Your providers will also teach you how to dispose of needles and syringes  ? You will learn how much insulin you need  and when to give it  You will be taught when not to give insulin  You will also be taught what to do if your blood sugar level drops too low  This may happen if you take insulin and do not eat the right amount of carbohydrates  More ways to manage type 2 diabetes:   • Wear medical alert identification  Wear medical alert jewelry or carry a card that says you have diabetes  Ask your provider where to get these items  • Do not smoke  Nicotine and other chemicals in cigarettes and cigars can cause lung and blood vessel damage  It also makes it more difficult to manage your diabetes  Ask your provider for information if you currently smoke and need help to quit  Do not use e-cigarettes or smokeless tobacco in place of cigarettes or to help you quit  They still contain nicotine  • Check your feet each day for cuts, scratches, calluses, or other wounds  Look for redness and swelling, and feel for warmth  Wear shoes that fit well  Check your shoes for rocks or other objects that can hurt your feet  Do not walk barefoot or wear shoes without socks   Wear cotton socks to help keep your feet dry  • Ask about vaccines you may need  You have a higher risk for serious illness if you get the flu, pneumonia, COVID-19, or hepatitis  Ask your provider if you should get vaccines to prevent these or other diseases, and when to get the vaccines  • Talk to your provider if you become stressed about diabetes care  Sometimes being able to fit diabetes care into your life can cause increased stress  The stress can cause you not to take care of yourself properly  Your care team providers can help by offering tips about self-care  Your providers may suggest you talk to a mental health provider who can listen and offer help with self-care issues  • Have your A1c checked as directed  Your provider may check your A1c every 3 months, or 2 times each year if your diabetes is controlled  An A1c test shows the average amount of sugar in your blood over the past 2 to 3 months  Your provider will tell you what your A1c level should be  • Have screening tests as directed  Your provider may recommend screening for complications of diabetes and other conditions that may develop  Some screenings may begin right away and some may happen within the first 5 years of diagnosis:    ? Examples of diabetes complications  include kidney problems, high cholesterol, high blood pressure, blood vessel problems, eye problems, and sleep apnea  ? You may be screened for a low vitamin B level  if you take oral diabetes medicine for a long time  ? Women of childbearing years may be screened  for polycystic ovarian syndrome (PCOS)  Follow up with your doctor or diabetes care team providers as directed: You may need to have blood tests done before your follow-up visit  The test results will show if changes need to be made in your treatment or self-care  Talk to your provider if you cannot afford your medicine  Write down your questions so you remember to ask them during your visits    © Copyright [employer] 2022 Information is for End User's use only and may not be sold, redistributed or otherwise used for commercial purposes  The above information is an  only  It is not intended as medical advice for individual conditions or treatments  Talk to your doctor, nurse or pharmacist before following any medical regimen to see if it is safe and effective for you  Basic Carbohydrate Counting   AMBULATORY CARE:   Carbohydrate counting  is a way to plan your meals by counting the amount of carbohydrate in foods  Carbohydrates are the sugars, starches, and fiber found in fruit, grains, vegetables, and milk products  Carbohydrates increase your blood sugar levels  Carbohydrate counting can help you eat the right amount of carbohydrate to keep your blood sugar levels under control  What you need to know about planning meals using carbohydrate counting:  • A dietitian or healthcare provider will help you develop a healthy meal plan that works best for you  You will be taught how much carbohydrate to eat or drink for each meal and snack  Your meal plan will be based on your age, weight, usual food intake, and physical activity level  If you have diabetes, it will also include your blood sugar levels and diabetes medicine  Once you know how much carbohydrate you should eat, you can decide what type of food you want to eat  • You will need to know what foods contain carbohydrate and how much they contain  Keep track of the amount of carbohydrate in meals and snacks in order to follow your meal plan  Do not avoid carbohydrates or skip meals  Your blood sugar may fall too low if you do not eat enough carbohydrate or you skip meals  Foods that contain carbohydrate:   • Breads:  Each serving of food listed below contains about 15 g of carbohydrate   ? 1 slice of bread (1 ounce) or 1 flour or corn tortilla (6 inch)    ? ½ of a hamburger bun or ¼ of a large bagel (about 1 ounce)    ?  1 pancake (about 4 inches across and ¼ inch thick)    • Cereals and grains:  Serving sizes of ready-to-eat cereals vary  Look at the serving size and the total carbohydrate amount listed on the food label  Each serving of food listed below contains about 15 g of carbohydrate   ? ¾ cup of dry, unsweetened, ready-to-eat cereal or ¼ cup of low-fat granola     ? ½ cup of oatmeal or other cooked cereal     ? ? cup of cooked rice or pasta    • Starchy vegetables and beans:  Each serving of food listed below contains about 15 g of carbohydrate   ? ½ cup of corn, green peas, sweet potatoes, or mashed potatoes    ? ¼ of a large baked potato    ? ½ cup of beans, lentils, and peas (garbanzo, elmore, kidney, white, split, black-eyed)    • Crackers and snacks:  Each serving of food listed below contains about 15 g of carbohydrate   ? 3 maik cracker squares or 8 animal crackers     ? 6 saltine-type crackers    ? 3 cups of popcorn or ¾ ounce of pretzels, potato chips, or tortilla chips    • Fruit:  Each serving of food listed below contains about 15 g of carbohydrate   ? 1 small (4 ounce) piece of fresh fruit or ¾ to 1 cup of fresh fruit    ? ½ cup of canned or frozen fruit, packed in natural juice    ? ½ cup (4 ounces) of unsweetened fruit juice    ? 2 tablespoons of dried fruit    • Desserts or sugary foods:  Each serving of food listed below contains about 15 g of carbohydrate   ? 2-inch square unfrosted cake or brownie     ? 2 small cookies    ? ½ cup of ice cream, frozen yogurt, or nondairy frozen yogurt    ? ¼ cup of sherbet or sorbet    ? 1 tablespoon of regular syrup, jam, or jelly    ? 2 tablespoons of light syrup    • Milk and yogurt:  Foods from the milk group contain about 12 g of carbohydrate per serving  ? 1 cup of fat-free or low-fat milk    ? 1 cup of soy milk    ? ? cup of fat-free, yogurt sweetened with artificial sweetener    • Non-starchy vegetables:  Each serving contains about 5 g of carbohydrate    Three servings of non-starch vegetables count as 1 carbohydrate serving  ? ½ cup of cooked vegetables or 1 cup of raw vegetables  This includes beets, broccoli, cabbage, cauliflower, cucumber, mushrooms, tomatoes, and zucchini    ? ½ cup of vegetable juice    How to use carbohydrate counting to plan meals:   • Count carbohydrate amounts using serving sizes:      ? Pasta dinner example: You plan to have pasta, tossed salad, and an 8-ounce glass of milk  Your healthcare provider tells you that you may have 4 carbohydrate servings for dinner  One carbohydrate serving of pasta is ? cup  One cup of pasta will equal 3 carbohydrate servings  An 8-ounce glass of milk will count as 1 carbohydrate serving  These amounts of food would equal 4 carbohydrate servings  One cup of tossed salad does not count toward your carbohydrate servings  • Count carbohydrate amounts using food labels:  Find the total amount of carbohydrate in a packaged food by reading the food label  Food labels tell you the serving size of the food and the total carbohydrate amount in each serving  Find the serving size on the food label and then decide how many servings you will eat  Multiply the number of servings you plan to eat by the carbohydrate amount per serving  ? Granola bar snack example: Your meal plan allows you to have 2 carbohydrate servings (30 grams) of carbohydrate for a snack  You plan to eat 1 package of granola bars, which contains 2 bars  According to the food label, the serving size of food in this package is 1 bar  Each serving (1 bar) contains 25 grams of carbohydrate  The total amount of carbohydrate in this package of granola bars would be 50 g  Based on your meal plan, you should eat only 1 bar  Follow up with your doctor as directed:  Write down your questions so you remember to ask them during your visits    © Copyright [employer] 2022 Information is for End User's use only and may not be sold, redistributed or otherwise used for commercial purposes  The above information is an  only  It is not intended as medical advice for individual conditions or treatments  Talk to your doctor, nurse or pharmacist before following any medical regimen to see if it is safe and effective for you  Foot Care for People with Diabetes   AMBULATORY CARE:   What you need to know about foot care:  Long-term high blood sugar levels can damage the blood vessels and nerves in your legs and feet  This damage makes it hard to feel pressure, pain, temperature, and touch  You may not be able to feel a cut or sore, or shoes that are too tight  Foot care is needed to prevent serious problems, such as an infection or amputation  Diabetes may cause your toes to become crooked or curved under  These changes may affect the way you walk and can lead to increased pressure on your foot  The pressure can decrease blood flow to your feet  Lack of blood flow increases your risk for a foot ulcer  Call your care team provider if:   • Your feet become numb, weak, or hard to move  • You have pus draining from a sore on your foot  • You have a wound on your foot that gets bigger, deeper, or does not heal     • You see blisters, cuts, scratches, calluses, or sores on your foot  • You have a fever, and your feet become red, warm, and swollen  • Your toenails become thick, curled, or yellow  • You find it hard to check your feet because your vision is poor  • You have questions or concerns about your condition or care  How to care for your feet:   • Check your feet each day  Look at your whole foot, including the bottom, and between and under your toes  Check for wounds, corns, and calluses  Use a mirror to see the bottom of your feet  The skin on your feet may be shiny, tight, or darker than normal  Your feet may also be cold and pale  Feel your feet by running your hands along the tops, bottoms, sides, and between your toes   Redness, swelling, and warmth are signs of blood flow problems that can lead to a foot ulcer  Do not try to remove corns or calluses yourself  Do not ignore small problems, such as dry skin or small wounds  These can become life-threatening over time without proper care  • Wash your feet each day with soap and warm water  Do not use hot water, because this can injure your foot  Dry your feet gently with a towel after you wash them  Dry between and under your toes  • Apply lotion or a moisturizer on your dry feet  Ask your care team provider what lotions are best to use  Do not put lotion or moisturizer between your toes  Moisture between your toes could lead to skin breakdown  • Cut your toenails correctly  File or cut your toenails straight across  Use a soft brush to clean around your toenails  If your toenails are very thick, you may need to have a care team provider or specialist cut them  • Protect your feet  Do not walk barefoot or wear your shoes without socks  Check your shoes for rocks or other objects that can hurt your feet  Wear cotton socks to help keep your feet dry  Wear socks without toe seams, or wear them with the seams inside out  Change your socks each day  Do not wear socks that are dirty or damp  • Wear shoes that fit well  Wear shoes that do not rub against any area of your feet  Your shoes should be ½ to ¾ inch (1 to 2 centimeters) longer than your feet  Your shoes should also have extra space around the widest part of your feet  Walking or athletic shoes with laces or straps that adjust are best  Ask your care team provider for help to choose shoes that fit you best  Ask your provider if you need to wear an insert, orthotic, or bandage on your feet  • Go to your follow-up visits  Your care team provider will do a foot exam at least 1 time each year  You may need a foot exam more often if you have nerve damage, foot deformities, or ulcers   Your provider will check for nerve damage and how well you can feel your feet  Your provider will check your shoes to see if they fit well  • Do not smoke  Smoking can damage your blood vessels and put you at increased risk for foot ulcers  Ask your care team provider for information if you currently smoke and need help to quit  E-cigarettes or smokeless tobacco still contain nicotine  Talk to your care team provider before you use these products  Follow up with your diabetes care team provider or foot specialist as directed: You will need to have your feet checked at least 1 time each year  You may need a foot exam more often if you have nerve damage, foot deformities, or ulcers  Write down your questions so you remember to ask them during your visits  © Copyright [employer] 2022 Information is for End User's use only and may not be sold, redistributed or otherwise used for commercial purposes  The above information is an  only  It is not intended as medical advice for individual conditions or treatments  Talk to your doctor, nurse or pharmacist before following any medical regimen to see if it is safe and effective for you

## 2023-02-24 NOTE — PROGRESS NOTES
Assessment/Plan:  76 y o female, in NAD, is monitoring BBG's constantly off the FreeStyle 2 and 3:  She can do either, and even her daughter's phone monitors pt's BS!! BMI Counseling: Body mass index is 40 74 kg/m²  The BMI is above normal  Nutrition recommendations include decreasing portion sizes, encouraging healthy choices of fruits and vegetables, decreasing fast food intake, consuming healthier snacks, limiting drinks that contain sugar, moderation in carbohydrate intake, increasing intake of lean protein and reducing intake of saturated and trans fat  Exercise recommendations include moderate physical activity 150 minutes/week and exercising 3-5 times per week  No pharmacotherapy was ordered  Rationale for BMI follow-up plan is due to patient being overweight or obese  1  Uncontrolled type 2 diabetes mellitus with hyperglycemia ([facility] [address] )  Comments:  VBG's are monitored by freestyle and one-page scanned into computer  Will make adjustment of log from 6 units up to 10 units AC lunch     2  Resistant hypertension  Comments:  -180, double ck'ed with MA (Ishmael) and confirmed this hi  Inc the Valsartan 160mg to 320 mg OD    3  Psoriasis  Comments:  No  plaque on palm at this time  4  COPD exacerbation ([address] )  Comments:  stable and no exaccerbation    5  Obesity, morbid ([address] )  Comments:  BMI 40 74    6  Encounter for long-term (current) use of medications  Comments:  all meds reviewed  7  Vitamin D insufficiency    8  Encounter for long-term current use of high risk medication  Comments: All discussed with pt and MA          Subjective:      Patient ID: Won Bergeron is a 76 y o  female      HPI    The following portions of the patient's history were reviewed and updated as appropriate: She  has a past medical history of Asthma, Benign essential hypertension, Chronic lower back pain, COPD (chronic obstructive pulmonary disease) ([address] ), Diabetes mellitus ([address] ), Hyperlipidemia, and Sciatica  She   Patient Active Problem List    Diagnosis Date Noted   • Embolism and thrombosis of arteries of the lower extremities ([place] [address] ) 02/22/2022   • Uncontrolled type 2 diabetes mellitus with hyperglycemia ([place] [address] ) 10/09/2021   • Hypertensive urgency 10/09/2021   • Chest pain, unspecified 09/29/2021   • Hypokalemia 09/29/2021   • Allergic reaction 08/12/2021   • Increased BMI 02/15/2021   • Class 2 severe obesity due to excess calories with serious comorbidity and body mass index (BMI) of 35 0 to 35 9 in adult [facility]) 02/15/2021   • Depression, recurrent ([place] [address] ) 01/11/2021   • Obesity, morbid ([place] [address] ) 01/11/2021   • Myalgia due to statin 10/13/2020   • Need for vaccination 10/13/2020   • Psoriasis 10/13/2020   • Hyperlipidemia associated with type 2 diabetes mellitus ([place] [address] ) 10/13/2020   • Mixed simple and mucopurulent chronic bronchitis ([place] [address] ) 05/26/2020   • 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/2020   • Mixed hyperlipidemia 05/26/2020   • Psoriasis-like skin disease 05/26/2020   • Unspecified abnormalities of gait and mobility 12/06/2019   • Personal history of nicotine dependence 12/06/2019   • Other chronic pain 12/06/2019   • Acute left-sided low back pain with left-sided sciatica 12/03/2019   • Controlled type 2 diabetes mellitus, with long-term current use of insulin ([place] [address] ) 12/03/2019   • Resistant hypertension 12/03/2019     She  has a past surgical history that includes Cholecystectomy  Her family history includes Emphysema in her father; Leukemia in her father; Uterine cancer in her mother  She  reports that she quit smoking about 18 years ago  Her smoking use included cigarettes  She has a 30 00 pack-year smoking history  She has never used smokeless tobacco  She reports that she does not drink alcohol and does not use drugs    Current Outpatient Medications   Medication Sig Dispense Refill   • Apremilast (Otezla) 30 MG TABS Take 1 tablet by mouth 2 (two) times a day 180 tablet 1   • aspirin 81 mg chewable tablet Chew 1 tablet (81 mg total) daily 30 tablet 0   • Canagliflozin (Invokana) 300 MG TABS Take 1 tablet (300 mg total) by mouth daily before breakfast 90 tablet 3   • chlorthalidone 25 mg tablet Take 1 5 tablets (37 5 mg total) by mouth daily 135 tablet 3   • Cholecalciferol (VITAMIN D) 125 MCG (5000 UT) CAPS Take by mouth     • Continuous Blood Gluc  (FreeStyle Dejuan 14 Day [place]) THERESA Check BG's 2-4x a day depending on sugar levels 1 each 0   • Continuous Blood Gluc Sensor (FreeStyle Dejuan 14 Day Sensor) MISC Check sugars 2-4x daily depending on sugar levels 28 each 6   • diltiazem (CARDIZEM CD) 360 MG 24 hr capsule TAKE 1 CAPSULE BY MOUTH  DAILY 90 capsule 3   • insulin glargine (Lantus SoloStar) 100 units/mL injection pen Inject 50 Units under the skin daily 45 mL 3   • insulin lispro (HumaLOG KwikPen) 100 units/mL injection pen INJECT SUBCUTANEOUSLY 5  UNITS BEFORE BREAKFAST 10  UNITS BEFORE LUNCH AND 15  UNITS BEFORE SUPPER 30 mL 3   • OneTouch Ultra test strip TEST 2-3 TIMES DAILY 100 strip 3   • potassium chloride (K-DUR,KLOR-CON) 20 mEq tablet Take 1 tablet (20 mEq total) by mouth 2 (two) times a day 180 tablet 1     Current Facility-Administered Medications   Medication Dose Route Frequency Provider Last Rate Last Admin   • cyanocobalamin injection 1,000 mcg  1,000 mcg Intramuscular Q30 Days Ernie Lexy, DO   1,000 mcg at 04/06/21 1669   • cyanocobalamin injection 1,000 mcg  1,000 mcg Intramuscular Q30 Days Ernie Lexy, DO   1,000 mcg at 10/27/20 1449   • cyanocobalamin injection 1,000 mcg  1,000 mcg Intramuscular Q30 Days Ernie Lexy, DO   1,000 mcg at 01/05/21 2084   • cyanocobalamin injection 1,000 mcg  1,000 mcg Intramuscular Q30 Days Ernie Lexy, DO   1,000 mcg at 03/09/21 1455     Current Outpatient Medications on File Prior to Visit   Medication Sig   • Apremilast (Otezla) 30 MG TABS Take 1 tablet by mouth 2 (two) times a day   • aspirin 81 mg chewable tablet Chew 1 tablet (81 mg total) daily   • Canagliflozin (Invokana) 300 MG TABS Take 1 tablet (300 mg total) by mouth daily before breakfast   • chlorthalidone 25 mg tablet Take 1 5 tablets (37 5 mg total) by mouth daily   • Cholecalciferol (VITAMIN D) 125 MCG (5000 UT) CAPS Take by mouth   • Continuous Blood Gluc  (FreeStyle Dejuan 14 Day [place]) THERESA Check BG's 2-4x a day depending on sugar levels   • Continuous Blood Gluc Sensor (FreeStyle Dejuan 14 Day Sensor) MISC Check sugars 2-4x daily depending on sugar levels   • diltiazem (CARDIZEM CD) 360 MG 24 hr capsule TAKE 1 CAPSULE BY MOUTH  DAILY   • insulin glargine (Lantus SoloStar) 100 units/mL injection pen Inject 50 Units under the skin daily   • insulin lispro (HumaLOG KwikPen) 100 units/mL injection pen INJECT SUBCUTANEOUSLY 5  UNITS BEFORE BREAKFAST 10  UNITS BEFORE LUNCH AND 15  UNITS BEFORE SUPPER   • OneTouch Ultra test strip TEST 2-3 TIMES DAILY   • potassium chloride (K-DUR,KLOR-CON) 20 mEq tablet Take 1 tablet (20 mEq total) by mouth 2 (two) times a day   • [DISCONTINUED] valsartan (DIOVAN) 160 mg tablet Take 1 tablet (160 mg total) by mouth daily     Current Facility-Administered Medications on File Prior to Visit   Medication   • cyanocobalamin injection 1,000 mcg   • cyanocobalamin injection 1,000 mcg   • cyanocobalamin injection 1,000 mcg   • cyanocobalamin injection 1,000 mcg     She is allergic to amlodipine, bystolic [nebivolol hcl], and glipizide       Review of Systems   Constitutional: Negative for chills, fatigue and fever  HENT: Negative for congestion, dental problem, mouth sores, nosebleeds, postnasal drip and rhinorrhea  She has mostly Spring allergies  States Singulair not helping  Hence , end that   Respiratory: Negative for cough, shortness of breath and wheezing  Cardiovascular: Negative for chest pain, palpitations and leg swelling  Gastrointestinal: Negative for abdominal distention and nausea     Endocrine: Negative for polydipsia, polyphagia and polyuria  See BBG sheet , with QID BS's on   Using the FreeStyle disc and tracking BBG daily  Genitourinary: Negative for difficulty urinating and flank pain  Musculoskeletal: Negative for arthralgias, back pain, gait problem and myalgias  Skin: Negative for rash  Allergic/Immunologic: Negative for environmental allergies  Neurological: Negative for dizziness, facial asymmetry, weakness, light-headedness, numbness and headaches  Hematological: Negative for adenopathy  Psychiatric/Behavioral: Negative for agitation, behavioral problems, confusion, decreased concentration, sleep disturbance and suicidal ideas  The patient is not nervous/anxious  Objective:      /56 (BP Location: Right arm, Patient Position: Sitting, Cuff Size: Standard)   Pulse 57   Temp 98 1 °F (36 7 °C) (Temporal)   Ht 5' 1" (1 549 m)   Wt 97 8 kg (215 lb 9 6 oz)   SpO2 97%   BMI 40 74 kg/m²          Physical Exam  Vitals and nursing note reviewed  Constitutional:       General: She is not in acute distress  Appearance: Normal appearance  She is well-developed  She is obese  She is not ill-appearing or diaphoretic  HENT:      Head: Normocephalic and atraumatic  Nose: Nose normal  No congestion or rhinorrhea  Eyes:      General: No scleral icterus  Conjunctiva/sclera: Conjunctivae normal       Pupils: Pupils are equal, round, and reactive to light  Comments: Watery and itchy eyes from the allergies   Neck:      Thyroid: No thyromegaly  Trachea: No tracheal deviation  Cardiovascular:      Rate and Rhythm: Normal rate and regular rhythm  Pulses: Normal pulses  Heart sounds: Normal heart sounds  Pulmonary:      Effort: Pulmonary effort is normal  No respiratory distress  Breath sounds: No wheezing (to ER on 9/8/22 and better now), rhonchi or rales  Chest:      Chest wall: No tenderness  Musculoskeletal:         General: No tenderness or deformity   Normal range of motion  Cervical back: Normal range of motion and neck supple  No rigidity  No muscular tenderness  Right lower leg: No edema  Left lower leg: No edema  Skin:     General: Skin is warm and dry  Coloration: Skin is not pale  Findings: No erythema or rash  Neurological:      General: No focal deficit present  Mental Status: She is alert and oriented to person, place, and time  Mental status is at baseline  Cranial Nerves: No cranial nerve deficit  Psychiatric:         Mood and Affect: Mood normal          Behavior: Behavior normal          Thought Content: Thought content normal          Judgment: Judgment normal          Pt seen from 9:40 to 10:33 a m  This time was spent reviewing previous records, reviewing previous laboratory and other tests, taking history from patient, examination of patient, discussion of prognosis and treatment, ordering laboratory tests, ordering medications, and completion of the medical record

## 2023-03-20 ENCOUNTER — TELEPHONE (OUTPATIENT)
Dept: FAMILY MEDICINE CLINIC | Facility: CLINIC | Age: 78
End: 2023-03-20

## 2023-03-20 DIAGNOSIS — I10 RESISTANT HYPERTENSION: ICD-10-CM

## 2023-03-20 RX ORDER — VALSARTAN 320 MG/1
320 TABLET ORAL DAILY
Qty: 90 TABLET | Refills: 3 | Status: SHIPPED | OUTPATIENT
Start: 2023-03-20

## 2023-03-20 NOTE — TELEPHONE ENCOUNTER
Patient came to the office and sounded very upset and stated she would like her chart notes fax over to this place that refills her insulin  Patient gave me fax number and to have that fax over   Chart notes were faxed #[phone]

## 2023-03-27 NOTE — TELEPHONE ENCOUNTER
Pt called again -- wanting urgent order for Freestyle Dejuan 2 in order to check BS faxed to previous listed fax number

## 2023-04-04 ENCOUNTER — OFFICE VISIT (OUTPATIENT)
Dept: FAMILY MEDICINE CLINIC | Facility: CLINIC | Age: 78
End: 2023-04-04

## 2023-04-04 VITALS
BODY MASS INDEX: 40.74 KG/M2 | SYSTOLIC BLOOD PRESSURE: 144 MMHG | OXYGEN SATURATION: 97 % | HEIGHT: 61 IN | HEART RATE: 57 BPM | DIASTOLIC BLOOD PRESSURE: 56 MMHG | TEMPERATURE: 97.9 F

## 2023-04-04 DIAGNOSIS — E11.65 UNCONTROLLED TYPE 2 DIABETES MELLITUS WITH HYPERGLYCEMIA (HCC): ICD-10-CM

## 2023-04-04 DIAGNOSIS — Z12.4 CERVICAL CANCER SCREENING: ICD-10-CM

## 2023-04-04 DIAGNOSIS — Z79.899 ENCOUNTER FOR LONG-TERM (CURRENT) USE OF MEDICATIONS: ICD-10-CM

## 2023-04-04 DIAGNOSIS — I10 RESISTANT HYPERTENSION: ICD-10-CM

## 2023-04-04 DIAGNOSIS — J44.1 COPD EXACERBATION (HCC): ICD-10-CM

## 2023-04-04 DIAGNOSIS — Z00.00 MEDICARE ANNUAL WELLNESS VISIT, SUBSEQUENT: Primary | ICD-10-CM

## 2023-04-04 DIAGNOSIS — L98.9 PSORIASIS-LIKE SKIN DISEASE: ICD-10-CM

## 2023-04-04 DIAGNOSIS — L40.9 PSORIASIS: ICD-10-CM

## 2023-04-04 NOTE — PROGRESS NOTES
Assessment/Plan:78 yo female, in NAD, with considerable medical issues surrounding the following #1 getting the freestyle series to this has been at [facility] for several weeks but hopefully is coming this week and I have given her 1 sample sensor that she can use for the next 14 days as we adjust her insulin dose which will remain Lantus 50 units every day that is unchanged the Marilin Pro) Humalog) will go to 4 units AC breakfast, 10 or 15 units AC lunch, 10 units AC supper  I have looked at her sugars and also confirms her A1c of 8 5 which translates into a daily nonfasting 3-month blood sugar of 195 -She is very high before supper and that is what we have to go after  She understands how to monitor  #2   Blood pressure is under control fairly well at 144/56 with pulse 57 taking chlorthalidone 25, diltiazem 360, valsartan 320  I will not change any doses there  3  Mammogram is needed and she will also let me know on that next visit     #4 mother passed away of vaginal cancer, will be sure patient gets to GYN for routine check every 5 years,          1  Medicare annual wellness visit, subsequent    2  Cervical cancer screening  Comments: Mother  of vaginal cancer will be sure patient follows up  Orders:  -     Ambulatory Referral to Gynecology; Future; Expected date: 2023    3  Encounter for long-term (current) use of medications  Comments: All medications reviewed for efficacy and safety  Especially insulin--Lantus and Humalog and the doses  4  Resistant hypertension  Comments:  Blood pressures are fairly good running 120 up to 150 rarely 160 she is taking 3 medications chlorthalidone diltiazem and valsartan    5  Uncontrolled type 2 diabetes mellitus with hyperglycemia ([facility] [address] )  Comments:  A1c 8 5 which places average nonfasting 3-month blood sugar at 195  We need to get her lunch and especially supper blood sugars under better control--see recom    6  Psoriasis    7   COPD exacerbation ([facility] )    8  Psoriasis-like skin disease  Comments:  Estevan Rist use has been great for the past approximate 5 years and she is not having any thenar breakouts of late taking Estevan Rist last year once daily          Subjective: Patient appears comfortable in no acute distress is satisfied in very comfortable with her present medication regimen but her blood sugars at lunch and especially supper are vastly uncontrolled  Measures discussed     Patient ID: Anuj Nava is a 76 y o  female  HPI    The following portions of the patient's history were reviewed and updated as appropriate: She  has a past medical history of Asthma, Benign essential hypertension, Chronic lower back pain, COPD (chronic obstructive pulmonary disease) ([facility] [address] ), Diabetes mellitus ([facility] [address] ), Hyperlipidemia, and Sciatica    She   Patient Active Problem List    Diagnosis Date Noted   • Embolism and thrombosis of arteries of the lower extremities ([facility] ) 02/22/2022   • Uncontrolled type 2 diabetes mellitus with hyperglycemia ([facility] ) 10/09/2021   • Hypertensive urgency 10/09/2021   • Chest pain, unspecified 09/29/2021   • Hypokalemia 09/29/2021   • Allergic reaction 08/12/2021   • Increased BMI 02/15/2021   • Class 2 severe obesity due to excess calories with serious comorbidity and body mass index (BMI) of 35 0 to 35 9 in adult [facility]) 02/15/2021   • Depression, recurrent ([facility] [address] ) 01/11/2021   • Obesity, morbid ([facility] ) 01/11/2021   • Myalgia due to statin 10/13/2020   • Need for vaccination 10/13/2020   • Psoriasis 10/13/2020   • Hyperlipidemia associated with type 2 diabetes mellitus ([facility] [address] ) 10/13/2020   • Mixed simple and mucopurulent chronic bronchitis ([address] ) 05/26/2020   • 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/2020   • Mixed hyperlipidemia 05/26/2020   • Psoriasis-like skin disease 05/26/2020   • Unspecified abnormalities of gait and mobility 12/06/2019   • Personal history of nicotine dependence 12/06/2019   • Other chronic pain 12/06/2019   • Acute left-sided low back pain with left-sided sciatica 12/03/2019   • Controlled type 2 diabetes mellitus, with long-term current use of insulin ([facility] [address] ) 12/03/2019   • Resistant hypertension 12/03/2019     She  has a past surgical history that includes Cholecystectomy  Her family history includes Emphysema in her father; Leukemia in her father; Uterine cancer in her mother  She  reports that she quit smoking about 18 years ago  Her smoking use included cigarettes  She has a 30 00 pack-year smoking history  She has never used smokeless tobacco  She reports that she does not drink alcohol and does not use drugs    Current Outpatient Medications   Medication Sig Dispense Refill   • Apremilast (Otezla) 30 MG TABS Take 1 tablet by mouth 2 (two) times a day 180 tablet 1   • aspirin 81 mg chewable tablet Chew 1 tablet (81 mg total) daily 30 tablet 0   • Canagliflozin (Invokana) 300 MG TABS Take 1 tablet (300 mg total) by mouth daily before breakfast 90 tablet 3   • chlorthalidone 25 mg tablet Take 1 5 tablets (37 5 mg total) by mouth daily (Patient taking differently: Take 37 5 mg by mouth daily Pt taking 2 daily) 135 tablet 3   • Cholecalciferol (VITAMIN D) 125 MCG (5000 UT) CAPS Take by mouth     • Continuous Blood Gluc  (FreeStyle Edjuan 14 Day [place]) THERESA Check BG's 2-4x a day depending on sugar levels 1 each 0   • Continuous Blood Gluc Sensor (FreeStyle Dejuan 14 Day Sensor) MISC Check sugars 2-4x daily depending on sugar levels 28 each 6   • diltiazem (CARDIZEM CD) 360 MG 24 hr capsule TAKE 1 CAPSULE BY MOUTH  DAILY 90 capsule 3   • insulin glargine (Lantus SoloStar) 100 units/mL injection pen Inject 50 Units under the skin daily 45 mL 3   • insulin lispro (HumaLOG KwikPen) 100 units/mL injection pen INJECT SUBCUTANEOUSLY 5  UNITS BEFORE BREAKFAST 10  UNITS BEFORE LUNCH AND 15  UNITS BEFORE SUPPER 30 mL 3   • OneTouch Ultra test strip TEST 2-3 TIMES DAILY 100 strip 3   • potassium chloride (K-DUR,KLOR-CON) 20 mEq tablet Take 1 tablet (20 mEq total) by mouth 2 (two) times a day 180 tablet 1   • valsartan (DIOVAN) 320 MG tablet Take 1 tablet (320 mg total) by mouth daily 90 tablet 3     Current Facility-Administered Medications   Medication Dose Route Frequency Provider Last Rate Last Admin   • cyanocobalamin injection 1,000 mcg  1,000 mcg Intramuscular Q30 Days Rachael Heckle, DO   1,000 mcg at 04/06/21 0801   • cyanocobalamin injection 1,000 mcg  1,000 mcg Intramuscular Q30 Days Rachael Heckle, DO   1,000 mcg at 10/27/20 1449   • cyanocobalamin injection 1,000 mcg  1,000 mcg Intramuscular Q30 Days Rachael Heckle, DO   1,000 mcg at 01/05/21 6366   • cyanocobalamin injection 1,000 mcg  1,000 mcg Intramuscular Q30 Days Rachael Heckle, DO   1,000 mcg at 03/09/21 1455     Current Outpatient Medications on File Prior to Visit   Medication Sig   • Apremilast (Otezla) 30 MG TABS Take 1 tablet by mouth 2 (two) times a day   • aspirin 81 mg chewable tablet Chew 1 tablet (81 mg total) daily   • Canagliflozin (Invokana) 300 MG TABS Take 1 tablet (300 mg total) by mouth daily before breakfast   • chlorthalidone 25 mg tablet Take 1 5 tablets (37 5 mg total) by mouth daily (Patient taking differently: Take 37 5 mg by mouth daily Pt taking 2 daily)   • Cholecalciferol (VITAMIN D) 125 MCG (5000 UT) CAPS Take by mouth   • Continuous Blood Gluc  (FreeStyle Dejuan 14 Day [place]) THERESA Check BG's 2-4x a day depending on sugar levels   • Continuous Blood Gluc Sensor (FreeStyle Dejuan 14 Day Sensor) MISC Check sugars 2-4x daily depending on sugar levels   • diltiazem (CARDIZEM CD) 360 MG 24 hr capsule TAKE 1 CAPSULE BY MOUTH  DAILY   • insulin glargine (Lantus SoloStar) 100 units/mL injection pen Inject 50 Units under the skin daily   • insulin lispro (HumaLOG KwikPen) 100 units/mL injection pen INJECT SUBCUTANEOUSLY 5  UNITS BEFORE BREAKFAST 10  UNITS BEFORE LUNCH AND 15  UNITS BEFORE SUPPER   • OneTouch Ultra test strip TEST 2-3 TIMES DAILY   • potassium chloride "(K-DUR,KLOR-CON) 20 mEq tablet Take 1 tablet (20 mEq total) by mouth 2 (two) times a day   • valsartan (DIOVAN) 320 MG tablet Take 1 tablet (320 mg total) by mouth daily     Current Facility-Administered Medications on File Prior to Visit   Medication   • cyanocobalamin injection 1,000 mcg   • cyanocobalamin injection 1,000 mcg   • cyanocobalamin injection 1,000 mcg   • cyanocobalamin injection 1,000 mcg     She is allergic to amlodipine, bystolic [nebivolol hcl], and glipizide       Review of Systems   Constitutional: Negative for chills, fatigue and fever  HENT: Negative for congestion, dental problem, mouth sores, nosebleeds, postnasal drip and rhinorrhea  She has mostly Spring allergies  States Singulair not helping  Hence , end that   Respiratory: Negative for cough, shortness of breath and wheezing  Cardiovascular: Negative for chest pain, palpitations and leg swelling  Gastrointestinal: Negative for abdominal distention and nausea  Endocrine: Negative for polydipsia, polyphagia and polyuria  See BBG sheet , with QID BS's on   Using the FreeStyle disc and tracking BBG daily  Genitourinary: Negative for difficulty urinating and flank pain  Musculoskeletal: Negative for arthralgias, back pain, gait problem and myalgias  Skin: Negative for rash  Allergic/Immunologic: Negative for environmental allergies  Neurological: Negative for dizziness, facial asymmetry, weakness, light-headedness, numbness and headaches  Hematological: Negative for adenopathy  Psychiatric/Behavioral: Negative for agitation, behavioral problems, confusion, decreased concentration, sleep disturbance and suicidal ideas  The patient is not nervous/anxious            Objective:      /56 (BP Location: Left arm, Patient Position: Sitting, Cuff Size: Standard)   Pulse 57   Temp 97 9 °F (36 6 °C) (Temporal)   Ht 5' 1\" (1 549 m)   SpO2 97%   BMI 40 74 kg/m²          Physical Exam  Vitals and nursing " note reviewed  Constitutional:       General: She is not in acute distress  Appearance: Normal appearance  She is well-developed  She is obese  She is not ill-appearing or diaphoretic  HENT:      Head: Normocephalic and atraumatic  Nose: Nose normal  No congestion or rhinorrhea  Eyes:      General: No scleral icterus  Conjunctiva/sclera: Conjunctivae normal       Pupils: Pupils are equal, round, and reactive to light  Comments: Watery and itchy eyes from the allergies   Neck:      Thyroid: No thyromegaly  Trachea: No tracheal deviation  Cardiovascular:      Rate and Rhythm: Normal rate and regular rhythm  Pulses: Normal pulses  Heart sounds: Normal heart sounds  Pulmonary:      Effort: Pulmonary effort is normal  No respiratory distress  Breath sounds: No wheezing (to ER on 9/8/22 and better now), rhonchi or rales  Chest:      Chest wall: No tenderness  Musculoskeletal:         General: No tenderness or deformity  Normal range of motion  Cervical back: Normal range of motion and neck supple  No rigidity  No muscular tenderness  Right lower leg: No edema  Left lower leg: No edema  Skin:     General: Skin is warm and dry  Coloration: Skin is not pale  Findings: No erythema or rash  Neurological:      General: No focal deficit present  Mental Status: She is alert and oriented to person, place, and time  Mental status is at baseline  Cranial Nerves: No cranial nerve deficit  Psychiatric:         Mood and Affect: Mood normal          Behavior: Behavior normal          Thought Content:  Thought content normal          Judgment: Judgment normal          30 min with all the above issues addressed -     This time was spent reviewing previous records, reviewing previous laboratory and other tests, taking history from patient, examination of patient, discussion of prognosis and treatment, ordering laboratory tests, ordering medications, and completion of the medical record

## 2023-04-04 NOTE — PATIENT INSTRUCTIONS
Medicare Preventive Visit Patient Instructions  Thank you for completing your Welcome to Medicare Visit or Medicare Annual Wellness Visit today  Your next wellness visit will be due in one year (4/4/2024)  The screening/preventive services that you may require over the next 5-10 years are detailed below  Some tests may not apply to you based off risk factors and/or age  Screening tests ordered at today's visit but not completed yet may show as past due  Also, please note that scanned in results may not display below  Preventive Screenings:  Service Recommendations Previous Testing/Comments   Colorectal Cancer Screening  * Colonoscopy    * Fecal Occult Blood Test (FOBT)/Fecal Immunochemical Test (FIT)  * Fecal DNA/Cologuard Test  * Flexible Sigmoidoscopy Age: 39-70 years old   Colonoscopy: every 10 years (may be performed more frequently if at higher risk)  OR  FOBT/FIT: every 1 year  OR  Cologuard: every 3 years  OR  Sigmoidoscopy: every 5 years  Screening may be recommended earlier than age 39 if at higher risk for colorectal cancer  Also, an individualized decision between you and your healthcare provider will decide whether screening between the ages of 74-80 would be appropriate  Colonoscopy: Not on file  FOBT/FIT: Not on file  Cologuard: 03/08/2021  Sigmoidoscopy: Not on file          Breast Cancer Screening Age: 36 years old  Frequency: every 1-2 years  Not required if history of left and right mastectomy Mammogram: 01/05/2013        Cervical Cancer Screening Between the ages of 21-29, pap smear recommended once every 3 years  Between the ages of 33-67, can perform pap smear with HPV co-testing every 5 years     Recommendations may differ for women with a history of total hysterectomy, cervical cancer, or abnormal pap smears in past  Pap Smear: Not on file    Screening Not Indicated   Hepatitis C Screening Once for adults born between [facility]  More frequently in patients at high risk for Hepatitis C Hep C Antibody: Not on file        Diabetes Screening 1-2 times per year if you're at risk for diabetes or have pre-diabetes Fasting glucose: 154 mg/dL (1/30/2023)  A1C: 8 5 % (1/30/2023)  Screening Not Indicated  History Diabetes   Cholesterol Screening Once every 5 years if you don't have a lipid disorder  May order more often based on risk factors  Lipid panel: 09/14/2022    Screening Not Indicated  History Lipid Disorder     Other Preventive Screenings Covered by Medicare:  1  Abdominal Aortic Aneurysm (AAA) Screening: covered once if your at risk  You're considered to be at risk if you have a family history of AAA  2  Lung Cancer Screening: covers low dose CT scan once per year if you meet all of the following conditions: (1) Age 50-69; (2) No signs or symptoms of lung cancer; (3) Current smoker or have quit smoking within the last 15 years; (4) You have a tobacco smoking history of at least 20 pack years (packs per day multiplied by number of years you smoked); (5) You get a written order from a healthcare provider  3  Glaucoma Screening: covered annually if you're considered high risk: (1) You have diabetes OR (2) Family history of glaucoma OR (3)  aged 48 and older OR (3)  American aged 72 and older  3  Osteoporosis Screening: covered every 2 years if you meet one of the following conditions: (1) You're estrogen deficient and at risk for osteoporosis based off medical history and other findings; (2) Have a vertebral abnormality; (3) On glucocorticoid therapy for more than 3 months; (4) Have primary hyperparathyroidism; (5) On osteoporosis medications and need to assess response to drug therapy  · Last bone density test (DXA Scan): Not on file  5  HIV Screening: covered annually if you're between the age of 12-76  Also covered annually if you are younger than 13 and older than 72 with risk factors for HIV infection   For pregnant patients, it is covered up to 3 times per pregnancy  Immunizations:  Immunization Recommendations   Influenza Vaccine Annual influenza vaccination during flu season is recommended for all persons aged >= 6 months who do not have contraindications   Pneumococcal Vaccine   * Pneumococcal conjugate vaccine = PCV13 (Prevnar 13), PCV15 (Vaxneuvance), PCV20 (Prevnar 20)  * Pneumococcal polysaccharide vaccine = PPSV23 (Pneumovax) Adults 25-60 years old: 1-3 doses may be recommended based on certain risk factors  Adults 72 years old: 1-2 doses may be recommended based off what pneumonia vaccine you previously received   Hepatitis B Vaccine 3 dose series if at intermediate or high risk (ex: diabetes, end stage renal disease, liver disease)   Tetanus (Td) Vaccine - COST NOT COVERED BY MEDICARE PART B Following completion of primary series, a booster dose should be given every 10 years to maintain immunity against tetanus  Td may also be given as tetanus wound prophylaxis  Tdap Vaccine - COST NOT COVERED BY MEDICARE PART B Recommended at least once for all adults  For pregnant patients, recommended with each pregnancy  Shingles Vaccine (Shingrix) - COST NOT COVERED BY MEDICARE PART B  2 shot series recommended in those aged 48 and above     Health Maintenance Due:      Topic Date Due   • Hepatitis C Screening  Never done     Immunizations Due:      Topic Date Due   • COVID-19 Vaccine (4 - Booster for Osei Peter series) 01/04/2022     Advance Directives   What are advance directives? Advance directives are legal documents that state your wishes and plans for medical care  These plans are made ahead of time in case you lose your ability to make decisions for yourself  Advance directives can apply to any medical decision, such as the treatments you want, and if you want to donate organs  What are the types of advance directives? There are many types of advance directives, and each state has rules about how to use them   You may choose a combination of any of the following:  · Living will: This is a written record of the treatment you want  You can also choose which treatments you do not want, which to limit, and which to stop at a certain time  This includes surgery, medicine, IV fluid, and tube feedings  · Durable power of  for healthcare [facility]): This is a written record that states who you want to make healthcare choices for you when you are unable to make them for yourself  This person, called a proxy, is usually a family member or a friend  You may choose more than 1 proxy  · Do not resuscitate (DNR) order:  A DNR order is used in case your heart stops beating or you stop breathing  It is a request not to have certain forms of treatment, such as CPR  A DNR order may be included in other types of advance directives  · Medical directive: This covers the care that you want if you are in a coma, near death, or unable to make decisions for yourself  You can list the treatments you want for each condition  Treatment may include pain medicine, surgery, blood transfusions, dialysis, IV or tube feedings, and a ventilator (breathing machine)  · Values history: This document has questions about your views, beliefs, and how you feel and think about life  This information can help others choose the care that you would choose  Why are advance directives important? An advance directive helps you control your care  Although spoken wishes may be used, it is better to have your wishes written down  Spoken wishes can be misunderstood, or not followed  Treatments may be given even if you do not want them  An advance directive may make it easier for your family to make difficult choices about your care  Fall Prevention    Fall prevention  includes ways to make your home and other areas safer  It also includes ways you can move more carefully to prevent a fall   Health conditions that cause changes in your blood pressure, vision, or muscle strength and coordination may increase your risk for falls  Medicines may also increase your risk for falls if they make you dizzy, weak, or sleepy  Fall prevention tips:   · Stand or sit up slowly  · Use assistive devices as directed  · Wear shoes that fit well and have soles that   · Wear a personal alarm  · Stay active  · Manage your medical conditions  Home Safety Tips:  · Add items to prevent falls in the bathroom  · Keep paths clear  · Install bright lights in your home  · Keep items you use often on shelves within reach  · Paint or place reflective tape on the edges of your stairs  Weight Management   Why it is important to manage your weight:  Being overweight increases your risk of health conditions such as heart disease, high blood pressure, type 2 diabetes, and certain types of cancer  It can also increase your risk for osteoarthritis, sleep apnea, and other respiratory problems  Aim for a slow, steady weight loss  Even a small amount of weight loss can lower your risk of health problems  How to lose weight safely:  A safe and healthy way to lose weight is to eat fewer calories and get regular exercise  You can lose up about 1 pound a week by decreasing the number of calories you eat by 500 calories each day  Healthy meal plan for weight management:  A healthy meal plan includes a variety of foods, contains fewer calories, and helps you stay healthy  A healthy meal plan includes the following:  · Eat whole-grain foods more often  A healthy meal plan should contain fiber  Fiber is the part of grains, fruits, and vegetables that is not broken down by your body  Whole-grain foods are healthy and provide extra fiber in your diet  Some examples of whole-grain foods are whole-wheat breads and pastas, oatmeal, brown rice, and bulgur  · Eat a variety of vegetables every day  Include dark, leafy greens such as spinach, kale, reyna greens, and mustard greens   Eat yellow and orange vegetables such as carrots, sweet potatoes, and winter squash  · Eat a variety of fruits every day  Choose fresh or canned fruit (canned in its own juice or light syrup) instead of juice  Fruit juice has very little or no fiber  · Eat low-fat dairy foods  Drink fat-free (skim) milk or 1% milk  Eat fat-free yogurt and low-fat cottage cheese  Try low-fat cheeses such as mozzarella and other reduced-fat cheeses  · Choose meat and other protein foods that are low in fat  Choose beans or other legumes such as split peas or lentils  Choose fish, skinless poultry (chicken or turkey), or lean cuts of red meat (beef or pork)  Before you cook meat or poultry, cut off any visible fat  · Use less fat and oil  Try baking foods instead of frying them  Add less fat, such as margarine, sour cream, regular salad dressing and mayonnaise to foods  Eat fewer high-fat foods  Some examples of high-fat foods include french fries, doughnuts, ice cream, and cakes  · Eat fewer sweets  Limit foods and drinks that are high in sugar  This includes candy, cookies, regular soda, and sweetened drinks  Exercise:  Exercise at least 30 minutes per day on most days of the week  Some examples of exercise include walking, biking, dancing, and swimming  You can also fit in more physical activity by taking the stairs instead of the elevator or parking farther away from stores  Ask your healthcare provider about the best exercise plan for you  © Copyright [employer] 2018 Information is for End User's use only and may not be sold, redistributed or otherwise used for commercial purposes   All illustrations and images included in CareNotes® are the copyrighted property of [employer]  or [address] [place] [address] [employer]

## 2023-04-04 NOTE — PROGRESS NOTES
Diabetic Foot Exam    Patient's shoes and socks removed  Right Foot/Ankle   Right Foot Inspection  Skin Exam: skin normal, skin intact, dry skin and warmth  No callus, no erythema, no maceration, no abnormal color, no pre-ulcer, no ulcer and no callus  Toe Exam: ROM and strength within normal limits  Sensory   Vibration: intact  Proprioception: intact  Monofilament testing: intact    Vascular  Capillary refills: < 3 seconds  The right DP pulse is 2+  The right PT pulse is 1+  Left Foot/Ankle  Left Foot Inspection  Skin Exam: skin normal, skin intact, dry skin and warmth  No erythema, no maceration, normal color, no pre-ulcer, no ulcer and no callus  Toe Exam: ROM and strength within normal limits  Sensory   Vibration: intact  Proprioception: intact  Monofilament testing: intact    Vascular  Capillary refills: < 3 seconds  The left DP pulse is 2+  The left PT pulse is 1+  Assign Risk Category  No deformity present  No loss of protective sensation  No weak pulses  Risk: 0   Assessment and Plan:     Problem List Items Addressed This Visit    None       Preventive health issues were discussed with patient, and age appropriate screening tests were ordered as noted in patient's After Visit Summary  Personalized health advice and appropriate referrals for health education or preventive services given if needed, as noted in patient's After Visit Summary       History of Present Illness:     Patient presents for a Medicare Wellness Visit    HPI   Patient Care Team:  Lucina Suárez DO as PCP - General (Family Medicine)     Review of Systems:     Review of Systems     Problem List:     Patient Active Problem List   Diagnosis   • Acute left-sided low back pain with left-sided sciatica   • Controlled type 2 diabetes mellitus, with long-term current use of insulin ([facility])   • Resistant hypertension   • Mixed simple and mucopurulent chronic bronchitis ([facility] [address] )   • Cyanocobalamin deficiency   • Mixed hyperlipidemia   • Psoriasis-like skin disease   • Myalgia due to statin   • Need for vaccination   • Psoriasis   • Hyperlipidemia associated with type 2 diabetes mellitus (HCC)   • Depression, recurrent (HCC)   • Obesity, morbid (HCC)   • Increased BMI   • Class 2 severe obesity due to excess calories with serious comorbidity and body mass index (BMI) of 35 0 to 35 9 in adult [facility])   • Unspecified abnormalities of gait and mobility   • Personal history of nicotine dependence   • Other chronic pain   • Allergic reaction   • Chest pain, unspecified   • Hypokalemia   • Uncontrolled type 2 diabetes mellitus with hyperglycemia ([facility])   • Hypertensive urgency   • Embolism and thrombosis of arteries of the lower extremities ([facility])      Past Medical and Surgical History:     Past Medical History:   Diagnosis Date   • Asthma    • Benign essential hypertension    • Chronic lower back pain    • COPD (chronic obstructive pulmonary disease) ([facility])    • Diabetes mellitus ([facility] [address] )    • Hyperlipidemia    • Sciatica      Past Surgical History:   Procedure Laterality Date   • CHOLECYSTECTOMY        Family History:     Family History   Problem Relation Age of Onset   • Uterine cancer Mother    • Emphysema Father    • Leukemia Father       Social History:     Social History     Socioeconomic History   • Marital status:      Spouse name: None   • Number of children: 4   • Years of education: None   • Highest education level: None   Occupational History   • Occupation: retired- supervisor for [employer]    Tobacco Use   • Smoking status: Former     Packs/day: 1 00     Years: 30 00     Pack years: 30 00     Types: Cigarettes     Quit date: 2005     Years since quittin 2   • Smokeless tobacco: Never   Vaping Use   • Vaping Use: Never used   Substance and Sexual Activity   • Alcohol use: Never   • Drug use: Never   • Sexual activity: None   Other Topics Concern   • None   Social History Narrative    · Smoking - how much:    PPD,  Quit age 62      · Tobacco-years of use:   36, · started at the age of 22 and quit at 62    · Do you currently or have you served in the [employer]:   No      · Sexual orientation:   Heterosexual      · Exercise level:   Heavy  Goes to the gym 5 days per week     · Diet:   Regular      · General stress level:   Low      · Has smoked since age:   19      · Caffeine intake:   Occasional      · Guns present in home:   No      · Seat belts used routinely:   Yes      · Sunscreen used routinely:   Yes      · Smoke alarm in home: Yes      · Advance directive:    Yes      · Salt Intake:   none     · Live alone or with others:   alone          Social Determinants of Health     Financial Resource Strain: Not on file   Food Insecurity: Not on file   Transportation Needs: Not on file   Physical Activity: Not on file   Stress: Not on file   Social Connections: Not on file   Intimate Partner Violence: Not on file   Housing Stability: Not on file      Medications and Allergies:     Current Outpatient Medications   Medication Sig Dispense Refill   • Apremilast (Otezla) 30 MG TABS Take 1 tablet by mouth 2 (two) times a day 180 tablet 1   • aspirin 81 mg chewable tablet Chew 1 tablet (81 mg total) daily 30 tablet 0   • Canagliflozin (Invokana) 300 MG TABS Take 1 tablet (300 mg total) by mouth daily before breakfast 90 tablet 3   • chlorthalidone 25 mg tablet Take 1 5 tablets (37 5 mg total) by mouth daily (Patient taking differently: Take 37 5 mg by mouth daily Pt taking 2 daily) 135 tablet 3   • Cholecalciferol (VITAMIN D) 125 MCG (5000 UT) CAPS Take by mouth     • Continuous Blood Gluc  (FreeStyle Dejuan 14 Day Cool) THERESA Check BG's 2-4x a day depending on sugar levels 1 each 0   • Continuous Blood Gluc Sensor (FreeStyle Dejuan 14 Day Sensor) MISC Check sugars 2-4x daily depending on sugar levels 28 each 6   • diltiazem (CARDIZEM CD) 360 MG 24 hr capsule TAKE 1 CAPSULE BY MOUTH  DAILY 90 capsule 3   • insulin glargine (Lantus SoloStar) 100 units/mL injection pen Inject 50 Units under the skin daily 45 mL 3   • insulin lispro (HumaLOG KwikPen) 100 units/mL injection pen INJECT SUBCUTANEOUSLY 5  UNITS BEFORE BREAKFAST 10  UNITS BEFORE LUNCH AND 15  UNITS BEFORE SUPPER 30 mL 3   • OneTouch Ultra test strip TEST 2-3 TIMES DAILY 100 strip 3   • potassium chloride (K-DUR,KLOR-CON) 20 mEq tablet Take 1 tablet (20 mEq total) by mouth 2 (two) times a day 180 tablet 1   • valsartan (DIOVAN) 320 MG tablet Take 1 tablet (320 mg total) by mouth daily 90 tablet 3     Current Facility-Administered Medications   Medication Dose Route Frequency Provider Last Rate Last Admin   • cyanocobalamin injection 1,000 mcg  1,000 mcg Intramuscular Q30 Days Maryellen [facility], DO   1,000 mcg at 04/06/21 5851   • cyanocobalamin injection 1,000 mcg  1,000 mcg Intramuscular Q30 Days [facility], DO   1,000 mcg at 10/27/20 1449   • cyanocobalamin injection 1,000 mcg  1,000 mcg Intramuscular Q30 Days [facility], DO   1,000 mcg at 01/05/21 0750   • cyanocobalamin injection 1,000 mcg  1,000 mcg Intramuscular Q30 Days Mary[facility], DO   1,000 mcg at 03/09/21 1455     Allergies   Allergen Reactions   • Amlodipine Tongue Swelling   • Bystolic [Nebivolol Hcl] Allergic Rhinitis   • Glipizide Other (See Comments)     Dry mouth, unable to sleep      Immunizations:     Immunization History   Administered Date(s) Administered   • COVID-19 PFIZER VACCINE 0 3 ML IM 04/01/2021, 04/22/2021, 11/09/2021   • INFLUENZA 09/28/2011, 09/26/2012, 12/20/2013, 10/07/2014, 10/27/2015, 10/12/2016, 11/21/2017, 11/20/2018, 10/01/2019   • Influenza, high dose seasonal 0 7 mL 10/27/2020, 10/10/2021, 10/18/2022   • Influenza, seasonal, injectable 10/01/2019   • Pneumococcal Conjugate 13-Valent 10/27/2015   • Pneumococcal Polysaccharide PPV23 08/11/2010, 10/01/2019   • Zoster 09/26/2012      Health Maintenance:         Topic Date Due   • Hepatitis C Screening  Never done         Topic Date Due • COVID-19 Vaccine (4 - Booster for Pfizer series) 01/04/2022      Medicare Screening Tests and Risk Assessments:     Mihai Sharma is here for her Subsequent Wellness visit  Last Medicare Wellness visit information reviewed, patient interviewed and updates made to the record today  Health Risk Assessment:   Patient rates overall health as excellent  Patient feels that their physical health rating is same  Patient is very satisfied with their life  Eyesight was rated as same  Hearing was rated as same  Patient feels that their emotional and mental health rating is same  Patients states they are never, rarely angry  Patient states they are sometimes unusually tired/fatigued  Pain experienced in the last 7 days has been none  Patient states that she has experienced no weight loss or gain in last 6 months  Depression Screening:   PHQ-9 Score: 6      Fall Risk Screening: In the past year, patient has experienced: history of falling in past year    Number of falls: 1  Injured during fall?: No    Feels unsteady when standing or walking?: No    Worried about falling?: Yes      Urinary Incontinence Screening:   Patient has not leaked urine accidently in the last six months  Home Safety:  Patient does not have trouble with stairs inside or outside of their home  Patient has working smoke alarms and has working carbon monoxide detector  Home safety hazards include: none  Nutrition:   Current diet is Regular, Limited junk food and No Added Salt  Pt eats good portion sizes -- lots of vegetables and protein -- lots of fruits    Medications:   Patient is currently taking over-the-counter supplements  OTC medications include: see medication list  Patient is able to manage medications  Activities of Daily Living (ADLs)/Instrumental Activities of Daily Living (IADLs):   Walk and transfer into and out of bed and chair?: Yes  Dress and groom yourself?: Yes    Bathe or shower yourself?: Yes    Feed yourself?  Yes  Do your laundry/housekeeping?: Yes  Manage your money, pay your bills and track your expenses?: Yes  Make your own meals?: Yes    Do your own shopping?: Yes    Previous Hospitalizations:   Any hospitalizations or ED visits within the last 12 months?: No      Advance Care Planning:   Living will: Yes    Durable POA for healthcare: Yes    Advanced directive: Yes    Advanced directive counseling given: Yes    Five wishes given: No    Patient declined ACP directive: No    End of Life Decisions reviewed with patient: Yes    Provider agrees with end of life decisions: Yes      Cognitive Screening:   Provider or family/friend/caregiver concerned regarding cognition?: No    PREVENTIVE SCREENINGS      Cardiovascular Screening:    General: Screening Not Indicated, History Lipid Disorder and Risks and Benefits Discussed      Diabetes Screening:     General: Screening Not Indicated, History Diabetes, Risks and Benefits Discussed and Screening Current      Colorectal Cancer Screening:     General: Risks and Benefits Discussed      Breast Cancer Screening:     General: Risks and Benefits Discussed and Patient Declines      Cervical Cancer Screening:    General: Screening Not Indicated and Risks and Benefits Discussed      Osteoporosis Screening:    General: Risks and Benefits Discussed      Abdominal Aortic Aneurysm (AAA) Screening:        General: Risks and Benefits Discussed      Lung Cancer Screening:     General: Screening Not Indicated and Risks and Benefits Discussed      Hepatitis C Screening:    General: Risks and Benefits Discussed      Preventive Screening Comments: Patient is beginning to use freestyle series to for constant blood sugar monitoring that should make a difference in her A1c which is too high at 8 5    Screening, Brief Intervention, and Referral to Treatment (SBIRT)    Screening  Typical number of drinks in a day: 0  Typical number of drinks in a week: 0  Interpretation: Low risk drinking behavior      Single "Item Drug Screening:  How often have you used an illegal drug (including marijuana) or a prescription medication for non-medical reasons in the past year? never    Single Item Drug Screen Score: 0  Interpretation: Negative screen for possible drug use disorder    Brief Intervention  Alcohol & drug use screenings were reviewed  No concerns regarding substance use disorder identified  Healthy alcohol use/limits discussed  Other Counseling Topics:   Car/seat belt/driving safety, skin self-exam, sunscreen and calcium and vitamin D intake and regular weightbearing exercise  Will refer to GYN for 1 more Pap smear--mother  of vaginal cancer at age 40  Patient needs mammogram not done for several years Will address next time but she agrees  Blood sugars are out of control with A1c 8 5 will reinstate freestyle series to and she will monitor blood sugars carefully at this point get Lantus 50 units then before then Marilin Pro insulin before breakfast lunch and supper  4 units 10 to 15 units may be 20 before lunch and 10 units before supper must get these blood sugars down    No results found  Physical Exam:     /56 (BP Location: Left arm, Patient Position: Sitting, Cuff Size: Standard)   Pulse 57   Temp 97 9 °F (36 6 °C) (Temporal)   Ht 5' 1\" (1 549 m)   SpO2 97%   BMI 40 74 kg/m²     Physical Exam  Cardiovascular:      Pulses: no weak pulses          Dorsalis pedis pulses are 2+ on the right side and 2+ on the left side  Posterior tibial pulses are 1+ on the right side and 1+ on the left side  Feet:      Right foot:      Skin integrity: Warmth and dry skin present  No ulcer, skin breakdown, erythema or callus  Left foot:      Skin integrity: Warmth and dry skin present  No ulcer, skin breakdown, erythema or callus            Rachael Davison DO  "

## 2023-04-23 DIAGNOSIS — E11.9 CONTROLLED TYPE 2 DIABETES MELLITUS WITHOUT COMPLICATION, WITH LONG-TERM CURRENT USE OF INSULIN (HCC): ICD-10-CM

## 2023-04-23 DIAGNOSIS — Z79.4 CONTROLLED TYPE 2 DIABETES MELLITUS WITHOUT COMPLICATION, WITH LONG-TERM CURRENT USE OF INSULIN (HCC): ICD-10-CM

## 2023-04-24 RX ORDER — INSULIN GLARGINE 100 [IU]/ML
INJECTION, SOLUTION SUBCUTANEOUS
Qty: 45 ML | Refills: 3 | Status: SHIPPED | OUTPATIENT
Start: 2023-04-24

## 2023-05-03 ENCOUNTER — OFFICE VISIT (OUTPATIENT)
Dept: OBGYN CLINIC | Facility: CLINIC | Age: 78
End: 2023-05-03

## 2023-05-03 VITALS
SYSTOLIC BLOOD PRESSURE: 132 MMHG | BODY MASS INDEX: 41.54 KG/M2 | DIASTOLIC BLOOD PRESSURE: 72 MMHG | WEIGHT: 220 LBS | HEIGHT: 61 IN

## 2023-05-03 DIAGNOSIS — Z80.49 FAMILY HISTORY OF UTERINE CANCER: ICD-10-CM

## 2023-05-03 DIAGNOSIS — Z01.419 WOMEN'S ANNUAL ROUTINE GYNECOLOGICAL EXAMINATION: Primary | ICD-10-CM

## 2023-05-03 DIAGNOSIS — Z01.419 WOMEN'S ANNUAL ROUTINE GYNECOLOGICAL EXAMINATION: ICD-10-CM

## 2023-05-03 DIAGNOSIS — Z78.0 MENOPAUSE: ICD-10-CM

## 2023-05-03 DIAGNOSIS — Z12.31 SCREENING MAMMOGRAM, ENCOUNTER FOR: ICD-10-CM

## 2023-05-03 NOTE — PROGRESS NOTES
"Subjective      Mai Gonzalez is a 76 y o  female who presents for annual well woman exam        No PMB  Mother uterine cancer  Father leukemia  History of abnormal Pap smear: no    Menstrual History:  OB History        4    Para   4    Term   4            AB        Living   4       SAB        IAB        Ectopic        Multiple        Live Births                    No LMP recorded  Patient is postmenopausal        The following portions of the patient's history were reviewed and updated as appropriate: allergies, current medications, past family history, past medical history, past social history, past surgical history and problem list     Review of Systems  Review of Systems   Constitutional: Negative for activity change, appetite change, chills, fatigue and fever  Respiratory: Negative for apnea, cough, chest tightness and shortness of breath  Cardiovascular: Negative for chest pain, palpitations and leg swelling  Gastrointestinal: Positive for diarrhea  Negative for abdominal pain, constipation, nausea and vomiting  Genitourinary: Negative for difficulty urinating, dysuria, flank pain, frequency, hematuria and urgency  Neurological: Negative for dizziness, seizures, syncope, light-headedness, numbness and headaches  Psychiatric/Behavioral: Negative for agitation and confusion            Objective      /72 (BP Location: Left arm, Patient Position: Sitting, Cuff Size: Adult)   Ht 5' 1\" (1 549 m)   Wt 99 8 kg (220 lb)   BMI 41 57 kg/m²     Physical Exam  OBGyn Exam     General:   alert and oriented, in no acute distress, alert, appears stated age and cooperative   Heart: regular rate and rhythm, S1, S2 normal, no murmur, click, rub or gallop   Lungs: clear to auscultation bilaterally   Abdomen: soft, non-tender, without masses or organomegaly   Vulva: normal, atrophy   Vagina:  Vaginal atrophy mild cystocele/rectocele   Cervix: no cervical motion tenderness and no lesions   Uterus: " normal size   Adnexa:  Breast Exam:  normal adnexa  breasts appear normal, no suspicious masses, no skin or nipple changes or axillary nodes  Assessment      @well woman@   68-year-old female  Annual exam  Diabetes  COPD  Chronic hypertension  Asthma  Family history of uterine cancer/denies any postmenopausal bleeding    Plan   No Pap needed at the patient age  Diet/exercise  Calcium/vitamin D  Mammogram  Colonoscopy up-to-date  DEXA scan  Recommend genetic testing secondary to family history of uterine cancer  Return to office for annual exam   All questions answered  There are no Patient Instructions on file for this visit

## 2023-05-05 ENCOUNTER — TELEPHONE (OUTPATIENT)
Dept: HEMATOLOGY ONCOLOGY | Facility: CLINIC | Age: 78
End: 2023-05-05

## 2023-05-05 NOTE — TELEPHONE ENCOUNTER
I called Natasha Cantu to schedule a new patient appointment with the Cancer Risk and Genetics Program       Outcome:  Genetics appointment scheduled for 10/10/2023 9:30 am     Personal/Family History Related to Appointment:   Fhx uterine ca (mother)   No personal h/o ca   Fhx of Leukemia (father)     History of Genetic Testing:  Patient reports no personal or family history of genetic testing    Genetics Family History Questionnaire:  I confirmed the patient's e-mail on file as the best e-mail to send an invite link for our genetics family history intake

## 2023-05-17 ENCOUNTER — OFFICE VISIT (OUTPATIENT)
Dept: FAMILY MEDICINE CLINIC | Facility: CLINIC | Age: 78
End: 2023-05-17

## 2023-05-17 VITALS
HEIGHT: 61 IN | WEIGHT: 219.4 LBS | SYSTOLIC BLOOD PRESSURE: 140 MMHG | OXYGEN SATURATION: 96 % | TEMPERATURE: 98 F | DIASTOLIC BLOOD PRESSURE: 64 MMHG | BODY MASS INDEX: 41.42 KG/M2 | HEART RATE: 64 BPM

## 2023-05-17 DIAGNOSIS — Z11.59 NEED FOR HEPATITIS C SCREENING TEST: Primary | ICD-10-CM

## 2023-05-17 DIAGNOSIS — E11.65 UNCONTROLLED TYPE 2 DIABETES MELLITUS WITH HYPERGLYCEMIA (HCC): ICD-10-CM

## 2023-05-17 NOTE — PROGRESS NOTES
Name: Vishnu Sellers      : 1945      MRN: [id number]  Encounter Provider: Dorene Lopez DO  Encounter Date: 2023   Encounter department: [address] [place] [address]     1  Need for hepatitis C screening test    2  Uncontrolled type 2 diabetes mellitus with hyperglycemia ([facility] [address] )        Subjective      There 76year-old soon-to-be [de-identified]year-old Félix Alejandro just returned from [facility] in [place] where she goes to eat with her sister  This is only for her birthday  She is here for follow-up of all the conditions listed in the visit diagnosis section  Significant effort placed on diabetic control glycemic control and medication management  Her weight has gone up some now 219 pounds up from 200 not long ago  Could this be the insulin?  1 good choice would be Mounjaro and I will start that at 2 5 mg once weekly now in hopes of insurance covering it  As a side benefit of that, patient could lose up to 22% of her body weight  It is not yet approved for weight loss but it is being used for that and the main indication now is diabetic control  There is no evidence in the family of medullary thyroid cancer, and I should note  Review of Systems   Constitutional: Negative for chills, fatigue and fever  HENT: Negative for congestion, dental problem, mouth sores, nosebleeds, postnasal drip and rhinorrhea  She has mostly Spring allergies  States Singulair not helping  Hence , end that   Respiratory: Negative for cough, shortness of breath and wheezing  Cardiovascular: Negative for chest pain, palpitations and leg swelling  Gastrointestinal: Negative for abdominal distention and nausea  Endocrine: Negative for polydipsia, polyphagia and polyuria  See BBG sheet , with QID BS's on   Using the FreeStyle disc and tracking BBG daily  Genitourinary: Negative for difficulty urinating and flank pain     Musculoskeletal: Negative for arthralgias, back pain, gait problem and myalgias  Skin: Negative for rash  Allergic/Immunologic: Negative for environmental allergies  Neurological: Negative for dizziness, facial asymmetry, weakness, light-headedness, numbness and headaches  Hematological: Negative for adenopathy  Psychiatric/Behavioral: Negative for agitation, behavioral problems, confusion, decreased concentration, sleep disturbance and suicidal ideas  The patient is not nervous/anxious          Current Outpatient Medications on File Prior to Visit   Medication Sig   • Apremilast (Otezla) 30 MG TABS Take 1 tablet by mouth 2 (two) times a day   • aspirin 81 mg chewable tablet Chew 1 tablet (81 mg total) daily   • Canagliflozin (Invokana) 300 MG TABS Take 1 tablet (300 mg total) by mouth daily before breakfast   • chlorthalidone 25 mg tablet Take 1 5 tablets (37 5 mg total) by mouth daily (Patient taking differently: Take 37 5 mg by mouth daily Pt taking 2 daily)   • Cholecalciferol (VITAMIN D) 125 MCG (5000 UT) CAPS Take by mouth   • Continuous Blood Gluc  (FreeStyle Dejuan 14 Day [place]) THERESA Check BG's 2-4x a day depending on sugar levels   • Continuous Blood Gluc Sensor (IntooStyle Dejuan 14 Day Sensor) MISC Check sugars 2-4x daily depending on sugar levels   • diltiazem (CARDIZEM CD) 360 MG 24 hr capsule TAKE 1 CAPSULE BY MOUTH  DAILY   • insulin lispro (HumaLOG KwikPen) 100 units/mL injection pen INJECT SUBCUTANEOUSLY 5  UNITS BEFORE BREAKFAST 10  UNITS BEFORE LUNCH AND 15  UNITS BEFORE SUPPER   • Lantus SoloStar 100 units/mL SOPN INJECT SUBCUTANEOUSLY 50  UNITS DAILY   • OneTouch Ultra test strip TEST 2-3 TIMES DAILY   • potassium chloride (K-DUR,KLOR-CON) 20 mEq tablet Take 1 tablet (20 mEq total) by mouth 2 (two) times a day   • valsartan (DIOVAN) 320 MG tablet Take 1 tablet (320 mg total) by mouth daily       Objective     /64 (BP Location: Left arm, Patient Position: Sitting, Cuff Size: Standard)   Pulse 64   Temp 98 °F (36 7 °C) (Temporal) "Ht 5' 1\" (1 549 m)   Wt 99 5 kg (219 lb 6 4 oz)   SpO2 96%   BMI 41 46 kg/m²     Physical Exam  Vitals and nursing note reviewed  Constitutional:       General: She is not in acute distress  Appearance: Normal appearance  She is well-developed  She is obese  She is not ill-appearing or diaphoretic  HENT:      Head: Normocephalic and atraumatic  Nose: Nose normal  No congestion or rhinorrhea  Eyes:      General: No scleral icterus  Conjunctiva/sclera: Conjunctivae normal       Pupils: Pupils are equal, round, and reactive to light  Comments: Watery and itchy eyes from the allergies   Neck:      Thyroid: No thyromegaly  Trachea: No tracheal deviation  Cardiovascular:      Rate and Rhythm: Normal rate and regular rhythm  Pulses: Normal pulses  Heart sounds: Normal heart sounds  Pulmonary:      Effort: Pulmonary effort is normal  No respiratory distress  Breath sounds: No wheezing (to ER on 9/8/22 and better now), rhonchi or rales  Chest:      Chest wall: No tenderness  Musculoskeletal:         General: No tenderness or deformity  Normal range of motion  Cervical back: Normal range of motion and neck supple  No rigidity  No muscular tenderness  Right lower leg: No edema  Left lower leg: No edema  Skin:     General: Skin is warm and dry  Coloration: Skin is not pale  Findings: No erythema or rash  Neurological:      General: No focal deficit present  Mental Status: She is alert and oriented to person, place, and time  Mental status is at baseline  Cranial Nerves: No cranial nerve deficit  Psychiatric:         Mood and Affect: Mood normal          Behavior: Behavior normal          Thought Content:  Thought content normal          Judgment: Judgment normal               I personally reviewed the recent (and prior)  lab results, the image studies, pathology, other specialty/physicians consult notes and recommendations, and " outside medical records from other institutions, as appropriate  I had a lengthy discussion with the patient and shared the work-up findings  We discussed the diagnosis and management plan  I spent  35  minutes reviewing the records (labs, clinician notes, outside records, medical history, ordering medicine/tests/procedures, interpreting the imaging/labs previously done) and coordination of care as well as direct time with the patient today, of which greater than 50% of the time was spent in counseling and coordination of care with the patient/family        Mary Hale DO

## 2023-05-22 ENCOUNTER — TELEPHONE (OUTPATIENT)
Dept: FAMILY MEDICINE CLINIC | Facility: CLINIC | Age: 78
End: 2023-05-22

## 2023-05-22 NOTE — TELEPHONE ENCOUNTER
Doctor, this is a message for Doctor Tarun Godinez from [facility] June 16th, 1945  I called my insurance company, They say it's a new drug and I said I have to have it  So they're putting a special on it  And they said they will get in touch with you and then approved the drug  So I've been on the phone for 2 1/2 hours  That's it  Thank you

## 2023-05-23 ENCOUNTER — APPOINTMENT (OUTPATIENT)
Dept: LAB | Facility: HOSPITAL | Age: 78
End: 2023-05-23

## 2023-05-23 DIAGNOSIS — Z11.59 NEED FOR HEPATITIS C SCREENING TEST: ICD-10-CM

## 2023-05-23 NOTE — TELEPHONE ENCOUNTER
This is Alexi, [phone]  I'm calling for Ishmael or Doctor Katie Norton, nurse  I have a message that about the script  Please have him call me  You received a voice mail from Yelena

## 2023-05-24 LAB — HCV AB SER QL: NORMAL

## 2023-05-25 NOTE — TELEPHONE ENCOUNTER
Pt called stating she called insurance company to inquire as to why [facility] was denied  She states that insurance company had sent us a fax re PA  I informed pt that I have not yet seen a fax re [facility] but when I do see the fax it will be filled out and returned immediately

## 2023-05-26 NOTE — TELEPHONE ENCOUNTER
Patient came into office very upset due to prior authorization was not completed for her Mounjaro  States her peeling hands are getting worse, having diarrhea and vomiting from her Humalog and Lantus  Informed patient I would submit prior authorization to insurance, scanned to chart

## 2023-06-09 LAB
LEFT EYE DIABETIC RETINOPATHY: NORMAL
RIGHT EYE DIABETIC RETINOPATHY: NORMAL

## 2023-06-14 DIAGNOSIS — L40.9 PSORIASIS: ICD-10-CM

## 2023-06-14 RX ORDER — APREMILAST 30 MG/1
30 TABLET, FILM COATED ORAL 2 TIMES DAILY
Qty: 180 TABLET | Refills: 1 | Status: SHIPPED | OUTPATIENT
Start: 2023-06-14

## 2023-06-14 NOTE — TELEPHONE ENCOUNTER
Fax received from pharmacy requesting refill on patients Otezla 30 mg TAB  Medication sent for refill at this time

## 2023-07-14 ENCOUNTER — OFFICE VISIT (OUTPATIENT)
Dept: FAMILY MEDICINE CLINIC | Facility: CLINIC | Age: 78
End: 2023-07-14
Payer: COMMERCIAL

## 2023-07-14 VITALS
BODY MASS INDEX: 41.16 KG/M2 | WEIGHT: 218 LBS | HEART RATE: 70 BPM | OXYGEN SATURATION: 98 % | HEIGHT: 61 IN | RESPIRATION RATE: 16 BRPM | TEMPERATURE: 97.7 F | DIASTOLIC BLOOD PRESSURE: 90 MMHG | SYSTOLIC BLOOD PRESSURE: 160 MMHG

## 2023-07-14 DIAGNOSIS — Z79.899 MEDICATION MANAGEMENT: ICD-10-CM

## 2023-07-14 DIAGNOSIS — Z79.899 ENCOUNTER FOR LONG-TERM (CURRENT) USE OF MEDICATIONS: ICD-10-CM

## 2023-07-14 DIAGNOSIS — E11.65 UNCONTROLLED TYPE 2 DIABETES MELLITUS WITH HYPERGLYCEMIA (HCC): ICD-10-CM

## 2023-07-14 DIAGNOSIS — I10 RESISTANT HYPERTENSION: ICD-10-CM

## 2023-07-14 DIAGNOSIS — Z71.2 ENCOUNTER TO DISCUSS TEST RESULTS: ICD-10-CM

## 2023-07-14 DIAGNOSIS — E11.65 UNCONTROLLED TYPE 2 DIABETES MELLITUS WITH HYPERGLYCEMIA (HCC): Primary | ICD-10-CM

## 2023-07-14 DIAGNOSIS — J44.1 COPD EXACERBATION (HCC): ICD-10-CM

## 2023-07-14 DIAGNOSIS — R63.8 INCREASED BMI: ICD-10-CM

## 2023-07-14 PROCEDURE — 99214 OFFICE O/P EST MOD 30 MIN: CPT | Performed by: FAMILY MEDICINE

## 2023-07-14 NOTE — PROGRESS NOTES
Name: Saul Longoria      : 1945      MRN: [id number]  Encounter Provider: Helen Schreiber DO  Encounter Date: 2023   Encounter department: [address]     1. Uncontrolled type 2 diabetes mellitus with hyperglycemia (HCC)  Comments:  onFreeStyle:   now at 10:32 a.m  Review of monitor:  lowest BS are 2 pm.  Plan: inc Lantus to 60U  Add:  Mounjaro   Orders:  -     tirzepatide 2.5 MG/0.5ML; Inject 0.5 mL (2.5 mg total) under the skin every 7 days    2. Resistant hypertension  Comments:  /90 today by MA  We will continue to monitor blood pressure at home and report to me results--see HPI    3. COPD exacerbation ([address])  Comments:  stable--no appreciable change    4. Uncontrolled type 2 diabetes mellitus with hyperglycemia (HCC)  Comments:  See HPI  Orders:  -     tirzepatide 2.5 MG/0.5ML; Inject 0.5 mL (2.5 mg total) under the skin every 7 days    5. Encounter to discuss test results    6. Medication management  Comments:  Patient did not receive Mounjaro when ordered last time sent to local drugstore will send to [facility]. Long discussion on weight loss and uncontrolled sugars. Fo    7. Encounter for long-term (current) use of medications  Comments: All medications reviewed for safety efficacy and tolerance. 8. Increased BMI  Comments:  Discussed BMI 41 and weight 218 which is her maximum ever weight. Hopefully with Mounjaro weight will come down        Subjective      HPI -75-year-old Yobani Morales seen for follow-up of hypertension and IDDM on insulin. I have reviewed her freestyle and her blood sugars are by enlarge in the 1 50-2 50 range lowest time of day seems to be 130-2 PM and occasionally hitting 79. For the most part 2 . She currently is taking Lantus 50 units once daily and Marilin Pro 5 units AC breakfast 10 units AC lunch 15 units AC supper and Invokana 300 mg. Kim Nasuti was ordered last time but patient never got.   It was sent to [place], I will send it now to [facility]. She will continue to monitor like she is avoiding hypoglycemia and hopefully will lose 20% of her body weight or in the neighborhood of 40 pounds--that is our goal.  She knows that she may have to reduce her insulin as her weight declines. Regarding blood pressure by /90 by me 155//70 range. Currently taking chlorthalidone 37 and half milligrams once daily, diltiazem 360 once daily and valsartan 320 once daily. Checks blood sugars at home "every couple days" and generally runs about 130/80 range but it is labile. I hesitate to increase medication any further as I do not want to precipitate dizziness lightheadedness or falling. Therefore our plan will be to monitor blood pressure keep on low-salt diet avoid etc.The patient was advised that NSAID-type medications have two very important potential side effects: gastrointestinal irritation including hemorrhage and renal injuries. She was asked to take the medication with food and to stop if she experiences any GI upset. I asked her to call for vomiting, abdominal pain or black/bloody stools. The patient expresses understanding of these issues and questions were answered. Review of Systems   Constitutional: Negative for chills, fatigue and fever. HENT: Negative for congestion, dental problem, mouth sores, nosebleeds, postnasal drip and rhinorrhea. She has mostly Spring allergies. States Singulair not helping. Hence , end that   Respiratory: Negative for cough, shortness of breath and wheezing. Cardiovascular: Negative for chest pain, palpitations and leg swelling. Gastrointestinal: Negative for abdominal distention and nausea. Endocrine: Negative for polydipsia, polyphagia and polyuria. See BBG sheet , with QID BS's on   Using the FreeStyle disc and tracking BBG daily. Genitourinary: Negative for difficulty urinating and flank pain.    Musculoskeletal: Negative for arthralgias, back pain, gait problem and myalgias. Skin: Negative for rash. Allergic/Immunologic: Negative for environmental allergies. Neurological: Negative for dizziness, facial asymmetry, weakness, light-headedness, numbness and headaches. Hematological: Negative for adenopathy. Psychiatric/Behavioral: Negative for agitation, behavioral problems, confusion, decreased concentration, sleep disturbance and suicidal ideas. The patient is not nervous/anxious.         Current Outpatient Medications on File Prior to Visit   Medication Sig   • Apremilast (Otezla) 30 MG TABS Take 1 tablet by mouth 2 (two) times a day   • aspirin 81 mg chewable tablet Chew 1 tablet (81 mg total) daily   • Canagliflozin (Invokana) 300 MG TABS Take 1 tablet (300 mg total) by mouth daily before breakfast   • chlorthalidone 25 mg tablet Take 1.5 tablets (37.5 mg total) by mouth daily (Patient taking differently: Take 37.5 mg by mouth daily Pt taking 2 daily)   • Cholecalciferol (VITAMIN D) 125 MCG (5000 UT) CAPS Take by mouth   • Continuous Blood Gluc  (Nanigansyle Dejuan 14 Day [place]) THERESA Check BG's 2-4x a day depending on sugar levels   • Continuous Blood Gluc Sensor (NakedRoomStyle Dejuan 14 Day Sensor) MISC Check sugars 2-4x daily depending on sugar levels   • diltiazem (CARDIZEM CD) 360 MG 24 hr capsule TAKE 1 CAPSULE BY MOUTH  DAILY   • insulin lispro (HumaLOG KwikPen) 100 units/mL injection pen INJECT SUBCUTANEOUSLY 5  UNITS BEFORE BREAKFAST 10  UNITS BEFORE LUNCH AND 15  UNITS BEFORE SUPPER   • Lantus SoloStar 100 units/mL SOPN INJECT SUBCUTANEOUSLY 50  UNITS DAILY   • OneTouch Ultra test strip TEST 2-3 TIMES DAILY   • potassium chloride (K-DUR,KLOR-CON) 20 mEq tablet Take 1 tablet (20 mEq total) by mouth 2 (two) times a day   • valsartan (DIOVAN) 320 MG tablet Take 1 tablet (320 mg total) by mouth daily   • [DISCONTINUED] tirzepatide 2.5 MG/0.5ML Inject 0.5 mL (2.5 mg total) under the skin every 7 days       Objective     /90 (BP Location: Left arm, Patient Position: Sitting, Cuff Size: Large)   Pulse 70   Temp 97.7 °F (36.5 °C) (Temporal)   Resp 16   Ht 5' 1" (1.549 m)   Wt 98.9 kg (218 lb)   SpO2 98%   BMI 41.19 kg/m²     Physical Exam  Vitals and nursing note reviewed. Constitutional:       General: She is not in acute distress. Appearance: Normal appearance. She is well-developed. She is obese. She is not ill-appearing or diaphoretic. HENT:      Head: Normocephalic and atraumatic. Nose: Nose normal. No congestion or rhinorrhea. Eyes:      General: No scleral icterus. Conjunctiva/sclera: Conjunctivae normal.      Pupils: Pupils are equal, round, and reactive to light. Comments: Watery and itchy eyes from the allergies   Neck:      Thyroid: No thyromegaly. Trachea: No tracheal deviation. Cardiovascular:      Rate and Rhythm: Normal rate and regular rhythm. Pulses: Normal pulses. Heart sounds: Normal heart sounds. Pulmonary:      Effort: Pulmonary effort is normal. No respiratory distress. Breath sounds: No wheezing (to ER on 9/8/22 and better now), rhonchi or rales. Chest:      Chest wall: No tenderness. Musculoskeletal:         General: No tenderness or deformity. Normal range of motion. Cervical back: Normal range of motion and neck supple. No rigidity. No muscular tenderness. Right lower leg: No edema. Left lower leg: No edema. Skin:     General: Skin is warm and dry. Coloration: Skin is not pale. Findings: No erythema or rash. Neurological:      General: No focal deficit present. Mental Status: She is alert and oriented to person, place, and time. Mental status is at baseline. Cranial Nerves: No cranial nerve deficit. Psychiatric:         Mood and Affect: Mood normal.         Behavior: Behavior normal.         Thought Content:  Thought content normal.         Judgment: Judgment normal.              I personally reviewed the recent (and prior)  lab results, the image studies, pathology, other specialty/physicians consult notes and recommendations, and outside medical records from other institutions, as appropriate. I had a lengthy discussion with the patient and shared the work-up findings. We discussed the diagnosis and management plan. I spent  40  minutes reviewing the records (labs, clinician notes, outside records, medical history, ordering medicine/tests/procedures, interpreting the imaging/labs previously done) and coordination of care as well as direct time with the patient today, of which greater than 50% of the time was spent in counseling and coordination of care with the patient/family.       Don Koyanagi, DO

## 2023-07-18 ENCOUNTER — TELEPHONE (OUTPATIENT)
Dept: FAMILY MEDICINE CLINIC | Facility: CLINIC | Age: 78
End: 2023-07-18

## 2023-07-18 NOTE — TELEPHONE ENCOUNTER
This is [place], [phone]. I'm calling for Doctor Martha Glasgow. Nurse. I believe it's Ishmael. I need to know how what the status is on the papers. So as soon as he calls me, thank you.

## 2023-07-19 NOTE — TELEPHONE ENCOUNTER
Recvd PA for Mounjaro 2.5 mg/.5 mL PA paperwork 7/19/23. Submitted via [facility] at this time.      [place] [id number]

## 2023-08-16 ENCOUNTER — RA CDI HCC (OUTPATIENT)
Dept: OTHER | Facility: HOSPITAL | Age: 78
End: 2023-08-16

## 2023-08-16 NOTE — PROGRESS NOTES
[address] [facility] coding opportunities          Chart Reviewed number of suggestions sent to Provider: 1   E11.69, E78.5  ( need updated lipid profile)    Patients Insurance     Medicare Insurance: [employer]

## 2023-08-22 ENCOUNTER — OFFICE VISIT (OUTPATIENT)
Dept: FAMILY MEDICINE CLINIC | Facility: CLINIC | Age: 78
End: 2023-08-22
Payer: COMMERCIAL

## 2023-08-22 VITALS
BODY MASS INDEX: 40.63 KG/M2 | HEIGHT: 61 IN | OXYGEN SATURATION: 98 % | HEART RATE: 54 BPM | TEMPERATURE: 97.3 F | DIASTOLIC BLOOD PRESSURE: 62 MMHG | WEIGHT: 215.2 LBS | SYSTOLIC BLOOD PRESSURE: 158 MMHG

## 2023-08-22 DIAGNOSIS — R63.8 INCREASED BMI: ICD-10-CM

## 2023-08-22 DIAGNOSIS — I10 RESISTANT HYPERTENSION: ICD-10-CM

## 2023-08-22 DIAGNOSIS — Z71.2 ENCOUNTER TO DISCUSS TEST RESULTS: ICD-10-CM

## 2023-08-22 DIAGNOSIS — E11.65 UNCONTROLLED TYPE 2 DIABETES MELLITUS WITH HYPERGLYCEMIA (HCC): Primary | ICD-10-CM

## 2023-08-22 DIAGNOSIS — L40.9 PSORIASIS: ICD-10-CM

## 2023-08-22 DIAGNOSIS — Z79.899 ENCOUNTER FOR LONG-TERM (CURRENT) USE OF MEDICATIONS: ICD-10-CM

## 2023-08-22 DIAGNOSIS — Z79.899 MEDICATION MANAGEMENT: ICD-10-CM

## 2023-08-22 PROCEDURE — 99214 OFFICE O/P EST MOD 30 MIN: CPT | Performed by: FAMILY MEDICINE

## 2023-08-22 RX ORDER — LOSARTAN POTASSIUM AND HYDROCHLOROTHIAZIDE 25; 100 MG/1; MG/1
1 TABLET ORAL DAILY
Qty: 90 TABLET | Refills: 3
Start: 2023-08-22

## 2023-08-22 NOTE — PROGRESS NOTES
Name: Florin Clemente      : 1945      MRN: [id number]  Encounter Provider: Filomena Gonzáles DO  Encounter Date: 2023   Encounter department: [address]     1. Uncontrolled type 2 diabetes mellitus with hyperglycemia ([address])  Comments:  Long discussion of diabetes and multiple therapies. Insulin doses adjusted both log and Lantus  Mounjaro increased to 5 Mg  Orders:  -     tirzepatide 5 MG/0.5ML; Inject 0.5 mL (5 mg total) under the skin every 7 days    2. Medication management  Comments: All medications reviewed for safety efficacy and tolerance    3. Encounter for long-term (current) use of medications    4. Increased BMI  Comments:  Weight 215 pounds with BMI 40.66--> again excellent candidate for Mounjaro    5. Encounter to discuss test results  Comments: All recent labs reviewed and discussed    6. Resistant hypertension  Comments:  Blood pressure 154/60 by MA and 158/62 by me--  Discontinue chlorthalidone and valsartan and begin losartan/HCTZ 100/25 once daily  Long discussion ensued regar  Orders:  -     losartan-hydrochlorothiazide (HYZAAR) 100-25 MG per tablet; Take 1 tablet by mouth daily    7. Psoriasis  Comments:  Mostly Palmar--discussed and stable        Subjective      HPI -- 78 yr very active female, on FreeStyle Series 2 since Aug 11. I viewed the scan: Today 1 a.m. 64, 3:15 a.m. 111 ,  8 a.m. 148,  10:16 a. 202, and 11 a.m. 166    Therefore, NEW PLAN is as follows:   Keep log as follows: 5 U ac B'fast, 8 U ac Lunch, and zero ac supper; keep the Lantus at 50 U, BUT IF 1 a.m. BS's are <80, then reduce to 44 U. She is taking the Mounjaro 2.5 mg and will advance to 5.0 mg wk'ly x 8 wks. Wt:  Is down 3 lbs in one mo.  On 2.5 mg Mounjaro   Today, inc the Monunjaro to 5 mg wk'ly x2 mo    BP by me is 158/62 and will make new plans now to discontinue the present valsartan 320 and chlorthalidone 50 mg daily and replace them with losartan/HCTZ 100/25 1 tablet once daily--she has those at home    Review of Systems   Constitutional: Negative for chills, fatigue and fever. HENT: Negative for congestion, dental problem, mouth sores, nosebleeds, postnasal drip and rhinorrhea. She has mostly Spring allergies. States Singulair not helping. Hence , end that   Respiratory: Negative for cough, shortness of breath and wheezing. Cardiovascular: Negative for chest pain, palpitations and leg swelling. Gastrointestinal: Negative for abdominal distention and nausea. Endocrine: Negative for polydipsia, polyphagia and polyuria. See BBG sheet , with QID BS's on   Using the FreeStyle disc and tracking BBG daily. Genitourinary: Negative for difficulty urinating and flank pain. Musculoskeletal: Negative for arthralgias, back pain, gait problem and myalgias. Skin: Negative for rash. Allergic/Immunologic: Negative for environmental allergies. Neurological: Negative for dizziness, facial asymmetry, weakness, light-headedness, numbness and headaches. Hematological: Negative for adenopathy. Psychiatric/Behavioral: Negative for agitation, behavioral problems, confusion, decreased concentration, sleep disturbance and suicidal ideas. The patient is not nervous/anxious.         Current Outpatient Medications on File Prior to Visit   Medication Sig   • Apremilast (Otezla) 30 MG TABS Take 1 tablet by mouth 2 (two) times a day   • aspirin 81 mg chewable tablet Chew 1 tablet (81 mg total) daily   • Canagliflozin (Invokana) 300 MG TABS Take 1 tablet (300 mg total) by mouth daily before breakfast   • Cholecalciferol (VITAMIN D) 125 MCG (5000 UT) CAPS Take by mouth   • Continuous Blood Gluc  (FreeStyle Dejuan 14 Day [place]) THERESA Check BG's 2-4x a day depending on sugar levels   • Continuous Blood Gluc Sensor (FreeStyle Dejuan 14 Day Sensor) MISC Check sugars 2-4x daily depending on sugar levels   • diltiazem (CARDIZEM CD) 360 MG 24 hr capsule TAKE 1 CAPSULE BY MOUTH  DAILY   • insulin lispro (HumaLOG KwikPen) 100 units/mL injection pen INJECT SUBCUTANEOUSLY 5  UNITS BEFORE BREAKFAST 10  UNITS BEFORE LUNCH AND 15  UNITS BEFORE SUPPER   • Lantus SoloStar 100 units/mL SOPN INJECT SUBCUTANEOUSLY 50  UNITS DAILY   • OneTouch Ultra test strip TEST 2-3 TIMES DAILY   • potassium chloride (K-DUR,KLOR-CON) 20 mEq tablet Take 1 tablet (20 mEq total) by mouth 2 (two) times a day   • [DISCONTINUED] chlorthalidone 25 mg tablet Take 1.5 tablets (37.5 mg total) by mouth daily (Patient taking differently: Take 37.5 mg by mouth daily Pt taking 2 daily)   • [DISCONTINUED] tirzepatide 2.5 MG/0.5ML Inject 0.5 mL (2.5 mg total) under the skin every 7 days   • [DISCONTINUED] valsartan (DIOVAN) 320 MG tablet Take 1 tablet (320 mg total) by mouth daily       Objective     /62   Pulse (!) 54   Temp (!) 97.3 °F (36.3 °C) (Temporal)   Ht 5' 1" (1.549 m)   Wt 97.6 kg (215 lb 3.2 oz)   SpO2 98%   BMI 40.66 kg/m²     Physical Exam  Vitals and nursing note reviewed. Constitutional:       General: She is not in acute distress. Appearance: Normal appearance. She is well-developed. She is obese. She is not ill-appearing or diaphoretic. HENT:      Head: Normocephalic and atraumatic. Nose: Nose normal. No congestion or rhinorrhea. Eyes:      General: No scleral icterus. Conjunctiva/sclera: Conjunctivae normal.      Pupils: Pupils are equal, round, and reactive to light. Comments: Watery and itchy eyes from the allergies   Neck:      Thyroid: No thyromegaly. Trachea: No tracheal deviation. Cardiovascular:      Rate and Rhythm: Normal rate and regular rhythm. Pulses: Normal pulses. Heart sounds: Normal heart sounds. Pulmonary:      Effort: Pulmonary effort is normal. No respiratory distress. Breath sounds: No wheezing (to ER on 9/8/22 and better now), rhonchi or rales. Chest:      Chest wall: No tenderness.    Musculoskeletal:         General: No tenderness or deformity. Normal range of motion. Cervical back: Normal range of motion and neck supple. No rigidity. No muscular tenderness. Right lower leg: No edema. Left lower leg: No edema. Skin:     General: Skin is warm and dry. Coloration: Skin is not pale. Findings: No erythema or rash. Neurological:      General: No focal deficit present. Mental Status: She is alert and oriented to person, place, and time. Mental status is at baseline. Cranial Nerves: No cranial nerve deficit. Psychiatric:         Mood and Affect: Mood normal.         Behavior: Behavior normal.         Thought Content: Thought content normal.         Judgment: Judgment normal.              I personally reviewed the recent (and prior)  lab results, the image studies, pathology, other specialty/physicians consult notes and recommendations, and outside medical records from other institutions, as appropriate. I had a lengthy discussion with the patient and shared the work-up findings. We discussed the diagnosis and management plan. I spent  35  minutes reviewing the records (labs, clinician notes, outside records, medical history, ordering medicine/tests/procedures, interpreting the imaging/labs previously done) and coordination of care as well as direct time with the patient today, of which greater than 50% of the time was spent in counseling and coordination of care with the patient/family.       Chris Griffin DO

## 2023-09-11 DIAGNOSIS — E11.65 UNCONTROLLED TYPE 2 DIABETES MELLITUS WITH HYPERGLYCEMIA (HCC): ICD-10-CM

## 2023-09-11 DIAGNOSIS — E87.6 HYPOKALEMIA: ICD-10-CM

## 2023-09-11 RX ORDER — POTASSIUM CHLORIDE 20 MEQ/1
20 TABLET, EXTENDED RELEASE ORAL 2 TIMES DAILY
Qty: 180 TABLET | Refills: 3 | Status: SHIPPED | OUTPATIENT
Start: 2023-09-11

## 2023-09-11 RX ORDER — CANAGLIFLOZIN 300 MG/1
300 TABLET, FILM COATED ORAL
Qty: 90 TABLET | Refills: 3 | Status: SHIPPED | OUTPATIENT
Start: 2023-09-11

## 2023-09-20 ENCOUNTER — HOSPITAL ENCOUNTER (EMERGENCY)
Facility: HOSPITAL | Age: 78
Discharge: HOME | End: 2023-09-20
Attending: EMERGENCY MEDICINE | Admitting: EMERGENCY MEDICINE
Payer: COMMERCIAL

## 2023-09-20 ENCOUNTER — APPOINTMENT (EMERGENCY)
Dept: CT IMAGING | Facility: HOSPITAL | Age: 78
End: 2023-09-20
Payer: COMMERCIAL

## 2023-09-20 VITALS
TEMPERATURE: 97.9 F | HEART RATE: 57 BPM | OXYGEN SATURATION: 95 % | SYSTOLIC BLOOD PRESSURE: 154 MMHG | DIASTOLIC BLOOD PRESSURE: 70 MMHG | RESPIRATION RATE: 18 BRPM

## 2023-09-20 DIAGNOSIS — R68.2 DRY MOUTH: ICD-10-CM

## 2023-09-20 DIAGNOSIS — R63.1 EXCESSIVE THIRST: ICD-10-CM

## 2023-09-20 DIAGNOSIS — R51.9 HEADACHE: Primary | ICD-10-CM

## 2023-09-20 DIAGNOSIS — R11.2 NAUSEA AND VOMITING: ICD-10-CM

## 2023-09-20 LAB
ALBUMIN SERPL BCP-MCNC: 4.2 G/DL (ref 3.5–5)
ALP SERPL-CCNC: 115 U/L (ref 34–104)
ALT SERPL W P-5'-P-CCNC: 16 U/L (ref 7–52)
ANION GAP SERPL CALCULATED.3IONS-SCNC: 7 MMOL/L
AST SERPL W P-5'-P-CCNC: 32 U/L (ref 13–39)
ATRIAL RATE: 67 BPM
ATRIAL RATE: 68 BPM
BASOPHILS # BLD AUTO: 0.12 THOUSANDS/ÂΜL (ref 0–0.1)
BASOPHILS NFR BLD AUTO: 1 % (ref 0–1)
BILIRUB SERPL-MCNC: 0.38 MG/DL (ref 0.2–1)
BUN SERPL-MCNC: 22 MG/DL (ref 5–25)
CALCIUM SERPL-MCNC: 9.5 MG/DL (ref 8.4–10.2)
CHLORIDE SERPL-SCNC: 103 MMOL/L (ref 96–108)
CO2 SERPL-SCNC: 30 MMOL/L (ref 21–32)
CREAT SERPL-MCNC: 0.91 MG/DL (ref 0.6–1.3)
EOSINOPHIL # BLD AUTO: 0.29 THOUSAND/ÂΜL (ref 0–0.61)
EOSINOPHIL NFR BLD AUTO: 3 % (ref 0–6)
ERYTHROCYTE [DISTWIDTH] IN BLOOD BY AUTOMATED COUNT: 13.7 % (ref 11.6–15.1)
GFR SERPL CREATININE-BSD FRML MDRD: 59 ML/MIN/1.73SQ M
GLUCOSE SERPL-MCNC: 109 MG/DL (ref 65–140)
GLUCOSE SERPL-MCNC: 116 MG/DL (ref 65–140)
HCT VFR BLD AUTO: 49.1 % (ref 34.8–46.1)
HGB BLD-MCNC: 15.5 G/DL (ref 11.5–15.4)
IMM GRANULOCYTES # BLD AUTO: 0.06 THOUSAND/UL (ref 0–0.2)
IMM GRANULOCYTES NFR BLD AUTO: 1 % (ref 0–2)
LYMPHOCYTES # BLD AUTO: 2.86 THOUSANDS/ÂΜL (ref 0.6–4.47)
LYMPHOCYTES NFR BLD AUTO: 27 % (ref 14–44)
MAGNESIUM SERPL-MCNC: 1.8 MG/DL (ref 1.9–2.7)
MAGNESIUM SERPL-MCNC: 2.1 MG/DL (ref 1.9–2.7)
MCH RBC QN AUTO: 28.8 PG (ref 26.8–34.3)
MCHC RBC AUTO-ENTMCNC: 31.6 G/DL (ref 31.4–37.4)
MCV RBC AUTO: 91 FL (ref 82–98)
MONOCYTES # BLD AUTO: 1.18 THOUSAND/ÂΜL (ref 0.17–1.22)
MONOCYTES NFR BLD AUTO: 11 % (ref 4–12)
NEUTROPHILS # BLD AUTO: 6.09 THOUSANDS/ÂΜL (ref 1.85–7.62)
NEUTS SEG NFR BLD AUTO: 57 % (ref 43–75)
NRBC BLD AUTO-RTO: 0 /100 WBCS
P AXIS: 1 DEGREES
P AXIS: 2 DEGREES
PLATELET # BLD AUTO: 298 THOUSANDS/UL (ref 149–390)
PMV BLD AUTO: 10.6 FL (ref 8.9–12.7)
POTASSIUM SERPL-SCNC: 4 MMOL/L (ref 3.5–5.3)
POTASSIUM SERPL-SCNC: 4.9 MMOL/L (ref 3.5–5.3)
PR INTERVAL: 164 MS
PR INTERVAL: 166 MS
PROT SERPL-MCNC: 7.3 G/DL (ref 6.4–8.4)
QRS AXIS: 27 DEGREES
QRS AXIS: 27 DEGREES
QRSD INTERVAL: 88 MS
QRSD INTERVAL: 90 MS
QT INTERVAL: 394 MS
QT INTERVAL: 396 MS
QTC INTERVAL: 418 MS
QTC INTERVAL: 418 MS
RBC # BLD AUTO: 5.38 MILLION/UL (ref 3.81–5.12)
SODIUM SERPL-SCNC: 140 MMOL/L (ref 135–147)
T WAVE AXIS: 49 DEGREES
T WAVE AXIS: 52 DEGREES
T4 FREE SERPL-MCNC: 0.76 NG/DL (ref 0.61–1.12)
TSH SERPL DL<=0.05 MIU/L-ACNC: 7.76 UIU/ML (ref 0.45–4.5)
VENTRICULAR RATE: 67 BPM
VENTRICULAR RATE: 68 BPM
WBC # BLD AUTO: 10.6 THOUSAND/UL (ref 4.31–10.16)

## 2023-09-20 PROCEDURE — 93010 ELECTROCARDIOGRAM REPORT: CPT | Performed by: INTERNAL MEDICINE

## 2023-09-20 PROCEDURE — 84439 ASSAY OF FREE THYROXINE: CPT

## 2023-09-20 PROCEDURE — 99284 EMERGENCY DEPT VISIT MOD MDM: CPT

## 2023-09-20 PROCEDURE — G1004 CDSM NDSC: HCPCS

## 2023-09-20 PROCEDURE — 84443 ASSAY THYROID STIM HORMONE: CPT

## 2023-09-20 PROCEDURE — 99284 EMERGENCY DEPT VISIT MOD MDM: CPT | Performed by: EMERGENCY MEDICINE

## 2023-09-20 PROCEDURE — 83735 ASSAY OF MAGNESIUM: CPT

## 2023-09-20 PROCEDURE — 96361 HYDRATE IV INFUSION ADD-ON: CPT

## 2023-09-20 PROCEDURE — 96360 HYDRATION IV INFUSION INIT: CPT

## 2023-09-20 PROCEDURE — 93005 ELECTROCARDIOGRAM TRACING: CPT

## 2023-09-20 PROCEDURE — 84132 ASSAY OF SERUM POTASSIUM: CPT

## 2023-09-20 PROCEDURE — 80053 COMPREHEN METABOLIC PANEL: CPT

## 2023-09-20 PROCEDURE — 36415 COLL VENOUS BLD VENIPUNCTURE: CPT

## 2023-09-20 PROCEDURE — 70450 CT HEAD/BRAIN W/O DYE: CPT

## 2023-09-20 PROCEDURE — 85025 COMPLETE CBC W/AUTO DIFF WBC: CPT

## 2023-09-20 PROCEDURE — 82948 REAGENT STRIP/BLOOD GLUCOSE: CPT

## 2023-09-20 RX ORDER — SODIUM CHLORIDE 9 MG/ML
100 INJECTION, SOLUTION INTRAVENOUS CONTINUOUS
Status: DISCONTINUED | OUTPATIENT
Start: 2023-09-20 | End: 2023-09-20 | Stop reason: HOSPADM

## 2023-09-20 RX ORDER — ACETAMINOPHEN 325 MG/1
650 TABLET ORAL ONCE
Status: COMPLETED | OUTPATIENT
Start: 2023-09-20 | End: 2023-09-20

## 2023-09-20 RX ORDER — ONDANSETRON 4 MG/1
4 TABLET, FILM COATED ORAL EVERY 8 HOURS PRN
Qty: 20 TABLET | Refills: 0 | Status: SHIPPED | OUTPATIENT
Start: 2023-09-20

## 2023-09-20 RX ADMIN — SODIUM CHLORIDE 100 ML/HR: 0.9 INJECTION, SOLUTION INTRAVENOUS at 06:24

## 2023-09-20 RX ADMIN — SODIUM CHLORIDE 250 ML: 0.9 INJECTION, SOLUTION INTRAVENOUS at 04:23

## 2023-09-20 RX ADMIN — ACETAMINOPHEN 650 MG: 325 TABLET, FILM COATED ORAL at 04:23

## 2023-09-20 NOTE — ED PROVIDER NOTES
History  Chief Complaint   Patient presents with   • Headache     Headache and increased thirst since yesterday. Is diabetic, states her sugar has been controlled. Also reports dizziness. Denies h/o migraines. States she feels like this when her potassium is low     78year old Female with PMH of HTN, DM, and COPD presents to the ED complaining of HA, excessive thirst, and dry mouth which started on Monday morning. Patient states that she has been in the hospital before with similar symptoms and she was given IV hydration and potassium replenishment. Patient also mentions a few episodes of vomiting following oral intake of water in large quantities. Denies chest pain, SOB, cough, abdominal pain, n/v/d, fever, chills, dizziness, lightheadedness, dysuria, hematuria, hematochezia, or melena. Prior to Admission Medications   Prescriptions Last Dose Informant Patient Reported? Taking?    Apremilast (Otezla) 30 MG TABS  Self No No   Sig: Take 1 tablet by mouth 2 (two) times a day   Cholecalciferol (VITAMIN D) 125 MCG (5000 UT) CAPS  Self Yes No   Sig: Take by mouth   Continuous Blood Gluc  (FreeStyle Dejuan 14 Day [place]) THERESA  Self No No   Sig: Check BG's 2-4x a day depending on sugar levels   Continuous Blood Gluc Sensor (FreeStyle Dejuan 14 Day Sensor) MISC  Self No No   Sig: Check sugars 2-4x daily depending on sugar levels   Invokana 300 MG TABS   No No   Sig: TAKE 1 TABLET BY MOUTH  DAILY BEFORE BREAKFAST   Lantus SoloStar 100 units/mL SOPN  Self No No   Sig: INJECT SUBCUTANEOUSLY 50  UNITS DAILY   OneTouch Ultra test strip  Self No No   Sig: TEST 2-3 TIMES DAILY   aspirin 81 mg chewable tablet  Self No No   Sig: Chew 1 tablet (81 mg total) daily   diltiazem (CARDIZEM CD) 360 MG 24 hr capsule  Self No No   Sig: TAKE 1 CAPSULE BY MOUTH  DAILY   insulin lispro (HumaLOG KwikPen) 100 units/mL injection pen  Self No No   Sig: INJECT SUBCUTANEOUSLY 5  UNITS BEFORE BREAKFAST 10  UNITS BEFORE LUNCH AND 15 UNITS BEFORE SUPPER   losartan-hydrochlorothiazide (HYZAAR) 100-25 MG per tablet   No No   Sig: Take 1 tablet by mouth daily   potassium chloride (K-DUR,KLOR-CON) 20 mEq tablet   No No   Sig: TAKE 1 TABLET BY MOUTH TWICE  DAILY   tirzepatide 5 MG/0.5ML   No No   Sig: Inject 0.5 mL (5 mg total) under the skin every 7 days      Facility-Administered Medications Last Administration Doses Remaining   cyanocobalamin injection 1,000 mcg 2021  8:17 AM    cyanocobalamin injection 1,000 mcg 10/27/2020  2:49 PM    cyanocobalamin injection 1,000 mcg 2021  7:50 AM    cyanocobalamin injection 1,000 mcg 3/9/2021  2:55 PM           Past Medical History:   Diagnosis Date   • Asthma    • Benign essential hypertension    • Chronic lower back pain    • COPD (chronic obstructive pulmonary disease) (HCC)    • Diabetes mellitus ([address])    • Hyperlipidemia    • Sciatica        Past Surgical History:   Procedure Laterality Date   • CHOLECYSTECTOMY         Family History   Problem Relation Age of Onset   • Uterine cancer Mother    • Emphysema Father    • Leukemia Father      I have reviewed and agree with the history as documented. E-Cigarette/Vaping   • E-Cigarette Use Former User      E-Cigarette/Vaping Substances   • Nicotine No    • THC No    • CBD No    • Flavoring No    • Other No    • Unknown No      Social History     Tobacco Use   • Smoking status: Former     Packs/day: 1.00     Years: 30.00     Total pack years: 30.00     Types: Cigarettes     Quit date: 2005     Years since quittin.7   • Smokeless tobacco: Never   Vaping Use   • Vaping Use: Former   Substance Use Topics   • Alcohol use: Never   • Drug use: Never        Review of Systems   Constitutional: Negative. Negative for chills, diaphoresis, fatigue and fever. HENT: Negative. Negative for congestion and sore throat. Dry mouth and excessive thirst   Eyes: Negative. Negative for pain and visual disturbance. Respiratory: Negative.   Negative for cough and shortness of breath. Cardiovascular: Negative. Negative for chest pain. Gastrointestinal: Negative. Negative for abdominal pain, blood in stool, constipation, diarrhea, nausea and vomiting. Genitourinary: Negative. Negative for dysuria and hematuria. Musculoskeletal: Negative. Negative for arthralgias and back pain. Skin: Negative. Negative for color change and rash. Neurological: Positive for headaches. Negative for dizziness and light-headedness. Physical Exam  ED Triage Vitals [09/20/23 0320]   Temperature Pulse Respirations Blood Pressure SpO2   97.9 °F (36.6 °C) 69 20 (!) 182/75 98 %      Temp Source Heart Rate Source Patient Position - Orthostatic VS BP Location FiO2 (%)   Oral Monitor Sitting Right arm --      Pain Score       8             Orthostatic Vital Signs  Vitals:    09/20/23 0320 09/20/23 0430 09/20/23 0500   BP: (!) 182/75 152/67 154/70   Pulse: 69 61 57   Patient Position - Orthostatic VS: Sitting Sitting        Physical Exam  Vitals and nursing note reviewed. Constitutional:       Appearance: Normal appearance. She is normal weight. HENT:      Head: Normocephalic and atraumatic. Right Ear: External ear normal.      Left Ear: External ear normal.      Nose: Nose normal.      Mouth/Throat:      Mouth: Mucous membranes are dry. Pharynx: Oropharynx is clear. Eyes:      Conjunctiva/sclera: Conjunctivae normal.   Cardiovascular:      Rate and Rhythm: Normal rate and regular rhythm. Pulses: Normal pulses. Heart sounds: Normal heart sounds. Comments: RRR with +S1 and S2, no murmurs appreciated on exam. Peripheral pulses intact. Pulmonary:      Effort: Pulmonary effort is normal.      Breath sounds: Normal breath sounds. Comments: CTABL with no abnormal lung sounds such as wheezes or rales appreciated on exam.     Abdominal:      General: Abdomen is flat. Bowel sounds are normal.      Palpations: Abdomen is soft.       Comments: Soft, non tender, normo-active bowel sounds. Without rigidity, guarding, or distension. Musculoskeletal:         General: Normal range of motion. Cervical back: Normal range of motion. Skin:     General: Skin is warm and dry. Neurological:      General: No focal deficit present. Mental Status: She is alert and oriented to person, place, and time. Mental status is at baseline. Comments: CN II-XII intact. No focal neurologic deficits noted on exam.  5/5 strength in all extremities. Neurovascularly intact with normal sensation and motor function.            ED Medications  Medications   sodium chloride 0.9 % bolus 250 mL (0 mL Intravenous Stopped 9/20/23 0622)   acetaminophen (TYLENOL) tablet 650 mg (650 mg Oral Given 9/20/23 0423)       Diagnostic Studies  Results Reviewed     Procedure Component Value Units Date/Time    T4, free [[id number]]  (Normal) Collected: 09/20/23 0408    Lab Status: Final result Specimen: Blood from Arm, Right Updated: 09/20/23 1540     Free T4 0.76 ng/dL     Narrative:        "Therapeutic range for patients medicated with thyroid disorders: 0.61-1.24 ng/dL."    TSH, 3rd generation with Free T4 reflex [[id number]]  (Abnormal) Collected: 09/20/23 0408    Lab Status: Final result Specimen: Blood from Arm, Right Updated: 09/20/23 0932     TSH 3RD GENERATON 7.759 uIU/mL     Potassium [[id number]]  (Normal) Collected: 09/20/23 0452    Lab Status: Final result Specimen: Blood from Arm, Right Updated: 09/20/23 0526     Potassium 4.0 mmol/L     Magnesium [[id number]]  (Abnormal) Collected: 09/20/23 0452    Lab Status: Final result Specimen: Blood from Arm, Right Updated: 09/20/23 0526     Magnesium 1.8 mg/dL     Comprehensive metabolic panel [[id number]]  (Abnormal) Collected: 09/20/23 0408    Lab Status: Final result Specimen: Blood from Arm, Right Updated: 09/20/23 0434     Sodium 140 mmol/L      Potassium 4.9 mmol/L      Chloride 103 mmol/L      CO2 30 mmol/L      ANION GAP 7 mmol/L BUN 22 mg/dL      Creatinine 0.91 mg/dL      Glucose 116 mg/dL      Calcium 9.5 mg/dL      AST 32 U/L      ALT 16 U/L      Alkaline Phosphatase 115 U/L      Total Protein 7.3 g/dL      Albumin 4.2 g/dL      Total Bilirubin 0.38 mg/dL      eGFR 59 ml/min/1.73sq m     Narrative:      [facility] guidelines for Chronic Kidney Disease (CKD):   •  Stage 1 with normal or high GFR (GFR > 90 mL/min/1.73 square meters)  •  Stage 2 Mild CKD (GFR = 60-89 mL/min/1.73 square meters)  •  Stage 3A Moderate CKD (GFR = 45-59 mL/min/1.73 square meters)  •  Stage 3B Moderate CKD (GFR = 30-44 mL/min/1.73 square meters)  •  Stage 4 Severe CKD (GFR = 15-29 mL/min/1.73 square meters)  •  Stage 5 End Stage CKD (GFR <15 mL/min/1.73 square meters)  Note: GFR calculation is accurate only with a steady state creatinine    Magnesium [[id number]]  (Normal) Collected: 09/20/23 0408    Lab Status: Final result Specimen: Blood from Arm, Right Updated: 09/20/23 0434     Magnesium 2.1 mg/dL     CBC and differential [[id number]]  (Abnormal) Collected: 09/20/23 0408    Lab Status: Final result Specimen: Blood from Arm, Right Updated: 09/20/23 0415     WBC 10.60 Thousand/uL      RBC 5.38 Million/uL      Hemoglobin 15.5 g/dL      Hematocrit 49.1 %      MCV 91 fL      MCH 28.8 pg      MCHC 31.6 g/dL      RDW 13.7 %      MPV 10.6 fL      Platelets 730 Thousands/uL      nRBC 0 /100 WBCs      Neutrophils Relative 57 %      Immat GRANS % 1 %      Lymphocytes Relative 27 %      Monocytes Relative 11 %      Eosinophils Relative 3 %      Basophils Relative 1 %      Neutrophils Absolute 6.09 Thousands/µL      Immature Grans Absolute 0.06 Thousand/uL      Lymphocytes Absolute 2.86 Thousands/µL      Monocytes Absolute 1.18 Thousand/µL      Eosinophils Absolute 0.29 Thousand/µL      Basophils Absolute 0.12 Thousands/µL     Fingerstick Glucose (POCT) [[id number]]  (Normal) Collected: 09/20/23 0319    Lab Status: Final result Updated: 09/20/23 0319     POC Glucose 109 mg/dl                  CT head without contrast   Final Result by Eveline Dias MD (09/20 0550)      No acute intracranial abnormality. Mild periventricular microangiopathic changes. Workstation performed: [id number]               Procedures  Procedures      ED Course  ED Course as of 09/23/23 1623   Wed Sep 20, 2023   0437 Potassium and magnesium were noted to be hemolyzed and ordered for a re-draw. Medical Decision Making  28-year-old female with past medical history of hypertension, diabetes, COPD, presented to the ED complaining of headache, excessive thirst, and dry mouth which started on Monday morning. Patient's labs and imaging was obtained and reviewed by the provider. Labs were noted to show no acute concerns at this time and her CT head showed no acute intracranial abnormality. Patient was given 250 mL of 0.9% NS along with 650 mg of Tylenol for her headache. Through shared decision making between the patient and the provider, the patient was planned for discharge. Patient was advised to follow up outpatient with her PCP and was provided a prescription for Zofran to help with nausea at home. She was also instructed to return to the ED if her symptoms worsen including but not limited to fever, increasing nausea or vomiting, increasing thirst, worsening headaches, chest pain, shortness of breath, or changes in behavior. DDx including but not limited to: least likely Heat exhaustion, metabolic abnormality, suspected mild dehydration, thyroid disease; doubt heat stroke, rhabdomyolysis. Amount and/or Complexity of Data Reviewed  Labs: ordered. Radiology: ordered. Risk  OTC drugs. Prescription drug management.             Disposition  Final diagnoses:   Headache   Excessive thirst   Dry mouth   Nausea and vomiting     Time reflects when diagnosis was documented in both MDM as applicable and the Disposition within this note     Time User Action Codes Description Comment    9/20/2023  6:15 AM Oval Confer Add [R51.9] Headache     9/20/2023  6:15 AM Oval Confer Add [R63.1] Excessive thirst     9/20/2023  6:15 AM Oval Confer Add [R68.2] Dry mouth     9/20/2023  6:27 AM Oval Confer Add [R11.2] Nausea and vomiting       ED Disposition     ED Disposition   Discharge    Condition   Stable    Date/Time   Wed Sep 20, 2023  [facility] discharge to home/self care. Follow-up Information     Follow up With Specialties Details Why Contact Info Additional Information    Arely Quintero, DO Family Medicine In 3 days  [address] [facility] [address]  [address] [facility] Emergency Department Emergency Medicine Go to  As needed. Return to the ED if your symptoms worsen including but not limited to increasing headache, increasing nausea or vomiting, increasing thirst, fever, chest pain, shortness of breath, or changes in behavior.  [phone] [facility], [place], [place], [address]          Discharge Medication List as of 9/20/2023  6:28 AM      START taking these medications    Details   ondansetron (ZOFRAN) 4 mg tablet Take 1 tablet (4 mg total) by mouth every 8 (eight) hours as needed for nausea or vomiting, Starting Wed 9/20/2023, Normal         CONTINUE these medications which have NOT CHANGED    Details   Apremilast (Otezla) 30 MG TABS Take 1 tablet by mouth 2 (two) times a day, Starting Wed 6/14/2023, Normal      aspirin 81 mg chewable tablet Chew 1 tablet (81 mg total) daily, Starting Fri 10/1/2021, Normal      Cholecalciferol (VITAMIN D) 125 MCG (5000 UT) CAPS Take by mouth, Historical Med      Continuous Blood Gluc  (FreeStyle Dejuan 14 Day [place]) THERESA Check BG's 2-4x a day depending on sugar levels, Print      Continuous Blood Gluc Sensor (FreeStyle Dejuan 14 Day Sensor) MISC Check sugars 2-4x daily depending on sugar levels, Print      diltiazem (CARDIZEM CD) 360 MG 24 hr capsule TAKE 1 CAPSULE BY MOUTH  DAILY, Normal      insulin lispro (HumaLOG KwikPen) 100 units/mL injection pen INJECT SUBCUTANEOUSLY 5  UNITS BEFORE BREAKFAST 10  UNITS BEFORE LUNCH AND 15  UNITS BEFORE SUPPER, Normal      Invokana 300 MG TABS TAKE 1 TABLET BY MOUTH  DAILY BEFORE BREAKFAST, Starting Mon 9/11/2023, Normal      Lantus SoloStar 100 units/mL SOPN INJECT SUBCUTANEOUSLY 50  UNITS DAILY, Normal      losartan-hydrochlorothiazide (HYZAAR) 100-25 MG per tablet Take 1 tablet by mouth daily, Starting Tue 8/22/2023, No Print      OneTouch Ultra test strip TEST 2-3 TIMES DAILY, Normal      potassium chloride (K-DUR,KLOR-CON) 20 mEq tablet TAKE 1 TABLET BY MOUTH TWICE  DAILY, Starting Mon 9/11/2023, Normal      tirzepatide 5 MG/0.5ML Inject 0.5 mL (5 mg total) under the skin every 7 days, Starting Tue 8/22/2023, Normal           No discharge procedures on file. PDMP Review       Value Time User    PDMP Reviewed  Yes 9/20/2023  3:04 AM Son Busby MD           ED Provider  Attending physically available and evaluated Frank Howell. I managed the patient along with the ED Attending.     Electronically Signed by         Ioana Gallego MD  09/23/23 8222

## 2023-09-20 NOTE — ED ATTENDING ATTESTATION
9/20/2023  I, Maryann Frazier MD, saw and evaluated the patient. I have discussed the patient with the resident/non-physician practitioner and agree with the resident's/non-physician practitioner's findings, Plan of Care, and MDM as documented in the resident's/non-physician practitioner's note, except where noted. All available labs and Radiology studies were reviewed. I was present for key portions of any procedure(s) performed by the resident/non-physician practitioner and I was immediately available to provide assistance. At this point I agree with the current assessment done in the Emergency Department. I have conducted an independent evaluation of this patient a history and physical is as follows: Patient is a 78year old female with headache and increased thirst and dizziness since yesterday. No trauma. No fever. Had N/V but not now. No diarrhea. No chest pain or sob. No urinary sx. States her sx are similar to her low potassium. Was last seen at [address] on 7/5/23 for wellness visit. [facility] website checked on this patient and no Rx found. NCAT. PERRL. Mucous membranes somewhat moist. Lungs clear. Heart regular without murmur. Abdomen soft and nontender. Good bowel sounds. No edema. No rash noted. No focal deficits. DDx including but not limited to: tension headache, cluster headache, migraine, thyroid disease, metabolic abnormality; doubt cardiac etiology, ICH, SAH, tumor, meningitis, temporal arteritis, carbon monoxide poisoning, zoster, sinusitis. Will check labs and CT head. I reviewed EKG. Will give IVFs.      ED Course         Critical Care Time  Procedures

## 2023-09-21 ENCOUNTER — VBI (OUTPATIENT)
Dept: FAMILY MEDICINE CLINIC | Facility: CLINIC | Age: 78
End: 2023-09-21

## 2023-09-21 NOTE — TELEPHONE ENCOUNTER
09/21/23 3:06 PM    Patient contacted post ED visit, [facility] department spoke with patient/caregiver and outreach was successful. Thank you.   Florence Youssef  [facility]

## 2023-09-29 ENCOUNTER — TELEPHONE (OUTPATIENT)
Dept: GENETICS | Facility: CLINIC | Age: 78
End: 2023-09-29

## 2023-10-03 DIAGNOSIS — E11.65 UNCONTROLLED TYPE 2 DIABETES MELLITUS WITH HYPERGLYCEMIA (HCC): ICD-10-CM

## 2023-10-03 RX ORDER — TIRZEPATIDE 5 MG/.5ML
INJECTION, SOLUTION SUBCUTANEOUS
Qty: 4 ML | Refills: 5 | Status: SHIPPED | OUTPATIENT
Start: 2023-10-03

## 2023-10-04 ENCOUNTER — OFFICE VISIT (OUTPATIENT)
Dept: FAMILY MEDICINE CLINIC | Facility: CLINIC | Age: 78
End: 2023-10-04
Payer: COMMERCIAL

## 2023-10-04 VITALS
WEIGHT: 211.2 LBS | OXYGEN SATURATION: 97 % | TEMPERATURE: 97.7 F | SYSTOLIC BLOOD PRESSURE: 134 MMHG | DIASTOLIC BLOOD PRESSURE: 54 MMHG | HEART RATE: 68 BPM | BODY MASS INDEX: 39.88 KG/M2 | HEIGHT: 61 IN

## 2023-10-04 DIAGNOSIS — Z23 INFLUENZA VACCINATION ADMINISTERED AT CURRENT VISIT: ICD-10-CM

## 2023-10-04 DIAGNOSIS — Z23 FLU VACCINE NEED: ICD-10-CM

## 2023-10-04 DIAGNOSIS — Z79.899 MEDICATION MANAGEMENT: ICD-10-CM

## 2023-10-04 DIAGNOSIS — Z79.899 ENCOUNTER FOR LONG-TERM (CURRENT) USE OF MEDICATIONS: ICD-10-CM

## 2023-10-04 DIAGNOSIS — R63.8 INCREASED BMI: ICD-10-CM

## 2023-10-04 DIAGNOSIS — E11.65 UNCONTROLLED TYPE 2 DIABETES MELLITUS WITH HYPERGLYCEMIA (HCC): Primary | ICD-10-CM

## 2023-10-04 DIAGNOSIS — Z71.2 ENCOUNTER TO DISCUSS TEST RESULTS: ICD-10-CM

## 2023-10-04 DIAGNOSIS — I1A.0 RESISTANT HYPERTENSION: ICD-10-CM

## 2023-10-04 DIAGNOSIS — E66.01 OBESITY, MORBID (HCC): ICD-10-CM

## 2023-10-04 DIAGNOSIS — E87.6 HYPOKALEMIA: ICD-10-CM

## 2023-10-04 LAB — SL AMB POCT HEMOGLOBIN AIC: 6.5 (ref ?–6.5)

## 2023-10-04 PROCEDURE — 90662 IIV NO PRSV INCREASED AG IM: CPT | Performed by: FAMILY MEDICINE

## 2023-10-04 PROCEDURE — G0008 ADMIN INFLUENZA VIRUS VAC: HCPCS | Performed by: FAMILY MEDICINE

## 2023-10-04 PROCEDURE — 99214 OFFICE O/P EST MOD 30 MIN: CPT | Performed by: FAMILY MEDICINE

## 2023-10-04 PROCEDURE — 83036 HEMOGLOBIN GLYCOSYLATED A1C: CPT | Performed by: FAMILY MEDICINE

## 2023-10-04 NOTE — PROGRESS NOTES
Assessment/Plan:       1. Uncontrolled type 2 diabetes mellitus with hyperglycemia ([address])  Assessment & Plan: Today A1c 6.5 percent down from 7.8 percent in 05/2023 and 8.5 percent in 01/2023--9 months ago. I explained about glycogenesis. We reviewed her continuous glucose monitor series 2 reader and most blood glucose are in the one 50 to 190 mg/dL range, occasionally over 200 mg/dL, and A1c has improved. She was encouraged to drink copious amounts of fluid. She follows in 2 months. Orders:  -     POCT hemoglobin A1c  -     Albumin / creatinine urine ratio; Future; Expected date: 01/04/2024  -     Comprehensive metabolic panel; Future; Expected date: 01/04/2024  -     Hemoglobin A1C; Future; Expected date: 01/04/2024    2. Influenza vaccination administered at current visit  -     influenza vaccine, high-dose, PF 0.7 mL (FLUZONE HIGH-DOSE)    3. Encounter to discuss test results    4. Encounter for long-term (current) use of medications    5. Increased BMI    6. Medication management  Assessment & Plan:  All medications reviewed. Diabetic medications include Invokana 300mg daily, Lantus, Solostar 50 units and Mounjaro 5 mg weekly. The patient is incredibly happy. She can take MAG64 one tablet twice a day or she could take it once a day. 7. Flu vaccine need    8. Obesity, morbid ([address])  Assessment & Plan:  Her weight is down 4 pounds since last visit, she is now 211 pounds. Her BMI of 39.91.      9. Resistant hypertension  Assessment & Plan:  Her blood pressure is 134/54 mmHg  She is difficult to control. Good control on diltiazem 360 mg once daily and losartan/HCTZ 100mg/25mg. 10. Hypokalemia  Assessment & Plan:  Current potassium 4.0 mEq/L, lowest 2.8 mEq/L 2 years ago. I think we have solved that problem and basically her potassium run in the low four range, taking potassium chloride 20 mEq once a day.  I asked her to be sure to take it once daily and she can crush the medication or put it on an apple sauce or cottage cheese. Subjective:      Patient ID: Liz Fagan is a 78 y.o. female well-known to me for many years presents for regular visit and 5-week follow up. She reports that her blood sugar increases although she has not eaten yet. Her metformin was discontinued several years ago as she needed stronger industry strength. She has been using [place] 2 which is covered by her insurance. Her peak weight is 220 pounds. She weighed 210 pounds last year. She exercises regularly and it helped to improve her diabetes. She is not taking magnesium. She mentions she went to the hospital on Monday due to dehydration and vomiting. Her A1c on 05/23/2023 was 7.8 percent in 01/2023 was 8.5 percent, and before that was 8.1 percent, and 8.2 percent Her blood glucose on 10/03/2023 was 133 mg/dL. The last labs were obtained 09/20/2023. Magnesium is 1.8 mEq/L which is close to normal, white blood cell count is 10,600/uL, hemoglobin is 15.5 g/dl, and GFR is 59 ml/min. Her daughter, Rosalba Mcleod puts the [place] 2 on her. Current medications:  Lantus 50 units daily. Lispro insulin in the afternoon. Olesya Mimes. Low-dose aspirin. Vitamin D. Diltiazem 360 mg  Losartan  Hydrochlorothiazide  Potassium    She received her influenza vaccine. The following portions of the patient's history were reviewed and updated as appropriate: allergies, current medications, past family history, past medical history, past social history, past surgical history and problem list.    Review of Systems        Objective:  /54 (BP Location: Left arm, Patient Position: Sitting, Cuff Size: Standard)   Pulse 68   Temp 97.7 °F (36.5 °C) (Temporal)   Ht 5' 1" (1.549 m)   Wt 95.8 kg (211 lb 3.2 oz)   SpO2 97%   BMI 39.91 kg/m²          Physical Exam  Physical Exam  Vitals and nursing note reviewed. Constitutional:       General: He is not in acute distress. Appearance: Normal appearance.  He is well-developed. HENT:      Head: Normocephalic and atraumatic. Eyes:      General:         Right eye: No discharge. Left eye: No discharge. Neck:      Thyroid: No thyromegaly. Cardiovascular:      Rate and Rhythm: Normal rate and regular rhythm. Pulses: Normal pulses. Heart sounds: Normal heart sounds. No murmur heard. Pulmonary:      Effort: Pulmonary effort is normal.      Breath sounds: Normal breath sounds. No wheezing or rhonchi. Musculoskeletal:      Cervical back: Neck supple. Right lower leg: No edema. Left lower leg: No edema. Lymphadenopathy:      Cervical: No cervical adenopathy. Skin:     General: Skin is warm. Capillary Refill: Capillary refill takes less than 2 seconds. Neurological:      General: No focal deficit present. Mental Status: He is alert and oriented to person, place, and time. Psychiatric:         Mood and Affect: Mood normal.         Behavior: Behavior normal.         Thought Content: Thought content normal.          Transcribed for Maurisio Ortiz DO, by Claude Alvarez/Dhruv MOSQUEDA on 10/08/23 at 9:14 PM. Powered by [employer].

## 2023-10-05 LAB — SL AMB POCT HEMOGLOBIN AIC: 6.5 (ref ?–6.5)

## 2023-10-10 ENCOUNTER — CLINICAL SUPPORT (OUTPATIENT)
Dept: GENETICS | Facility: CLINIC | Age: 78
End: 2023-10-10

## 2023-10-10 ENCOUNTER — DOCUMENTATION (OUTPATIENT)
Dept: GENETICS | Facility: CLINIC | Age: 78
End: 2023-10-10

## 2023-10-10 DIAGNOSIS — Z80.49 FAMILY HISTORY OF UTERINE CANCER: ICD-10-CM

## 2023-10-10 NOTE — PROGRESS NOTES
Pre-Test Genetic Counseling Consult Note    Patient Name: Francesca Kellogg   /Age: /19 y.o. Referring Provider: Tarah Cardoza MD    Date of Service: 10/10/2023  Genetic Counselor: Clarisse Mackenzie MS, [facility]  Interpretation Services: None  Location: Telephone consult   Length of Visit: 27 Minutes    Rose Doyle was referred to the [address] [place], [place] [address] and Genetic Assessment Program due to her family history of uterine cancer. she presents today to discuss the possibility of a hereditary cancer syndrome, options for genetic testing, and implications for her and her family. Cancer History and Treatment:   Personal History: no personal history of cancer    Screening Hx:   Breast:  Breast Imaging (): no evidence of malignancy per patient; records unavailable   Breast Biopsy: none     Colon:  Colonoscopies: <10 cumulative lifetime polyps per patient report; records unavailable     Cologuard: negative per patient report, records unavailable     Gynecologic:  Ovaries and Uterus intact     Skin:  No current screening; F/u w/ dermatologist annually for psoriasis     Other screening: none     Reproductive History  Age at menarche: 3  Age at first live birth: 19  Menopause: Post Menopausal (37)  Hormone replacement: none     Medical and Surgical History  Pertinent surgical history:   Past Surgical History:   Procedure Laterality Date   • CHOLECYSTECTOMY        Pertinent medical history:  Past Medical History:   Diagnosis Date   • Asthma    • Benign essential hypertension    • Chronic lower back pain    • COPD (chronic obstructive pulmonary disease) ([facility])    • Diabetes mellitus ([facility])    • Hyperlipidemia    • Sciatica          Other History:  Height:   Ht Readings from Last 1 Encounters:   10/04/23 5' 1" (1.549 m)     Weight:   Wt Readings from Last 1 Encounters:   10/04/23 95.8 kg (211 lb 3.2 oz)       Relevant Family History   Patient reports possible [employer]azi [employer] ancestry on her maternal side.      Maternal Family History: Mother: uterine cancer diagnosed in 45s (d.44)   Limited information regarding family history and structure     Paternal Family History:  Father: leukemia (d.55)     Please refer to the scanned pedigree in the Media Tab for a complete family history     *All history is reported as provided by the patient; records are not available for review, except where indicated. Assessment:  We discussed sporadic, familial and hereditary cancer. We reviewed that only 5-10% of cancers are considered hereditary. Cancers such as lung cancer, cervical cancer, testicular cancer, and liver cancer very rarely have a hereditary etiology, and we cannot test for a genetic predisposition for these cancers at this time. We also discussed the many factors that influence our risk for cancer such as age, environmental exposures, lifestyle choices and family history. We reviewed the indications suggestive of a hereditary predisposition to cancer. Genetic testing is indicated for [facility] based on the following criteria: Meets NCCN V1.2023 Testing Criteria for the Evaluation of Iraheta syndrome: first-degree relative (mother) diagnosed with uterine cancer <49 y/o. [facility] is unaffected, but is considering genetic testing due to a family history of uterine cancer. Although Mar's mother is the most informative person to undergo genetic testing, [facility] can consider genetic testing due to the following:   Patient does not have cancer but is the most informative person to test because their mother is . The risks, benefits, and limitations of genetic testing were reviewed with the patient, as well as genetic discrimination laws, and possible test results (positive, negative, variants of uncertain significance) and their clinical implications.  If positive for a mutation, options for managing cancer risk including increased surveillance, chemoprevention, and in some cases prophylactic surgery were discussed. Gabriela Santoro was informed that if a hereditary cancer syndrome was identified in her, first degree relatives (parents, siblings, and children) have a chance of also inheriting the condition. Genetic testing would allow for predictive genetic testing in other relatives, who may also be at risk depending on their degree of relation. Billing:  Most individuals pay <$100 for hereditary cancer genetic testing. If insurance covers the cost of the testing, individuals may still pay out of pocket secondary to co-pays, co-insurance, or deductibles. If the cost of the testing exceeds $100, the lab will reach out to the patient via phone or e-mail. The patient will then have the option to proceed with the testing, cancel the testing, or elect the self-pay option of $250. Gabriela Santoro verbalized understanding. She is not interested in the testing if it costs >$100. Plan: Patient decided to proceed with testing and provided consent. Summary:     Sample Collection:  A blood kit was mailed home to the patient    Genetic Testing Preformed: [employer] Cancer Panel + RNA (47 genes): APC, JACOBO, AXIN2, BARD1, BMPR1A, BRCA1, BRCA2, BRIP1, CDH1, CDK4, CDKN2A, CHEK2, CTNNA1, DICER1, EPCAM, GREM1, HOXB13, KIT, MEN1, MLH1, MSH2, MSH3, MSH6, MUTYH, NBN, NF1, NTHL1, PALB2, PDGFRA, PMS2, POLD1, POLE, PTEN, RAD50, RAD51C, RAD51D, SDHA, SDHB, SDHC, SDHD, SMAD4, SMARCA4, STK11, TP53, TSC1, TSC2, VHL    Result Call Information:  In the event that we need to reach Gabriela Santoro via telephone:  I confirmed the patient's mobile number on file as the best number to call with results  I confirmed with the patient that we can leave a voicemail on her mobile number    Results take approximately 2-3 weeks to complete once test is started. Gabriela Santoro will be notified via "[employer] once results are available. Additional recommendations for surveillance/medical management will be made pending genetic test results.

## 2023-10-10 NOTE — LETTER
October 10, 2023     Diya Boyce, [address] [facility]  [place] [address] [facility] [address]    Patient: Angela Whitehead  YOB: 1945  Date of Visit: 10/10/2023      Dear Dr. Jennifer Roberto:    Thank you for referring Autumn Yanez to me for evaluation. Below are my notes for this consultation. If you have questions, please do not hesitate to call me. I look forward to following your patient along with you. Sincerely,        Peng Hernandez        CC: No Recipients        Pre-Test Genetic Counseling Consult Note    Patient Name: Angela Whitehead   /Age: /48 y.o. Referring Provider: Diya Boyce MD    Date of Service: 10/10/2023  Genetic Counselor: Peng Hernandez MS, [facility]  Interpretation Services: None  Location: Telephone consult   Length of Visit: 27 Minutes    Chuy Alvarez was referred to the [address] [place] [address] and Genetic Assessment Program due to her family history of uterine cancer. she presents today to discuss the possibility of a hereditary cancer syndrome, options for genetic testing, and implications for her and her family.      Cancer History and Treatment:   Personal History: no personal history of cancer    Screening Hx:   Breast:  Breast Imaging (): no evidence of malignancy per patient; records unavailable   Breast Biopsy: none     Colon:  Colonoscopies: <10 cumulative lifetime polyps per patient report; records unavailable     Cologuard: negative per patient report, records unavailable     Gynecologic:  Ovaries and Uterus intact     Skin:  No current screening; F/u w/ dermatologist annually for psoriasis     Other screening: none     Reproductive History  Age at menarche: 3  Age at first live birth: 19  Menopause: Post Menopausal (37)  Hormone replacement: none     Medical and Surgical History  Pertinent surgical history:   Past Surgical History:   Procedure Laterality Date   • CHOLECYSTECTOMY        Pertinent medical history:  Past Medical History:   Diagnosis Date   • Asthma • Benign essential hypertension    • Chronic lower back pain    • COPD (chronic obstructive pulmonary disease) (HCC)    • Diabetes mellitus (HCC)    • Hyperlipidemia    • Sciatica          Other History:  Height:   Ht Readings from Last 1 Encounters:   10/04/23 5' 1" (1.549 m)     Weight:   Wt Readings from Last 1 Encounters:   10/04/23 95.8 kg (211 lb 3.2 oz)       Relevant Family History   Patient reports possible [employer]azi [employer] ancestry on her maternal side. Maternal Family History: Mother: uterine cancer diagnosed in 45s (d.44)   Limited information regarding family history and structure     Paternal Family History:  Father: leukemia (d.55)     Please refer to the scanned pedigree in the Media Tab for a complete family history     *All history is reported as provided by the patient; records are not available for review, except where indicated. Assessment:  We discussed sporadic, familial and hereditary cancer. We reviewed that only 5-10% of cancers are considered hereditary. Cancers such as lung cancer, cervical cancer, testicular cancer, and liver cancer very rarely have a hereditary etiology, and we cannot test for a genetic predisposition for these cancers at this time. We also discussed the many factors that influence our risk for cancer such as age, environmental exposures, lifestyle choices and family history. We reviewed the indications suggestive of a hereditary predisposition to cancer. Genetic testing is indicated for [facility] based on the following criteria: Meets NCCN V1.2023 Testing Criteria for the Evaluation of Iraheta syndrome: first-degree relative (mother) diagnosed with uterine cancer <49 y/o. [facility] is unaffected, but is considering genetic testing due to a family history of uterine cancer.  Although Mar's mother is the most informative person to undergo genetic testing, [facility] can consider genetic testing due to the following:   Patient does not have cancer but is the most informative person to test because their mother is . The risks, benefits, and limitations of genetic testing were reviewed with the patient, as well as genetic discrimination laws, and possible test results (positive, negative, variants of uncertain significance) and their clinical implications. If positive for a mutation, options for managing cancer risk including increased surveillance, chemoprevention, and in some cases prophylactic surgery were discussed. Fermín Kincaid was informed that if a hereditary cancer syndrome was identified in her, first degree relatives (parents, siblings, and children) have a chance of also inheriting the condition. Genetic testing would allow for predictive genetic testing in other relatives, who may also be at risk depending on their degree of relation. Billing:  Most individuals pay <$100 for hereditary cancer genetic testing. If insurance covers the cost of the testing, individuals may still pay out of pocket secondary to co-pays, co-insurance, or deductibles. If the cost of the testing exceeds $100, the lab will reach out to the patient via phone or e-mail. The patient will then have the option to proceed with the testing, cancel the testing, or elect the self-pay option of $250. Fermín Kincaid verbalized understanding. She is not interested in the testing if it costs >$100. Plan: Patient decided to proceed with testing and provided consent.     Summary:     Sample Collection:  A blood kit was mailed home to the patient    Genetic Testing Preformed: [employer] Cancer Panel + RNA (47 genes): APC, JACOBO, AXIN2, BARD1, BMPR1A, BRCA1, BRCA2, BRIP1, CDH1, CDK4, CDKN2A, CHEK2, CTNNA1, DICER1, EPCAM, GREM1, HOXB13, KIT, MEN1, MLH1, MSH2, MSH3, MSH6, MUTYH, NBN, NF1, NTHL1, PALB2, PDGFRA, PMS2, POLD1, POLE, PTEN, RAD50, RAD51C, RAD51D, SDHA, SDHB, SDHC, SDHD, SMAD4, SMARCA4, STK11, TP53, TSC1, TSC2, VHL    Result Call Information:  In the event that we need to reach Troy aisha via telephone:  I confirmed the patient's mobile number on file as the best number to call with results  I confirmed with the patient that we can leave a voicemail on her mobile number    Results take approximately 2-3 weeks to complete once test is started. Bob leonard will be notified via [employer] once results are available. Additional recommendations for surveillance/medical management will be made pending genetic test results.

## 2023-10-11 ENCOUNTER — APPOINTMENT (OUTPATIENT)
Dept: LAB | Facility: AMBULARY SURGERY CENTER | Age: 78
End: 2023-10-11
Payer: COMMERCIAL

## 2023-10-23 ENCOUNTER — TELEPHONE (OUTPATIENT)
Dept: GENETICS | Facility: CLINIC | Age: 78
End: 2023-10-23

## 2023-10-23 NOTE — TELEPHONE ENCOUNTER
Post-Test Genetic Counseling Consult Note  Today I spoke with Chris Ocampo over the phone to review the results of her genetic test for hereditary cancer. We met previously on 10/10/23 for pre-test counseling. A copy of this consult note and genetic test result will be shared with the patient. SUMMARY:    Test(s): [employer] Cancer Panel + RNA (47 genes): APC, JACOBO, AXIN2, BARD1, BMPR1A, BRCA1, BRCA2, BRIP1, CDH1, CDK4, CDKN2A, CHEK2, CTNNA1, DICER1, EPCAM, GREM1, HOXB13, KIT, MEN1, MLH1, MSH2, MSH3, MSH6, MUTYH, NBN, NF1, NTHL1, PALB2, PDGFRA, PMS2, POLD1, POLE, PTEN, RAD50, RAD51C, RAD51D, SDHA, SDHB, SDHC, SDHD, SMAD4, SMARCA4, STK11, TP53, TSC1, TSC2, VHL     Result: Negative - No Clinically Significant Variants Detected      Assessment:   A negative result significantly reduces the likelihood that Chris Ocampo has a hereditary cancer syndrome. However, this testing is unable to completely rule out the presence of hereditary cancer. It remains possible that:  There is a variant in an area of a gene which was not tested or there is a variant not detectable due to technical limitations of this test.     There is a variant in another gene that was not included in this test or in a gene not known to be linked to cancer or tumors. A family member has a genetic variant that the patient did not inherit. The cancer in the family is sporadic and is related to non-hereditary factors. Risk based on Family History:  Mar's risk of endometrial cancer is increased, based on the reported family history. As compared to the general population risk of 3%. Empiric data suggest that a patient's risk to develop endometrial cancer is approximately 5.6% given one first-degree relative diagnosed with endometrial cancer (PMID: 73093263). Risks and Testing for Family Members:  Despite a negative result, Mar's first-degree relatives may be at increased risk for the cancers based on the family history.  We recommend they discuss screening and management recommendations with their healthcare providers. At this time we do not recommend testing for [facility] 's children based on her negative test result. WOMEN'S AND [facility] 's children still need to consider the history of cancer on the other side of their family when determining their risks. If [facility] has any affected family members with a cancer diagnosis, especially at a young age, they may still consider genetic testing. Relatives who wish to pursue genetic testing can reach out to the [address] [place] [address] ([phone]) to schedule an appointment or visit [email or web address] to identify a local genetic counselor. Additional Information:  A healthy lifestyle will improve overall health and reduce risk for illness. Eating a healthy diet and exercising for 4 hours per week is recommended. Both diet and exercise have been shown to help maintain a healthy weight. Postmenopausal women who are overweight are at higher risk for breast cancer. Moderate to heavy alcohol use can increase the risk for some cancers. Smoking cigarettes can also increase risk for breast, lung, prostate, pancreatic and other cancers. Plan:   There are no additional recommendations based on Mar's negative result. she should continue cancer screening and medical management as clinically indicated and as determined appropriate by her healthcare providers. Negative Result: [facility] was strongly encouraged to contact us regarding any changes in her personal or family history of cancer as these changes could alter our recommendation regarding genetic testing and/or cancer screening.

## 2023-11-27 ENCOUNTER — RA CDI HCC (OUTPATIENT)
Dept: OTHER | Facility: HOSPITAL | Age: 78
End: 2023-11-27

## 2023-11-27 NOTE — PROGRESS NOTES
E11.69  [facility] coding opportunities          Chart Reviewed number of suggestions sent to Provider: 1     Patients Insurance     Medicare Insurance: [facility]

## 2023-12-05 ENCOUNTER — OFFICE VISIT (OUTPATIENT)
Dept: FAMILY MEDICINE CLINIC | Facility: CLINIC | Age: 78
End: 2023-12-05
Payer: COMMERCIAL

## 2023-12-05 VITALS
HEIGHT: 61 IN | DIASTOLIC BLOOD PRESSURE: 58 MMHG | WEIGHT: 214.6 LBS | OXYGEN SATURATION: 94 % | BODY MASS INDEX: 40.52 KG/M2 | TEMPERATURE: 97.7 F | HEART RATE: 64 BPM | SYSTOLIC BLOOD PRESSURE: 118 MMHG

## 2023-12-05 DIAGNOSIS — Z79.899 ENCOUNTER FOR LONG-TERM (CURRENT) USE OF MEDICATIONS: ICD-10-CM

## 2023-12-05 DIAGNOSIS — Z71.2 ENCOUNTER TO DISCUSS TEST RESULTS: ICD-10-CM

## 2023-12-05 DIAGNOSIS — E11.9 CONTROLLED TYPE 2 DIABETES MELLITUS WITHOUT COMPLICATION, WITH LONG-TERM CURRENT USE OF INSULIN (HCC): Primary | ICD-10-CM

## 2023-12-05 DIAGNOSIS — E66.01 OBESITY, MORBID (HCC): ICD-10-CM

## 2023-12-05 DIAGNOSIS — E11.65 UNCONTROLLED TYPE 2 DIABETES MELLITUS WITH HYPERGLYCEMIA (HCC): ICD-10-CM

## 2023-12-05 DIAGNOSIS — R63.8 INCREASED BMI: ICD-10-CM

## 2023-12-05 DIAGNOSIS — Z79.899 MEDICATION MANAGEMENT: ICD-10-CM

## 2023-12-05 DIAGNOSIS — Z79.4 CONTROLLED TYPE 2 DIABETES MELLITUS WITHOUT COMPLICATION, WITH LONG-TERM CURRENT USE OF INSULIN (HCC): Primary | ICD-10-CM

## 2023-12-05 PROCEDURE — 99214 OFFICE O/P EST MOD 30 MIN: CPT | Performed by: FAMILY MEDICINE

## 2023-12-05 NOTE — ASSESSMENT & PLAN NOTE
Her current weight is 214 pounds. She weighed 211 pounds on 10/04/2023. She will continue to exercise, monitor her diet and continue Mounjaro 5 mg. Discussed Mounjaro dosages. She will increase the [facility] dosage to 7.5 mg per day for 2 months and see how she does.

## 2023-12-05 NOTE — PATIENT INSTRUCTIONS
Breast Cancer Screening    Breast cancer is the most common cancer in females in the [facility] and the second most common cause of cancer death in women    The risk of breast cancer from birth to death is 12.9 percent (1 in 6 women)    Approximately 43,000 women will die in the [place] annually from breast cancer    Mammography    A mammogram is an x-ray of your breasts to screen for breast cancer    It uses the least amount of radiation necessary to provide the highest quality image able to detect early signs of breast cancer. For most women, we recommend getting your first mammogram at the age of 36 and repeating it yearly         Signs of Breast Cancer    Lumps  Thickening or swelling of parts of the breast  Irritation or dimpling of breast skin  Red or flaky skin in the nipple area  Pain in the nipple area  Pulling in of the nipple  Change in size or shape of the breast  Pain in any area of the breast     Did you know by getting a mammogram, doctors can find breast cancer 3 YEARS before a lump is even felt! Early detection is BEST! Schedule your mammogram TODAY! To schedule this appointment with [facility], please contact Central Scheduling at [phone] Cancer Screening    There are approximately 150,000 new cases of colorectal cancer diagnosed anually in the [place]    Approximately 53,000 Americans die of colon cancer every year    Colorectal cancer causes 8% of all cancer related deaths in the [facility]. That is almost 1 out of every 10 deaths from all cancers.     The incidence of colorectal cancer in people under the age of 48 has steadily increased at a rate of 2 percent per year from 18 through 2018    The overall death rate from colorectal cancer have declined overall since the mid-1980s in the [facility] and this is partially attributed to increased screening    Screening Procedures    There are multiple ways to screen for colorectal cancer which include:  Colonoscopy  CT Colonography (virtual colonoscopy)  Sigmoidoscopy  Fecal Immunochemical Test (FIT)  DNA Stool Test (cologuard)  High Sensitivity Fecal Occult Blood Test (FOBT)    Colonoscopy remains the gold standard in regards to screening for colorectal cancer. Generally during a colonoscopy, sedation is used to make you comfortable. Then a scope is used to look in the colon to evaluate for evidence of colon polyps or evidence of cancer. Risk Factors    Outside of a genetic predisposition for colorectal cancer, family history is the second most important risk factor. Having a single affected first-degree relative (parent, sibling, or child) with colorectal cancer increases the risk approximately two-fold over that of the general population  Overweight/Obesity  Diabetes mellitus and insulin resistance   Tobacco  Alcohol  Lack of regular physical activity  Diet low in fruits/vegetables, high in processed foods, low in fiber, and/or high in fat    Signs/Symptoms    Change in bowel habits  Diarrhea or constipation persisting longer than 4 weeks  Rectal bleeding  Prolonged abdominal discomfort  Weakness or fatigue  Weight loss    Early detection is BEST! Schedule your colonoscopy TODAY if you are 45 years or older!

## 2023-12-05 NOTE — ASSESSMENT & PLAN NOTE
The blood glucose levels of the patient have been much better controlled since she started using the db4objects 2. She has improved. I reviewed the db4objects 2 reader with the patient, and her blood glucose were all less than 200 mg/dL. After eating, her blood glucose increases but quickly returns to normal, which is quite good. It is suggested only taking Lantus 40 to 50 units a day and practically no Humalog. Previously, she was on 5, 10, and 15 units of Humalog at breakfast, lunch, and supper, respectively. Nocturnal hypoglycemia is an issue for her and occurs fairly frequently, so we are discontinuing the Humalog, especially at night. She will continue with Mounjaro 5 mg per day.

## 2023-12-05 NOTE — PROGRESS NOTES
Assessment/Plan:          1. Controlled type 2 diabetes mellitus without complication, with long-term current use of insulin ([address])  Assessment & Plan: The blood glucose levels of the patient have been much better controlled since she started using the [address] 2. She has improved. I reviewed the [address] reader with the patient, and her blood glucose were all less than 200 mg/dL. After eating, her blood glucose increases but quickly returns to normal, which is quite good. It is suggested only taking Lantus 40 to 50 units a day and practically no Humalog. Previously, she was on 5, 10, and 15 units of Humalog at breakfast, lunch, and supper, respectively. Nocturnal hypoglycemia is an issue for her and occurs fairly frequently, so we are discontinuing the Humalog, especially at night. She will continue with Mounjaro 5 mg per day. 2. Encounter for long-term (current) use of medications    3. Medication management    4. Encounter to discuss test results    5. Uncontrolled type 2 diabetes mellitus with hyperglycemia (HCC)    6. Obesity, morbid ([address])  Assessment & Plan:  Her current weight is 214 pounds. She weighed 211 pounds on 10/04/2023. She will continue to exercise, monitor her diet and continue Mounjaro 5 mg. Discussed Mounjaro dosages. She will increase the [facility] dosage to 7.5 mg per day for 2 months and see how she does. 7. Increased BMI        The patient will follow up in 2 months. BMI Counseling: Body mass index is 40.55 kg/m². The BMI is above normal. Nutrition recommendations include decreasing portion sizes, encouraging healthy choices of fruits and vegetables, decreasing fast food intake, consuming healthier snacks, limiting drinks that contain sugar, moderation in carbohydrate intake, increasing intake of lean protein and reducing intake of saturated and trans fat.  Exercise recommendations include moderate physical activity 150 minutes/week and exercising 3-5 times per week. No pharmacotherapy was ordered. Rationale for BMI follow-up plan is due to patient being overweight or obese. Subjective:       Patient ID: Liz Fagan is a 78 y.o. female who presents for followup. The patient has experienced several instances of hypoglycemia for 3 months. She uses the [employer] for her glucose monitoring. Her glucose levels no longer exceed 200 mg/dL if she is taking her medicine. She administers 40 to 50 units of Lantus insulin each morning and occasionally takes 4 units of Humalog in the morning when she wakes up. Nighttime administration of Humalog results in thirst. She abstains from medication if her glucose level is below 150 mg/dL. She frequently wakes up at night due to hypoglycemic symptoms such as tremors, and her glucose levels are typically in the 50s this week. She continues to take Mounjaro 5 mg. She consumes crackers or candies in the middle of the night. She is eating less. She dislikes cereal unless it is oatmeal. Her diet consists of 1 egg and 1 piece of toast for breakfast, half a chicken sandwich for lunch, and a [place] sprout for dinner. She enjoys eating liver and will have it for dinner later. She likes to go to the gym. She has not had a drink in 25 years. She used to drink 1 or 2 alcoholic beverages. Her glucose level increases to 190 mg/dL postprandially. She once had a nocturnal glucose level of 41 mg/dL, necessitating food intake.     The following portions of the patient's history were reviewed and updated as appropriate: allergies, current medications, past family history, past medical history, past social history, past surgical history, and problem list.            Objective:       /58 (BP Location: Left arm, Patient Position: Sitting, Cuff Size: Standard)   Pulse 64   Temp 97.7 °F (36.5 °C) (Temporal)   Ht 5' 1" (1.549 m)   Wt 97.3 kg (214 lb 9.6 oz)   SpO2 94%   BMI 40.55 kg/m²          Physical Exam  Vitals and nursing note reviewed. Weight: 214 pounds. Constitutional:       General: She is not in acute distress. Appearance: Normal appearance. She is well-developed. HENT:      Head: Normocephalic and atraumatic. Eyes:      General:         Right eye: No discharge. Left eye: No discharge. Neck:      Thyroid: No thyromegaly. Cardiovascular:      Rate and Rhythm: Normal rate and regular rhythm. Pulses: Normal pulses. Heart sounds: Normal heart sounds. No murmur heard. Pulmonary:      Effort: Pulmonary effort is normal.      Breath sounds: Normal breath sounds. No wheezing or rhonchi. Musculoskeletal:      Cervical back: Neck supple. Right lower leg: No edema. Left lower leg: No edema. Lymphadenopathy:      Cervical: No cervical adenopathy. Skin:     General: Skin is warm. Capillary Refill: Capillary refill takes less than 2 seconds. Neurological:      General: No focal deficit present. Mental Status: She is alert and oriented to person, place, and time. Psychiatric:         Mood and Affect: Mood normal.         Behavior: Behavior normal.         Thought Content: Thought content normal.            I personally reviewed the recent (and prior)  lab results, the image studies, pathology, other specialty/physicians consult notes and recommendations, and outside medical records from other institutions, as appropriate. I had a lengthy discussion with the patient and shared the work-up findings. We discussed the diagnosis and management plan. I spent  30  minutes reviewing the records (labs, clinician notes, outside records, medical history, ordering medicine/tests/procedures, interpreting the imaging/labs previously done) and coordination of care as well as direct time with the patient today, of which greater than 50% of the time was spent in counseling and coordination of care with the patient/family.     Transcribed for Kisha Bourgeois DO, by Chery Vidal Sam Vieira on 12/10/23 at 6:02 PM. Powered by [email or web address].

## 2024-01-02 ENCOUNTER — TELEPHONE (OUTPATIENT)
Dept: FAMILY MEDICINE CLINIC | Facility: CLINIC | Age: 79
End: 2024-01-02

## 2024-01-03 DIAGNOSIS — I1A.0 RESISTANT HYPERTENSION: ICD-10-CM

## 2024-01-04 RX ORDER — LOSARTAN POTASSIUM AND HYDROCHLOROTHIAZIDE 25; 100 MG/1; MG/1
1 TABLET ORAL DAILY
Qty: 90 TABLET | Refills: 1 | Status: SHIPPED | OUTPATIENT
Start: 2024-01-04

## 2024-01-04 NOTE — TELEPHONE ENCOUNTER
Received request for pt Rx refill on Losartan HCTZ     Form also is requesting refill on Chlorthalidone but this medication is not on active medication list so not queued at this time. D/c by provider.

## 2024-01-17 ENCOUNTER — TELEPHONE (OUTPATIENT)
Age: 79
End: 2024-01-17

## 2024-01-17 DIAGNOSIS — J41.8 MIXED SIMPLE AND MUCOPURULENT CHRONIC BRONCHITIS (HCC): Primary | ICD-10-CM

## 2024-01-17 NOTE — TELEPHONE ENCOUNTER
Patient called the RX Refill Line. Message is being forwarded to the office.     Patient is requesting a script for   Ipratropium/ albuterol for her nebulizer, I do not see this on her med list, please review and advise      Please contact patient at

## 2024-01-18 ENCOUNTER — TELEPHONE (OUTPATIENT)
Age: 79
End: 2024-01-18

## 2024-01-18 DIAGNOSIS — J41.8 MIXED SIMPLE AND MUCOPURULENT CHRONIC BRONCHITIS (HCC): ICD-10-CM

## 2024-01-18 RX ORDER — IPRATROPIUM BROMIDE AND ALBUTEROL SULFATE 2.5; .5 MG/3ML; MG/3ML
3 SOLUTION RESPIRATORY (INHALATION) 4 TIMES DAILY
Qty: 180 ML | Refills: 3 | Status: SHIPPED | OUTPATIENT
Start: 2024-01-18 | End: 2024-01-18 | Stop reason: SDUPTHER

## 2024-01-18 RX ORDER — IPRATROPIUM BROMIDE AND ALBUTEROL SULFATE 2.5; .5 MG/3ML; MG/3ML
3 SOLUTION RESPIRATORY (INHALATION) 4 TIMES DAILY
Qty: 180 ML | Refills: 3 | Status: SHIPPED | OUTPATIENT
Start: 2024-01-18

## 2024-01-18 NOTE — TELEPHONE ENCOUNTER
PA for ipratropium-albuterol (DUO-NEB) 0.5-2.5 mg/3 mL nebulizer solution    Submitted via  []CMM-KEY   [x][employer]-Case ID # [id number]  []Faxed to plan   []Other website   []Phone call Case ID #     Office notes sent, clinical questions answered. Awaiting determination

## 2024-01-18 NOTE — TELEPHONE ENCOUNTER
Patient called and stated that the  ipratropium-albuterol (DUO-NEB) 0.5-2.5 mg/3 mL nebulizer solution is not covered at [facility] where is was sent .    She would like for it to be sent [facility]

## 2024-01-18 NOTE — TELEPHONE ENCOUNTER
Duplicate PA Request please see other telephone encounter from today 1- regarding Ipratropium-albuterol PA which was already done/denied. Please review patient's chart to see status of PA before creating another encounter Thank You.

## 2024-01-18 NOTE — TELEPHONE ENCOUNTER
PA for ipratropium-albuterol (DUO-NEB) 0.5-2.5 mg/3 mL nebulizer solution Denied    Reason:      Message sent to office clinical pool Yes    Denial letter scanned into Media Yes    Appeal started No

## 2024-01-18 NOTE — TELEPHONE ENCOUNTER
Rx is not even sent for pt yet, rx is still awaiting approval. So is this a refill request? Rx would need to be sent first in order to do PA

## 2024-01-18 NOTE — TELEPHONE ENCOUNTER
Lynnette Quiñones is a 18 year old female presenting with sinus pressure and drainage which started two days ago.  Denies known Latex allergy or symptoms of Latex sensitivity.  Medication and allergy lists verified and updated with pt.  Rona White MD    Visit Vitals   • /60   • Pulse 85   • Temp 98.4 °F (36.9 °C) (Tympanic)   • Wt 78.7 kg   • SpO2 100%     Weight with shoes     Please assist re PA for pt Rx duoneb

## 2024-01-29 ENCOUNTER — RA CDI HCC (OUTPATIENT)
Dept: OTHER | Facility: HOSPITAL | Age: 79
End: 2024-01-29

## 2024-01-29 NOTE — PROGRESS NOTES
E11.69  HCC coding opportunities          Chart Reviewed number of suggestions sent to Provider: 1     Patients Insurance     Medicare Insurance: [employer] Medicare Advantage

## 2024-02-02 DIAGNOSIS — J44.1 COPD EXACERBATION (HCC): Primary | ICD-10-CM

## 2024-02-02 NOTE — TELEPHONE ENCOUNTER
Patient called stating her [employer] insurance informed her that her Albuterol inhaler needs re certification by Dr Montejo. Please reach out to [employer] @ 1 [phone]. Patient states [facility].

## 2024-02-04 RX ORDER — ALBUTEROL SULFATE 90 UG/1
2 AEROSOL, METERED RESPIRATORY (INHALATION) 4 TIMES DAILY
Qty: 18 G | Refills: 1 | Status: SHIPPED | OUTPATIENT
Start: 2024-02-04

## 2024-02-06 ENCOUNTER — TELEPHONE (OUTPATIENT)
Age: 79
End: 2024-02-06

## 2024-02-06 ENCOUNTER — OFFICE VISIT (OUTPATIENT)
Dept: FAMILY MEDICINE CLINIC | Facility: CLINIC | Age: 79
End: 2024-02-06
Payer: COMMERCIAL

## 2024-02-06 VITALS
BODY MASS INDEX: 40.06 KG/M2 | WEIGHT: 212.2 LBS | TEMPERATURE: 97.9 F | DIASTOLIC BLOOD PRESSURE: 64 MMHG | HEART RATE: 62 BPM | OXYGEN SATURATION: 97 % | HEIGHT: 61 IN | SYSTOLIC BLOOD PRESSURE: 146 MMHG

## 2024-02-06 DIAGNOSIS — J41.8 MIXED SIMPLE AND MUCOPURULENT CHRONIC BRONCHITIS (HCC): ICD-10-CM

## 2024-02-06 DIAGNOSIS — E78.5 HYPERLIPIDEMIA ASSOCIATED WITH TYPE 2 DIABETES MELLITUS: ICD-10-CM

## 2024-02-06 DIAGNOSIS — E11.65 UNCONTROLLED TYPE 2 DIABETES MELLITUS WITH HYPERGLYCEMIA (HCC): ICD-10-CM

## 2024-02-06 DIAGNOSIS — I1A.0 RESISTANT HYPERTENSION: ICD-10-CM

## 2024-02-06 DIAGNOSIS — E11.69 HYPERLIPIDEMIA ASSOCIATED WITH TYPE 2 DIABETES MELLITUS: ICD-10-CM

## 2024-02-06 DIAGNOSIS — E66.01 OBESITY, MORBID (HCC): ICD-10-CM

## 2024-02-06 DIAGNOSIS — J44.1 COPD EXACERBATION (HCC): Primary | ICD-10-CM

## 2024-02-06 DIAGNOSIS — Z79.899 ENCOUNTER FOR LONG-TERM (CURRENT) USE OF MEDICATIONS: ICD-10-CM

## 2024-02-06 DIAGNOSIS — H61.23 IMPACTED CERUMEN OF BOTH EARS: ICD-10-CM

## 2024-02-06 DIAGNOSIS — Z79.899 MEDICATION MANAGEMENT: ICD-10-CM

## 2024-02-06 PROCEDURE — 69210 REMOVE IMPACTED EAR WAX UNI: CPT | Performed by: FAMILY MEDICINE

## 2024-02-06 PROCEDURE — 99215 OFFICE O/P EST HI 40 MIN: CPT | Performed by: FAMILY MEDICINE

## 2024-02-06 NOTE — TELEPHONE ENCOUNTER
PA for tirzepatide (Mounjaro) 7.5 MG/0.5ML    Submitted via  []CMM-KEY   [x][employer]-Case ID # [id number]  []Faxed to plan   []Other website   []Phone call Case ID #     Office notes sent, clinical questions answered. Awaiting determination

## 2024-02-06 NOTE — PROGRESS NOTES
Assessment/Plan:          1. COPD exacerbation (HCC)  Assessment & Plan:  She is aware of her COPD diagnosis and is considering the possibility of asthma. A pulmonary function test with a post-bronchodilator will be scheduled for her in approximately 10 to 14 days, as she is currently recovering from a COPD exacerbation.     Orders:  -     Ambulatory referral to Pulmonology; Future; Expected date: 02/20/2024  -     Complete PFT with post bronchodilator; Future; Expected date: 02/20/2024    2. Mixed simple and mucopurulent chronic bronchitis (HCC)  Comments:  I spoke with the insur co on the phone this OV re PA ([employer])    3. Resistant hypertension    4. Medication management    5. Obesity, morbid (HCC)    6. Uncontrolled type 2 diabetes mellitus with hyperglycemia (HCC)  Comments:  taking Lispro about OD when bs hi, butr Lantus always 50 U , Mounjaro 5  Assessment & Plan:  A review of the Dejuan 2 data reveals that most of her blood glucose levels are in the 100 mg/dL range, with a few reaching the low 200 mg/dL range. However, the majority are in the mid-range. The patient is using up her weekly dose of Mounjaro 5 mg, and her next refill should be increased to 7.5 mg, hence this prescription.    Orders:  -     tirzepatide (Mounjaro) 7.5 MG/0.5ML; Inject 0.5 mL (7.5 mg total) under the skin every 7 days    7. Encounter for long-term (current) use of medications  Assessment & Plan:  She is doing well.      8. Hyperlipidemia associated with type 2 diabetes mellitus     9. Impacted cerumen of both ears  -     Ear cerumen removal        Bilateral cerumen impaction.  Bilateral ears were curetted, with no external complications. The procedure was well-tolerated. Her hearing has improved.    The patient will follow up in 2 months.        Ear cerumen removal    Date/Time: 2/6/2024 12:30 PM    Performed by: TAMIR Montejo DO  Authorized by: TAMIR Montejo DO  Universal Protocol:  Consent: Verbal consent obtained.  Risks and  benefits: risks, benefits and alternatives were discussed  Consent given by: patient  Patient understanding: patient states understanding of the procedure being performed  Patient identity confirmed: verbally with patient    Patient location:  Clinic  Procedure details:     Local anesthetic:  None    Location:  L ear and R ear    Procedure type: curette      Approach:  External  Post-procedure details:     Complication:  None    Hearing quality:  Improved    Patient tolerance of procedure:  Tolerated well, no immediate complications              Subjective:       Patient ID: Mar Elizondo is a 78 y.o. female presents for evaluation of multiple medical concerns.    The patient reports that albuterol is no longer as effective as it once was. Nebulizer therapy, specifically DuoNeb twice a day, has proven beneficial, although the initial use was unsuccessful. She experiences chest congestion and audible breathing, which is noticeable even to her grandson. She expectorates thick mucus. Despite these symptoms, her overall condition remains stable. She has been unable to increase her water intake. A persistent cough has been present since New Year’s Awilda, mirroring the symptoms she experienced the previous year. She also complains of a sore throat and is currently using Mucinex for symptom relief.    Her blood glucose levels, as measured by Dejuan 2, were 134 mg/dL at 1:00 PM, 91 mg/dL at 11:45 AM, and 168 mg/dL at 9:00 AM today. On 02/05/2024, her levels were 208 mg/dL at 2:00 PM, 112 mg/dL at 5:00 PM, and 115 mg/dL at 7:00 PM. On February 4, 2024, her levels were 141 mg/dL at noon, 115 mg/dL at 2:00 PM, 153 mg/dL at 9:00 PM, and 165 mg/dL at 10:00 PM. Her blood glucose levels are fluctuating, with readings mostly in the mid-100 mg/dL range. She is on a daily regimen of Lantus, taking 50 units each morning. She also takes Humalog, with dosages ranging from 3 to 4 units, depending on her blood glucose levels. Additionally,  "she receives a weekly injection of Mounjaro 5 mg. She is not currently taking Invokana. If her blood glucose level reaches 190 mg/dL before bedtime, she administers 4 units of Humalog. She experiences tremors when her blood glucose levels fall within the range of 50 mg/dL to 45 mg/dL. She has reported a weight loss of 2 lbs but suspects she is gaining weight since she is fully exercising and doing a lot more.    She has reported balance issues and is performing exercises to address them.    The following portions of the patient's history were reviewed and updated as appropriate: allergies, current medications, past family history, past medical history, past social history, past surgical history, and problem list.    Review of systems.  Respiratory: Positive for persistent cough.  HENT: Positive for sore throat.          Objective:       /64 (BP Location: Left arm, Patient Position: Sitting, Cuff Size: Standard)   Pulse 62   Temp 97.9 °F (36.6 °C) (Temporal)   Ht 5' 1\" (1.549 m)   Wt 96.3 kg (212 lb 3.2 oz)   SpO2 97%   BMI 40.09 kg/m²          Physical Exam  Vitals and nursing note reviewed.   Constitutional:       General: She is not in acute distress.     Appearance: Normal appearance. She is well-developed.   HENT:      Head: Normocephalic and atraumatic.     Ear: Slight amount of cerumen in bilateral ear noted.  Eyes:      General:         Right eye: No discharge.         Left eye: No discharge.   Neck:      Thyroid: No thyromegaly.   Cardiovascular:      Rate and Rhythm: Normal rate and regular rhythm.      Pulses: Normal pulses.      Heart sounds: Normal heart sounds. No murmur heard.  Pulmonary:      Effort: Pulmonary effort is normal.      Breath sounds: Normal breath sounds. No wheezing or rhonchi.   Musculoskeletal:      Cervical back: Neck supple.      Right lower leg: No edema.      Left lower leg: No edema.   Lymphadenopathy:      Cervical: No cervical adenopathy.   Skin:     General: Skin " is warm.      Capillary Refill: Capillary refill takes less than 2 seconds.   Neurological:      General: No focal deficit present.      Mental Status: She is alert and oriented to person, place, and time.   Psychiatric:         Mood and Affect: Mood normal.         Behavior: Behavior normal.         Thought Content: Thought content normal.        I personally reviewed the recent (and prior)  lab results, the image studies, pathology, other specialty/physicians consult notes and recommendations, and outside medical records from other institutions, as appropriate. I had a lengthy discussion with the patient and shared the work-up findings. We discussed the diagnosis and management plan.  I spent  45  minutes reviewing the records (labs, clinician notes, outside records, medical history, ordering medicine/tests/procedures, interpreting the imaging/labs previously done) and coordination of care as well as direct time with the patient today, of which greater than 50% of the time was spent in counseling and coordination of care with the patient/family.  Note--this was a particularly long visit and it was 1 that entailed me talking with the at insurance company which she had on the lining before I came in to see her.  She wanted me to talk to them regarding her DuoNeb solution which they were not approving.  Turns out that pharmacist was billing Medicare part D and it should be billed billed to Medicare part B.  Hopefully she will get the solution.  Biggest issue is which she do better on Treligy??  After much discussion it was decided to send her to pulmonology for further evaluation.  She states she was never there before    Transcribed for TAMIR Montejo, DO on 02/10/24  by Maite Lema at 4:46 PM. Powered by [employer].

## 2024-02-06 NOTE — ASSESSMENT & PLAN NOTE
She is aware of her COPD diagnosis and is considering the possibility of asthma. A pulmonary function test with a post-bronchodilator will be scheduled for her in approximately 10 to 14 days, as she is currently recovering from a COPD exacerbation.

## 2024-02-06 NOTE — ASSESSMENT & PLAN NOTE
A review of the Dejuan 2 data reveals that most of her blood glucose levels are in the 100 mg/dL range, with a few reaching the low 200 mg/dL range. However, the majority are in the mid-range. The patient is using up her weekly dose of Mounjaro 5 mg, and her next refill should be increased to 7.5 mg, hence this prescription.

## 2024-02-07 NOTE — TELEPHONE ENCOUNTER
PA for tirzepatide (Mounjaro) 7.5 MG/0.5ML already approved    Payer: [email or web address] - [employer] Case ID: [id number]  Note from payer: This medication or product was previously approved on [id number] from 05/22/2023 to 12/31/2024. You will be able to fill a prescription for this medication at your pharmacy. If your pharmacy has questions regarding the processing of your prescription, please have them call the [email or web address] pharmacy help desk at [phone].

## 2024-02-13 ENCOUNTER — TELEPHONE (OUTPATIENT)
Age: 79
End: 2024-02-13

## 2024-02-13 DIAGNOSIS — J41.8 MIXED SIMPLE AND MUCOPURULENT CHRONIC BRONCHITIS (HCC): ICD-10-CM

## 2024-02-13 RX ORDER — IPRATROPIUM BROMIDE AND ALBUTEROL SULFATE 2.5; .5 MG/3ML; MG/3ML
3 SOLUTION RESPIRATORY (INHALATION) 4 TIMES DAILY
Qty: 180 ML | Refills: 1 | Status: SHIPPED | OUTPATIENT
Start: 2024-02-13

## 2024-02-27 ENCOUNTER — CONSULT (OUTPATIENT)
Dept: PULMONOLOGY | Facility: CLINIC | Age: 79
End: 2024-02-27
Payer: COMMERCIAL

## 2024-02-27 ENCOUNTER — HOSPITAL ENCOUNTER (OUTPATIENT)
Dept: PULMONOLOGY | Facility: HOSPITAL | Age: 79
Discharge: HOME | End: 2024-02-27
Payer: COMMERCIAL

## 2024-02-27 VITALS
OXYGEN SATURATION: 98 % | HEART RATE: 64 BPM | BODY MASS INDEX: 39.35 KG/M2 | WEIGHT: 208.4 LBS | HEIGHT: 61 IN | DIASTOLIC BLOOD PRESSURE: 50 MMHG | TEMPERATURE: 97.2 F | SYSTOLIC BLOOD PRESSURE: 110 MMHG

## 2024-02-27 DIAGNOSIS — J44.1 COPD EXACERBATION (HCC): ICD-10-CM

## 2024-02-27 DIAGNOSIS — E66.01 CLASS 2 SEVERE OBESITY DUE TO EXCESS CALORIES WITH SERIOUS COMORBIDITY AND BODY MASS INDEX (BMI) OF 35.0 TO 35.9 IN ADULT: ICD-10-CM

## 2024-02-27 DIAGNOSIS — J44.1 COPD EXACERBATION (HCC): Primary | ICD-10-CM

## 2024-02-27 PROCEDURE — 94726 PLETHYSMOGRAPHY LUNG VOLUMES: CPT | Performed by: INTERNAL MEDICINE

## 2024-02-27 PROCEDURE — 94726 PLETHYSMOGRAPHY LUNG VOLUMES: CPT

## 2024-02-27 PROCEDURE — 94729 DIFFUSING CAPACITY: CPT | Performed by: INTERNAL MEDICINE

## 2024-02-27 PROCEDURE — 99204 OFFICE O/P NEW MOD 45 MIN: CPT | Performed by: INTERNAL MEDICINE

## 2024-02-27 PROCEDURE — 94060 EVALUATION OF WHEEZING: CPT

## 2024-02-27 PROCEDURE — 94060 EVALUATION OF WHEEZING: CPT | Performed by: INTERNAL MEDICINE

## 2024-02-27 PROCEDURE — 94729 DIFFUSING CAPACITY: CPT

## 2024-02-27 PROCEDURE — 94760 N-INVAS EAR/PLS OXIMETRY 1: CPT

## 2024-02-27 RX ORDER — ALBUTEROL SULFATE 2.5 MG/3ML
2.5 SOLUTION RESPIRATORY (INHALATION) ONCE
Status: COMPLETED | OUTPATIENT
Start: 2024-02-27 | End: 2024-02-27

## 2024-02-27 RX ADMIN — ALBUTEROL SULFATE 2.5 MG: 2.5 SOLUTION RESPIRATORY (INHALATION) at 15:12

## 2024-02-27 NOTE — PROGRESS NOTES
Assessment/Plan:    COPD exacerbation (formerly [facility])  Mrs. Mar Elizondo was recently treated for an acute exacerbation of suspected COPD.  She was a previous smoker but quit many years back.  She stated that she smoked only for 10 years and about half a pack of cigarettes a day only.  She has a history of asthma from childhood.  She has also history of allergies.  On clinical examination, her chest was clear to auscultation.  She was previously treated with steroid.  Currently she is using albuterol inhaler/nebulizer on an as-needed basis.  She uses this 1-2 times a day.  Currently she is feeling much better.  She denies any significant phlegm.  She has some shortness of breath on exertion and occasional wheezing.  A PFT has been ordered.  We will await the PFT results.  She had a chest x-ray done recently which was unremarkable.  She also did not have any significant eosinophilia on her previous blood work.  I had a long discussion with her and answered all her questions.    Class 2 severe obesity due to excess calories with serious comorbidity and body mass index (BMI) of 35.0 to 35.9 in adult (formerly [facility])  She is obese and understands the need for weight reduction.  Currently she is on Mounjaro.  She denied any significant symptoms suggestive of sleep apnea.       Diagnoses and all orders for this visit:    Class 2 severe obesity due to excess calories with serious comorbidity and body mass index (BMI) of 35.0 to 35.9 in adult     COPD exacerbation (formerly [facility])  -     Ambulatory referral to Pulmonology          Subjective:      Patient ID: Mar Elizondo is a 78 y.o. female.    Mrs.Dolores Elizondo recently had an acute exacerbation of suspected COPD and was treated with steroid.  She is a previous smoker but quit many years back.  She stated that she smoked only for 10 years about half a pack a day.  She has history of allergies from childhood.  She is not aware of any previous history of asthma.  Currently she has shortness of breath on  exertion though it is much better.  She has cough with no phlegm and occasional wheezing.  She has been using albuterol inhaler/nebulizer on an as-needed basis 1-2 times a day.  She is scheduled for a PFT today.  She denied any chest pain or palpitations or swelling of feet.  She is obese and understands the need for weight reduction.  She denied any significant daytime sleepiness or morning headache.  I reviewed her previous chest x-ray which was unremarkable.  Did not have any significant eosinophilia on her previous blood work from October 2023        The following portions of the patient's history were reviewed and updated as appropriate: allergies, current medications, past family history, past medical history, past social history, past surgical history, and problem list.    Review of Systems   Constitutional:  Negative for appetite change, chills, fatigue, fever and unexpected weight change.   HENT:  Positive for hearing loss, rhinorrhea and sneezing. Negative for sore throat and trouble swallowing.    Eyes:  Negative for visual disturbance.   Respiratory:  Positive for cough, shortness of breath and wheezing. Negative for chest tightness.    Cardiovascular:  Negative for chest pain, palpitations and leg swelling.   Gastrointestinal:  Negative for abdominal pain, constipation, diarrhea, nausea and vomiting.   Genitourinary:  Negative for dysuria, frequency and urgency.   Musculoskeletal:  Positive for back pain. Negative for arthralgias and gait problem.   Skin:  Positive for rash (Psoariasis).   Allergic/Immunologic: Positive for environmental allergies.   Neurological:  Negative for dizziness, syncope, light-headedness and headaches.   Psychiatric/Behavioral:  Positive for sleep disturbance. Negative for agitation and confusion. The patient is not nervous/anxious.          Objective:      /50 (BP Location: Left arm, Patient Position: Sitting, Cuff Size: Standard)   Pulse 64   Temp (!) 97.2 °F (36.2  "°C) (Tympanic)   Ht 5' 1\" (1.549 m)   Wt 94.5 kg (208 lb 6.4 oz)   SpO2 98%   BMI 39.38 kg/m²          Physical Exam  Vitals reviewed.   Constitutional:       General: She is not in acute distress.     Appearance: She is obese. She is not ill-appearing, toxic-appearing or diaphoretic.      Interventions: She is not intubated.  HENT:      Head: Normocephalic.      Mouth/Throat:      Mouth: Mucous membranes are moist.   Neck:      Thyroid: No thyromegaly.      Vascular: No JVD.      Trachea: No tracheal deviation.   Cardiovascular:      Rate and Rhythm: Normal rate and regular rhythm.      Heart sounds: Normal heart sounds. No murmur heard.  Pulmonary:      Effort: Pulmonary effort is normal. No tachypnea, bradypnea, accessory muscle usage or respiratory distress. She is not intubated.      Breath sounds: Normal breath sounds. No stridor. No decreased breath sounds, wheezing, rhonchi or rales.   Chest:      Chest wall: No tenderness.   Abdominal:      General: Bowel sounds are normal.      Tenderness: There is no abdominal tenderness. There is no guarding.   Musculoskeletal:      Cervical back: Neck supple.      Right lower leg: No edema.      Left lower leg: No edema.   Lymphadenopathy:      Cervical: No cervical adenopathy.   Skin:     Coloration: Skin is not cyanotic or pale.      Findings: No rash.      Nails: There is no clubbing.   Neurological:      Mental Status: She is alert and oriented to person, place, and time.   Psychiatric:         Mood and Affect: Mood is not anxious.         Behavior: Behavior is not agitated.           "

## 2024-02-28 NOTE — ASSESSMENT & PLAN NOTE
She is obese and understands the need for weight reduction.  Currently she is on Mounjaro.  She denied any significant symptoms suggestive of sleep apnea.

## 2024-02-28 NOTE — ASSESSMENT & PLAN NOTE
Mrs. Mar Elizondo was recently treated for an acute exacerbation of suspected COPD.  She was a previous smoker but quit many years back.  She stated that she smoked only for 10 years and about half a pack of cigarettes a day only.  She has a history of asthma from childhood.  She has also history of allergies.  On clinical examination, her chest was clear to auscultation.  She was previously treated with steroid.  Currently she is using albuterol inhaler/nebulizer on an as-needed basis.  She uses this 1-2 times a day.  Currently she is feeling much better.  She denies any significant phlegm.  She has some shortness of breath on exertion and occasional wheezing.  A PFT has been ordered.  We will await the PFT results.  She had a chest x-ray done recently which was unremarkable.  She also did not have any significant eosinophilia on her previous blood work.  I had a long discussion with her and answered all her questions.

## 2024-03-05 ENCOUNTER — TELEPHONE (OUTPATIENT)
Dept: FAMILY MEDICINE CLINIC | Facility: CLINIC | Age: 79
End: 2024-03-05

## 2024-03-05 NOTE — TELEPHONE ENCOUNTER
Called pt and informed of PCP message -- pt consulted w pulm and is f/u next mo to discuss results further.

## 2024-03-05 NOTE — TELEPHONE ENCOUNTER
----- Message from TAMIR Montejo DO sent at 3/3/2024  5:57 PM EST -----  Please call patient and tell her PFTs were basically normal however bronchodilator medication did improve her function.  That is good news.  If she is struggling with breathing problems, tell her I will be happy to refer her to pulmonologist???  ----- Message -----  From: Jas Rey MD  Sent: 2/27/2024   9:05 PM EST  To: TAMIR Montejo DO

## 2024-03-19 DIAGNOSIS — E11.65 UNCONTROLLED TYPE 2 DIABETES MELLITUS WITH HYPERGLYCEMIA (HCC): ICD-10-CM

## 2024-03-19 RX ORDER — TIRZEPATIDE 7.5 MG/.5ML
INJECTION, SOLUTION SUBCUTANEOUS
Qty: 4 ML | Refills: 5 | Status: SHIPPED | OUTPATIENT
Start: 2024-03-19

## 2024-03-26 DIAGNOSIS — I1A.0 RESISTANT HYPERTENSION: ICD-10-CM

## 2024-03-27 RX ORDER — DILTIAZEM HYDROCHLORIDE 360 MG/1
CAPSULE, EXTENDED RELEASE ORAL
Qty: 90 CAPSULE | Refills: 0 | Status: SHIPPED | OUTPATIENT
Start: 2024-03-27

## 2024-04-02 ENCOUNTER — RA CDI HCC (OUTPATIENT)
Dept: OTHER | Facility: HOSPITAL | Age: 79
End: 2024-04-02

## 2024-04-02 PROBLEM — Z79.899 ENCOUNTER FOR LONG-TERM (CURRENT) USE OF MEDICATIONS: Status: RESOLVED | Noted: 2023-10-04 | Resolved: 2024-04-02

## 2024-04-02 PROBLEM — Z71.2 ENCOUNTER TO DISCUSS TEST RESULTS: Status: RESOLVED | Noted: 2023-12-05 | Resolved: 2024-04-02

## 2024-04-02 PROBLEM — M54.42 ACUTE LEFT-SIDED LOW BACK PAIN WITH LEFT-SIDED SCIATICA: Status: RESOLVED | Noted: 2019-12-03 | Resolved: 2024-04-02

## 2024-04-02 NOTE — PROGRESS NOTES
Previous suggestion used  HCC coding opportunities       Chart reviewed, no opportunity found: CHART REVIEWED, NO OPPORTUNITY FOUND        Patients Insurance     Medicare Insurance: [employer]vantage

## 2024-04-09 PROBLEM — E53.8 VITAMIN B12 DEFICIENCY: Status: ACTIVE | Noted: 2024-04-09

## 2024-04-09 PROBLEM — R73.9 ELEVATED BLOOD SUGAR: Status: ACTIVE | Noted: 2024-04-09

## 2024-04-09 PROBLEM — E66.01 OBESITY, MORBID (HCC): Status: ACTIVE | Noted: 2024-04-09

## 2024-04-09 PROBLEM — E55.9 VITAMIN D INSUFFICIENCY: Status: ACTIVE | Noted: 2024-04-09

## 2024-05-19 DIAGNOSIS — I1A.0 RESISTANT HYPERTENSION: ICD-10-CM

## 2024-05-19 DIAGNOSIS — E11.9 CONTROLLED TYPE 2 DIABETES MELLITUS WITHOUT COMPLICATION, WITH LONG-TERM CURRENT USE OF INSULIN (HCC): ICD-10-CM

## 2024-05-19 DIAGNOSIS — Z79.4 CONTROLLED TYPE 2 DIABETES MELLITUS WITHOUT COMPLICATION, WITH LONG-TERM CURRENT USE OF INSULIN (HCC): ICD-10-CM

## 2024-05-20 RX ORDER — LOSARTAN POTASSIUM AND HYDROCHLOROTHIAZIDE 25; 100 MG/1; MG/1
1 TABLET ORAL DAILY
Qty: 90 TABLET | Refills: 1 | Status: SHIPPED | OUTPATIENT
Start: 2024-05-20

## 2024-05-20 RX ORDER — INSULIN GLARGINE 100 [IU]/ML
INJECTION, SOLUTION SUBCUTANEOUS
Qty: 45 ML | Refills: 1 | Status: SHIPPED | OUTPATIENT
Start: 2024-05-20

## 2024-06-03 ENCOUNTER — APPOINTMENT (OUTPATIENT)
Dept: LAB | Facility: HOSPITAL | Age: 79
End: 2024-06-03
Payer: COMMERCIAL

## 2024-06-03 DIAGNOSIS — Z79.899 MEDICATION MANAGEMENT: ICD-10-CM

## 2024-06-03 DIAGNOSIS — R73.9 ELEVATED BLOOD SUGAR: ICD-10-CM

## 2024-06-03 DIAGNOSIS — E53.8 VITAMIN B12 DEFICIENCY: ICD-10-CM

## 2024-06-03 DIAGNOSIS — E55.9 VITAMIN D INSUFFICIENCY: ICD-10-CM

## 2024-06-03 DIAGNOSIS — E11.69 HYPERLIPIDEMIA ASSOCIATED WITH TYPE 2 DIABETES MELLITUS  (HCC): ICD-10-CM

## 2024-06-03 DIAGNOSIS — I1A.0 RESISTANT HYPERTENSION: ICD-10-CM

## 2024-06-03 DIAGNOSIS — E11.65 UNCONTROLLED TYPE 2 DIABETES MELLITUS WITH HYPERGLYCEMIA (HCC): ICD-10-CM

## 2024-06-03 DIAGNOSIS — E78.2 MIXED HYPERLIPIDEMIA: ICD-10-CM

## 2024-06-03 DIAGNOSIS — E78.5 HYPERLIPIDEMIA ASSOCIATED WITH TYPE 2 DIABETES MELLITUS  (HCC): ICD-10-CM

## 2024-06-03 LAB
25(OH)D3 SERPL-MCNC: 30.3 NG/ML (ref 30–100)
ALBUMIN SERPL BCP-MCNC: 4.2 G/DL (ref 3.5–5)
ALP SERPL-CCNC: 84 U/L (ref 34–104)
ALT SERPL W P-5'-P-CCNC: 16 U/L (ref 7–52)
ANION GAP SERPL CALCULATED.3IONS-SCNC: 9 MMOL/L (ref 4–13)
AST SERPL W P-5'-P-CCNC: 13 U/L (ref 13–39)
BILIRUB SERPL-MCNC: 0.46 MG/DL (ref 0.2–1)
BUN SERPL-MCNC: 15 MG/DL (ref 5–25)
CALCIUM SERPL-MCNC: 9.4 MG/DL (ref 8.4–10.2)
CHLORIDE SERPL-SCNC: 103 MMOL/L (ref 96–108)
CHOLEST SERPL-MCNC: 233 MG/DL
CO2 SERPL-SCNC: 30 MMOL/L (ref 21–32)
CREAT SERPL-MCNC: 0.73 MG/DL (ref 0.6–1.3)
ERYTHROCYTE [DISTWIDTH] IN BLOOD BY AUTOMATED COUNT: 13.4 % (ref 11.6–15.1)
GFR SERPL CREATININE-BSD FRML MDRD: 78 ML/MIN/1.73SQ M
GLUCOSE P FAST SERPL-MCNC: 157 MG/DL (ref 65–99)
HCT VFR BLD AUTO: 42.7 % (ref 34.8–46.1)
HDLC SERPL-MCNC: 49 MG/DL
HGB BLD-MCNC: 13.8 G/DL (ref 11.5–15.4)
LDLC SERPL CALC-MCNC: 162 MG/DL (ref 0–100)
MCH RBC QN AUTO: 29.7 PG (ref 26.8–34.3)
MCHC RBC AUTO-ENTMCNC: 32.3 G/DL (ref 31.4–37.4)
MCV RBC AUTO: 92 FL (ref 82–98)
NONHDLC SERPL-MCNC: 184 MG/DL
PLATELET # BLD AUTO: 274 THOUSANDS/UL (ref 149–390)
PMV BLD AUTO: 10.4 FL (ref 8.9–12.7)
POTASSIUM SERPL-SCNC: 4 MMOL/L (ref 3.5–5.3)
PROT SERPL-MCNC: 6.6 G/DL (ref 6.4–8.4)
RBC # BLD AUTO: 4.64 MILLION/UL (ref 3.81–5.12)
SODIUM SERPL-SCNC: 142 MMOL/L (ref 135–147)
TRIGL SERPL-MCNC: 112 MG/DL
TSH SERPL DL<=0.05 MIU/L-ACNC: 4.38 UIU/ML (ref 0.45–4.5)
VIT B12 SERPL-MCNC: 145 PG/ML (ref 180–914)
WBC # BLD AUTO: 8.69 THOUSAND/UL (ref 4.31–10.16)

## 2024-06-03 PROCEDURE — 82306 VITAMIN D 25 HYDROXY: CPT

## 2024-06-03 PROCEDURE — 36415 COLL VENOUS BLD VENIPUNCTURE: CPT

## 2024-06-03 PROCEDURE — 84443 ASSAY THYROID STIM HORMONE: CPT

## 2024-06-03 PROCEDURE — 82607 VITAMIN B-12: CPT

## 2024-06-03 PROCEDURE — 84244 ASSAY OF RENIN: CPT

## 2024-06-03 PROCEDURE — 85027 COMPLETE CBC AUTOMATED: CPT

## 2024-06-03 PROCEDURE — 80061 LIPID PANEL: CPT

## 2024-06-03 PROCEDURE — 82088 ASSAY OF ALDOSTERONE: CPT

## 2024-06-03 PROCEDURE — 80053 COMPREHEN METABOLIC PANEL: CPT

## 2024-06-04 DIAGNOSIS — I1A.0 RESISTANT HYPERTENSION: ICD-10-CM

## 2024-06-05 RX ORDER — DILTIAZEM HYDROCHLORIDE 360 MG/1
CAPSULE, EXTENDED RELEASE ORAL
Qty: 90 CAPSULE | Refills: 1 | Status: SHIPPED | OUTPATIENT
Start: 2024-06-05

## 2024-06-09 LAB
ALDOST SERPL-MCNC: 4.9 NG/DL (ref 0–30)
ALDOST/RENIN PLAS-RTO: 6.4 {RATIO} (ref 0–30)
RENIN PLAS-CCNC: 0.76 NG/ML/HR (ref 0.17–5.38)

## 2024-06-10 ENCOUNTER — OFFICE VISIT (OUTPATIENT)
Dept: FAMILY MEDICINE CLINIC | Facility: CLINIC | Age: 79
End: 2024-06-10
Payer: COMMERCIAL

## 2024-06-10 VITALS
TEMPERATURE: 96.9 F | SYSTOLIC BLOOD PRESSURE: 150 MMHG | BODY MASS INDEX: 39.38 KG/M2 | OXYGEN SATURATION: 97 % | HEART RATE: 59 BPM | DIASTOLIC BLOOD PRESSURE: 76 MMHG | HEIGHT: 61 IN | WEIGHT: 208.6 LBS

## 2024-06-10 DIAGNOSIS — R80.1 PERSISTENT PROTEINURIA: ICD-10-CM

## 2024-06-10 DIAGNOSIS — I1A.0 RESISTANT HYPERTENSION: ICD-10-CM

## 2024-06-10 DIAGNOSIS — E11.65 UNCONTROLLED TYPE 2 DIABETES MELLITUS WITH HYPERGLYCEMIA (HCC): ICD-10-CM

## 2024-06-10 DIAGNOSIS — E66.01 OBESITY, MORBID (HCC): ICD-10-CM

## 2024-06-10 DIAGNOSIS — R63.8 INCREASED BMI: Primary | ICD-10-CM

## 2024-06-10 DIAGNOSIS — E53.8 VITAMIN B12 DEFICIENCY: ICD-10-CM

## 2024-06-10 DIAGNOSIS — R82.71 BACTERIA IN URINE: ICD-10-CM

## 2024-06-10 PROBLEM — I74.3 EMBOLISM AND THROMBOSIS OF ARTERIES OF THE LOWER EXTREMITIES (HCC): Status: RESOLVED | Noted: 2022-02-22 | Resolved: 2024-06-10

## 2024-06-10 PROBLEM — F33.9 DEPRESSION, RECURRENT (HCC): Status: RESOLVED | Noted: 2021-01-11 | Resolved: 2024-06-10

## 2024-06-10 PROCEDURE — 96372 THER/PROPH/DIAG INJ SC/IM: CPT | Performed by: FAMILY MEDICINE

## 2024-06-10 PROCEDURE — 99214 OFFICE O/P EST MOD 30 MIN: CPT | Performed by: FAMILY MEDICINE

## 2024-06-10 RX ORDER — NITROFURANTOIN 25; 75 MG/1; MG/1
100 CAPSULE ORAL 2 TIMES DAILY WITH MEALS
Qty: 10 CAPSULE | Refills: 0 | Status: SHIPPED | OUTPATIENT
Start: 2024-06-10 | End: 2024-06-15

## 2024-06-10 RX ADMIN — CYANOCOBALAMIN 1000 MCG: 1000 INJECTION, SOLUTION INTRAMUSCULAR; SUBCUTANEOUS at 15:56

## 2024-06-10 NOTE — PROGRESS NOTES
Ambulatory Visit  Name: Mar Elizondo      : 1945      MRN: [id number]  Encounter Provider: TAMIR Montejo DO  Encounter Date: 6/10/2024   Encounter department: [facility]    Assessment & Plan  1. Diabetes Mellitus.  The patient's diabetes is currently well-managed, as evidenced by an A1c level of 6.2.    2. Resistant Hypertension.  The patient will commence a regimen of diltiazem 360.    3. Persistent Proteinuria.  The patient's renin aldosterone ratio is increasing. The patient will be referred to nephrology for further evaluation. A prescription for nitrofurantoin 100 mg, to be taken twice daily with meals for a duration of 5 days, has been issued.    4. Vitamin B12 Deficiency.  The patient's B12 level is significantly low at 145. The patient will commence a regimen of B12 1000 mcg sublingual tablets. A B12 injection will be administered once a week for a period of 6 weeks. A follow-up B12 level will be rechecked in 3 weeks. The patient will continue her current regimen of vitamin D 5000 units.    Follow-up  The patient is scheduled for a follow-up visit in 2 months.  No orders of the defined types were placed in this encounter.         History of Present Illness     History of Present Illness  The patient is an 78-year-old female who presents for evaluation of multiple medical concerns.    The patient's diabetes is currently well-managed, as evidenced by an A1c level of 6.2. Her postprandial blood glucose levels typically range from 180 to 190, but subsequently drop to 106 within an hour. Her current medication regimen includes Mounjaro and Lantus.    The patient reports experiencing bladder symptoms and a decrease in urinary frequency.    The patient's hypertension is currently managed with losartan and diltiazem 360.    The patient is not currently on any cholesterol-lowering medication.   The patient has no known allergies.     Review of Systems   Constitutional:  Negative for  "activity change, appetite change, chills, diaphoresis, fatigue and fever.   HENT:  Negative for congestion, ear discharge, ear pain, facial swelling, nosebleeds, sore throat, tinnitus, trouble swallowing and voice change.    Eyes:  Negative for photophobia, pain, discharge, redness, itching and visual disturbance.   Respiratory:  Negative for apnea, cough, choking, chest tightness and shortness of breath.    Cardiovascular:  Negative for chest pain, palpitations and leg swelling.   Gastrointestinal:  Negative for abdominal distention, abdominal pain, blood in stool, constipation, diarrhea, nausea and vomiting.   Endocrine: Negative for cold intolerance, heat intolerance, polydipsia, polyphagia and polyuria.   Genitourinary:  Negative for decreased urine volume, difficulty urinating, dysuria, enuresis, frequency, hematuria, pelvic pain, urgency and vaginal bleeding.   Musculoskeletal:  Negative for arthralgias, back pain, gait problem, joint swelling, neck pain and neck stiffness.   Skin:  Negative for color change, pallor and rash.   Allergic/Immunologic: Negative for immunocompromised state.   Neurological:  Negative for dizziness, seizures, facial asymmetry, light-headedness, numbness and headaches.   Hematological:  Negative for adenopathy.   Psychiatric/Behavioral:  Negative for agitation, behavioral problems, confusion, decreased concentration, dysphoric mood and hallucinations.      Objective     /76 (BP Location: Left arm, Patient Position: Sitting, Cuff Size: Standard)   Pulse 59   Temp (!) 96.9 °F (36.1 °C) (Temporal)   Ht 5' 1\" (1.549 m)   Wt 94.6 kg (208 lb 9.6 oz)   SpO2 97%   BMI 39.41 kg/m²     Physical Exam    Physical Exam  Vitals and nursing note reviewed.   Constitutional:       General: She is not in acute distress.     Appearance: She is well-developed.   HENT:      Head: Normocephalic and atraumatic.   Eyes:      Conjunctiva/sclera: Conjunctivae normal.   Cardiovascular:      Rate " and Rhythm: Normal rate and regular rhythm.      Heart sounds: No murmur heard.  Pulmonary:      Effort: Pulmonary effort is normal. No respiratory distress.      Breath sounds: Normal breath sounds.   Abdominal:      Palpations: Abdomen is soft.      Tenderness: There is no abdominal tenderness.   Musculoskeletal:         General: No swelling.      Cervical back: Neck supple.   Skin:     General: Skin is warm and dry.   Neurological:      General: No focal deficit present.      Mental Status: She is alert and oriented to person, place, and time. Mental status is at baseline.   Psychiatric:         Mood and Affect: Mood normal.         Behavior: Behavior normal.         Thought Content: Thought content normal.         Judgment: Judgment normal.       Administrative Statements   I have spent a total time of 30 minutes on 06/10/24 In caring for this patient including .I personally reviewed the recent (and prior)  lab results, the image studies, pathology, other specialty/physicians consult notes and recommendations, and outside medical records from other institutions, as appropriate. I had a lengthy discussion with the patient and shared the work-up findings. We discussed the diagnosis and management plan.  I spent appropriate time with the medical record and patient,  commensurate with the level of service reviewing the records (labs, clinician notes, outside records, medical history, ordering medicine/tests/procedures, interpreting the imaging/labs previously done) and coordination of care as well as direct time with the patient today, of which greater than 50% of the time was spent in counseling and coordination of care with the patient/family.

## 2024-06-16 ENCOUNTER — RA CDI HCC (OUTPATIENT)
Dept: OTHER | Facility: HOSPITAL | Age: 79
End: 2024-06-16

## 2024-06-18 ENCOUNTER — CLINICAL SUPPORT (OUTPATIENT)
Dept: FAMILY MEDICINE CLINIC | Facility: CLINIC | Age: 79
End: 2024-06-18
Payer: COMMERCIAL

## 2024-06-18 DIAGNOSIS — E53.8 CYANOCOBALAMIN DEFICIENCY: Primary | ICD-10-CM

## 2024-06-18 PROCEDURE — 96372 THER/PROPH/DIAG INJ SC/IM: CPT

## 2024-06-18 RX ADMIN — CYANOCOBALAMIN 1000 MCG: 1000 INJECTION, SOLUTION INTRAMUSCULAR; SUBCUTANEOUS at 13:52

## 2024-06-18 NOTE — PROGRESS NOTES
B12 administered at this time wo complication -- R deltoid. Per PCP last OV note pt to return in 1 wks time as sched.

## 2024-06-25 ENCOUNTER — CLINICAL SUPPORT (OUTPATIENT)
Dept: FAMILY MEDICINE CLINIC | Facility: CLINIC | Age: 79
End: 2024-06-25
Payer: COMMERCIAL

## 2024-06-25 DIAGNOSIS — E53.8 CYANOCOBALAMIN DEFICIENCY: Primary | ICD-10-CM

## 2024-06-25 PROCEDURE — 96372 THER/PROPH/DIAG INJ SC/IM: CPT

## 2024-06-25 RX ORDER — CYANOCOBALAMIN 1000 UG/ML
1000 INJECTION, SOLUTION INTRAMUSCULAR; SUBCUTANEOUS WEEKLY
Status: SHIPPED | OUTPATIENT
Start: 2024-06-25 | End: 2024-07-30

## 2024-06-25 RX ADMIN — CYANOCOBALAMIN 1000 MCG: 1000 INJECTION, SOLUTION INTRAMUSCULAR; SUBCUTANEOUS at 13:54

## 2024-07-03 ENCOUNTER — CLINICAL SUPPORT (OUTPATIENT)
Dept: FAMILY MEDICINE CLINIC | Facility: CLINIC | Age: 79
End: 2024-07-03
Payer: COMMERCIAL

## 2024-07-03 DIAGNOSIS — E53.8 VITAMIN B12 DEFICIENCY: Primary | ICD-10-CM

## 2024-07-03 RX ADMIN — CYANOCOBALAMIN 1000 MCG: 1000 INJECTION, SOLUTION INTRAMUSCULAR; SUBCUTANEOUS at 09:03

## 2024-07-03 NOTE — PROGRESS NOTES
B12 administered as sched wo complication at this time into L delt. Pt will rto as previously scheduled.

## 2024-07-08 ENCOUNTER — NURSE TRIAGE (OUTPATIENT)
Age: 79
End: 2024-07-08

## 2024-07-08 ENCOUNTER — APPOINTMENT (EMERGENCY)
Dept: RADIOLOGY | Facility: HOSPITAL | Age: 79
End: 2024-07-08
Payer: COMMERCIAL

## 2024-07-08 ENCOUNTER — HOSPITAL ENCOUNTER (EMERGENCY)
Facility: HOSPITAL | Age: 79
Discharge: HOME | End: 2024-07-08
Attending: EMERGENCY MEDICINE | Admitting: EMERGENCY MEDICINE
Payer: COMMERCIAL

## 2024-07-08 ENCOUNTER — APPOINTMENT (EMERGENCY)
Dept: CT IMAGING | Facility: HOSPITAL | Age: 79
End: 2024-07-08
Payer: COMMERCIAL

## 2024-07-08 VITALS
DIASTOLIC BLOOD PRESSURE: 78 MMHG | RESPIRATION RATE: 20 BRPM | TEMPERATURE: 98.5 F | OXYGEN SATURATION: 95 % | SYSTOLIC BLOOD PRESSURE: 165 MMHG | HEART RATE: 63 BPM | HEIGHT: 61 IN | WEIGHT: 206.79 LBS | BODY MASS INDEX: 39.04 KG/M2

## 2024-07-08 DIAGNOSIS — R42 VERTIGO: ICD-10-CM

## 2024-07-08 DIAGNOSIS — R42 DIZZINESS: Primary | ICD-10-CM

## 2024-07-08 DIAGNOSIS — I10 HYPERTENSION: ICD-10-CM

## 2024-07-08 LAB
2HR DELTA HS TROPONIN: -2 NG/L
ALBUMIN SERPL BCG-MCNC: 4.1 G/DL (ref 3.5–5)
ALP SERPL-CCNC: 93 U/L (ref 34–104)
ALT SERPL W P-5'-P-CCNC: 18 U/L (ref 7–52)
ANION GAP SERPL CALCULATED.3IONS-SCNC: 9 MMOL/L (ref 4–13)
AST SERPL W P-5'-P-CCNC: 20 U/L (ref 13–39)
BASOPHILS # BLD AUTO: 0.09 THOUSANDS/ÂΜL (ref 0–0.1)
BASOPHILS NFR BLD AUTO: 1 % (ref 0–1)
BILIRUB SERPL-MCNC: 0.39 MG/DL (ref 0.2–1)
BUN SERPL-MCNC: 16 MG/DL (ref 5–25)
CALCIUM SERPL-MCNC: 9.2 MG/DL (ref 8.4–10.2)
CARDIAC TROPONIN I PNL SERPL HS: 4 NG/L
CARDIAC TROPONIN I PNL SERPL HS: 6 NG/L
CHLORIDE SERPL-SCNC: 102 MMOL/L (ref 96–108)
CO2 SERPL-SCNC: 28 MMOL/L (ref 21–32)
CREAT SERPL-MCNC: 0.66 MG/DL (ref 0.6–1.3)
EOSINOPHIL # BLD AUTO: 0.25 THOUSAND/ÂΜL (ref 0–0.61)
EOSINOPHIL NFR BLD AUTO: 2 % (ref 0–6)
ERYTHROCYTE [DISTWIDTH] IN BLOOD BY AUTOMATED COUNT: 12.8 % (ref 11.6–15.1)
GFR SERPL CREATININE-BSD FRML MDRD: 83 ML/MIN/1.73SQ M
GLUCOSE SERPL-MCNC: 80 MG/DL (ref 65–140)
HCT VFR BLD AUTO: 44.8 % (ref 34.8–46.1)
HGB BLD-MCNC: 14.8 G/DL (ref 11.5–15.4)
IMM GRANULOCYTES # BLD AUTO: 0.03 THOUSAND/UL (ref 0–0.2)
IMM GRANULOCYTES NFR BLD AUTO: 0 % (ref 0–2)
LYMPHOCYTES # BLD AUTO: 2.61 THOUSANDS/ÂΜL (ref 0.6–4.47)
LYMPHOCYTES NFR BLD AUTO: 25 % (ref 14–44)
MAGNESIUM SERPL-MCNC: 1.9 MG/DL (ref 1.9–2.7)
MCH RBC QN AUTO: 29.4 PG (ref 26.8–34.3)
MCHC RBC AUTO-ENTMCNC: 33 G/DL (ref 31.4–37.4)
MCV RBC AUTO: 89 FL (ref 82–98)
MONOCYTES # BLD AUTO: 0.99 THOUSAND/ÂΜL (ref 0.17–1.22)
MONOCYTES NFR BLD AUTO: 9 % (ref 4–12)
NEUTROPHILS # BLD AUTO: 6.68 THOUSANDS/ÂΜL (ref 1.85–7.62)
NEUTS SEG NFR BLD AUTO: 63 % (ref 43–75)
NRBC BLD AUTO-RTO: 0 /100 WBCS
PLATELET # BLD AUTO: 275 THOUSANDS/UL (ref 149–390)
PMV BLD AUTO: 10.2 FL (ref 8.9–12.7)
POTASSIUM SERPL-SCNC: 4.3 MMOL/L (ref 3.5–5.3)
PROT SERPL-MCNC: 6.8 G/DL (ref 6.4–8.4)
RBC # BLD AUTO: 5.03 MILLION/UL (ref 3.81–5.12)
SODIUM SERPL-SCNC: 139 MMOL/L (ref 135–147)
WBC # BLD AUTO: 10.65 THOUSAND/UL (ref 4.31–10.16)

## 2024-07-08 PROCEDURE — 80053 COMPREHEN METABOLIC PANEL: CPT | Performed by: EMERGENCY MEDICINE

## 2024-07-08 PROCEDURE — 36415 COLL VENOUS BLD VENIPUNCTURE: CPT | Performed by: EMERGENCY MEDICINE

## 2024-07-08 PROCEDURE — 84484 ASSAY OF TROPONIN QUANT: CPT | Performed by: EMERGENCY MEDICINE

## 2024-07-08 PROCEDURE — 71045 X-RAY EXAM CHEST 1 VIEW: CPT

## 2024-07-08 PROCEDURE — 99285 EMERGENCY DEPT VISIT HI MDM: CPT

## 2024-07-08 PROCEDURE — 96375 TX/PRO/DX INJ NEW DRUG ADDON: CPT

## 2024-07-08 PROCEDURE — 96374 THER/PROPH/DIAG INJ IV PUSH: CPT

## 2024-07-08 PROCEDURE — 85025 COMPLETE CBC W/AUTO DIFF WBC: CPT | Performed by: EMERGENCY MEDICINE

## 2024-07-08 PROCEDURE — 83735 ASSAY OF MAGNESIUM: CPT | Performed by: EMERGENCY MEDICINE

## 2024-07-08 PROCEDURE — 70450 CT HEAD/BRAIN W/O DYE: CPT

## 2024-07-08 PROCEDURE — 99285 EMERGENCY DEPT VISIT HI MDM: CPT | Performed by: EMERGENCY MEDICINE

## 2024-07-08 PROCEDURE — 93005 ELECTROCARDIOGRAM TRACING: CPT

## 2024-07-08 RX ORDER — DIAZEPAM 5 MG/ML
2.5 INJECTION, SOLUTION INTRAMUSCULAR; INTRAVENOUS ONCE
Status: COMPLETED | OUTPATIENT
Start: 2024-07-08 | End: 2024-07-08

## 2024-07-08 RX ORDER — LABETALOL HYDROCHLORIDE 5 MG/ML
10 INJECTION, SOLUTION INTRAVENOUS ONCE
Status: COMPLETED | OUTPATIENT
Start: 2024-07-08 | End: 2024-07-08

## 2024-07-08 RX ORDER — MECLIZINE HYDROCHLORIDE 25 MG/1
25 TABLET ORAL ONCE
Status: COMPLETED | OUTPATIENT
Start: 2024-07-08 | End: 2024-07-08

## 2024-07-08 RX ADMIN — MECLIZINE HYDROCHLORIDE 25 MG: 25 TABLET ORAL at 16:39

## 2024-07-08 RX ADMIN — DIAZEPAM 2.5 MG: 5 INJECTION, SOLUTION INTRAMUSCULAR; INTRAVENOUS at 15:45

## 2024-07-08 RX ADMIN — LABETALOL HYDROCHLORIDE 10 MG: 5 INJECTION, SOLUTION INTRAVENOUS at 15:44

## 2024-07-08 NOTE — TELEPHONE ENCOUNTER
Called and spoke w pt -- she confirmed Vertigo & HA x 3d. Advised pt be evaluated at [facility] ED as this may require imaging study. Pt was agreeable and en-route to ED.

## 2024-07-08 NOTE — ED PROVIDER NOTES
History  Chief Complaint   Patient presents with    Dizziness     Vertigo symptoms since Friday, history of same, took meclizine last night      79-year-old female comes in for evaluation of vertigo.  Patient states that she has a known history of vertigo and on Friday night she started to have the sensation of the room spinning.  Patient has tried her meclizine Friday Saturday and Sunday without refill of her symptoms.  Patient did not take any meclizine today.  She also has a known history of hypertension and has not taken any of her medications today.  States she has been having trouble controlling her blood pressure was told by her family doctor to see a cardiologist she does not have an appointment for several weeks however.  Denies any headache or blurred vision no slurred speech no weakness in her arms or legs.  No chest pain or shortness of breath.      History provided by:  Patient and medical records   used: No    Dizziness  Quality:  Room spinning  Severity:  Moderate  Onset quality:  Sudden  Duration:  3 days  Timing:  Constant  Progression:  Unchanged  Chronicity:  Recurrent  Context: head movement    Ineffective treatments:  Lying down and medication  Associated symptoms: no chest pain, no diarrhea, no headaches, no palpitations, no shortness of breath, no tinnitus and no vision changes    Risk factors: no anemia, no Meniere's disease and no new medications        Prior to Admission Medications   Prescriptions Last Dose Informant Patient Reported? Taking?   Apremilast (Otezla) 30 MG TABS  Self No No   Sig: Take 1 tablet by mouth 2 (two) times a day   Cholecalciferol (VITAMIN D) 125 MCG (5000 UT) CAPS  Self Yes No   Sig: Take by mouth   Continuous Blood Gluc  (FreeStyle Dejuan 14 Day [place]) THERESA  Self No No   Sig: Check BG's 2-4x a day depending on sugar levels   Continuous Blood Gluc Sensor (FreeStyle Dejuan 14 Day Sensor) MISC  Self No No   Sig: Check sugars 2-4x daily  depending on sugar levels   Insulin Glargine Solostar (Lantus SoloStar) 100 UNIT/ML SOPN   No No   Sig: INJECT SUBCUTANEOUSLY 50 UNITS  DAILY   Invokana 300 MG TABS  Self No No   Sig: TAKE 1 TABLET BY MOUTH  DAILY BEFORE BREAKFAST   Patient not taking: Reported on 6/10/2024   Mounjaro 7.5 MG/0.5ML   No No   Sig: INJECT THE CONTENTS OF ONE PEN  SUBCUTANEOUSLY WEEKLY AS  DIRECTED   OneTouch Ultra test strip  Self No No   Sig: TEST 2-3 TIMES DAILY   albuterol (PROVENTIL HFA,VENTOLIN HFA) 90 mcg/act inhaler  Self No No   Sig: Inhale 2 puffs 4 (four) times a day   aspirin 81 mg chewable tablet  Self No No   Sig: Chew 1 tablet (81 mg total) daily   diltiazem (CARDIZEM CD) 360 MG 24 hr capsule   No No   Sig: TAKE 1 CAPSULE BY MOUTH DAILY   insulin lispro (HumaLOG KwikPen) 100 units/mL injection pen  Self No No   Sig: INJECT SUBCUTANEOUSLY 5  UNITS BEFORE BREAKFAST 10  UNITS BEFORE LUNCH AND 15  UNITS BEFORE SUPPER   ipratropium-albuterol (DUO-NEB) 0.5-2.5 mg/3 mL nebulizer solution   No No   Sig: Take 3 mL by nebulization 4 (four) times a day   losartan-hydrochlorothiazide (HYZAAR) 100-25 MG per tablet   No No   Sig: TAKE 1 TABLET BY MOUTH DAILY   ondansetron (ZOFRAN) 4 mg tablet  Self No No   Sig: Take 1 tablet (4 mg total) by mouth every 8 (eight) hours as needed for nausea or vomiting   potassium chloride (K-DUR,KLOR-CON) 20 mEq tablet  Self No No   Sig: TAKE 1 TABLET BY MOUTH TWICE  DAILY      Facility-Administered Medications Last Administration Doses Remaining   cyanocobalamin injection 1,000 mcg 3/9/2021  2:55 PM    cyanocobalamin injection 1,000 mcg 7/3/2024  9:03 AM 2          Past Medical History:   Diagnosis Date    Acute left-sided low back pain with left-sided sciatica 12/03/2019    Asthma     Benign essential hypertension     Chronic lower back pain     COPD (chronic obstructive pulmonary disease) (HCC)     Diabetes mellitus (HCC)     Hyperlipidemia     Sciatica        Past Surgical History:   Procedure  Laterality Date    CHOLECYSTECTOMY         Family History   Problem Relation Age of Onset    Uterine cancer Mother     Emphysema Father     Leukemia Father      I have reviewed and agree with the history as documented.    E-Cigarette/Vaping    E-Cigarette Use Former User      E-Cigarette/Vaping Substances    Nicotine No     THC No     CBD No     Flavoring No     Other No     Unknown No      Social History     Tobacco Use    Smoking status: Former     Current packs/day: 0.00     Average packs/day: 1 pack/day for 30.0 years (30.0 ttl pk-yrs)     Types: Cigarettes     Start date: 1975     Quit date: 2005     Years since quittin.5    Smokeless tobacco: Never   Vaping Use    Vaping status: Former   Substance Use Topics    Alcohol use: Never    Drug use: Never       Review of Systems   Constitutional:  Negative for fatigue and fever.   HENT:  Negative for congestion, ear pain and tinnitus.    Eyes:  Negative for discharge and redness.   Respiratory:  Negative for apnea, cough, shortness of breath and wheezing.    Cardiovascular:  Negative for chest pain and palpitations.   Gastrointestinal:  Negative for abdominal pain and diarrhea.   Endocrine: Negative for cold intolerance and polydipsia.   Genitourinary:  Negative for difficulty urinating and hematuria.   Musculoskeletal:  Negative for arthralgias and back pain.   Skin:  Negative for color change and rash.   Allergic/Immunologic: Negative for environmental allergies and immunocompromised state.   Neurological:  Positive for dizziness. Negative for numbness and headaches.   Hematological:  Negative for adenopathy. Does not bruise/bleed easily.   Psychiatric/Behavioral:  Negative for agitation and behavioral problems.        Physical Exam  Physical Exam  Constitutional:       Appearance: She is well-developed.   HENT:      Head: Normocephalic and atraumatic.      Right Ear: External ear normal.      Left Ear: External ear normal.   Eyes:       Conjunctiva/sclera: Conjunctivae normal.      Pupils: Pupils are equal, round, and reactive to light.   Neck:      Thyroid: No thyroid mass.      Vascular: No carotid bruit or JVD.      Trachea: Trachea normal.   Cardiovascular:      Rate and Rhythm: Normal rate and regular rhythm.      Pulses: Normal pulses.      Heart sounds: Normal heart sounds, S1 normal and S2 normal.   Pulmonary:      Effort: Pulmonary effort is normal.      Breath sounds: Normal breath sounds. No wheezing, rhonchi or rales.   Abdominal:      General: Bowel sounds are normal.      Palpations: Abdomen is soft.      Tenderness: There is no abdominal tenderness. There is no guarding or rebound.   Genitourinary:     Exam position: Supine.      Vagina: No bleeding.   Musculoskeletal:         General: Normal range of motion.      Cervical back: Normal range of motion and neck supple.   Lymphadenopathy:      Cervical: No cervical adenopathy.   Skin:     General: Skin is warm and dry.   Neurological:      Mental Status: She is alert and oriented to person, place, and time.      GCS: GCS eye subscore is 4. GCS verbal subscore is 5. GCS motor subscore is 6.      Cranial Nerves: No cranial nerve deficit.      Sensory: No sensory deficit.      Deep Tendon Reflexes: Reflexes are normal and symmetric.   Psychiatric:         Speech: Speech normal.         Behavior: Behavior normal. Behavior is cooperative.         Thought Content: Thought content normal.         Vital Signs  ED Triage Vitals [07/08/24 1502]   Temperature Pulse Respirations Blood Pressure SpO2   98.5 °F (36.9 °C) 68 18 (!) 235/101 97 %      Temp Source Heart Rate Source Patient Position - Orthostatic VS BP Location FiO2 (%)   Oral Monitor Lying Right arm --      Pain Score       No Pain           Vitals:    07/08/24 1502 07/08/24 1600 07/08/24 1623 07/08/24 1630   BP: (!) 235/101 (!) 189/78 (!) 189/89 165/78   Pulse: 68 61  63   Patient Position - Orthostatic VS: Lying            Visual  Acuity      ED Medications  Medications   labetalol (NORMODYNE) injection 10 mg (10 mg Intravenous Given 7/8/24 1544)   diazepam (VALIUM) injection 2.5 mg (2.5 mg Intravenous Given 7/8/24 1545)   meclizine (ANTIVERT) tablet 25 mg (25 mg Oral Given 7/8/24 1639)       Diagnostic Studies  Results Reviewed       Procedure Component Value Units Date/Time    HS Troponin I 2hr [[id number]]  (Normal) Collected: 07/08/24 1656    Lab Status: Final result Specimen: Blood from Arm, Left Updated: 07/08/24 1723     hs TnI 2hr 4 ng/L      Delta 2hr hsTnI -2 ng/L     Comprehensive metabolic panel [[id number]] Collected: 07/08/24 1529    Lab Status: Final result Specimen: Blood from Arm, Left Updated: 07/08/24 1605     Sodium 139 mmol/L      Potassium 4.3 mmol/L      Chloride 102 mmol/L      CO2 28 mmol/L      ANION GAP 9 mmol/L      BUN 16 mg/dL      Creatinine 0.66 mg/dL      Glucose 80 mg/dL      Calcium 9.2 mg/dL      AST 20 U/L      ALT 18 U/L      Alkaline Phosphatase 93 U/L      Total Protein 6.8 g/dL      Albumin 4.1 g/dL      Total Bilirubin 0.39 mg/dL      eGFR 83 ml/min/1.73sq m     Narrative:      National Kidney Disease Foundation guidelines for Chronic Kidney Disease (CKD):     Stage 1 with normal or high GFR (GFR > 90 mL/min/1.73 square meters)    Stage 2 Mild CKD (GFR = 60-89 mL/min/1.73 square meters)    Stage 3A Moderate CKD (GFR = 45-59 mL/min/1.73 square meters)    Stage 3B Moderate CKD (GFR = 30-44 mL/min/1.73 square meters)    Stage 4 Severe CKD (GFR = 15-29 mL/min/1.73 square meters)    Stage 5 End Stage CKD (GFR <15 mL/min/1.73 square meters)  Note: GFR calculation is accurate only with a steady state creatinine    Magnesium [[id number]]  (Normal) Collected: 07/08/24 1529    Lab Status: Final result Specimen: Blood from Arm, Left Updated: 07/08/24 1605     Magnesium 1.9 mg/dL     HS Troponin 0hr (reflex protocol) [[id number]]  (Normal) Collected: 07/08/24 1529    Lab Status: Final result Specimen: Blood from Arm,  Left Updated: 07/08/24 1557     hs TnI 0hr 6 ng/L     CBC and differential [[id number]]  (Abnormal) Collected: 07/08/24 1529    Lab Status: Final result Specimen: Blood from Arm, Left Updated: 07/08/24 1535     WBC 10.65 Thousand/uL      RBC 5.03 Million/uL      Hemoglobin 14.8 g/dL      Hematocrit 44.8 %      MCV 89 fL      MCH 29.4 pg      MCHC 33.0 g/dL      RDW 12.8 %      MPV 10.2 fL      Platelets 275 Thousands/uL      nRBC 0 /100 WBCs      Segmented % 63 %      Immature Grans % 0 %      Lymphocytes % 25 %      Monocytes % 9 %      Eosinophils Relative 2 %      Basophils Relative 1 %      Absolute Neutrophils 6.68 Thousands/µL      Absolute Immature Grans 0.03 Thousand/uL      Absolute Lymphocytes 2.61 Thousands/µL      Absolute Monocytes 0.99 Thousand/µL      Eosinophils Absolute 0.25 Thousand/µL      Basophils Absolute 0.09 Thousands/µL                    CT head without contrast   Final Result by Hong Gerber MD (07/08 1552)      No acute intracranial abnormality.                  Workstation performed: [id number]         XR chest 1 view portable    (Results Pending)              Procedures  ECG 12 Lead Documentation Only    Date/Time: 7/8/2024 1:06 PM    Performed by: Carrol Knight DO  Authorized by: Carrol Knight DO    Patient location:  ED  Rate:     ECG rate:  66  Rhythm:     Rhythm: sinus rhythm    Ectopy:     Ectopy: none    QRS:     QRS axis:  Normal  Conduction:     Conduction: normal             ED Course  ED Course as of 07/08/24 1903   Mon Jul 08, 2024   1634 Patient states that her dizziness is better.  Blood pressure has come down.                                             Medical Decision Making  Differential diagnosis includes but is not limited to vertigo, dizziness, electrolyte abnormality, hypertension, arrhythmia   stroke intracranial abnormality, or ACS less likely    Problems Addressed:  Dizziness: acute illness or injury  Hypertension: chronic illness or injury with  exacerbation, progression, or side effects of treatment  Vertigo: acute illness or injury    Amount and/or Complexity of Data Reviewed  Labs: ordered. Decision-making details documented in ED Course.  Radiology: ordered and independent interpretation performed. Decision-making details documented in ED Course.     Details: Chest x-ray within normal limits  ECG/medicine tests: ordered and independent interpretation performed. Decision-making details documented in ED Course.    Risk  OTC drugs.  Prescription drug management.  Risk Details: Patient may take Motrin or Tylenol as needed for pain.  Discussed with patient she must take her blood pressure medication's as prescribed.  She should also continue her meclizine at home.  Patient needs to follow-up with cardiology and PCP and possibly neurology.             Disposition  Final diagnoses:   Dizziness   Vertigo   Hypertension     Time reflects when diagnosis was documented in both MDM as applicable and the Disposition within this note       Time User Action Codes Description Comment    7/8/2024  5:25 PM Carrol Knight Add [R42] Dizziness     7/8/2024  5:25 PM Carrol Knight Add [R42] Vertigo     7/8/2024  5:25 PM Carrol Knight Add [I10] Hypertension           ED Disposition       ED Disposition   Discharge    Condition   Stable    Date/Time   Mon Jul 8, 2024  5:25 PM    Comment   Mar Elizondo discharge to home/self care.                   Follow-up Information       Follow up With Specialties Details Why Contact Info Additional Information    [facility]iology [facility]iology Schedule an appointment as soon as possible for a visit   [address] [address]  [phone] [facility], [address] [address], [place], [place], [address]   [phone]            Discharge Medication List as of 7/8/2024  5:26 PM        CONTINUE these medications which have NOT CHANGED     Details   albuterol (PROVENTIL HFA,VENTOLIN HFA) 90 mcg/act inhaler Inhale 2 puffs 4 (four) times a day, Starting Sun 2/4/2024, Normal      Apremilast (Otezla) 30 MG TABS Take 1 tablet by mouth 2 (two) times a day, Starting Wed 6/14/2023, Normal      aspirin 81 mg chewable tablet Chew 1 tablet (81 mg total) daily, Starting Fri 10/1/2021, Normal      Cholecalciferol (VITAMIN D) 125 MCG (5000 UT) CAPS Take by mouth, Historical Med      Continuous Blood Gluc  (FreeStyle Dejuan 14 Day [place]) THERESA Check BG's 2-4x a day depending on sugar levels, Print      Continuous Blood Gluc Sensor (FreeStyle Dejuan 14 Day Sensor) MISC Check sugars 2-4x daily depending on sugar levels, Print      diltiazem (CARDIZEM CD) 360 MG 24 hr capsule TAKE 1 CAPSULE BY MOUTH DAILY, Normal      Insulin Glargine Solostar (Lantus SoloStar) 100 UNIT/ML SOPN INJECT SUBCUTANEOUSLY 50 UNITS  DAILY, Normal      insulin lispro (HumaLOG KwikPen) 100 units/mL injection pen INJECT SUBCUTANEOUSLY 5  UNITS BEFORE BREAKFAST 10  UNITS BEFORE LUNCH AND 15  UNITS BEFORE SUPPER, Normal      Invokana 300 MG TABS TAKE 1 TABLET BY MOUTH  DAILY BEFORE BREAKFAST, Starting Mon 9/11/2023, Normal      ipratropium-albuterol (DUO-NEB) 0.5-2.5 mg/3 mL nebulizer solution Take 3 mL by nebulization 4 (four) times a day, Starting Tue 2/13/2024, Normal      losartan-hydrochlorothiazide (HYZAAR) 100-25 MG per tablet TAKE 1 TABLET BY MOUTH DAILY, Starting Mon 5/20/2024, Normal      Mounjaro 7.5 MG/0.5ML INJECT THE CONTENTS OF ONE PEN  SUBCUTANEOUSLY WEEKLY AS  DIRECTED, Normal      ondansetron (ZOFRAN) 4 mg tablet Take 1 tablet (4 mg total) by mouth every 8 (eight) hours as needed for nausea or vomiting, Starting Wed 9/20/2023, Normal      OneTouch Ultra test strip TEST 2-3 TIMES DAILY, Normal      potassium chloride (K-DUR,KLOR-CON) 20 mEq tablet TAKE 1 TABLET BY MOUTH TWICE  DAILY, Starting Mon 9/11/2023, Normal                 PDMP Review         Value Time User    PDMP  Reviewed  Yes 9/20/2023  3:04 AM Arthur Jones MD            ED Provider  Electronically Signed by             Carrol Knight DO  07/08/24 1895

## 2024-07-08 NOTE — TELEPHONE ENCOUNTER
The patient called because she feels weak. She spoke to a nurse but they didn't tell her if she has to go to the ED or they will give her an appointment for today.

## 2024-07-08 NOTE — TELEPHONE ENCOUNTER
"Patient c/o vertigo for the past 3 days. She states she has had dizziness, and a headache. The last time this happened she states she went to the ED, she received an injection and was sent home. She wants to know if she can come into the office today. Please follow up.    Reason for Disposition   [1] MODERATE dizziness (e.g., vertigo; feels very unsteady, interferes with normal activities) AND [2] has NOT been evaluated by physician for this    Answer Assessment - Initial Assessment Questions  1. DESCRIPTION: \"Describe your dizziness.\"      Dizzy, lightheaded    2. VERTIGO: \"Do you feel like either you or the room is spinning or tilting?\"       Yes    4. SEVERITY: \"How bad is it?\"  \"Can you walk?\"    - MODERATE: Feels very unsteady when walking, but not falling; interferes with normal activities (e.g., school, work) .        5. ONSET:  \"When did the dizziness begin?\"      3 days    6. AGGRAVATING FACTORS: \"Does anything make it worse?\" (e.g., standing, change in head position)      Changing position    7. CAUSE: \"What do you think is causing the dizziness?\"      Vertigo    8. RECURRENT SYMPTOM: \"Have you had dizziness before?\" If Yes, ask: \"When was the last time?\" \"What happened that time?\"      Last time she went to the ER     9. OTHER SYMPTOMS: \"Do you have any other symptoms?\" (e.g., headache, weakness, numbness, vomiting, earache)      Headache    10. PREGNANCY: \"Is there any chance you are pregnant?\" \"When was your last menstrual period?\"        N/A    Protocols used: Dizziness - Vertigo-ADULT-AH    "

## 2024-07-08 NOTE — ED NOTES
Discharge instructions reviewed with pt. Pt verbalized understanding. And has no further questions at this time. Pt ambulatory off unit with steady gait.      Bailey Kc RN  07/08/24 [id number]

## 2024-07-09 LAB
ATRIAL RATE: 66 BPM
P AXIS: -3 DEGREES
PR INTERVAL: 158 MS
QRS AXIS: 26 DEGREES
QRSD INTERVAL: 92 MS
QT INTERVAL: 394 MS
QTC INTERVAL: 413 MS
T WAVE AXIS: 60 DEGREES
VENTRICULAR RATE: 66 BPM

## 2024-07-09 PROCEDURE — 93010 ELECTROCARDIOGRAM REPORT: CPT | Performed by: INTERNAL MEDICINE

## 2024-07-10 ENCOUNTER — CLINICAL SUPPORT (OUTPATIENT)
Dept: FAMILY MEDICINE CLINIC | Facility: CLINIC | Age: 79
End: 2024-07-10
Payer: COMMERCIAL

## 2024-07-10 DIAGNOSIS — E53.8 VITAMIN B12 DEFICIENCY: Primary | ICD-10-CM

## 2024-07-10 RX ADMIN — CYANOCOBALAMIN 1000 MCG: 1000 INJECTION, SOLUTION INTRAMUSCULAR; SUBCUTANEOUS at 08:50

## 2024-07-17 ENCOUNTER — CLINICAL SUPPORT (OUTPATIENT)
Dept: FAMILY MEDICINE CLINIC | Facility: CLINIC | Age: 79
End: 2024-07-17
Payer: COMMERCIAL

## 2024-07-17 DIAGNOSIS — E53.8 VITAMIN B12 DEFICIENCY: Primary | ICD-10-CM

## 2024-07-17 PROCEDURE — 96372 THER/PROPH/DIAG INJ SC/IM: CPT

## 2024-07-17 RX ADMIN — CYANOCOBALAMIN 1000 MCG: 1000 INJECTION, SOLUTION INTRAMUSCULAR; SUBCUTANEOUS at 09:09

## 2024-07-23 ENCOUNTER — CONSULT (OUTPATIENT)
Dept: NEPHROLOGY | Facility: CLINIC | Age: 79
End: 2024-07-23
Payer: COMMERCIAL

## 2024-07-23 VITALS
HEART RATE: 68 BPM | DIASTOLIC BLOOD PRESSURE: 78 MMHG | HEIGHT: 61 IN | SYSTOLIC BLOOD PRESSURE: 156 MMHG | BODY MASS INDEX: 39.46 KG/M2 | WEIGHT: 209 LBS

## 2024-07-23 DIAGNOSIS — R80.1 PERSISTENT PROTEINURIA: ICD-10-CM

## 2024-07-23 DIAGNOSIS — R80.9 MICROALBUMINURIA: Primary | ICD-10-CM

## 2024-07-23 PROCEDURE — 99204 OFFICE O/P NEW MOD 45 MIN: CPT | Performed by: INTERNAL MEDICINE

## 2024-07-23 NOTE — LETTER
July 23, 2024     TAMIR Montejo DO  [address] [facility]  [address]  [place] [address]    Patient: Mar Elizondo   YOB: 1945   Date of Visit: 7/23/2024       Dear Dr. Montejo:    Thank you for referring Mar Elizondo to me for evaluation. Below are my notes for this consultation.    If you have questions, please do not hesitate to call me. I look forward to following your patient along with you.         Sincerely,        Aguila Davila MD        CC: No Recipients    Aguila Dvaila MD  7/23/2024  9:22 AM  Sign when Signing Visit  Consultation - Nephrology   Mar Elizondo 79 y.o. female MRN: [id number]  Unit/Bed#:  Encounter: [id number]      Assessment & Plan    Assessment / Plan:    1.  Microalbuminuria    Is a longstanding diabetic and seen on my partners a few years ago.  This point in time her creatinine is normal at 0.6 she still has protein estimations in the microalbumin range and although it increased in the last few years this does not mean she has diabetic nephropathy at this point.  I told her to continue with good sugar control keep your A1c less than 7%.  She did not tolerate a statin in the past she told me.  Blood pressures are not ideal and we will discuss below.  Continue her ARB.    For now just continue to monitor per protocol and I told her just to focus on her sugar control and weight loss.    2.  Hypertension    This apparently has been an issue over the years and she is presently on diltiazem, an ARB and a thiazide diuretic.  Blood pressure is not ideal she states it could be in the 140s systolic at home.  Her heart rate is borderline so reluctant to use a beta-blocker.  I do not want to use doxazosin today because of her recent vertigo.  The other medications are older and the side effect profile the patient wanted to hold off and see if she lost some weight on Mounjaro to see if that would help she you can observe.    If she remains free of vertiginous symptoms you could try doxazosin  starting at 1 mg at bed and slowly titrate up as tolerated.    I have spent a total time of 48 minutes in caring for this patient on the day of the visit/encounter including Diagnostic results, Impressions, Reviewing / ordering tests, medicine, procedures  , and Obtaining or reviewing history  .       History of Present Illness  Physician Requesting Consult: No att. providers found  Reason for Consult / Principal Problem: Microalbuminuria and hypertension  Hx and PE limited by:   HPI: Mar Elizondo is a 79 y.o. year old female who presents for her first visit with me.  She had seen a partner mine a few years ago.  She is referred for an increase in her protein estimation and potentially discussing blood pressure management.  She had no complaints today and feels well.  History obtained from chart review and the patient    Consults    Review of Systems   Constitutional:  Negative for chills, diaphoresis and fever.   HENT: Negative.     Eyes: Negative.    Respiratory:  Negative for cough, chest tightness, shortness of breath and wheezing.    Cardiovascular:  Negative for chest pain, palpitations and leg swelling.   Gastrointestinal:  Negative for abdominal pain, diarrhea and nausea.   Genitourinary:  Negative for difficulty urinating, dysuria and flank pain.   Neurological:  Negative for dizziness and headaches.        Recent vertigo symptoms.   Psychiatric/Behavioral:  Negative for agitation, behavioral problems and confusion.        Historical Information  Patient Active Problem List   Diagnosis   • Controlled type 2 diabetes mellitus, with long-term current use of insulin (HCC)   • Resistant hypertension   • Mixed simple and mucopurulent chronic bronchitis (HCC)   • Cyanocobalamin deficiency   • Mixed hyperlipidemia   • Psoriasis-like skin disease   • Myalgia due to statin   • Need for vaccination   • Psoriasis   • Hyperlipidemia associated with type 2 diabetes mellitus  (HCC)   • Increased BMI   • Class 2 severe  obesity due to excess calories with serious comorbidity and body mass index (BMI) of 35.0 to 35.9 in adult ([facility])   • Unspecified abnormalities of gait and mobility   • Personal history of nicotine dependence   • Other chronic pain   • Allergic reaction   • Chest pain, unspecified   • Hypokalemia   • Uncontrolled type 2 diabetes mellitus with hyperglycemia ([facility])   • Hypertensive urgency   • Medication management   • COPD exacerbation ([facility])   • Obesity, morbid ([facility])   • Vitamin D insufficiency   • Vitamin B12 deficiency   • Elevated blood sugar     Past Medical History:   Diagnosis Date   • Acute left-sided low back pain with left-sided sciatica 2019   • Asthma    • Benign essential hypertension    • Chronic lower back pain    • COPD (chronic obstructive pulmonary disease) ([facility])    • Diabetes mellitus ([facility])    • Hyperlipidemia    • Sciatica      Past Surgical History:   Procedure Laterality Date   • CHOLECYSTECTOMY       Social History  Social History     Substance and Sexual Activity   Alcohol Use Never   No tobacco ethanol or drug abuse.  Social History     Substance and Sexual Activity   Drug Use Never     Social History     Tobacco Use   Smoking Status Former   • Current packs/day: 0.00   • Average packs/day: 1 pack/day for 30.0 years (30.0 ttl pk-yrs)   • Types: Cigarettes   • Start date: 1975   • Quit date: 2005   • Years since quittin.5   Smokeless Tobacco Never     Family History   Problem Relation Age of Onset   • Uterine cancer Mother    • Emphysema Father    • Leukemia Father    Family history of kidney disease.    Meds/Allergies  current meds:   Current Facility-Administered Medications   Medication Dose Route Frequency   • cyanocobalamin injection 1,000 mcg  1,000 mcg Intramuscular Q30 Days     No Known Allergies    Objective  [unfilled]  There is no height or weight on file to calculate BMI.    Invasive Devices:        PHYSICAL EXAM:  There were no vitals taken for this  "visit.    Physical Exam  Constitutional:       General: She is not in acute distress.     Appearance: She is not toxic-appearing.   HENT:      Head: Normocephalic and atraumatic.      Mouth/Throat:      Mouth: Mucous membranes are moist.   Eyes:      General: No scleral icterus.     Extraocular Movements: Extraocular movements intact.   Cardiovascular:      Rate and Rhythm: Normal rate and regular rhythm.      Heart sounds:      No friction rub. No gallop.   Pulmonary:      Effort: Pulmonary effort is normal. No respiratory distress.      Breath sounds: No wheezing or rales.   Abdominal:      General: Bowel sounds are normal. There is no distension.      Palpations: Abdomen is soft.      Tenderness: There is no abdominal tenderness. There is no rebound.   Musculoskeletal:      Cervical back: Normal range of motion and neck supple.   Neurological:      Mental Status: She is alert.           Current Weight:    First Weight:      Lab Results:              Invalid input(s): \"LABGLOM\"        Invalid input(s): \"LABALBU\"          "

## 2024-07-23 NOTE — LETTER
July 23, 2024     TAMIR Montejo DO  [address] [facility]  [address]  [place] [address]    Patient: Mar Elizondo   YOB: 1945   Date of Visit: 7/23/2024       Dear Dr. Montejo:    Thank you for referring Mar Elizondo to me for evaluation. Below are my notes for this consultation.    If you have questions, please do not hesitate to call me. I look forward to following your patient along with you.         Sincerely,        Aguila Davila MD        CC: No Recipients    Aguila Davila MD  7/23/2024  9:21 AM  Sign when Signing Visit  Consultation - Nephrology   Mar Elizondo 79 y.o. female MRN: [id number]  Unit/Bed#:  Encounter: [id number]      Assessment & Plan    Assessment / Plan:    1.  Microalbuminuria    Is a longstanding diabetic and seen on my partners a few years ago.  This point in time her creatinine is normal at 0.6 she still has protein estimations in the microalbumin range and although it increased in the last few years this does not mean she has diabetic nephropathy at this point.  I told her to continue with good sugar control keep your A1c less than 7%.  She did not tolerate a statin in the past she told me.  Blood pressures are not ideal and we will discuss below.  Continue her ARB.    For now just continue to monitor per protocol and I told her just to focus on her sugar control and weight loss.    2.  Hypertension    This apparently has been an issue over the years and she is presently on diltiazem, an ARB and a thiazide diuretic.  Blood pressure is not ideal she states it could be in the 140s systolic at home.  Her heart rate is borderline so reluctant to use a beta-blocker.  I do not want to use doxazosin today because of her recent vertigo.  The other medications are older and the side effect profile the patient wanted to hold off and see if she lost some weight on Mounjaro to see if that would help she you can observe.    If she remains free of vertiginous symptoms you could try doxazosin  starting at 1 mg at bed and slowly titrate up as tolerated.    I have spent a total time of 48 minutes in caring for this patient on the day of the visit/encounter including {AMB Counseling Topics:[id number]}.       History of Present Illness  Physician Requesting Consult: No att. providers found  Reason for Consult / Principal Problem: ***  Hx and PE limited by:   HPI: Mar Elizondo is a 79 y.o. year old female who presents with ***  History obtained from {source of history:617955}    Consults    Review of Systems   Constitutional:  Negative for chills, diaphoresis and fever.   HENT: Negative.     Eyes: Negative.    Respiratory:  Negative for cough, chest tightness, shortness of breath and wheezing.    Cardiovascular:  Negative for chest pain, palpitations and leg swelling.   Gastrointestinal:  Negative for abdominal pain, diarrhea and nausea.   Genitourinary:  Negative for difficulty urinating, dysuria and flank pain.   Neurological:  Negative for dizziness and headaches.        Recent vertigo symptoms.   Psychiatric/Behavioral:  Negative for agitation, behavioral problems and confusion.        Historical Information  Patient Active Problem List   Diagnosis    Controlled type 2 diabetes mellitus, with long-term current use of insulin (HCC)    Resistant hypertension    Mixed simple and mucopurulent chronic bronchitis (HCC)    Cyanocobalamin deficiency    Mixed hyperlipidemia    Psoriasis-like skin disease    Myalgia due to statin    Need for vaccination    Psoriasis    Hyperlipidemia associated with type 2 diabetes mellitus  (HCC)    Increased BMI    Class 2 severe obesity due to excess calories with serious comorbidity and body mass index (BMI) of 35.0 to 35.9 in adult (HCC)    Unspecified abnormalities of gait and mobility    Personal history of nicotine dependence    Other chronic pain    Allergic reaction    Chest pain, unspecified    Hypokalemia    Uncontrolled type 2 diabetes mellitus with hyperglycemia  (HCC)    Hypertensive urgency    Medication management    COPD exacerbation (HCC)    Obesity, morbid (HCC)    Vitamin D insufficiency    Vitamin B12 deficiency    Elevated blood sugar     Past Medical History:   Diagnosis Date    Acute left-sided low back pain with left-sided sciatica 2019    Asthma     Benign essential hypertension     Chronic lower back pain     COPD (chronic obstructive pulmonary disease) (HCC)     Diabetes mellitus (HCC)     Hyperlipidemia     Sciatica      Past Surgical History:   Procedure Laterality Date    CHOLECYSTECTOMY       Social History  Social History     Substance and Sexual Activity   Alcohol Use Never     Social History     Substance and Sexual Activity   Drug Use Never     Social History     Tobacco Use   Smoking Status Former    Current packs/day: 0.00    Average packs/day: 1 pack/day for 30.0 years (30.0 ttl pk-yrs)    Types: Cigarettes    Start date: 1975    Quit date: 2005    Years since quittin.5   Smokeless Tobacco Never     Family History   Problem Relation Age of Onset    Uterine cancer Mother     Emphysema Father     Leukemia Father        Meds/Allergies  current meds:   Current Facility-Administered Medications   Medication Dose Route Frequency    cyanocobalamin injection 1,000 mcg  1,000 mcg Intramuscular Q30 Days     No Known Allergies    Objective  [unfilled]  There is no height or weight on file to calculate BMI.    Invasive Devices:        PHYSICAL EXAM:  There were no vitals taken for this visit.    Physical Exam  Constitutional:       General: She is not in acute distress.     Appearance: She is not toxic-appearing.   HENT:      Head: Normocephalic and atraumatic.      Mouth/Throat:      Mouth: Mucous membranes are moist.   Eyes:      General: No scleral icterus.     Extraocular Movements: Extraocular movements intact.   Cardiovascular:      Rate and Rhythm: Normal rate and regular rhythm.      Heart sounds:      No friction rub. No gallop.  "  Pulmonary:      Effort: Pulmonary effort is normal. No respiratory distress.      Breath sounds: No wheezing or rales.   Abdominal:      General: Bowel sounds are normal. There is no distension.      Palpations: Abdomen is soft.      Tenderness: There is no abdominal tenderness. There is no rebound.   Musculoskeletal:      Cervical back: Normal range of motion and neck supple.   Neurological:      Mental Status: She is alert.           Current Weight:    First Weight:      Lab Results:              Invalid input(s): \"LABGLOM\"        Invalid input(s): \"LABALBU\"          "

## 2024-07-23 NOTE — PATIENT INSTRUCTIONS
You are here for a visit and being a diabetic.  Puts you at risk for kidney disease but at this point in time you do not have diabetic kidney disease there is a tiny bit of protein in the urine recall that microalbumin stage it is not very heavy so at this point no significant kidney disease.  Also the blood test for your kidney function creatinine was totally normal at 0.6.    Your blood sugars are under good control.    Your blood pressure is not ideal but you said at times it is 140s sometimes 150s.  You have had problems with other medications over the years and the first 1 I would think of giving you can cause dizziness and since you recently were having vertigo attacks were going to hold off we will see if you lose some weight on the Mounjaro and maybe the blood pressure will come down slightly but for now no changes.

## 2024-07-23 NOTE — PROGRESS NOTES
Consultation - Nephrology   Mar Elizondo 79 y.o. female MRN: [id number]  Unit/Bed#:  Encounter: [id number]      Assessment & Plan     Assessment / Plan:    1.  Microalbuminuria    Is a longstanding diabetic and seen on my partners a few years ago.  This point in time her creatinine is normal at 0.6 she still has protein estimations in the microalbumin range and although it increased in the last few years this does not mean she has diabetic nephropathy at this point.  I told her to continue with good sugar control keep your A1c less than 7%.  She did not tolerate a statin in the past she told me.  Blood pressures are not ideal and we will discuss below.  Continue her ARB.    For now just continue to monitor per protocol and I told her just to focus on her sugar control and weight loss.    2.  Hypertension    This apparently has been an issue over the years and she is presently on diltiazem, an ARB and a thiazide diuretic.  Blood pressure is not ideal she states it could be in the 140s systolic at home.  Her heart rate is borderline so reluctant to use a beta-blocker.  I do not want to use doxazosin today because of her recent vertigo.  The other medications are older and the side effect profile the patient wanted to hold off and see if she lost some weight on Mounjaro to see if that would help she you can observe.    If she remains free of vertiginous symptoms you could try doxazosin starting at 1 mg at bed and slowly titrate up as tolerated.    I have spent a total time of 48 minutes in caring for this patient on the day of the visit/encounter including Diagnostic results, Impressions, Reviewing / ordering tests, medicine, procedures  , and Obtaining or reviewing history  .       History of Present Illness   Physician Requesting Consult: No att. providers found  Reason for Consult / Principal Problem: Microalbuminuria and hypertension  Hx and PE limited by:   HPI: Mar Elizondo is a 79 y.o. year old female who  presents for her first visit with me.  She had seen a partner mine a few years ago.  She is referred for an increase in her protein estimation and potentially discussing blood pressure management.  She had no complaints today and feels well.  History obtained from chart review and the patient    Consults    Review of Systems   Constitutional:  Negative for chills, diaphoresis and fever.   HENT: Negative.     Eyes: Negative.    Respiratory:  Negative for cough, chest tightness, shortness of breath and wheezing.    Cardiovascular:  Negative for chest pain, palpitations and leg swelling.   Gastrointestinal:  Negative for abdominal pain, diarrhea and nausea.   Genitourinary:  Negative for difficulty urinating, dysuria and flank pain.   Neurological:  Negative for dizziness and headaches.        Recent vertigo symptoms.   Psychiatric/Behavioral:  Negative for agitation, behavioral problems and confusion.        Historical Information   Patient Active Problem List   Diagnosis    Controlled type 2 diabetes mellitus, with long-term current use of insulin (HCC)    Resistant hypertension    Mixed simple and mucopurulent chronic bronchitis (HCC)    Cyanocobalamin deficiency    Mixed hyperlipidemia    Psoriasis-like skin disease    Myalgia due to statin    Need for vaccination    Psoriasis    Hyperlipidemia associated with type 2 diabetes mellitus  (HCC)    Increased BMI    Class 2 severe obesity due to excess calories with serious comorbidity and body mass index (BMI) of 35.0 to 35.9 in adult (HCC)    Unspecified abnormalities of gait and mobility    Personal history of nicotine dependence    Other chronic pain    Allergic reaction    Chest pain, unspecified    Hypokalemia    Uncontrolled type 2 diabetes mellitus with hyperglycemia ([facility])    Hypertensive urgency    Medication management    COPD exacerbation (HCC)    Obesity, morbid (HCC)    Vitamin D insufficiency    Vitamin B12 deficiency    Elevated blood sugar     Past  Medical History:   Diagnosis Date    Acute left-sided low back pain with left-sided sciatica 2019    Asthma     Benign essential hypertension     Chronic lower back pain     COPD (chronic obstructive pulmonary disease) (HCC)     Diabetes mellitus (HCC)     Hyperlipidemia     Sciatica      Past Surgical History:   Procedure Laterality Date    CHOLECYSTECTOMY       Social History   Social History     Substance and Sexual Activity   Alcohol Use Never   No tobacco ethanol or drug abuse.  Social History     Substance and Sexual Activity   Drug Use Never     Social History     Tobacco Use   Smoking Status Former    Current packs/day: 0.00    Average packs/day: 1 pack/day for 30.0 years (30.0 ttl pk-yrs)    Types: Cigarettes    Start date: 1975    Quit date: 2005    Years since quittin.5   Smokeless Tobacco Never     Family History   Problem Relation Age of Onset    Uterine cancer Mother     Emphysema Father     Leukemia Father    Family history of kidney disease.    Meds/Allergies   current meds:   Current Facility-Administered Medications   Medication Dose Route Frequency    cyanocobalamin injection 1,000 mcg  1,000 mcg Intramuscular Q30 Days     No Known Allergies    Objective   [unfilled]  There is no height or weight on file to calculate BMI.    Invasive Devices:        PHYSICAL EXAM:  There were no vitals taken for this visit.    Physical Exam  Constitutional:       General: She is not in acute distress.     Appearance: She is not toxic-appearing.   HENT:      Head: Normocephalic and atraumatic.      Mouth/Throat:      Mouth: Mucous membranes are moist.   Eyes:      General: No scleral icterus.     Extraocular Movements: Extraocular movements intact.   Cardiovascular:      Rate and Rhythm: Normal rate and regular rhythm.      Heart sounds:      No friction rub. No gallop.   Pulmonary:      Effort: Pulmonary effort is normal. No respiratory distress.      Breath sounds: No wheezing or rales.  "  Abdominal:      General: Bowel sounds are normal. There is no distension.      Palpations: Abdomen is soft.      Tenderness: There is no abdominal tenderness. There is no rebound.   Musculoskeletal:      Cervical back: Normal range of motion and neck supple.   Neurological:      Mental Status: She is alert.           Current Weight:    First Weight:      Lab Results:              Invalid input(s): \"LABGLOM\"        Invalid input(s): \"LABALBU\"          "

## 2024-07-24 ENCOUNTER — CLINICAL SUPPORT (OUTPATIENT)
Dept: FAMILY MEDICINE CLINIC | Facility: CLINIC | Age: 79
End: 2024-07-24
Payer: COMMERCIAL

## 2024-07-24 DIAGNOSIS — E53.8 VITAMIN B12 DEFICIENCY: Primary | ICD-10-CM

## 2024-07-24 PROCEDURE — 96372 THER/PROPH/DIAG INJ SC/IM: CPT

## 2024-07-24 RX ADMIN — CYANOCOBALAMIN 1000 MCG: 1000 INJECTION, SOLUTION INTRAMUSCULAR; SUBCUTANEOUS at 09:01

## 2024-07-29 DIAGNOSIS — L40.9 PSORIASIS: ICD-10-CM

## 2024-07-30 RX ORDER — APREMILAST 30 MG/1
30 TABLET, FILM COATED ORAL 2 TIMES DAILY
Qty: 180 TABLET | Refills: 1 | Status: SHIPPED | OUTPATIENT
Start: 2024-07-30

## 2024-07-31 ENCOUNTER — CLINICAL SUPPORT (OUTPATIENT)
Dept: FAMILY MEDICINE CLINIC | Facility: CLINIC | Age: 79
End: 2024-07-31
Payer: COMMERCIAL

## 2024-07-31 ENCOUNTER — TELEPHONE (OUTPATIENT)
Age: 79
End: 2024-07-31

## 2024-07-31 ENCOUNTER — RA CDI HCC (OUTPATIENT)
Dept: OTHER | Facility: HOSPITAL | Age: 79
End: 2024-07-31

## 2024-07-31 DIAGNOSIS — E53.8 VITAMIN B12 DEFICIENCY: Primary | ICD-10-CM

## 2024-07-31 PROCEDURE — 96372 THER/PROPH/DIAG INJ SC/IM: CPT

## 2024-07-31 RX ADMIN — CYANOCOBALAMIN 1000 MCG: 1000 INJECTION, SOLUTION INTRAMUSCULAR; SUBCUTANEOUS at 09:15

## 2024-07-31 NOTE — TELEPHONE ENCOUNTER
PA for Apremilast (Otezla) 30 MG TABS SUBMITTED     via    [][employer]   [x][employer]-Case ID # [id number]  []Faxed to plan   []Other website   []Phone call Case ID #     Office notes sent, clinical questions answered. Awaiting determination    Turnaround time for your insurance to make a decision on your Prior Authorization can take 7-21 business days.

## 2024-07-31 NOTE — TELEPHONE ENCOUNTER
Rody from [employer] called in stating there is already a PA on file for this medication that is good until 10/5/24, Rody states the medication is due for another refill until 8/23/24 which the insurance will cover.

## 2024-08-08 ENCOUNTER — CLINICAL SUPPORT (OUTPATIENT)
Dept: FAMILY MEDICINE CLINIC | Facility: CLINIC | Age: 79
End: 2024-08-08
Payer: COMMERCIAL

## 2024-08-08 DIAGNOSIS — E53.8 VITAMIN B12 DEFICIENCY: Primary | ICD-10-CM

## 2024-08-08 PROCEDURE — 96372 THER/PROPH/DIAG INJ SC/IM: CPT

## 2024-08-08 RX ADMIN — CYANOCOBALAMIN 1000 MCG: 1000 INJECTION, SOLUTION INTRAMUSCULAR; SUBCUTANEOUS at 09:02

## 2024-08-27 ENCOUNTER — CONSULT (OUTPATIENT)
Dept: CARDIOLOGY CLINIC | Facility: CLINIC | Age: 79
End: 2024-08-27
Payer: COMMERCIAL

## 2024-08-27 VITALS
SYSTOLIC BLOOD PRESSURE: 132 MMHG | DIASTOLIC BLOOD PRESSURE: 68 MMHG | WEIGHT: 211.4 LBS | HEIGHT: 61 IN | BODY MASS INDEX: 39.91 KG/M2 | OXYGEN SATURATION: 98 % | HEART RATE: 68 BPM

## 2024-08-27 DIAGNOSIS — I10 HYPERTENSION: ICD-10-CM

## 2024-08-27 DIAGNOSIS — E78.2 MIXED HYPERLIPIDEMIA: Primary | ICD-10-CM

## 2024-08-27 PROCEDURE — 99204 OFFICE O/P NEW MOD 45 MIN: CPT | Performed by: STUDENT IN AN ORGANIZED HEALTH CARE EDUCATION/TRAINING PROGRAM

## 2024-08-27 NOTE — PROGRESS NOTES
[facility]  [address]    [place] [address]  Phone#  [phone]  Fax#  [phone]  [facility] Cardiology Office Consultation             NAME: Mar Elizondo  AGE: 79 y.o. SEX: female   : 1945   MRN: [id number]    DATE: 2024  TIME: 9:24 AM    Assessment and recommendations    1. Mixed hyperlipidemia        2. Hypertension  Ambulatory Referral to Cardiology         Hyperlipidemia  Discussed with patient. Recommended statin. She refuses. Discussed dietary changes    Hypertension  Patient's blood pressure currently well-controlled on losartan/HCTZ 100/25 mg daily, diltiazem 360 mg daily.  She does have a component of whitecoat hypertension, and her blood pressure decreased after sitting in our office visit for approximately 10 minutes.  She checks her blood pressures daily at home and states the systolic blood pressures are usually in the 130s or below.  -I discussed that my next recommendation would be to start spironolactone if needed for further blood pressure control  -Encourage home BP monitoring    Follow up 1 yr for BP management    Chief Complaint   Patient presents with    Advice Only     Ref by ED for dizziness & palpitations     Fatigue    Dizziness     Episodes have subsided since ED visit        HPI:    Mar Elizondo is a 79 y.o.-year-old female who presents to the cardiology clinic for initial consultation.  Past medical history significant for hypertension, COPD, type 2 diabetes, hyperlipidemia.    She presents today for an emergency room follow-up as well as recommendations regarding blood pressure control.  She was in the emergency room at the beginning of July for an episode of vertigo.  Troponins were checked at that time that were negative.  Her ECG showed normal sinus rhythm, normal.  She denied any chest pain, chest discomfort, shortness of breath, or dyspnea exertion at that time.    Patient has had history of difficulty controlling  blood pressures.  Is currently on losartan/HCTZ 100/25 mg daily and diltiazem 360 mg daily.  Has had issues with amlodipine in the past, and reported leg pain while taking it.  Blood pressure was 142/78 at the beginning of the appointment.  After resting for approximately 10 minutes, her blood pressure decreased to 132/68.  She reports a history of whitecoat hypertension as well as hypertension.  Blood pressures remain moderately well-controlled at home per patient report.  Reports systolics usually in the 120s-130s, but occasionally higher.        I personally reviewed the patient's pertinent cardiac imaging and labs in [facility]:  ECGs, detailed above      Past history, family history, social history, current medications, vital signs, recent lab and imaging studies and  prior cardiology studies reviewed independently on this visit.    No Known Allergies    Current Outpatient Medications:     albuterol (PROVENTIL HFA,VENTOLIN HFA) 90 mcg/act inhaler, Inhale 2 puffs 4 (four) times a day, Disp: 18 g, Rfl: 1    Apremilast (Otezla) 30 MG TABS, Take 1 tablet by mouth 2 (two) times a day, Disp: 180 tablet, Rfl: 1    aspirin 81 mg chewable tablet, Chew 1 tablet (81 mg total) daily, Disp: 30 tablet, Rfl: 0    Cholecalciferol (VITAMIN D) 125 MCG (5000 UT) CAPS, Take by mouth, Disp: , Rfl:     Continuous Blood Gluc  (FreeStyle Dejuan 14 Day [place]) THERESA, Check BG's 2-4x a day depending on sugar levels, Disp: 1 each, Rfl: 0    Continuous Blood Gluc Sensor (FreeStyle Dejuan 14 Day Sensor) MISC, Check sugars 2-4x daily depending on sugar levels, Disp: 28 each, Rfl: 6    diltiazem (CARDIZEM CD) 360 MG 24 hr capsule, TAKE 1 CAPSULE BY MOUTH DAILY, Disp: 90 capsule, Rfl: 1    Insulin Glargine Solostar (Lantus SoloStar) 100 UNIT/ML SOPN, INJECT SUBCUTANEOUSLY 50 UNITS  DAILY, Disp: 45 mL, Rfl: 1    insulin lispro (HumaLOG KwikPen) 100 units/mL injection pen, INJECT SUBCUTANEOUSLY 5  UNITS BEFORE BREAKFAST 10  UNITS BEFORE LUNCH  AND 15  UNITS BEFORE SUPPER, Disp: 30 mL, Rfl: 3    ipratropium-albuterol (DUO-NEB) 0.5-2.5 mg/3 mL nebulizer solution, Take 3 mL by nebulization 4 (four) times a day, Disp: 180 mL, Rfl: 1    losartan-hydrochlorothiazide (HYZAAR) 100-25 MG per tablet, TAKE 1 TABLET BY MOUTH DAILY, Disp: 90 tablet, Rfl: 1    Mounjaro 7.5 MG/0.5ML, INJECT THE CONTENTS OF ONE PEN  SUBCUTANEOUSLY WEEKLY AS  DIRECTED, Disp: 4 mL, Rfl: 5    OneTouch Ultra test strip, TEST 2-3 TIMES DAILY, Disp: 100 strip, Rfl: 3    potassium chloride (K-DUR,KLOR-CON) 20 mEq tablet, TAKE 1 TABLET BY MOUTH TWICE  DAILY (Patient taking differently: Take 20 mEq by mouth 2 (two) times a day Pt taking 20 mEq  daily), Disp: 180 tablet, Rfl: 3    Current Facility-Administered Medications:     cyanocobalamin injection 1,000 mcg, 1,000 mcg, Intramuscular, Q30 Days, TAMIR Montejo DO, 1,000 mcg at 08/08/24 0902    Past Medical History:   Diagnosis Date    Acute left-sided low back pain with left-sided sciatica 12/03/2019    Asthma     Benign essential hypertension     Chronic lower back pain     COPD (chronic obstructive pulmonary disease) (HCC)     Diabetes mellitus (HCC)     Hyperlipidemia     Sciatica      Past Surgical History:   Procedure Laterality Date    CHOLECYSTECTOMY       Family History   Problem Relation Age of Onset    Uterine cancer Mother     Emphysema Father     Leukemia Father        Social History   reports that she quit smoking about 19 years ago. Her smoking use included cigarettes. She started smoking about 49 years ago. She has a 30 pack-year smoking history. She has never used smokeless tobacco. She reports that she does not drink alcohol and does not use drugs.     Review of Systems   Constitutional:  Negative for fatigue and unexpected weight change.   HENT: Negative.     Respiratory:  Negative for chest tightness and shortness of breath.    Cardiovascular:  Negative for chest pain and palpitations.   Gastrointestinal:  Negative for  abdominal pain.   Musculoskeletal: Negative.    Neurological: Negative.    Psychiatric/Behavioral: Negative.         Objective:     Vitals:    08/27/24 0907   BP: 132/68   Pulse:    SpO2:      Physical Exam  Vitals reviewed.   Constitutional:       General: She is not in acute distress.     Appearance: She is well-developed.   HENT:      Head: Normocephalic and atraumatic.   Eyes:      Conjunctiva/sclera: Conjunctivae normal.   Cardiovascular:      Rate and Rhythm: Normal rate and regular rhythm.      Heart sounds: No murmur heard.  Pulmonary:      Effort: Pulmonary effort is normal. No respiratory distress.      Breath sounds: Normal breath sounds.   Musculoskeletal:         General: No swelling.      Cervical back: Neck supple.   Skin:     General: Skin is warm and dry.   Neurological:      Mental Status: She is alert.   Psychiatric:         Mood and Affect: Mood normal.         Pertinent Laboratory/Diagnostic Studies:    Laboratory studies reviewed personally by Venessa Mccullough MD    BMP:   Lab Results   Component Value Date    SODIUM 139 07/08/2024    K 4.3 07/08/2024     07/08/2024    CO2 28 07/08/2024    BUN 16 07/08/2024    CREATININE 0.66 07/08/2024    GLUC 80 07/08/2024    CALCIUM 9.2 07/08/2024     CBC:  Lab Results   Component Value Date    WBC 10.65 (H) 07/08/2024    HGB 14.8 07/08/2024    HCT 44.8 07/08/2024    MCV 89 07/08/2024     07/08/2024     Lipid Profile:   Lab Results   Component Value Date    HDL 49 (L) 06/03/2024     Lab Results   Component Value Date    LDLCALC 162 (H) 06/03/2024     Lab Results   Component Value Date    TRIG 112 06/03/2024      Other labs:  Lab Results   Component Value Date    UDP6WQHDHTXC 4.379 06/03/2024     Lab Results   Component Value Date    ALT 18 07/08/2024    AST 20 07/08/2024       Imaging Studies:     Pertinent cardiac studies and imaging studies were personally reviewed on this visit and results summarized above.    Venessa Mccullough MD,  "PhD    Portions of the record may have been created with voice recognition software.  Occasional wrong word or \"sound alike\" substitutions may have occurred due to the inherent limitations of voice recognition software.  Read the chart carefully and recognize, using context, where substitutions have occurred. Please reach out to me directly for any clarifications.   "

## 2024-09-10 ENCOUNTER — OFFICE VISIT (OUTPATIENT)
Dept: FAMILY MEDICINE CLINIC | Facility: CLINIC | Age: 79
End: 2024-09-10
Payer: COMMERCIAL

## 2024-09-10 VITALS
OXYGEN SATURATION: 96 % | WEIGHT: 208.4 LBS | TEMPERATURE: 98.2 F | SYSTOLIC BLOOD PRESSURE: 150 MMHG | DIASTOLIC BLOOD PRESSURE: 64 MMHG | BODY MASS INDEX: 39.35 KG/M2 | HEART RATE: 63 BPM | HEIGHT: 61 IN

## 2024-09-10 DIAGNOSIS — E53.8 CYANOCOBALAMIN DEFICIENCY: ICD-10-CM

## 2024-09-10 DIAGNOSIS — R26.9 UNSPECIFIED ABNORMALITIES OF GAIT AND MOBILITY: ICD-10-CM

## 2024-09-10 DIAGNOSIS — E87.6 HYPOKALEMIA: ICD-10-CM

## 2024-09-10 DIAGNOSIS — T46.6X5A MYALGIA DUE TO STATIN: ICD-10-CM

## 2024-09-10 DIAGNOSIS — R63.8 INCREASED BMI: ICD-10-CM

## 2024-09-10 DIAGNOSIS — J44.1 COPD EXACERBATION (HCC): Primary | ICD-10-CM

## 2024-09-10 DIAGNOSIS — Z79.899 MEDICATION MANAGEMENT: ICD-10-CM

## 2024-09-10 DIAGNOSIS — E11.65 UNCONTROLLED TYPE 2 DIABETES MELLITUS WITH HYPERGLYCEMIA (HCC): ICD-10-CM

## 2024-09-10 DIAGNOSIS — L40.9 PSORIASIS: ICD-10-CM

## 2024-09-10 DIAGNOSIS — M79.10 MYALGIA DUE TO STATIN: ICD-10-CM

## 2024-09-10 PROBLEM — I16.0 HYPERTENSIVE URGENCY: Status: RESOLVED | Noted: 2021-10-09 | Resolved: 2024-09-10

## 2024-09-10 PROBLEM — Z79.4 CONTROLLED TYPE 2 DIABETES MELLITUS, WITH LONG-TERM CURRENT USE OF INSULIN (HCC): Status: RESOLVED | Noted: 2019-12-03 | Resolved: 2024-09-10

## 2024-09-10 PROBLEM — T78.40XA ALLERGIC REACTION: Status: RESOLVED | Noted: 2021-08-12 | Resolved: 2024-09-10

## 2024-09-10 PROBLEM — R07.9 CHEST PAIN, UNSPECIFIED: Status: RESOLVED | Noted: 2021-09-29 | Resolved: 2024-09-10

## 2024-09-10 PROBLEM — E11.9 CONTROLLED TYPE 2 DIABETES MELLITUS, WITH LONG-TERM CURRENT USE OF INSULIN (HCC): Status: RESOLVED | Noted: 2019-12-03 | Resolved: 2024-09-10

## 2024-09-10 LAB — SL AMB POCT HEMOGLOBIN AIC: 6.3 (ref ?–6.5)

## 2024-09-10 PROCEDURE — 83036 HEMOGLOBIN GLYCOSYLATED A1C: CPT | Performed by: FAMILY MEDICINE

## 2024-09-10 PROCEDURE — 99215 OFFICE O/P EST HI 40 MIN: CPT | Performed by: FAMILY MEDICINE

## 2024-09-10 NOTE — ASSESSMENT & PLAN NOTE
Just finishing Mounjaro 7.5 for 1 month we will take another 2 months and at the end of that up to 10 mg weekly okay will see her in about 2 months and I mean even though my    Lab Results   Component Value Date    HGBA1C 6.3 09/10/2024       Orders:    POCT hemoglobin A1c

## 2024-09-10 NOTE — ASSESSMENT & PLAN NOTE
Patient has more frequent appropriate receptive ambulatory dysfunction to now cannot walk on grass so we will treat that by not walking on grass because

## 2024-09-10 NOTE — ASSESSMENT & PLAN NOTE
COPD exacerbations are nil she continues on nebulizer treatments once-4 times daily as needed did try breast tree in past.  Will continue breast tree as needed

## 2024-09-10 NOTE — ASSESSMENT & PLAN NOTE
Weight down from 218 208 now 1 year later she is currently on Mounjaro 7.5 mg for the past 1 month and will go another 2 months at that dose must be careful of nocturnal hypoglycemia.  We have reviewed her freestyle 2 and her blood sugar control is really quite good her average 90-day is 145.  A1c today 6.3

## 2024-09-10 NOTE — PROGRESS NOTES
Ambulatory Visit  Name: Mar Elizondo      : 1945      MRN: [id number]  Encounter Provider: TAMIR Montejo DO  Encounter Date: 9/10/2024   Encounter department: [facility] [place]    Assessment & Plan  Uncontrolled type 2 diabetes mellitus with hyperglycemia (HCC)  Just finishing Mounjaro 7.5 for 1 month we will take another 2 months and at the end of that up to 10 mg weekly okay will see her in about 2 months and I mean even though my    Lab Results   Component Value Date    HGBA1C 6.3 09/10/2024       Orders:    POCT hemoglobin A1c    COPD exacerbation (HCC)  COPD exacerbations are nil she continues on nebulizer treatments once-4 times daily as needed did try breast tree in past.  Will continue breast tree as needed       Unspecified abnormalities of gait and mobility  Patient has more frequent appropriate receptive ambulatory dysfunction to now cannot walk on grass so we will treat that by not walking on grass because       Myalgia due to statin  And cannot take statin therapy because of myalgia that this has been tested over and over       Cyanocobalamin deficiency  Patient been known to be vitamin B12 insufficient takes oral at home and injection here       Hypokalemia         Medication management         Increased BMI  Weight down from 218 208 now 1 year later she is currently on Mounjaro 7.5 mg for the past 1 month and will go another 2 months at that dose must be careful of nocturnal hypoglycemia.  We have reviewed her freestyle 2 and her blood sugar control is really quite good her average 90-day is 145.  A1c today 6.3       Psoriasis  Mild flare size of silver dollar on the hypothenar eminence of the left hand.  Tends to crack and bleed there is nothing on the right hand she takes Otezla 30 mg once daily generally cannot tolerate twice daily due to nausea            History of Present Illness     History of Present Illness  The patient is an 79-year-old female who presents for  follow-up.    She has been on Mounjaro for a month, which has resulted in a weight gain of 2 pounds. Her weight was 218 pounds in 07/2023. She reports eating every night. Her current medications include Lantus Solostar 50 units in the morning and lispro. She has not taken lispro for the past 2 to 3 months due to low blood sugar levels. She has attempted to reduce her insulin dosage to 44 or 46 units, but this led to an increase in her blood sugar levels. She is unable to consume citrus fruits with Mounjaro. She supplements her diet with B12 gummies. Her exercise routine includes weight lifting at home and walking on the sidewalk, but she no longer runs.    She consulted a cardiologist, Dr. Venessa Mccullough, 1 to 2 weeks ago. Her blood pressure readings at home typically range from 130 to 140. She has a history of low blood pressure. She reports no weakness or dizziness. She notes that her blood pressure can rise to 160 or 170 during physical activities such as walking or running, but it usually returns to around 130 within half an hour. She continues to take potassium chloride 20 mg twice daily.    She has experienced falls in her yard, attributing them to difficulty walking on grass. She reports no associated dizziness or lightheadedness.    She experiences sneezing and wheezing, which she believes are due to tree allergies. She uses a nebulizer for relief and takes Claritin as needed, such as when she goes fishing. She recalls an incident where she developed chest tightness and wheezing while fishing, but these symptoms resolved after taking medication. She used a nebulizer twice daily for a month last year.     Review of Systems   Constitutional:  Negative for activity change, appetite change, chills, diaphoresis, fatigue and fever.   HENT:  Negative for congestion, ear discharge, ear pain, facial swelling, nosebleeds, sore throat, tinnitus, trouble swallowing and voice change.    Eyes:  Negative for photophobia,  "pain, discharge, redness, itching and visual disturbance.   Respiratory:  Negative for apnea, cough, choking, chest tightness and shortness of breath.    Cardiovascular:  Negative for chest pain, palpitations and leg swelling.   Gastrointestinal:  Negative for abdominal distention, abdominal pain, blood in stool, constipation, diarrhea, nausea and vomiting.   Endocrine: Negative for cold intolerance, heat intolerance, polydipsia, polyphagia and polyuria.   Genitourinary:  Negative for decreased urine volume, difficulty urinating, dysuria, enuresis, frequency, hematuria, pelvic pain, urgency and vaginal bleeding.   Musculoskeletal:  Negative for arthralgias, back pain, gait problem, joint swelling, neck pain and neck stiffness.   Skin:  Negative for color change, pallor and rash.   Allergic/Immunologic: Negative for immunocompromised state.   Neurological:  Negative for dizziness, seizures, facial asymmetry, light-headedness, numbness and headaches.   Hematological:  Negative for adenopathy.   Psychiatric/Behavioral:  Negative for agitation, behavioral problems, confusion, decreased concentration, dysphoric mood and hallucinations.      Objective     /64 (BP Location: Left arm, Patient Position: Sitting, Cuff Size: Standard)   Pulse 63   Temp 98.2 °F (36.8 °C) (Temporal)   Ht 5' 1\" (1.549 m)   Wt 94.5 kg (208 lb 6.4 oz)   SpO2 96%   BMI 39.38 kg/m²     Physical Exam  Vital Signs  Patient's weight is 208.5 pounds.  Physical Exam  Vitals and nursing note reviewed.   Constitutional:       General: She is not in acute distress.     Appearance: She is well-developed.   HENT:      Head: Normocephalic and atraumatic.   Eyes:      Conjunctiva/sclera: Conjunctivae normal.   Cardiovascular:      Rate and Rhythm: Normal rate and regular rhythm.      Heart sounds: No murmur heard.  Pulmonary:      Effort: Pulmonary effort is normal. No respiratory distress.      Breath sounds: Normal breath sounds.   Abdominal:      " Palpations: Abdomen is soft.      Tenderness: There is no abdominal tenderness.   Musculoskeletal:         General: No swelling.      Cervical back: Neck supple.   Skin:     General: Skin is warm and dry.   Neurological:      General: No focal deficit present.      Mental Status: She is alert and oriented to person, place, and time. Mental status is at baseline.   Psychiatric:         Mood and Affect: Mood normal.         Behavior: Behavior normal.         Thought Content: Thought content normal.         Judgment: Judgment normal.       Administrative Statements   I have spent a total time of 41 minutes in caring for this patient on the day of the visit/encounter including Diagnostic results, Prognosis, Risks and benefits of tx options, Instructions for management, Patient and family education, Importance of tx compliance, Risk factor reductions, Impressions, Counseling / Coordination of care, Documenting in the medical record, Reviewing / ordering tests, medicine, procedures  , and Obtaining or reviewing history  .

## 2024-09-10 NOTE — ASSESSMENT & PLAN NOTE
Mild flare size of silver dollar on the hypothenar eminence of the left hand.  Tends to crack and bleed there is nothing on the right hand she takes Otezla 30 mg once daily generally cannot tolerate twice daily due to nausea

## 2024-10-08 ENCOUNTER — TELEPHONE (OUTPATIENT)
Age: 79
End: 2024-10-08

## 2024-10-08 NOTE — TELEPHONE ENCOUNTER
[facility] mail order  pharmacy called a prior authorization is needed  for the patients otezla 30 mg    thank you

## 2024-10-09 NOTE — TELEPHONE ENCOUNTER
PA for Apremilast (Otezla) 30 MG TABS APPROVED     Date(s) approved 10-9-2024 - 10-9-2025        Patient advised by          [][employer] Message  [x]Phone call   []LMOM  []L/M to call office as no active Communication consent on file  []Unable to leave detailed message as VM not approved on Communication consent       Pharmacy advised by    [x]Fax  []Phone call    Approval letter scanned into Media Yes

## 2024-10-09 NOTE — TELEPHONE ENCOUNTER
PA for Apremilast (Otezla) 30 MG TABS SUBMITTED     via    []CMM-KEY:   [x]Surescripts-Case ID # [id number]  []Availity-Auth ID # NDC #   []Faxed to plan   []Other website   []Phone call Case ID #     Office notes sent, clinical questions answered. Awaiting determination    Turnaround time for your insurance to make a decision on your Prior Authorization can take 7-21 business days.

## 2024-10-23 DIAGNOSIS — L40.9 PSORIASIS: ICD-10-CM

## 2024-10-23 RX ORDER — APREMILAST 30 MG/1
30 TABLET, FILM COATED ORAL 2 TIMES DAILY
Qty: 180 TABLET | Refills: 1 | Status: SHIPPED | OUTPATIENT
Start: 2024-10-23

## 2024-10-23 NOTE — TELEPHONE ENCOUNTER
Reason for call: Not a duplicate - Pharmacy is [facility]  Pt need sent ASAP so she can have in time before she runs out in 8 days    [x] Refill   [] Prior Auth  [] Other:     Office:   [x] PCP/Provider - Dr Montejo   [] Specialty/Provider -     Medication: otezla    Dose/Frequency: 30 mg BID     Quantity: 90D w refill     Pharmacy: [facility]     Does the patient have enough for 3 days?   [x] Yes   [x] No - Send as HP to POD

## 2024-11-19 ENCOUNTER — NURSE TRIAGE (OUTPATIENT)
Age: 79
End: 2024-11-19

## 2024-11-19 NOTE — TELEPHONE ENCOUNTER
Regarding: sick for the last 6 days no appt  ----- Message from Helen VASQUEZ sent at 11/19/2024  4:29 PM EST -----  Patient stated that she has been sick for the last 6days and she wanted an appt with  but Dr. Montejo does not have any appt... patient agreed to speak to nurseBrandin Ramires

## 2024-11-19 NOTE — TELEPHONE ENCOUNTER
"Warm transfer to clinical to assist with scheduling . Appointment for tomorrow was offered for 3 pm. Patient aware, Aware to proceed to Urgent Care or ED if symptoms worsen.  C/O URI x 5 days. Denies fever, SOB, using her inhaler and nebulizer. Does not have a pulse ox to check O2 level. History of COPD. Reason for Disposition   Patient sounds very sick or weak to the triager    Answer Assessment - Initial Assessment Questions  1. ONSET: \"When did the nasal discharge start?\"       5 days   2. AMOUNT: \"How much discharge is there?\"       moderate  3. COUGH: \"Do you have a cough?\" If Yes, ask: \"Describe the color of your mucus.\" (e.g., clear, white, yellow, green)      Yes , yellow  4. RESPIRATORY DISTRESS: \"Describe your breathing.\"       SOB, moderate   5. FEVER: \"Do you have a fever?\" If Yes, ask: \"What is your temperature, how was it measured, and when did it start?\"      Denies   6. SEVERITY: \"Overall, how bad are you feeling right now?\" (e.g., doesn't interfere with normal activities, staying home from school/work, staying in bed)       Sleeping a lot , effecting her normal activities   7. OTHER SYMPTOMS: \"Do you have any other symptoms?\" (e.g., earache, mouth sores, sore throat, wheezing)      Chest congestion   8. PREGNANCY: \"Is there any chance you are pregnant?\" \"When was your last menstrual period?\"      N/a    Protocols used: Common Cold-Adult-[place]    "

## 2024-11-20 ENCOUNTER — OFFICE VISIT (OUTPATIENT)
Dept: FAMILY MEDICINE CLINIC | Facility: CLINIC | Age: 79
End: 2024-11-20
Payer: COMMERCIAL

## 2024-11-20 VITALS
BODY MASS INDEX: 38.59 KG/M2 | TEMPERATURE: 98 F | DIASTOLIC BLOOD PRESSURE: 92 MMHG | HEART RATE: 76 BPM | OXYGEN SATURATION: 96 % | WEIGHT: 204.4 LBS | HEIGHT: 61 IN | SYSTOLIC BLOOD PRESSURE: 174 MMHG

## 2024-11-20 DIAGNOSIS — J98.8 CONGESTION OF RESPIRATORY TRACT: ICD-10-CM

## 2024-11-20 DIAGNOSIS — R05.1 ACUTE COUGH: ICD-10-CM

## 2024-11-20 DIAGNOSIS — I1A.0 RESISTANT HYPERTENSION: ICD-10-CM

## 2024-11-20 DIAGNOSIS — J10.1 INFLUENZA A: Primary | ICD-10-CM

## 2024-11-20 DIAGNOSIS — J20.9 ACUTE BRONCHITIS, UNSPECIFIED ORGANISM: ICD-10-CM

## 2024-11-20 LAB
SARS-COV-2 AG UPPER RESP QL IA: NEGATIVE
SL AMB POCT RAPID FLU A: POSITIVE
SL AMB POCT RAPID FLU B: NEGATIVE
VALID CONTROL: NORMAL

## 2024-11-20 PROCEDURE — 87804 INFLUENZA ASSAY W/OPTIC: CPT | Performed by: FAMILY MEDICINE

## 2024-11-20 PROCEDURE — 87811 SARS-COV-2 COVID19 W/OPTIC: CPT | Performed by: FAMILY MEDICINE

## 2024-11-20 PROCEDURE — 99213 OFFICE O/P EST LOW 20 MIN: CPT | Performed by: FAMILY MEDICINE

## 2024-11-20 PROCEDURE — G2211 COMPLEX E/M VISIT ADD ON: HCPCS | Performed by: FAMILY MEDICINE

## 2024-11-20 RX ORDER — AZITHROMYCIN 250 MG/1
TABLET, FILM COATED ORAL
Qty: 6 TABLET | Refills: 0 | Status: SHIPPED | OUTPATIENT
Start: 2024-11-20 | End: 2024-11-24

## 2024-11-20 NOTE — PROGRESS NOTES
Name: Mar Elizondo      : 1945      MRN: [id number]  Encounter Provider: TAMIR Montejo DO  Encounter Date: 2024   Encounter department: [facility]    Assessment & Plan  Influenza A         Acute cough  Onset 6 days ago, along with terrible sinus congestion and HA.  TESTED + for FLU A today  Orders:    POCT Rapid Covid Ag    POCT rapid flu A and B    Congestion of respiratory tract    Orders:    POCT Rapid Covid Ag    POCT rapid flu A and B    Acute bronchitis, unspecified organism    Orders:    azithromycin (Zithromax) 250 mg tablet; Take 2 tablets (500 mg total) by mouth daily for 1 day, THEN 1 tablet (250 mg total) daily for 4 days.    Resistant hypertension           Depression Screening and Follow-up Plan: Patient was screened for depression during today's encounter. They screened negative with a PHQ-2 score of 0.      History of Present Illness     History of Present Illness  The patient is an 79-year-old male who presents for evaluation of multiple medical concerns.    He has been unwell for a week, experiencing symptoms such as cough, sinus congestion, and headache. His eyes have also started to water. Despite using his atomizer, he found no relief. He has not been in contact with his daughter who had influenza. He missed his influenza vaccine this year. He has an albuterol inhaler at home and has been consuming plenty of fluids.    He has not been taking his blood pressure medication.     Review of Systems   Constitutional:  Positive for fatigue and fever.   HENT:  Positive for congestion, postnasal drip and rhinorrhea.    Respiratory:  Positive for cough and wheezing.    Cardiovascular: Negative.    Gastrointestinal: Negative.    Endocrine: Negative.    Genitourinary: Negative.    Musculoskeletal:  Positive for myalgias.   Neurological:  Positive for headaches.   Psychiatric/Behavioral: Negative.       Objective   BP (!) 174/92 (BP Location: Left arm, Patient Position:  "Sitting, Cuff Size: Standard)   Pulse 76   Temp 98 °F (36.7 °C) (Temporal)   Ht 5' 1\" (1.549 m)   Wt 92.7 kg (204 lb 6.4 oz)   SpO2 96%   BMI 38.62 kg/m²     Physical Exam  Ears are clear of wax.    Vital Signs  Blood pressure measures 174.  Physical Exam  Constitutional:       General: She is in acute distress.      Appearance: Normal appearance. She is ill-appearing.   HENT:      Head: Normocephalic and atraumatic.      Nose: Congestion and rhinorrhea present.      Mouth/Throat:      Mouth: Mucous membranes are moist.   Eyes:      Pupils: Pupils are equal, round, and reactive to light.   Cardiovascular:      Rate and Rhythm: Normal rate and regular rhythm.   Pulmonary:      Effort: Pulmonary effort is normal.      Breath sounds: Rhonchi and rales present.   Abdominal:      General: Abdomen is flat.   Musculoskeletal:      Right lower leg: No edema.      Left lower leg: No edema.   Skin:     General: Skin is warm.      Coloration: Skin is pale.   Neurological:      General: No focal deficit present.      Mental Status: She is alert and oriented to person, place, and time. Mental status is at baseline.   Psychiatric:         Mood and Affect: Mood normal.         Behavior: Behavior normal.         Thought Content: Thought content normal.         Judgment: Judgment normal.     Administrative Statements   I have spent a total time of 25 minutes in caring for this patient on the day of the visit/encounter including Diagnostic results, Prognosis, Risks and benefits of tx options, Instructions for management, Patient and family education, Importance of tx compliance, Risk factor reductions, Impressions, Counseling / Coordination of care, Documenting in the medical record, Reviewing / ordering tests, medicine, procedures  , and Obtaining or reviewing history  . Topics discussed with the patient / family include symptom assessment and management, medication review, medication adjustment, and psychosocial support.  "

## 2024-11-25 ENCOUNTER — TELEPHONE (OUTPATIENT)
Age: 79
End: 2024-11-25

## 2024-11-25 NOTE — TELEPHONE ENCOUNTER
Called and spoke w pt -- she is having pain in both upper extremities. She reports she cannot lift her arm above her shoulder. Pain is currently 8/10 per patient. She also reports some stiffness in neck.     She reports since finishing Zpak her breathing has greatly improved and her congestion has cleared up.

## 2024-11-25 NOTE — TELEPHONE ENCOUNTER
Patient called in stating she is feeling a little bit better but is now experiencing a headache, soreness in both arms, and has to walk with her walker. Patient asked what she can do.    Please advise, thank you

## 2024-11-25 NOTE — TELEPHONE ENCOUNTER
Discussed w PCP -- he is recommending pt treat symptoms and notify office as to how she is doing tomorrow morning. Pt was agreeable to doing so. She was advised to increase fluid intake as she is sick -- she is currently drinking lots of apple juice and ensure as she does not have much of an appetite.

## 2024-12-04 ENCOUNTER — RA CDI HCC (OUTPATIENT)
Dept: OTHER | Facility: HOSPITAL | Age: 79
End: 2024-12-04

## 2024-12-12 ENCOUNTER — OFFICE VISIT (OUTPATIENT)
Dept: FAMILY MEDICINE CLINIC | Facility: CLINIC | Age: 79
End: 2024-12-12
Payer: COMMERCIAL

## 2024-12-12 VITALS
TEMPERATURE: 98.1 F | SYSTOLIC BLOOD PRESSURE: 160 MMHG | OXYGEN SATURATION: 97 % | HEIGHT: 61 IN | DIASTOLIC BLOOD PRESSURE: 76 MMHG | WEIGHT: 203.6 LBS | BODY MASS INDEX: 38.44 KG/M2 | HEART RATE: 76 BPM

## 2024-12-12 DIAGNOSIS — J44.1 COPD EXACERBATION (HCC): ICD-10-CM

## 2024-12-12 DIAGNOSIS — Z23 FLU VACCINE NEED: ICD-10-CM

## 2024-12-12 DIAGNOSIS — Z23 NEED FOR VACCINATION: ICD-10-CM

## 2024-12-12 DIAGNOSIS — L98.9 PSORIASIS-LIKE SKIN DISEASE: ICD-10-CM

## 2024-12-12 DIAGNOSIS — Z79.899 MEDICATION MANAGEMENT: ICD-10-CM

## 2024-12-12 DIAGNOSIS — Z71.2 ENCOUNTER TO DISCUSS TEST RESULTS: ICD-10-CM

## 2024-12-12 DIAGNOSIS — E11.65 UNCONTROLLED TYPE 2 DIABETES MELLITUS WITH HYPERGLYCEMIA (HCC): ICD-10-CM

## 2024-12-12 DIAGNOSIS — I1A.0 RESISTANT HYPERTENSION: Primary | ICD-10-CM

## 2024-12-12 PROCEDURE — 99214 OFFICE O/P EST MOD 30 MIN: CPT | Performed by: FAMILY MEDICINE

## 2024-12-12 PROCEDURE — G2211 COMPLEX E/M VISIT ADD ON: HCPCS | Performed by: FAMILY MEDICINE

## 2024-12-12 NOTE — ASSESSMENT & PLAN NOTE
Urges full vaccinations, but pt resistant, but agrees to the zoster. Rx given for [facility]

## 2024-12-12 NOTE — ASSESSMENT & PLAN NOTE
Physicians reviewed and patient needs Shingrix which is the shingles vaccine and slip given to take to [facility]

## 2024-12-12 NOTE — PROGRESS NOTES
Name: Mar Elizondo      : 1945      MRN: [id number]  Encounter Provider: TAMIR Montejo DO  Encounter Date: 2024   Encounter department: [facility] PRIMARY [facility] [place]    Assessment & Plan  Resistant hypertension  LARGE CUFF:  160/76 L arm - but did NOT take meds today       COPD exacerbation (HCC)  Urges full vaccinations, but pt resistant, but agrees to the zoster. [facility]       Psoriasis-like skin disease  Taking Otrzla 30 mg BID and very effective       Medication management  All  meds reviewed and discussed        Flu vaccine need  BUT pt REFUSES       Encounter to discuss test results  Labs of 2024. All reviewed and discussed in detail        Uncontrolled type 2 diabetes mellitus with hyperglycemia (HCC)    Lab Results   Component Value Date    HGBA1C 6.3 09/10/2024     Using the FreeStyle.  AM's BBG is 60-80, eats and goest o 100 and then 100-150 most of the day.      A1c mid September was 6.3 down from 6.6 which was before she started Mounjaro.  Likes Mounjaro greatly and A1c will continue to fall  Need for vaccination  Physicians reviewed and patient needs Shingrix which is the shingles vaccine and slip given to take to [facility]            History of Present Illness     History of Present Illness  The patient is an 79-year-old female who presents for evaluation of diabetes mellitus, hypertension, and diarrhea.    Her blood glucose levels typically range from 50 to 60 in the morning, increasing to around 100 post-breakfast. She has observed fluctuations in her blood sugar levels throughout the day, with a single cup of tea with sugar causing a spike to approximately 180. Her last A1c test was conducted approximately 1.5 months ago, prior to an illness. She reports feeling well overall and does not experience panic attacks. She has been monitoring her blood glucose levels at home and has not required any medication for readings up to 150. She has been managing  her diabetes with Mounjaro, which she reports has been effective in reducing her blood sugar levels.    She has been experiencing difficulty walking long distances due to a recent illness. She also reports that prolonged walking results in loose stools, necessitating immediate access to a restroom. This symptom began concurrently with her recent illness.    She has been taking her antihypertensive medications daily, except for this morning due to an episode of vomiting. She reports being able to feel her heartbeat. Her blood pressure readings have been consistently between 150 and 160.    MEDICATIONS  Current: Floridalma Barillas     Review of Systems   Constitutional:  Negative for activity change, appetite change, chills, diaphoresis, fatigue and fever.   HENT:  Negative for congestion, ear discharge, ear pain, facial swelling, nosebleeds, sore throat, tinnitus, trouble swallowing and voice change.    Eyes:  Negative for photophobia, pain, discharge, redness, itching and visual disturbance.   Respiratory:  Negative for apnea, cough, choking, chest tightness and shortness of breath.    Cardiovascular:  Negative for chest pain, palpitations and leg swelling.   Gastrointestinal:  Negative for abdominal distention, abdominal pain, blood in stool, constipation, diarrhea, nausea and vomiting.   Endocrine: Negative for cold intolerance, heat intolerance, polydipsia, polyphagia and polyuria.   Genitourinary:  Negative for decreased urine volume, difficulty urinating, dysuria, enuresis, frequency, hematuria, pelvic pain, urgency and vaginal bleeding.   Musculoskeletal:  Negative for arthralgias, back pain, gait problem, joint swelling, neck pain and neck stiffness.   Skin:  Negative for color change, pallor and rash.   Allergic/Immunologic: Negative for immunocompromised state.   Neurological:  Negative for dizziness, seizures, facial asymmetry, light-headedness, numbness and headaches.   Hematological:  Negative for  "adenopathy.   Psychiatric/Behavioral:  Negative for agitation, behavioral problems, confusion, decreased concentration, dysphoric mood and hallucinations.      Objective   /76 (BP Location: Left arm, Patient Position: Sitting, Cuff Size: Standard)   Pulse 76   Temp 98.1 °F (36.7 °C) (Temporal)   Ht 5' 1\" (1.549 m)   Wt 92.4 kg (203 lb 9.6 oz)   SpO2 97%   BMI 38.47 kg/m²     Physical Exam  Vital Signs  Blood pressure is 180/80.  Physical Exam  Vitals and nursing note reviewed.   Constitutional:       General: She is not in acute distress.     Appearance: She is well-developed.   HENT:      Head: Normocephalic and atraumatic.      Nose: No congestion or rhinorrhea.   Eyes:      Conjunctiva/sclera: Conjunctivae normal.   Cardiovascular:      Rate and Rhythm: Normal rate and regular rhythm.      Heart sounds: No murmur heard.  Pulmonary:      Effort: Pulmonary effort is normal. No respiratory distress.      Breath sounds: Normal breath sounds.   Abdominal:      Palpations: Abdomen is soft.      Tenderness: There is no abdominal tenderness.   Musculoskeletal:         General: No swelling.      Cervical back: Neck supple.   Skin:     General: Skin is warm and dry.      Capillary Refill: Capillary refill takes less than 2 seconds.   Neurological:      General: No focal deficit present.      Mental Status: She is alert and oriented to person, place, and time.   Psychiatric:         Mood and Affect: Mood normal.         Behavior: Behavior normal.         Thought Content: Thought content normal.         Judgment: Judgment normal.       Administrative Statements   I have spent a total time of 30 minutes in caring for this patient on the day of the visit/encounter including Diagnostic results, Prognosis, Risks and benefits of tx options, Instructions for management, Patient and family education, Importance of tx compliance, Risk factor reductions, Impressions, Counseling / Coordination of care, Documenting in the " medical record, Reviewing / ordering tests, medicine, procedures  , and Obtaining or reviewing history  .

## 2025-01-19 ENCOUNTER — APPOINTMENT (EMERGENCY)
Dept: CT IMAGING | Facility: HOSPITAL | Age: 80
DRG: 641 | End: 2025-01-19
Payer: COMMERCIAL

## 2025-01-19 ENCOUNTER — HOSPITAL ENCOUNTER (INPATIENT)
Facility: HOSPITAL | Age: 80
LOS: 3 days | Discharge: HOME HEALTH | DRG: 641 | End: 2025-01-23
Attending: EMERGENCY MEDICINE | Admitting: STUDENT IN AN ORGANIZED HEALTH CARE EDUCATION/TRAINING PROGRAM
Payer: COMMERCIAL

## 2025-01-19 ENCOUNTER — APPOINTMENT (EMERGENCY)
Dept: RADIOLOGY | Facility: HOSPITAL | Age: 80
DRG: 641 | End: 2025-01-19
Payer: COMMERCIAL

## 2025-01-19 DIAGNOSIS — R42 DIZZINESS: ICD-10-CM

## 2025-01-19 DIAGNOSIS — I65.21 STENOSIS OF RIGHT CAROTID ARTERY: ICD-10-CM

## 2025-01-19 DIAGNOSIS — R55 SYNCOPE: Primary | ICD-10-CM

## 2025-01-19 DIAGNOSIS — E78.2 MIXED HYPERLIPIDEMIA: ICD-10-CM

## 2025-01-19 DIAGNOSIS — I1A.0 RESISTANT HYPERTENSION: ICD-10-CM

## 2025-01-19 DIAGNOSIS — L98.9 PSORIASIS-LIKE SKIN DISEASE: ICD-10-CM

## 2025-01-19 PROBLEM — E04.2 MULTIPLE THYROID NODULES: Status: ACTIVE | Noted: 2025-01-19

## 2025-01-19 PROBLEM — I65.29 INTRACRANIAL CAROTID STENOSIS: Status: ACTIVE | Noted: 2025-01-19

## 2025-01-19 LAB
2HR DELTA HS TROPONIN: 0 NG/L
ALBUMIN SERPL BCG-MCNC: 4.1 G/DL (ref 3.5–5)
ALP SERPL-CCNC: 97 U/L (ref 34–104)
ALT SERPL W P-5'-P-CCNC: 18 U/L (ref 7–52)
ANION GAP SERPL CALCULATED.3IONS-SCNC: 7 MMOL/L (ref 4–13)
AST SERPL W P-5'-P-CCNC: 14 U/L (ref 13–39)
BASOPHILS # BLD AUTO: 0.1 THOUSANDS/ΜL (ref 0–0.1)
BASOPHILS NFR BLD AUTO: 1 % (ref 0–1)
BILIRUB SERPL-MCNC: 0.54 MG/DL (ref 0.2–1)
BUN SERPL-MCNC: 11 MG/DL (ref 5–25)
CALCIUM SERPL-MCNC: 9.3 MG/DL (ref 8.4–10.2)
CARDIAC TROPONIN I PNL SERPL HS: 6 NG/L (ref ?–50)
CARDIAC TROPONIN I PNL SERPL HS: 6 NG/L (ref ?–50)
CHLORIDE SERPL-SCNC: 102 MMOL/L (ref 96–108)
CO2 SERPL-SCNC: 28 MMOL/L (ref 21–32)
CREAT SERPL-MCNC: 0.67 MG/DL (ref 0.6–1.3)
EOSINOPHIL # BLD AUTO: 0.14 THOUSAND/ΜL (ref 0–0.61)
EOSINOPHIL NFR BLD AUTO: 2 % (ref 0–6)
ERYTHROCYTE [DISTWIDTH] IN BLOOD BY AUTOMATED COUNT: 13.9 % (ref 11.6–15.1)
EST. AVERAGE GLUCOSE BLD GHB EST-MCNC: 148 MG/DL
FLUAV RNA RESP QL NAA+PROBE: NEGATIVE
FLUBV RNA RESP QL NAA+PROBE: NEGATIVE
GFR SERPL CREATININE-BSD FRML MDRD: 82 ML/MIN/1.73SQ M
GLUCOSE SERPL-MCNC: 159 MG/DL (ref 65–140)
GLUCOSE SERPL-MCNC: 221 MG/DL (ref 65–140)
GLUCOSE SERPL-MCNC: 249 MG/DL (ref 65–140)
HBA1C MFR BLD: 6.8 %
HCT VFR BLD AUTO: 44.2 % (ref 34.8–46.1)
HGB BLD-MCNC: 14.7 G/DL (ref 11.5–15.4)
IMM GRANULOCYTES # BLD AUTO: 0.04 THOUSAND/UL (ref 0–0.2)
IMM GRANULOCYTES NFR BLD AUTO: 1 % (ref 0–2)
LYMPHOCYTES # BLD AUTO: 1.66 THOUSANDS/ΜL (ref 0.6–4.47)
LYMPHOCYTES NFR BLD AUTO: 20 % (ref 14–44)
MCH RBC QN AUTO: 28.8 PG (ref 26.8–34.3)
MCHC RBC AUTO-ENTMCNC: 33.3 G/DL (ref 31.4–37.4)
MCV RBC AUTO: 87 FL (ref 82–98)
MONOCYTES # BLD AUTO: 0.7 THOUSAND/ΜL (ref 0.17–1.22)
MONOCYTES NFR BLD AUTO: 9 % (ref 4–12)
NEUTROPHILS # BLD AUTO: 5.64 THOUSANDS/ΜL (ref 1.85–7.62)
NEUTS SEG NFR BLD AUTO: 67 % (ref 43–75)
NRBC BLD AUTO-RTO: 0 /100 WBCS
PLATELET # BLD AUTO: 273 THOUSANDS/UL (ref 149–390)
PMV BLD AUTO: 10.1 FL (ref 8.9–12.7)
POTASSIUM SERPL-SCNC: 4 MMOL/L (ref 3.5–5.3)
PROT SERPL-MCNC: 6.7 G/DL (ref 6.4–8.4)
RBC # BLD AUTO: 5.1 MILLION/UL (ref 3.81–5.12)
RSV RNA RESP QL NAA+PROBE: NEGATIVE
SARS-COV-2 RNA RESP QL NAA+PROBE: NEGATIVE
SODIUM SERPL-SCNC: 137 MMOL/L (ref 135–147)
WBC # BLD AUTO: 8.28 THOUSAND/UL (ref 4.31–10.16)

## 2025-01-19 PROCEDURE — 73502 X-RAY EXAM HIP UNI 2-3 VIEWS: CPT

## 2025-01-19 PROCEDURE — 99285 EMERGENCY DEPT VISIT HI MDM: CPT

## 2025-01-19 PROCEDURE — 99223 1ST HOSP IP/OBS HIGH 75: CPT | Performed by: INTERNAL MEDICINE

## 2025-01-19 PROCEDURE — 70496 CT ANGIOGRAPHY HEAD: CPT

## 2025-01-19 PROCEDURE — 70498 CT ANGIOGRAPHY NECK: CPT

## 2025-01-19 PROCEDURE — 84484 ASSAY OF TROPONIN QUANT: CPT

## 2025-01-19 PROCEDURE — 0241U HB NFCT DS VIR RESP RNA 4 TRGT: CPT | Performed by: INTERNAL MEDICINE

## 2025-01-19 PROCEDURE — 99285 EMERGENCY DEPT VISIT HI MDM: CPT | Performed by: EMERGENCY MEDICINE

## 2025-01-19 PROCEDURE — 83036 HEMOGLOBIN GLYCOSYLATED A1C: CPT | Performed by: INTERNAL MEDICINE

## 2025-01-19 PROCEDURE — 93005 ELECTROCARDIOGRAM TRACING: CPT

## 2025-01-19 PROCEDURE — 82948 REAGENT STRIP/BLOOD GLUCOSE: CPT

## 2025-01-19 PROCEDURE — 71045 X-RAY EXAM CHEST 1 VIEW: CPT

## 2025-01-19 PROCEDURE — 36415 COLL VENOUS BLD VENIPUNCTURE: CPT

## 2025-01-19 PROCEDURE — 85025 COMPLETE CBC W/AUTO DIFF WBC: CPT

## 2025-01-19 PROCEDURE — 82607 VITAMIN B-12: CPT

## 2025-01-19 PROCEDURE — 80053 COMPREHEN METABOLIC PANEL: CPT

## 2025-01-19 RX ORDER — ACETAMINOPHEN 325 MG/1
650 TABLET ORAL EVERY 6 HOURS PRN
Status: DISCONTINUED | OUTPATIENT
Start: 2025-01-19 | End: 2025-01-19

## 2025-01-19 RX ORDER — LOSARTAN POTASSIUM 50 MG/1
100 TABLET ORAL DAILY
Status: DISCONTINUED | OUTPATIENT
Start: 2025-01-20 | End: 2025-01-21

## 2025-01-19 RX ORDER — LOSARTAN POTASSIUM 50 MG/1
100 TABLET ORAL ONCE
Status: COMPLETED | OUTPATIENT
Start: 2025-01-19 | End: 2025-01-19

## 2025-01-19 RX ORDER — MECLIZINE HCL 12.5 MG 12.5 MG/1
25 TABLET ORAL EVERY 8 HOURS PRN
Status: DISCONTINUED | OUTPATIENT
Start: 2025-01-19 | End: 2025-01-20

## 2025-01-19 RX ORDER — ALBUTEROL SULFATE 90 UG/1
2 INHALANT RESPIRATORY (INHALATION) 4 TIMES DAILY
Status: DISCONTINUED | OUTPATIENT
Start: 2025-01-19 | End: 2025-01-23 | Stop reason: HOSPADM

## 2025-01-19 RX ORDER — DILTIAZEM HYDROCHLORIDE 180 MG/1
360 CAPSULE, COATED, EXTENDED RELEASE ORAL DAILY
Status: DISCONTINUED | OUTPATIENT
Start: 2025-01-20 | End: 2025-01-20

## 2025-01-19 RX ORDER — ENOXAPARIN SODIUM 100 MG/ML
40 INJECTION SUBCUTANEOUS DAILY
Status: DISCONTINUED | OUTPATIENT
Start: 2025-01-20 | End: 2025-01-23 | Stop reason: HOSPADM

## 2025-01-19 RX ORDER — INSULIN GLARGINE 100 [IU]/ML
50 INJECTION, SOLUTION SUBCUTANEOUS EVERY MORNING
Status: DISCONTINUED | OUTPATIENT
Start: 2025-01-20 | End: 2025-01-23 | Stop reason: HOSPADM

## 2025-01-19 RX ORDER — ACETAMINOPHEN 325 MG/1
650 TABLET ORAL EVERY 6 HOURS PRN
Status: DISCONTINUED | OUTPATIENT
Start: 2025-01-19 | End: 2025-01-23 | Stop reason: HOSPADM

## 2025-01-19 RX ORDER — HYDROCHLOROTHIAZIDE 25 MG/1
25 TABLET ORAL DAILY
Status: DISCONTINUED | OUTPATIENT
Start: 2025-01-20 | End: 2025-01-20

## 2025-01-19 RX ORDER — MECLIZINE HCL 12.5 MG 12.5 MG/1
25 TABLET ORAL ONCE
Status: COMPLETED | OUTPATIENT
Start: 2025-01-19 | End: 2025-01-19

## 2025-01-19 RX ORDER — HYDRALAZINE HYDROCHLORIDE 20 MG/ML
10 INJECTION INTRAMUSCULAR; INTRAVENOUS EVERY 6 HOURS PRN
Status: DISCONTINUED | OUTPATIENT
Start: 2025-01-19 | End: 2025-01-20

## 2025-01-19 RX ORDER — HYDROCHLOROTHIAZIDE 25 MG/1
25 TABLET ORAL ONCE
Status: COMPLETED | OUTPATIENT
Start: 2025-01-19 | End: 2025-01-19

## 2025-01-19 RX ORDER — ASPIRIN 81 MG/1
81 TABLET, CHEWABLE ORAL DAILY
Status: DISCONTINUED | OUTPATIENT
Start: 2025-01-20 | End: 2025-01-23 | Stop reason: HOSPADM

## 2025-01-19 RX ORDER — ENOXAPARIN SODIUM 100 MG/ML
40 INJECTION SUBCUTANEOUS DAILY
Status: DISCONTINUED | OUTPATIENT
Start: 2025-01-20 | End: 2025-01-19

## 2025-01-19 RX ORDER — INSULIN LISPRO 100 [IU]/ML
1-6 INJECTION, SOLUTION INTRAVENOUS; SUBCUTANEOUS
Status: DISCONTINUED | OUTPATIENT
Start: 2025-01-19 | End: 2025-01-23 | Stop reason: HOSPADM

## 2025-01-19 RX ADMIN — HYDRALAZINE HYDROCHLORIDE 10 MG: 20 INJECTION INTRAMUSCULAR; INTRAVENOUS at 21:22

## 2025-01-19 RX ADMIN — MECLIZINE HYDROCHLORIDE 25 MG: 12.5 TABLET ORAL at 13:15

## 2025-01-19 RX ADMIN — HYDROCHLOROTHIAZIDE 25 MG: 25 TABLET ORAL at 13:44

## 2025-01-19 RX ADMIN — ALBUTEROL SULFATE 2 PUFF: 90 AEROSOL, METERED RESPIRATORY (INHALATION) at 17:32

## 2025-01-19 RX ADMIN — LOSARTAN POTASSIUM 100 MG: 50 TABLET, FILM COATED ORAL at 13:44

## 2025-01-19 RX ADMIN — IOHEXOL 70 ML: 350 INJECTION, SOLUTION INTRAVENOUS at 12:43

## 2025-01-19 RX ADMIN — INSULIN LISPRO 3 UNITS: 100 INJECTION, SOLUTION INTRAVENOUS; SUBCUTANEOUS at 22:36

## 2025-01-19 NOTE — ED ATTENDING ATTESTATION
1/19/2025  I, Vitaly Ramirez MD, saw and evaluated the patient. I have discussed the patient with the resident/non-physician practitioner and agree with the resident's/non-physician practitioner's findings, Plan of Care, and MDM as documented in the resident's/non-physician practitioner's note, except where noted. All available labs and Radiology studies were reviewed.  I was present for key portions of any procedure(s) performed by the resident/non-physician practitioner and I was immediately available to provide assistance.       At this point I agree with the current assessment done in the Emergency Department.  I have conducted an independent evaluation of this patient a history and physical is as follows: Multiple syncopal events and falls resulting in head strike on aspirin.  Trauma level C activation.  Feels well when in bed but states that when she gets up she feels dizzy.    CTA head and neck with no acute traumatic injury.  Patient with nonfocal examination.  Syncopal event was described as without a prodrome.  Will need admission for telemetry.    Results Reviewed       Procedure Component Value Units Date/Time    HS Troponin I 2hr [[id number]]  (Normal) Collected: 01/19/25 1320    Lab Status: Final result Specimen: Blood from Arm, Left Updated: 01/19/25 1356     hs TnI 2hr 6 ng/L      Delta 2hr hsTnI 0 ng/L     HS Troponin 0hr (reflex protocol) [[id number]]  (Normal) Collected: 01/19/25 1120    Lab Status: Final result Specimen: Blood from Arm, Left Updated: 01/19/25 1212     hs TnI 0hr 6 ng/L     Comprehensive metabolic panel [[id number]]  (Abnormal) Collected: 01/19/25 1120    Lab Status: Final result Specimen: Blood from Arm, Left Updated: 01/19/25 1154     Sodium 137 mmol/L      Potassium 4.0 mmol/L      Chloride 102 mmol/L      CO2 28 mmol/L      ANION GAP 7 mmol/L      BUN 11 mg/dL      Creatinine 0.67 mg/dL      Glucose 221 mg/dL      Calcium 9.3 mg/dL      AST 14 U/L      ALT 18 U/L       Alkaline Phosphatase 97 U/L      Total Protein 6.7 g/dL      Albumin 4.1 g/dL      Total Bilirubin 0.54 mg/dL      eGFR 82 ml/min/1.73sq m     Narrative:      National Kidney Disease Foundation guidelines for Chronic Kidney Disease (CKD):     Stage 1 with normal or high GFR (GFR > 90 mL/min/1.73 square meters)    Stage 2 Mild CKD (GFR = 60-89 mL/min/1.73 square meters)    Stage 3A Moderate CKD (GFR = 45-59 mL/min/1.73 square meters)    Stage 3B Moderate CKD (GFR = 30-44 mL/min/1.73 square meters)    Stage 4 Severe CKD (GFR = 15-29 mL/min/1.73 square meters)    Stage 5 End Stage CKD (GFR <15 mL/min/1.73 square meters)  Note: GFR calculation is accurate only with a steady state creatinine    CBC and differential [[id number]] Collected: 01/19/25 1120    Lab Status: Final result Specimen: Blood from Arm, Left Updated: 01/19/25 1138     WBC 8.28 Thousand/uL      RBC 5.10 Million/uL      Hemoglobin 14.7 g/dL      Hematocrit 44.2 %      MCV 87 fL      MCH 28.8 pg      MCHC 33.3 g/dL      RDW 13.9 %      MPV 10.1 fL      Platelets 273 Thousands/uL      nRBC 0 /100 WBCs      Segmented % 67 %      Immature Grans % 1 %      Lymphocytes % 20 %      Monocytes % 9 %      Eosinophils Relative 2 %      Basophils Relative 1 %      Absolute Neutrophils 5.64 Thousands/µL      Absolute Immature Grans 0.04 Thousand/uL      Absolute Lymphocytes 1.66 Thousands/µL      Absolute Monocytes 0.70 Thousand/µL      Eosinophils Absolute 0.14 Thousand/µL      Basophils Absolute 0.10 Thousands/µL           XR hip/pelv 2-3 vws right if performed   ED Interpretation by Vitaly Ramirez MD (01/19 2819)   No acute osseous abnormality identified on independent evaluation of x-ray.      CTA head and neck with and without contrast   Final Result by Jadon Cordero MD (01/19 7657)      CT brain:  No acute intracranial abnormality. Chronic microangiopathic ischemic changes.      CTA head:   -Negative for large vessel intracranial occlusion .    -Mild to moderate narrowing at the bilateral petrous/cavernous ICA junctions.   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  The cervical vertebral arteries are patent.      Bilateral heterogeneous thyroid nodules, largest located within the left lobe as detailed above.                     Workstation performed: [id number]         XR chest 1 view portable   ED Interpretation by Vitaly Ramirez MD (01/19 1325)   No acute cardiac or pulmonary abnormality identified.            ED Course         Critical Care Time  CriticalCare Time    Date/Time: 1/19/2025 3:31 PM    Performed by: Vitaly Ramirez MD  Authorized by: Vitaly Ramirez MD    Critical care provider statement:     Critical care time (minutes):  50    Critical care time was exclusive of:  Separately billable procedures and treating other patients and teaching time    Critical care was necessary to treat or prevent imminent or life-threatening deterioration of the following conditions:  Trauma    Critical care was time spent personally by me on the following activities:  Examination of patient, ordering and review of radiographic studies, ordering and review of laboratory studies, re-evaluation of patient's condition and evaluation of patient's response to treatment    I assumed direction of critical care for this patient from another provider in my specialty: no    Comments:      Trauma level C due to fall, head strike on aspirin requiring my immediate bedside presents for evaluation, imaging, laboratory studies, reassessment, disposition.

## 2025-01-19 NOTE — ASSESSMENT & PLAN NOTE
Patient presented with recurrent syncope dizziness with elevated blood pressure 200s/100s  Patient has been on Cardizem 360 mg daily and losartan 100 mg daily  Blood pressure is trending down currently systolic blood pressure of 170  CTA head and neck shows no acute intracranial abnormality, chronic microangiopathic ischemic changes noted, has no neurological symptoms  Will monitor blood pressure closely, may need to add another antihypertensive  Will give IV hydralazine for SBP more than 170 and/or diastolic blood pressure more than 110 as needed

## 2025-01-19 NOTE — H&P
H&P - Hospitalist   Name: Mar Elizondo 79 y.o. female I MRN: [id number]  Unit/Bed#: ED-42 I Date of Admission: 1/19/2025   Date of Service: 1/19/2025 I Hospital Day: 0     Assessment & Plan  Type 2 diabetes mellitus with other specified complication ([facility])  Lab Results   Component Value Date    HGBA1C 6.3 09/10/2024       Recent Labs     01/19/25  1810   POCGLU 159*       Blood Sugar Average: Last 72 hrs:  (P) 159    Psoriasis  Controlled with apremilast tablets 30 mg twice daily  Resistant hypertension  Patient presented with recurrent syncope dizziness with elevated blood pressure 200s/100s  Patient has been on Cardizem 360 mg daily and losartan 100 mg daily  Blood pressure is trending down currently systolic blood pressure of 170  CTA head and neck shows no acute intracranial abnormality, chronic microangiopathic ischemic changes noted, has no neurological symptoms  Will monitor blood pressure closely, may need to add another antihypertensive  Will give IV hydralazine for SBP more than 170 and/or diastolic blood pressure more than 110 as needed  Obesity, morbid (HCC)  Advised about weight reduction and lifestyle modification  Vitamin B12 deficiency  on vitamin B12 supplement monthly  Syncope and collapse  Patient presents with dizziness and syncope and loss of consciousness and fall, 3 episodes in the past week, appears positional while getting up fast  Unwitnessed falls, no known seizure episodes in the past  Etiology unclear-likely vasovagal, cannot rule out cardiogenic or neurological  Noted to have elevated blood pressure of 230s/100s, is on 2 antihypertensive max dose, outpatient notes were reviewed and noted to have elevated blood pressure of 160s to 170s.  Patient does not appear to be dehydrated.  CT head , CTA head and neck shows no acute intracranial abnormality, no large vessel occlusion noted, moderate left extracranial carotid stenosis noted, mild to moderate narrowing at the bilateral  petrous/cavernous ICA junctions noted, moderate to severe focal stenosis involving the mid to inferior basilar artery noted.  No focal neurological deficit noted, EKG shows sinus rhythm, no acute ST-T changes noted  Will check orthostatic vitals, likely may have autonomic dysfunction with longstanding diabetes mellitus type 2  Check 2D echocardiogram, continue telemonitoring  Neurochecks  Multiple thyroid nodules  Multiple heterogenous, hypodense bilateral thyroid nodules noted largest in the left side 1.7 x 3 x 2.7 cm, needs outpatient follow-up with thyroid ultrasound  Finding was discussed with the patient and the daughter and made aware  Intracranial carotid stenosis  Patient presents with syncope ,recurrent and collapse  CT of the head and neck shows mild to moderate narrowing of bilateral petrous/cavernous ICA junction, also noted to have moderate to severe focal stenosis involving the mid to inferior basilar artery with moderate left extracranial carotid stenosis  Has no focal neurological symptoms or deficit  Will need vascular surgery follow-up      VTE Pharmacologic Prophylaxis:   Moderate Risk (Score 3-4) - Pharmacological DVT Prophylaxis Ordered: enoxaparin (Lovenox).  Code Status: Level 1 - Full Code   Discussion with family: Updated  (daughter) via phone.    Anticipated Length of Stay: Patient will be admitted on an observation basis with an anticipated length of stay of less than 2 midnights secondary to syncope.    History of Present Illness   Chief Complaint:       Mar Elizondo is a 79 y.o. female with a PMH of   Hypertension, diabetes mellitus type 2, BPPV, obesity, who presents with dizziness and syncope.  Patient states that she was watching TV and got up and felt dizzy lightheaded and fell, patient states that she passed out at least 5 to 10 minutes this morning.  Patient states this episode happened yesterday morning as well as a week ago which was similar episodes.  Patient  states she passed out at least 5 to 10 minutes when she fell on the ground.   all 3 events were unwitnessed however.  Patient denies of any nausea vomiting or dehydration.  Patient however states that these episodes happen early in the morning before having breakfast and her blood sugars were good.  Patient also complained of some right earache but no discharge or tinnitus or hearing loss.  Patient denies of any fever chills or cough.  Denies of any nausea vomiting or abdominal pain.  Patient complains of headache from the fall and head strike.    Review of Systems   HENT:  Positive for ear pain.    Neurological:  Positive for dizziness and syncope.   All other systems reviewed and are negative.      Historical Information   Past Medical History:   Diagnosis Date    Acute left-sided low back pain with left-sided sciatica 2019    Asthma     Benign essential hypertension     Chronic lower back pain     Controlled type 2 diabetes mellitus, with long-term current use of insulin (HCC) 2019    COPD (chronic obstructive pulmonary disease) (HCC)     Diabetes mellitus (HCC)     Hyperlipidemia     Sciatica      Past Surgical History:   Procedure Laterality Date    CHOLECYSTECTOMY       Social History     Tobacco Use    Smoking status: Former     Current packs/day: 0.00     Average packs/day: 1 pack/day for 30.0 years (30.0 ttl pk-yrs)     Types: Cigarettes     Start date: 1975     Quit date: 2005     Years since quittin.0    Smokeless tobacco: Never   Vaping Use    Vaping status: Former   Substance and Sexual Activity    Alcohol use: Never    Drug use: Never    Sexual activity: Not Currently     E-Cigarette/Vaping    E-Cigarette Use Former User      E-Cigarette/Vaping Substances    Nicotine No     THC No     CBD No     Flavoring No     Other No     Unknown No      Family history non-contributory  Social History:  Marital Status:    Occupation: retired  Patient Pre-hospital Living Situation:  Home  Patient Pre-hospital Level of Mobility: walks  Patient Pre-hospital Diet Restrictions: carb controlled    Meds/Allergies   I have reviewed home medications with patient personally.  Prior to Admission medications    Medication Sig Start Date End Date Taking? Authorizing Provider   albuterol (PROVENTIL HFA,VENTOLIN HFA) 90 mcg/act inhaler Inhale 2 puffs 4 (four) times a day 2/4/24 G Luis Eduardo Montejo, DO   Apremilast (Otezla) 30 MG TABS Take 1 tablet by mouth 2 (two) times a day 10/23/24   [facility], DO   aspirin 81 mg chewable tablet Chew 1 tablet (81 mg total) daily 10/1/21   Natalie Balasundram, DO   Cholecalciferol (VITAMIN D) 125 MCG (5000 UT) CAPS Take by mouth    Historical Provider, MD   Continuous Blood Gluc  (FreeStyle Dejuan 14 Day [place]) THERESA Check BG's 2-4x a day depending on sugar levels 12/28/21 G [facility], DO   Continuous Blood Gluc Sensor (FreeStyle Dejuan 14 Day Sensor) MISC Check sugars 2-4x daily depending on sugar levels 12/28/21 G [facility], DO   diltiazem (CARDIZEM CD) 360 MG 24 hr capsule TAKE 1 CAPSULE BY MOUTH DAILY 6/5/24 G [facility], DO   Insulin Glargine Solostar (Lantus SoloStar) 100 UNIT/ML SOPN INJECT SUBCUTANEOUSLY 50 UNITS  DAILY 5/20/24 G [facility], DO   insulin lispro (HumaLOG KwikPen) 100 units/mL injection pen INJECT SUBCUTANEOUSLY 5  UNITS BEFORE BREAKFAST 10  UNITS BEFORE LUNCH AND 15  UNITS BEFORE SUPPER  Patient not taking: Reported on 12/12/2024 1/20/23 G Luis Eduardo Montejo, DO   ipratropium-albuterol (DUO-NEB) 0.5-2.5 mg/3 mL nebulizer solution Take 3 mL by nebulization 4 (four) times a day 2/13/24 G [facility], DO   losartan-hydrochlorothiazide (HYZAAR) 100-25 MG per tablet TAKE 1 TABLET BY MOUTH DAILY 5/20/24 G [facility], DO   Mounjaro 7.5 MG/0.5ML INJECT THE CONTENTS OF ONE PEN  SUBCUTANEOUSLY WEEKLY AS  DIRECTED 3/19/24   G [facility], DO   OneTouch Ultra test strip TEST 2-3 TIMES DAILY 7/22/22   TAMIR Montejo, DO   potassium chloride  (K-DUR,KLOR-CON) 20 mEq tablet TAKE 1 TABLET BY MOUTH TWICE  DAILY 9/11/23   TAMIR Montejo, DO     No Known Allergies    Objective :  Temp:  [98.3 °F (36.8 °C)] 98.3 °F (36.8 °C)  HR:  [59-69] 69  BP: (166-239)/() 168/72  Resp:  [17-19] 17  SpO2:  [93 %-98 %] 95 %  O2 Device: None (Room air)    Physical Exam   HEENT-PERRLA, moist oral mucosa  Neck-supple, no JVD elevation   Respiratory-equal air entry bilaterally, no rales or rhonchi  Cardiovascular system-S1, S2 heard, no murmur or gallops or rubs  Abdomen-soft, nontender, no guarding or rigidity, bowel sounds heard  Extremities-no pedal edema  Peripheral pulses palpable  Musculoskeletal-no contractures  Central nervous system-no acute focal neurological deficit ,no sensory or motor deficit noted.  Skin-no rash noted       Lines/Drains:            Lab Results: I have reviewed the following results:  Results from last 7 days   Lab Units 01/19/25  1120   WBC Thousand/uL 8.28   HEMOGLOBIN g/dL 14.7   HEMATOCRIT % 44.2   PLATELETS Thousands/uL 273   SEGS PCT % 67   LYMPHO PCT % 20   MONO PCT % 9   EOS PCT % 2     Results from last 7 days   Lab Units 01/19/25  1120   SODIUM mmol/L 137   POTASSIUM mmol/L 4.0   CHLORIDE mmol/L 102   CO2 mmol/L 28   BUN mg/dL 11   CREATININE mg/dL 0.67   ANION GAP mmol/L 7   CALCIUM mg/dL 9.3   ALBUMIN g/dL 4.1   TOTAL BILIRUBIN mg/dL 0.54   ALK PHOS U/L 97   ALT U/L 18   AST U/L 14   GLUCOSE RANDOM mg/dL 221*         Results from last 7 days   Lab Units 01/19/25  1810   POC GLUCOSE mg/dl 159*     Lab Results   Component Value Date    HGBA1C 6.3 09/10/2024    HGBA1C 6.6 (H) 06/03/2024    HGBA1C 6.2 04/09/2024           Imaging Results Review: I personally reviewed the following image studies in PACS and associated radiology reports: CT head. My interpretation of the radiology images/reports is: Heterogeneous, hypodense bilateral thyroid nodules, largest located on the left measuring 1.7 x 3.0 x 2.7 cm Incidental discovery of one or  more thyroid nodule(s) measuring more than 1.5 cm and without suspicious features  .      ** Please Note: This note has been constructed using a voice recognition system. **

## 2025-01-19 NOTE — ASSESSMENT & PLAN NOTE
Lab Results   Component Value Date    HGBA1C 6.8 (H) 01/19/2025       Recent Labs     01/19/25  1810 01/19/25  2229 01/20/25  0813 01/20/25  1243   POCGLU 159* 249* 214* 209*       Blood Sugar Average: Last 72 hrs:  (P) 207.75  Patient has been diabetic for many years  He is on insulin Lantus 50 units daily and sliding scale insulin  Recent HbA1c has been under control  Patient also has been on Mounjaro for the past few months which has helped her control of diabetes mellitus type 2  Will cover with sliding scale insulin  Will monitor blood sugars  Has a continuous glucose monitor.

## 2025-01-19 NOTE — ASSESSMENT & PLAN NOTE
Lab Results   Component Value Date    HGBA1C 6.3 09/10/2024       Recent Labs     01/19/25  1810   POCGLU 159*       Blood Sugar Average: Last 72 hrs:  (P) 159

## 2025-01-20 ENCOUNTER — APPOINTMENT (OUTPATIENT)
Dept: NON INVASIVE DIAGNOSTICS | Facility: HOSPITAL | Age: 80
DRG: 641 | End: 2025-01-20
Payer: COMMERCIAL

## 2025-01-20 ENCOUNTER — APPOINTMENT (INPATIENT)
Dept: MRI IMAGING | Facility: HOSPITAL | Age: 80
DRG: 641 | End: 2025-01-20
Payer: COMMERCIAL

## 2025-01-20 PROBLEM — R42 DIZZINESS: Status: ACTIVE | Noted: 2025-01-20

## 2025-01-20 LAB
ANION GAP SERPL CALCULATED.3IONS-SCNC: 9 MMOL/L (ref 4–13)
AORTIC ROOT: 2.8 CM
AORTIC VALVE MEAN VELOCITY: 11.2 M/S
ASCENDING AORTA: 2.9 CM
ATRIAL RATE: 59 BPM
AV AREA BY CONTINUOUS VTI: 2.1 CM2
AV AREA PEAK VELOCITY: 1.9 CM2
AV LVOT MEAN GRADIENT: 3 MMHG
AV LVOT PEAK GRADIENT: 5 MMHG
AV MEAN PRESS GRAD SYS DOP V1V2: 6 MMHG
AV ORIFICE AREA US: 2.14 CM2
AV PEAK GRADIENT: 11 MMHG
AV VELOCITY RATIO: 0.76
AV VMAX SYS DOP: 1.62 M/S
BSA FOR ECHO PROCEDURE: 1.9 M2
BUN SERPL-MCNC: 15 MG/DL (ref 5–25)
CALCIUM SERPL-MCNC: 9.4 MG/DL (ref 8.4–10.2)
CHLORIDE SERPL-SCNC: 102 MMOL/L (ref 96–108)
CHOLEST SERPL-MCNC: 233 MG/DL (ref ?–200)
CO2 SERPL-SCNC: 26 MMOL/L (ref 21–32)
CREAT SERPL-MCNC: 0.71 MG/DL (ref 0.6–1.3)
DOP CALC AO VTI: 37.39 CM
DOP CALC LVOT AREA: 2.83 CM2
DOP CALC LVOT CARDIAC INDEX: 2.48 L/MIN/M2
DOP CALC LVOT CARDIAC OUTPUT: 4.71 L/MIN
DOP CALC LVOT DIAMETER: 1.9 CM
DOP CALC LVOT PEAK VEL VTI: 28.27 CM
DOP CALC LVOT PEAK VEL: 1.1 M/S
DOP CALC LVOT STROKE INDEX: 42.1 ML/M2
DOP CALC LVOT STROKE VOLUME: 80.11
E WAVE DECELERATION TIME: 366 MS
E/A RATIO: 0.62
ERYTHROCYTE [DISTWIDTH] IN BLOOD BY AUTOMATED COUNT: 14.2 % (ref 11.6–15.1)
FRACTIONAL SHORTENING: 28 (ref 28–44)
GFR SERPL CREATININE-BSD FRML MDRD: 80 ML/MIN/1.73SQ M
GLUCOSE P FAST SERPL-MCNC: 180 MG/DL (ref 65–99)
GLUCOSE SERPL-MCNC: 140 MG/DL (ref 65–140)
GLUCOSE SERPL-MCNC: 162 MG/DL (ref 65–140)
GLUCOSE SERPL-MCNC: 180 MG/DL (ref 65–140)
GLUCOSE SERPL-MCNC: 209 MG/DL (ref 65–140)
GLUCOSE SERPL-MCNC: 214 MG/DL (ref 65–140)
HCT VFR BLD AUTO: 43.7 % (ref 34.8–46.1)
HDLC SERPL-MCNC: 38 MG/DL
HGB BLD-MCNC: 14.9 G/DL (ref 11.5–15.4)
INTERVENTRICULAR SEPTUM IN DIASTOLE (PARASTERNAL SHORT AXIS VIEW): 1.3 CM
INTERVENTRICULAR SEPTUM: 1.3 CM (ref 0.6–1.1)
LAAS-AP2: 19.8 CM2
LAAS-AP4: 18.4 CM2
LDLC SERPL CALC-MCNC: 166 MG/DL (ref 0–100)
LEFT ATRIUM SIZE: 3.6 CM
LEFT ATRIUM VOLUME (MOD BIPLANE): 49 ML
LEFT ATRIUM VOLUME INDEX (MOD BIPLANE): 25.8 ML/M2
LEFT INTERNAL DIMENSION IN SYSTOLE: 2.8 CM (ref 2.1–4)
LEFT VENTRICULAR INTERNAL DIMENSION IN DIASTOLE: 3.9 CM (ref 3.5–6)
LEFT VENTRICULAR POSTERIOR WALL IN END DIASTOLE: 1.2 CM
LEFT VENTRICULAR STROKE VOLUME: 35 ML
LV EF US.2D.A4C+ESTIMATED: 76 %
LVSV (TEICH): 35 ML
MCH RBC QN AUTO: 29.4 PG (ref 26.8–34.3)
MCHC RBC AUTO-ENTMCNC: 34.1 G/DL (ref 31.4–37.4)
MCV RBC AUTO: 86 FL (ref 82–98)
MV E'TISSUE VEL-LAT: 7 CM/S
MV E'TISSUE VEL-SEP: 7 CM/S
MV PEAK A VEL: 1.16 M/S
MV PEAK E VEL: 72 CM/S
MV STENOSIS PRESSURE HALF TIME: 106 MS
MV VALVE AREA P 1/2 METHOD: 2.08
P AXIS: 15 DEGREES
PLATELET # BLD AUTO: 265 THOUSANDS/UL (ref 149–390)
PMV BLD AUTO: 10.2 FL (ref 8.9–12.7)
POTASSIUM SERPL-SCNC: 3.7 MMOL/L (ref 3.5–5.3)
PR INTERVAL: 154 MS
QRS AXIS: 43 DEGREES
QRSD INTERVAL: 90 MS
QT INTERVAL: 406 MS
QTC INTERVAL: 401 MS
RBC # BLD AUTO: 5.06 MILLION/UL (ref 3.81–5.12)
RIGHT ATRIAL 2D VOLUME: 32 ML
RIGHT ATRIUM AREA SYSTOLE A4C: 13.8 CM2
RIGHT VENTRICLE ID DIMENSION: 3.2 CM
SL CV LEFT ATRIUM LENGTH A2C: 5.7 CM
SL CV LV EF: 70
SL CV PED ECHO LEFT VENTRICLE DIASTOLIC VOLUME (MOD BIPLANE) 2D: 64 ML
SL CV PED ECHO LEFT VENTRICLE SYSTOLIC VOLUME (MOD BIPLANE) 2D: 29 ML
SODIUM SERPL-SCNC: 137 MMOL/L (ref 135–147)
T WAVE AXIS: 51 DEGREES
TRICUSPID ANNULAR PLANE SYSTOLIC EXCURSION: 2.2 CM
TRICUSPID VALVE PEAK REGURGITATION VELOCITY: 2.18 M/S
TRIGL SERPL-MCNC: 143 MG/DL (ref ?–150)
TSH SERPL DL<=0.05 MIU/L-ACNC: 6.43 UIU/ML (ref 0.45–4.5)
VENTRICULAR RATE: 59 BPM
VIT B12 SERPL-MCNC: 303 PG/ML (ref 180–914)
WBC # BLD AUTO: 10.91 THOUSAND/UL (ref 4.31–10.16)

## 2025-01-20 PROCEDURE — 97167 OT EVAL HIGH COMPLEX 60 MIN: CPT

## 2025-01-20 PROCEDURE — 82948 REAGENT STRIP/BLOOD GLUCOSE: CPT

## 2025-01-20 PROCEDURE — 93306 TTE W/DOPPLER COMPLETE: CPT | Performed by: INTERNAL MEDICINE

## 2025-01-20 PROCEDURE — 80061 LIPID PANEL: CPT | Performed by: STUDENT IN AN ORGANIZED HEALTH CARE EDUCATION/TRAINING PROGRAM

## 2025-01-20 PROCEDURE — 70551 MRI BRAIN STEM W/O DYE: CPT

## 2025-01-20 PROCEDURE — 97163 PT EVAL HIGH COMPLEX 45 MIN: CPT

## 2025-01-20 PROCEDURE — 93010 ELECTROCARDIOGRAM REPORT: CPT | Performed by: STUDENT IN AN ORGANIZED HEALTH CARE EDUCATION/TRAINING PROGRAM

## 2025-01-20 PROCEDURE — 80048 BASIC METABOLIC PNL TOTAL CA: CPT | Performed by: INTERNAL MEDICINE

## 2025-01-20 PROCEDURE — 85027 COMPLETE CBC AUTOMATED: CPT | Performed by: INTERNAL MEDICINE

## 2025-01-20 PROCEDURE — 99233 SBSQ HOSP IP/OBS HIGH 50: CPT | Performed by: STUDENT IN AN ORGANIZED HEALTH CARE EDUCATION/TRAINING PROGRAM

## 2025-01-20 PROCEDURE — 36415 COLL VENOUS BLD VENIPUNCTURE: CPT | Performed by: INTERNAL MEDICINE

## 2025-01-20 PROCEDURE — 93306 TTE W/DOPPLER COMPLETE: CPT

## 2025-01-20 PROCEDURE — 84443 ASSAY THYROID STIM HORMONE: CPT | Performed by: INTERNAL MEDICINE

## 2025-01-20 RX ORDER — MECLIZINE HYDROCHLORIDE 25 MG/1
25 TABLET ORAL EVERY 8 HOURS SCHEDULED
Status: COMPLETED | OUTPATIENT
Start: 2025-01-20 | End: 2025-01-21

## 2025-01-20 RX ORDER — LABETALOL HYDROCHLORIDE 5 MG/ML
10 INJECTION, SOLUTION INTRAVENOUS EVERY 6 HOURS PRN
Status: DISCONTINUED | OUTPATIENT
Start: 2025-01-20 | End: 2025-01-23 | Stop reason: HOSPADM

## 2025-01-20 RX ORDER — NIFEDIPINE 30 MG/1
60 TABLET, EXTENDED RELEASE ORAL DAILY
Status: DISCONTINUED | OUTPATIENT
Start: 2025-01-20 | End: 2025-01-21

## 2025-01-20 RX ORDER — ONDANSETRON 2 MG/ML
INJECTION INTRAMUSCULAR; INTRAVENOUS
Status: COMPLETED
Start: 2025-01-20 | End: 2025-01-20

## 2025-01-20 RX ORDER — ATORVASTATIN CALCIUM 40 MG/1
40 TABLET, FILM COATED ORAL
Status: DISCONTINUED | OUTPATIENT
Start: 2025-01-20 | End: 2025-01-23 | Stop reason: HOSPADM

## 2025-01-20 RX ORDER — ONDANSETRON 2 MG/ML
4 INJECTION INTRAMUSCULAR; INTRAVENOUS ONCE
Status: COMPLETED | OUTPATIENT
Start: 2025-01-20 | End: 2025-01-20

## 2025-01-20 RX ORDER — METOPROLOL SUCCINATE 50 MG/1
50 TABLET, EXTENDED RELEASE ORAL DAILY
Status: DISCONTINUED | OUTPATIENT
Start: 2025-01-21 | End: 2025-01-20

## 2025-01-20 RX ORDER — METOPROLOL SUCCINATE 50 MG/1
50 TABLET, EXTENDED RELEASE ORAL DAILY
Status: DISCONTINUED | OUTPATIENT
Start: 2025-01-20 | End: 2025-01-21

## 2025-01-20 RX ADMIN — HYDRALAZINE HYDROCHLORIDE 10 MG: 20 INJECTION INTRAMUSCULAR; INTRAVENOUS at 02:54

## 2025-01-20 RX ADMIN — METOPROLOL SUCCINATE 50 MG: 50 TABLET, EXTENDED RELEASE ORAL at 12:36

## 2025-01-20 RX ADMIN — ALBUTEROL SULFATE 2 PUFF: 90 AEROSOL, METERED RESPIRATORY (INHALATION) at 17:34

## 2025-01-20 RX ADMIN — ATORVASTATIN CALCIUM 40 MG: 40 TABLET, FILM COATED ORAL at 17:33

## 2025-01-20 RX ADMIN — ONDANSETRON 4 MG: 2 INJECTION INTRAMUSCULAR; INTRAVENOUS at 04:35

## 2025-01-20 RX ADMIN — MECLIZINE HYDROCHLORIDE 25 MG: 25 TABLET ORAL at 13:26

## 2025-01-20 RX ADMIN — MECLIZINE HYDROCHLORIDE 25 MG: 25 TABLET ORAL at 08:14

## 2025-01-20 RX ADMIN — ASPIRIN 81 MG CHEWABLE TABLET 81 MG: 81 TABLET CHEWABLE at 08:16

## 2025-01-20 RX ADMIN — ALBUTEROL SULFATE 2 PUFF: 90 AEROSOL, METERED RESPIRATORY (INHALATION) at 12:35

## 2025-01-20 RX ADMIN — INSULIN LISPRO 1 UNITS: 100 INJECTION, SOLUTION INTRAVENOUS; SUBCUTANEOUS at 22:34

## 2025-01-20 RX ADMIN — LOSARTAN POTASSIUM 100 MG: 50 TABLET, FILM COATED ORAL at 08:14

## 2025-01-20 RX ADMIN — MECLIZINE HYDROCHLORIDE 25 MG: 25 TABLET ORAL at 22:33

## 2025-01-20 RX ADMIN — DILTIAZEM HYDROCHLORIDE 360 MG: 180 CAPSULE, COATED, EXTENDED RELEASE ORAL at 08:15

## 2025-01-20 RX ADMIN — NIFEDIPINE 60 MG: 30 TABLET, FILM COATED, EXTENDED RELEASE ORAL at 14:46

## 2025-01-20 RX ADMIN — HYDROCHLOROTHIAZIDE 25 MG: 25 TABLET ORAL at 08:16

## 2025-01-20 RX ADMIN — MECLIZINE HYDROCHLORIDE 25 MG: 12.5 TABLET ORAL at 02:54

## 2025-01-20 RX ADMIN — INSULIN GLARGINE 50 UNITS: 100 INJECTION, SOLUTION SUBCUTANEOUS at 08:18

## 2025-01-20 RX ADMIN — INSULIN LISPRO 2 UNITS: 100 INJECTION, SOLUTION INTRAVENOUS; SUBCUTANEOUS at 12:43

## 2025-01-20 NOTE — ED NOTES
Pt attempted to use commode with assistance, got dizzy and vomited. Provider at bedside.      Florence Stokes, OMAR  01/20/25 0012

## 2025-01-20 NOTE — ED NOTES
Pt safely back into bed, provider notified of dizzy episode.      Florence Stokes, OMAR  01/20/25 0256

## 2025-01-20 NOTE — ASSESSMENT & PLAN NOTE
Patient presents with syncope ,recurrent and collapse  CT brain:  No acute intracranial abnormality. Chronic microangiopathic ischemic changes.     CTA head:  Negative for large vessel intracranial occlusion .  Mild to moderate narrowing at the bilateral petrous/cavernous ICA junctions.  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  The cervical vertebral arteries are patent.  Will need vascular surgery follow-up  Continue asa 81 mg daily  Start lipitor 40 mg daily   F/u lipid panel

## 2025-01-20 NOTE — ASSESSMENT & PLAN NOTE
Patient presented with recurrent syncope dizziness with elevated blood pressure 200s/100s  Patient has been on Cardizem 360 mg daily and losartan 100 mg daily  CTA head and neck shows no acute intracranial abnormality, chronic microangiopathic ischemic changes noted, has no neurological symptoms  Blood pressure has remained uncontrolled    1/20  Discontinue hydrochlorothiazide as this can exacerbate patient's lightheadedness  Continue losartan 100 mg daily  Will discontinue oral Cardizem 360 mg daily for Procardia 60 mg daily  Start Toprol-XL 50 mg daily to prevent rebound tachycardia from stopping Cardizem  Stop hydralazine and start prn labetalol for systolic >180 or diastolic >100

## 2025-01-20 NOTE — ED NOTES
Pt ringing bell to use bathroom but states she is having a dizzy episode. Pt unable to stand up on her own and struggling to sit up straight. . Assisted pt to commode.      Florence Stokes RN  01/20/25 1253

## 2025-01-20 NOTE — ASSESSMENT & PLAN NOTE
Patient presents with dizziness and syncope and loss of consciousness and fall, 3 episodes in the past week, appears positional while getting up fast  Unwitnessed falls, no known seizure episodes in the past  Etiology unclear-likely vasovagal, cannot rule out cardiogenic or neurological  Noted to have elevated blood pressure of 230s/100s, is on 2 antihypertensive max dose, outpatient notes were reviewed and noted to have elevated blood pressure of 160s to 170s.  Patient does not appear to be dehydrated.  CT head , CTA head and neck shows no acute intracranial abnormality, no large vessel occlusion noted, moderate left extracranial carotid stenosis noted, mild to moderate narrowing at the bilateral petrous/cavernous ICA junctions noted, moderate to severe focal stenosis involving the mid to inferior basilar artery noted.  No focal neurological deficit noted, EKG shows sinus rhythm, no acute ST-T changes noted  Follow-up echo

## 2025-01-20 NOTE — PHYSICAL THERAPY NOTE
PHYSICAL THERAPY EVALUATION  NAME: Mar Elizondo  AGE:   79 y.o.  MRN:  [id number]  ADMIT DX: Unspecified multiple injuries, initial encounter [T07.XXXA]    PMH:   Past Medical History:   Diagnosis Date    Acute left-sided low back pain with left-sided sciatica 12/03/2019    Asthma     Benign essential hypertension     Chronic lower back pain     Controlled type 2 diabetes mellitus, with long-term current use of insulin (HCC) 12/03/2019    COPD (chronic obstructive pulmonary disease) ([facility])     Diabetes mellitus (HCC)     Hyperlipidemia     Sciatica      LENGTH OF STAY: 0        01/20/25 1137   PT Last Visit   PT Visit Date 01/20/25   Note Type   Note type Evaluation   Pain Assessment   Pain Assessment Tool 0-10   Pain Score No Pain  (after evaluation c/o mild headache; RN made aware)   Hospital Pain Intervention(s) Repositioned   Restrictions/Precautions   Weight Bearing Precautions Per Order No   Other Precautions Multiple lines;Telemetry;Fall Risk   Home Living   Type of Home House   Home Layout One level;Stairs to enter with rails  (full flight of stairs to enter with bilateral rails)   Bathroom Shower/Tub Tub/shower unit   Bathroom Toilet Standard   Bathroom Equipment Tub transfer bench;Grab bars in shower;Grab bars around toilet   Home Equipment Walker;Cane   Additional Comments Ambulates independently without AD at baseline.   Prior Function   Level of [place] Independent with ADLs;Independent with functional mobility;Independent with IADLS   Lives With   (grandson; works during the day)   Receives Help From Family   IADLs Independent with driving;Independent with meal prep;Independent with medication management   Falls in the last 6 months 1 to 4  (2)   General   Additional Pertinent History supine BP: 205/75;  sitting BP: 198/84  (RN made aware)   Family/Caregiver Present No   Cognition   Overall Cognitive Status Impaired   Arousal/Participation Cooperative   Orientation Level Oriented to  "person;Oriented to place  (general time, (-) year)   Memory Decreased recall of recent events;Decreased short term memory   Following Commands Follows one step commands with increased time or repetition   Comments Pt identified by name and .   Subjective   Subjective Agrees to PT evaluation and is cooperative throughout session.  (\"No one knows what is going on.\")   RLE Assessment   RLE Assessment X   Strength RLE   RLE Overall Strength 4-/5  (functionally)   LLE Assessment   LLE Assessment X   Strength LLE   LLE Overall Strength 4-/5  (functionally)   Bed Mobility   Supine to Sit 5  Supervision   Additional items HOB elevated;Increased time required;Verbal cues   Sit to Supine 5  Supervision   Additional items Increased time required;Verbal cues   Transfers   Sit to Stand Unable to assess  (due to elevated BP and headache; RN made aware)   Ambulation/Elevation   Gait pattern Not appropriate   Balance   Static Sitting Good   Dynamic Sitting Fair +   Endurance Deficit   Endurance Deficit Yes   Endurance Deficit Description limited sitting tolerance, elevated BP   Activity Tolerance   Activity Tolerance Treatment limited secondary to medical complications (Comment)   Medical Staff Made Aware DEEPALI Elizalde   Nurse Made Aware Per RN, pt appropriate to evaluate; updated on BP/headache   Assessment   Prognosis Fair   Problem List Decreased strength;Decreased endurance;Impaired balance;Decreased mobility;Obesity;Pain   Assessment Pt is a 79 y.o. female seen for PT evaluation s/p admit to [facility] on 2022 w/ Syncope and collapse.  Order placed for PT. Comorbidities affecting pt's physical performance at time of assessment include: DM, HTN, obesity, and COPD. Personal factors affecting pt at time of IE include: steps to enter environment, limited home support, advanced age, limited insight into impairments, and recent fall(s). Prior to admission, pt was independent w/ all functional mobility w/ out AD, lived " in one floor environment, had full flight FAM (+) railing, and lived with grandson . Upon evaluation: Pt requires supervision for bed mobility. Limited assessment as pt's BP remains elevated with onset of headache.  Currently, pt presents with the following deficits: weakness, impaired balance, decreased endurance, pain, decreased activity tolerance, decreased safety awareness, and fall risk.  Pt's clinical presentation is currently unstable/unpredictable seen in pt's presentation of fall risk, elevated BP with mobility, and significant decline in functional mobility/tolerance compared to baseline.  Pt to benefit from continued skilled PT tx while in hospital and upon DC to address deficits as defined above and maximize level of functional mobility.  Recommend  progression of bed mobility, transfers, and gait/stair training as appropriate .   Goals   Patient Goals to go home   STG Expiration Date 01/30/25   Short Term Goal #1 Pt will be able to: (1) perform bed mobility with mod I to decrease caregiver burden (2) perform sit to stand with mod I to increase level of independence and promote safe toiletting and transfers (3) ambulate at least 200` with mod I and least restrictive AD to increase activity tolerance and allow for safe community mobilization (4) increase standing balance by 1 grade to decrease risk of falls (5) negotiate at least a full flight of stairs with supervision and use of handrail to allow safe access into home   PT Treatment Day 0   Plan   Treatment/Interventions Functional transfer training;LE strengthening/ROM;Therapeutic exercise;Endurance training;Patient/family training;Equipment eval/education;Bed mobility;Gait training   PT Frequency 3-5x/wk   Discharge Recommendation   Rehab Resource Intensity Level, PT II (Moderate Resource Intensity)  (discharge plan may change pending medical optimization)   AM-PAC Basic Mobility Inpatient   Turning in Flat Bed Without Bedrails 3   Lying on Back to  Sitting on Edge of Flat Bed Without Bedrails 3   Moving Bed to Chair 1   Standing Up From Chair Using Arms 1   Walk in Room 1   Climb 3-5 Stairs With Railing 1   Basic Mobility Inpatient Raw Score 10   Turning Head Towards Sound 4   Follow Simple Instructions 3   Low Function Basic Mobility Raw Score  17   Low Function Basic Mobility Standardized Score  27.46   [facility] Highest Level Of Mobility   -[facility] Goal 4: Move to chair/commode   -[facility] Achieved 3: Sit at edge of bed   End of Consult   Patient Position at End of Consult Supine;All needs within reach   Pt was seen for a co-eval with OT due to potential need for significant physical assist, poor pain control, impaired mental status, limiting behaviors, and/or poor adherence to precautions.     The patient's -[facility] Basic Mobility Inpatient Short Form Raw Score is 10, Standardized Score is  .  A Raw Score of less than 16 suggests the patient may benefit from discharge to post-acute rehabilitation services. However please refer to therapist recommendation for discharge planning given other factors that may influence destination.     Adapted from Ricardo VASQUEZ, Jahaira J, Yakelin J, Corinne J. [facility] “6-Clicks” Basic Mobility and Daily Activity Scores With Discharge Destination. Physical Therapy, 2021;101:1-9. DOI: [email or web address]      Clara Pollard, PT,DPT

## 2025-01-20 NOTE — PLAN OF CARE
Problem: PHYSICAL THERAPY ADULT  Goal: Performs mobility at highest level of function for planned discharge setting.  See evaluation for individualized goals.  Description: Treatment/Interventions: Functional transfer training, LE strengthening/ROM, Therapeutic exercise, Endurance training, Patient/family training, Equipment eval/education, Bed mobility, Gait training          See flowsheet documentation for full assessment, interventions and recommendations.  Note: Prognosis: Fair  Problem List: Decreased strength, Decreased endurance, Impaired balance, Decreased mobility, Obesity, Pain  Assessment: Pt is a 79 y.o. female seen for PT evaluation s/p admit to [facility] on 8/18/2022 w/ Syncope and collapse.  Order placed for PT. Comorbidities affecting pt's physical performance at time of assessment include: DM, HTN, obesity, and COPD. Personal factors affecting pt at time of IE include: steps to enter environment, limited home support, advanced age, limited insight into impairments, and recent fall(s). Prior to admission, pt was independent w/ all functional mobility w/ out AD, lived in one floor environment, had full flight FAM (+) railing, and lived with grandson . Upon evaluation: Pt requires supervision for bed mobility. Limited assessment as pt's BP remains elevated with onset of headache.  Currently, pt presents with the following deficits: weakness, impaired balance, decreased endurance, pain, decreased activity tolerance, decreased safety awareness, and fall risk.  Pt's clinical presentation is currently unstable/unpredictable seen in pt's presentation of fall risk, elevated BP with mobility, and significant decline in functional mobility/tolerance compared to baseline.  Pt to benefit from continued skilled PT tx while in hospital and upon DC to address deficits as defined above and maximize level of functional mobility.  Recommend  progression of bed mobility, transfers, and gait/stair training  as appropriate .        Rehab Resource Intensity Level, PT: II (Moderate Resource Intensity) (discharge plan may change pending medical optimization)    See flowsheet documentation for full assessment.

## 2025-01-20 NOTE — OCCUPATIONAL THERAPY NOTE
Occupational Therapy Evaluation     Patient Name: Mar Elizondo  Today's Date: 1/20/2025  Problem List  Principal Problem:    Syncope and collapse  Active Problems:    Resistant hypertension    Mixed hyperlipidemia    Psoriasis    Type 2 diabetes mellitus with other specified complication (HCC)    Obesity, morbid (HCC)    Vitamin B12 deficiency    Multiple thyroid nodules    Intracranial carotid stenosis    Dizziness    Past Medical History  Past Medical History:   Diagnosis Date    Acute left-sided low back pain with left-sided sciatica 12/03/2019    Asthma     Benign essential hypertension     Chronic lower back pain     Controlled type 2 diabetes mellitus, with long-term current use of insulin (HCC) 12/03/2019    COPD (chronic obstructive pulmonary disease) (HCC)     Diabetes mellitus (HCC)     Hyperlipidemia     Sciatica      Past Surgical History  Past Surgical History:   Procedure Laterality Date    CHOLECYSTECTOMY               01/20/25 1148   OT Last Visit   OT Visit Date 01/20/25   Note Type   Note type Evaluation   Pain Assessment   Pain Assessment Tool 0-10   Pain Score No Pain  (at end of session pt complaining about headache - RN made aware)   Restrictions/Precautions   Weight Bearing Precautions Per Order No   Other Precautions Cognitive;Multiple lines;Fall Risk;Pain   Home Living   Type of Home House   Home Layout One level;Stairs to enter with rails  (FF FAM)   Bathroom Shower/Tub Tub/shower unit   Bathroom Toilet Standard   Bathroom Equipment Tub transfer bench;Grab bars in shower;Grab bars around toilet   Bathroom Accessibility Accessible   Home Equipment Walker;Cane   Additional Comments no use of AD at baseline   Prior Function   Level of [place] Independent with ADLs   Lives With Family  (grandson: works during the day)   Receives Help From Family   IADLs Independent with driving;Independent with meal prep;Independent with medication management   Falls in the last 6 months 1 to 4  (2)  "  Vocational Retired   Lifestyle   Autonomy PTA pt living with grandson in 1SH, pt (I) with ADLs and IADLs, (+)falls, (+)drives, no use of AD at baseline   Reciprocal Relationships supportive grandson - however he works during the day   Service to Others retired   Intrinsic Gratification enjoys cleaning her home   General   Additional Pertinent History Admit due to fall + syncopal episode. PMH: DM II, BPPV, obesity, HTN   Family/Caregiver Present No   Subjective   Subjective \"I'm all brusied up on my arms from them taking so many blood pressures\"   ADL   Eating Assistance 7  Independent   Grooming Assistance 5  Supervision/Setup   UB Bathing Assistance 4  Minimal Assistance   LB Bathing Assistance 3  Moderate Assistance   UB Dressing Assistance 4  Minimal Assistance   LB Dressing Assistance 3  Moderate Assistance   Toileting Assistance  3  Moderate Assistance   Bed Mobility   Supine to Sit 5  Supervision   Additional items Increased time required;Verbal cues   Sit to Supine 5  Supervision   Additional items Increased time required;Verbal cues   Additional Comments supine /75   Transfers   Sit to Stand Unable to assess  (due to elevated BP sitting EOB (198/84) + headache)   Balance   Static Sitting Good   Dynamic Sitting Fair +   Activity Tolerance   Activity Tolerance Treatment limited secondary to medical complications (Comment)  (limited due to elevated BP + symptomatic)   Medical Staff Made Aware PT OMAR Elizabeth   RUE Assessment   RUE Assessment WFL   LUE Assessment   LUE Assessment WFL   Hand Function   Gross Motor Coordination Functional   Fine Motor Coordination Functional   Cognition   Overall Cognitive Status Impaired   Arousal/Participation Alert;Cooperative   Attention Attends with cues to redirect   Orientation Level Oriented to person;Oriented to place;Disoriented to time;Disoriented to situation   Memory Decreased short term memory;Decreased recall of recent events;Decreased recall of " precautions   Following Commands Follows one step commands with increased time or repetition   Comments Poor historian at times, requiring repetition of questions and cuing for attention. Limited safety awareness + problem solving   Assessment   Limitation Decreased ADL status;Decreased Safe judgement during ADL;Decreased cognition;Decreased endurance;Decreased self-care trans;Decreased high-level ADLs  (impaired balance, fxnl mobility, act eve, fxnl reach, strength, fxnl sitting balance, fxnl sitting eve, attention to task, safety awareness, insight, pacing, problem solving, learning new tasks)   Prognosis Good   Assessment Pt is a 79 y.o. female seen for OT evaluation s/p admission to [facility] on 1/19/2025 due to fall. Diagnosed with Syncope and collapse. Personal and env factors supporting pt at time of IE include (I) PLOF and supportive grandson . Personal and env factors inhibiting engagement in occupations include advanced age, lifestyle patterns, and inaccessible home environment( [facility] home). Performance deficits that affect the pt’s occupational performance can be seen above. Due to pt's current functional limitations and medical complications pt is functioning below baseline. Pt would benefit from continued skilled OT treatment in order to maximize safety, independence and overall performance with ADLs, functional mobility, functional transfers, and cognition in order to achieve highest level of function.   Goals   Patient Goals to go home   LTG Time Frame 10-14   Long Term Goal see goals listed below   Plan   Treatment Interventions ADL retraining;Functional transfer training;Endurance training;Cognitive reorientation;Patient/family training;Equipment evaluation/education;Compensatory technique education;Continued evaluation;Activityengagement;Energy conservation  (**OOB evaluation)   Goal Expiration Date 01/30/25   OT Treatment Day 0   OT Frequency 3-5x/wk   Discharge Recommendation   Rehab Resource Intensity  Level, OT II (Moderate Resource Intensity)  (d/c rec may change with OOB activity - limited at this time due to medical status)   AM-PAC Daily Activity Inpatient   Lower Body Dressing 2   Bathing 2   Toileting 2   Upper Body Dressing 3   Grooming 3   Eating 4   Daily Activity Raw Score 16   Daily Activity Standardized Score (Calc for Raw Score >=11) 35.96   AM-PAC Applied Cognition Inpatient   Following a Speech/Presentation 2   Understanding Ordinary Conversation 3   Taking Medications 1   Remembering Where Things Are Placed or Put Away 2   Remembering List of 4-5 Errands 1   Taking Care of Complicated Tasks 1   Applied Cognition Raw Score 10   Applied Cognition Standardized Score 24.98   End of Consult   Patient Position at End of Consult Supine;All needs within reach  (on stretcher)     GOALS:      -Patient will be Mod I with UB dressing using AE and AD as needed in order to increase (I) with ADLs    -Patient will be Mod I with UB bathing using AE and AD as needed in order to increase (I) with ADLs    -Patient will be Mod I with LB dressing with use of AE and AD as needed in order to increase (I) with ADLs    -Patient will be Mod I with LB bathing with use of AE and AD as needed in order to increase (I) with ADLs    -Patient will complete toileting w/ Mod I w/ G hygiene/thoroughness in order to reduce caregiver burden    -Patient will demonstrate Mod I with bed mobility for ability to manage own comfort and initiate OOB tasks.     **OOB mobility/transfer goals to be established in future session**    -Patient will tolerate therapeutic activities for greater than 30 min, in order to increase tolerance for functional activities.     -Patient will engage in ongoing cognitive assessment in order to assist with safe discharge planning/recommendations.        The patient's raw score on the AM-PAC Daily Activity Inpatient Short Form is 16. A raw score of less than 19 suggests the patient may benefit from discharge to  post-acute rehabilitation services. HOWEVER please refer to the recommendation of the Occupational Therapist for safe discharge planning.    This session, pt required and most appropriately benefited from skilled OT/PT co-eval due to decreased activity tolerance and unpredictable medical and/or functional status. OT and PT goals were addressed separately as seen in documentation.     Fide Prado MS, OTR/L

## 2025-01-20 NOTE — ASSESSMENT & PLAN NOTE
Patient presents with syncope ,recurrent and collapse  CT of the head and neck shows mild to moderate narrowing of bilateral petrous/cavernous ICA junction, also noted to have moderate to severe focal stenosis involving the mid to inferior basilar artery with moderate left extracranial carotid stenosis  Has no focal neurological symptoms or deficit  Will need vascular surgery follow-up

## 2025-01-20 NOTE — UTILIZATION REVIEW
Initial Clinical Review    Pt initially admitted as Observation on 1/19. Changed to Inpatient on 1/20. Pt requiring continued stay d/t adjustments to anti-HTN regimen.    Admission: Date/Time/Statement:   Admission Orders (From admission, onward)       Ordered        01/20/25 1355  INPATIENT ADMISSION  Once            01/19/25 1529  Place in Observation  Once                          Orders Placed This Encounter   Procedures    INPATIENT ADMISSION     Standing Status:   Standing     Number of Occurrences:   1     Level of Care:   Med Surg [16]     Estimated length of stay:   More than 2 Midnights     Certification:   I certify that inpatient services are medically necessary for this patient for a duration of greater than two midnights. See H&P and MD Progress Notes for additional information about the patient's course of treatment.     ED Arrival Information       Expected   -    Arrival   1/19/2025 10:33    Acuity   Emergent              Means of arrival   Walk-In    Escorted by   Self    Service   Hospitalist    Admission type   Emergency              Arrival complaint   Fall- Rib Pain, Dizziness (+ASA)             Chief Complaint   Patient presents with    Fall     Multiple falls yesterday and today. +aspirin. +headstrike       Initial Presentation: 79 y.o. female who presented self from home to [facility] ED. Admitted as Observation for evaluation and treatment of syncope. PMHx: asthma, chronic low back pain, T2DM, COPD, HLD, HTN. Presented w/ dizziness, syncope and LOC, associated w/ fall. Three episodes in the past week. Notes lightheadedness and room spinning sensation. BG elevated 221 on arrival, check HgbA1c. Imaging without acute injury. PLAN: telemetry, continue PTA meds, SSI w/ BG checks ACHS, Lantus, I&O, q4h neuro checks, orthostatic vitals, echo, Trend labs, replete electrolytes as needed; PT/OT evals.       Date: 01/20/25   Changed to Inpatient Status: notes nausea has resolved. Exam: when  looking toward right, leftward beating nystagmus of L eye. HgbA1c 6.8. Plan: continue telemetry, SSI w/ BG checks ACHS, Lantus, discontinue HCTZ & cardizem, continue losartan, start Procardia 60 mg,Toprol-XL 50 mg, stop PRN hydralazine change to PRN labetalol. Check MRI brain. Continue ASA. Start lipitor 40 mg. Meclizine. IVF. PT/OT evals.     ED Treatment-Medication Administration from 01/19/2025 1033 to 01/20/2025 1355         Date/Time Order Dose Route Action     01/19/2025 1243 iohexol (OMNIPAQUE) 350 MG/ML injection (MULTI-DOSE) 70 mL 70 mL Intravenous Given     01/19/2025 1315 meclizine (ANTIVERT) tablet 25 mg 25 mg Oral Given     01/19/2025 1344 losartan (COZAAR) tablet 100 mg 100 mg Oral Given     01/19/2025 1344 hydroCHLOROthiazide tablet 25 mg 25 mg Oral Given     01/19/2025 1732 albuterol (PROVENTIL HFA,VENTOLIN HFA) inhaler 2 puff 2 puff Inhalation Given     01/20/2025 1235 albuterol (PROVENTIL HFA,VENTOLIN HFA) inhaler 2 puff 2 puff Inhalation Given     01/20/2025 0816 aspirin chewable tablet 81 mg 81 mg Oral Given     01/20/2025 0815 diltiazem (CARDIZEM CD) 24 hr capsule 360 mg 360 mg Oral Given     01/20/2025 0818 insulin glargine (LANTUS) subcutaneous injection 50 Units 0.5 mL 50 Units Subcutaneous Given     01/20/2025 0814 losartan (COZAAR) tablet 100 mg 100 mg Oral Given     01/20/2025 0816 hydroCHLOROthiazide tablet 25 mg 25 mg Oral Given     01/20/2025 1243 insulin lispro (HumALOG/ADMELOG) 100 units/mL subcutaneous injection 1-6 Units 2 Units Subcutaneous Given     01/19/2025 2236 insulin lispro (HumALOG/ADMELOG) 100 units/mL subcutaneous injection 1-6 Units 3 Units Subcutaneous Given     01/20/2025 0254 meclizine (ANTIVERT) tablet 25 mg 25 mg Oral Given     01/19/2025 2122 hydrALAZINE (APRESOLINE) injection 10 mg 10 mg Intravenous Given     01/20/2025 0254 hydrALAZINE (APRESOLINE) injection 10 mg 10 mg Intravenous Given     01/20/2025 0435 ondansetron (ZOFRAN) injection 4 mg 4 mg Intravenous  Given     01/20/2025 0814 meclizine (ANTIVERT) tablet 25 mg 25 mg Oral Given     01/20/2025 1326 meclizine (ANTIVERT) tablet 25 mg 25 mg Oral Given     01/20/2025 1236 metoprolol succinate (TOPROL-XL) 24 hr tablet 50 mg 50 mg Oral Given            Scheduled Medications:  albuterol, 2 puff, Inhalation, 4x Daily  Apremilast, 30 mg, Oral, BID  aspirin, 81 mg, Oral, Daily  atorvastatin, 40 mg, Oral, Daily With Dinner  cyanocobalamin, 1,000 mcg, Intramuscular, Q30 Days  enoxaparin, 40 mg, Subcutaneous, Daily  insulin glargine, 50 Units, Subcutaneous, QAM  insulin lispro, 1-6 Units, Subcutaneous, TID AC  insulin lispro, 1-6 Units, Subcutaneous, HS  losartan, 100 mg, Oral, Daily  meclizine, 25 mg, Oral, Q8H KAYODE  metoprolol succinate, 50 mg, Oral, Daily  NIFEdipine, 60 mg, Oral, Daily    Continuous IV Infusions: none    PRN Meds:  acetaminophen, 650 mg, Oral, Q6H PRN  labetalol, 10 mg, Intravenous, Q6H PRN      ED Triage Vitals   Temperature Pulse Respirations Blood Pressure SpO2 Pain Score   01/19/25 1112 01/19/25 1112 01/19/25 1112 01/19/25 0425 01/19/25 1112 01/19/25 1112   98.3 °F (36.8 °C) 66 18 (!) 165/105 97 % 5     Weight (last 2 days)       Date/Time Weight    01/19/25 2230 92.4 (203.71)            Vital Signs (last 3 days)       Date/Time Temp Pulse Resp BP MAP (mmHg) SpO2 O2 Device Patient Position - Orthostatic VS [place] Coma Scale Score Pain    01/20/25 1327 -- -- -- 158/68 98 -- -- Sitting -- --    01/20/25 1236 -- 76 -- 152/67 -- -- -- -- -- --    01/20/25 1143 -- -- -- 198/84 121 -- -- -- -- --    01/20/25 1141 -- -- -- 181/151 163 -- -- -- -- --    01/20/25 1138 -- -- -- 205/75 108 -- -- -- -- --    01/20/25 1137 -- -- -- -- -- -- -- -- -- No Pain    01/20/25 1000 -- 72 18 110/53 77 92 % -- -- -- --    01/20/25 0800 -- 73 18 124/61 88 95 % -- -- -- --    01/20/25 0700 -- 72 18 137/65 94 93 % -- -- -- --    01/20/25 0600 -- 74 18 119/57 82 92 % -- -- -- --    01/20/25 0500 -- 71 -- 134/60 86 91 % -- -- --  --    01/20/25 0430 -- 78 -- 162/80 104 -- -- Lying - Orthostatic VS -- --    01/20/25 0400 -- 72 -- 159/68 98 97 % -- -- 15 --    01/20/25 0300 -- 74 18 171/71 102 95 % -- -- 15 --    01/20/25 0245 -- 73 20 193/78 112 96 % -- -- -- --    01/20/25 0100 -- 64 16 173/76 109 95 % -- -- -- --    01/20/25 0000 -- -- -- -- -- -- -- -- 15 --    01/19/25 2300 -- 73 -- 186/78 112 96 % -- -- -- --    01/19/25 2230 -- 75 18 192/81 116 96 % None (Room air) -- -- --    01/19/25 2115 -- 72 18 189/100 -- 96 % -- -- -- --    01/19/25 2000 -- -- -- -- -- -- -- -- 15 --    01/19/25 1800 -- 69 17 168/72 -- 95 % -- -- 15 --    01/19/25 1700 -- 64 19 166/70 -- 98 % -- -- 15 --    01/19/25 1600 -- 68 18 167/65 -- 96 % -- -- 15 --    01/19/25 1543 -- 66 17 167/68 -- 95 % -- -- -- --    01/19/25 1500 -- 61 18 197/84 121 94 % -- -- 15 --    01/19/25 1445 -- -- -- -- -- -- -- -- 15 --    01/19/25 1430 -- 60 18 226/88 127 96 % -- -- -- --    01/19/25 1400 -- 59 -- 239/100 143 93 % -- -- -- --    01/19/25 1315 -- 62 18 210/88 126 96 % -- -- 15 --    01/19/25 1300 -- 65 18 210/98 141 96 % -- -- 15 --    01/19/25 1230 -- 62 17 207/89 -- 96 % -- -- 15 --    01/19/25 1215 -- 62 18 221/97 -- 97 % -- -- 15 --    01/19/25 1200 -- 66 18 215/90 -- -- -- -- 15 --    01/19/25 1130 -- 65 17 230/100 -- -- -- -- 15 --    01/19/25 1112 98.3 °F (36.8 °C) 66 18 239/105 -- 97 % None (Room air) Sitting 15 5    01/19/25 0425 -- -- -- 165/105 -- -- -- Sitting - Orthostatic VS -- --              Pertinent Labs/Diagnostic Test Results:   Radiology:  XR hip/pelv 2-3 vws right if performed   ED Interpretation by Vitaly Ramirez MD (01/19 3816)   No acute osseous abnormality identified on independent evaluation of x-ray.      Final Interpretation by Shailesh Burks MD (01/19 9931)      No acute osseous abnormality.         Computerized Assisted Algorithm (CAA) may have been used to analyze all applicable images.            Workstation performed:  [id number]         CTA head and neck with and without contrast   Final Interpretation by Jadon Cordero MD (01/19 1415)      CT brain:  No acute intracranial abnormality. Chronic microangiopathic ischemic changes.      CTA head:   -Negative for large vessel intracranial occlusion .   -Mild to moderate narrowing at the bilateral petrous/cavernous ICA junctions.   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  The cervical vertebral arteries are patent.      Bilateral heterogeneous thyroid nodules, largest located within the left lobe as detailed above.                     Workstation performed: [id number]         XR chest 1 view portable   ED Interpretation by Vitaly Ramirez MD (01/19 1325)   No acute cardiac or pulmonary abnormality identified.      Final Interpretation by Shailesh Burks MD (01/19 1555)      No acute cardiopulmonary disease.            Workstation performed: [id number]         MRI brain wo contrast    (Results Pending)       Results from last 7 days   Lab Units 01/19/25  1949   SARS-COV-2  Negative     Results from last 7 days   Lab Units 01/20/25  0413 01/19/25  1120   WBC Thousand/uL 10.91* 8.28   HEMOGLOBIN g/dL 14.9 14.7   HEMATOCRIT % 43.7 44.2   PLATELETS Thousands/uL 265 273   TOTAL NEUT ABS Thousands/µL  --  5.64         Results from last 7 days   Lab Units 01/20/25  0413 01/19/25  1120   SODIUM mmol/L 137 137   POTASSIUM mmol/L 3.7 4.0   CHLORIDE mmol/L 102 102   CO2 mmol/L 26 28   ANION GAP mmol/L 9 7   BUN mg/dL 15 11   CREATININE mg/dL 0.71 0.67   EGFR ml/min/1.73sq m 80 82   CALCIUM mg/dL 9.4 9.3     Results from last 7 days   Lab Units 01/19/25  1120   AST U/L 14   ALT U/L 18   ALK PHOS U/L 97   TOTAL PROTEIN g/dL 6.7   ALBUMIN g/dL 4.1   TOTAL BILIRUBIN mg/dL 0.54     Results from last 7 days   Lab Units 01/20/25  1243 01/20/25  0813 01/19/25  2229 01/19/25  1810   POC  GLUCOSE mg/dl 209* 214* 249* 159*     Results from last 7 days   Lab Units 01/20/25  0413 01/19/25  1120   GLUCOSE RANDOM mg/dL 180* 221*         Results from last 7 days   Lab Units 01/19/25  1120   HEMOGLOBIN A1C % 6.8*   EAG mg/dl 148      Results from last 7 days   Lab Units 01/19/25  1320 01/19/25  1120   HS TNI 0HR ng/L  --  6   HS TNI 2HR ng/L 6  --    HSTNI D2 ng/L 0  --       Results from last 7 days   Lab Units 01/20/25  0413   TSH 3RD GENERATON uIU/mL 6.428*      Results from last 7 days   Lab Units 01/19/25  1949   INFLUENZA A PCR  Negative   INFLUENZA B PCR  Negative   RSV PCR  Negative       Past Medical History:   Diagnosis Date    Acute left-sided low back pain with left-sided sciatica 12/03/2019    Asthma     Benign essential hypertension     Chronic lower back pain     Controlled type 2 diabetes mellitus, with long-term current use of insulin (HCC) 12/03/2019    COPD (chronic obstructive pulmonary disease) (HCC)     Diabetes mellitus (HCC)     Hyperlipidemia     Sciatica      Present on Admission:   Type 2 diabetes mellitus with other specified complication (HCC)   Psoriasis   Resistant hypertension   Obesity, morbid (HCC)   Vitamin B12 deficiency   Mixed hyperlipidemia      Admitting Diagnosis: No admission diagnoses are documented for this encounter.  Age/Sex: 79 y.o. female    Network Utilization Review Department  ATTENTION: Please call with any questions or concerns to [phone] and carefully listen to the prompts so that you are directed to the right person. All voicemails are confidential.   For Discharge needs, contact Care Management DC Support Team at [phone] opt. [phone]  Send all requests for admission clinical reviews, approved or denied determinations and any other requests to dedicated fax number below belonging to the campus where the patient is receiving treatment. List of dedicated fax numbers for the Facilities:  FACILITY NAME UR FAX NUMBER   ADMISSION DENIALS  (Administrative/Medical Necessity) [phone]   DISCHARGE SUPPORT TEAM (NETWORK) [phone]   PARENT CHILD HEALTH (Maternity/NICU/Pediatrics) [phone]   [facility] [phone]   [facility] [phone]   [facility] [phone]   [facility] [phone]   [facility] [phone]   [facility] [phone]   [facility] [phone]   [facility] [phone]   [facility] [phone]   [facility] [phone]   General acute hospital [phone]   [facility] [phone]

## 2025-01-20 NOTE — PLAN OF CARE
Problem: OCCUPATIONAL THERAPY ADULT  Goal: Performs self-care activities at highest level of function for planned discharge setting.  See evaluation for individualized goals.  Description: Treatment Interventions: ADL retraining, Functional transfer training, Endurance training, Cognitive reorientation, Patient/family training, Equipment evaluation/education, Compensatory technique education, Continued evaluation, Activityengagement, Energy conservation (**OOB evaluation)          See flowsheet documentation for full assessment, interventions and recommendations.   Note: Limitation: Decreased ADL status, Decreased Safe judgement during ADL, Decreased cognition, Decreased endurance, Decreased self-care trans, Decreased high-level ADLs (impaired balance, fxnl mobility, act eve, fxnl reach, strength, fxnl sitting balance, fxnl sitting eve, attention to task, safety awareness, insight, pacing, problem solving, learning new tasks)  Prognosis: Good  Assessment: Pt is a 79 y.o. female seen for OT evaluation s/p admission to [facility] on 1/19/2025 due to fall. Diagnosed with Syncope and collapse. Personal and env factors supporting pt at time of IE include (I) PLOF and supportive grandson . Personal and env factors inhibiting engagement in occupations include advanced age, lifestyle patterns, and inaccessible home environment( [facility], [place] home). Performance deficits that affect the pt’s occupational performance can be seen above. Due to pt's current functional limitations and medical complications pt is functioning below baseline. Pt would benefit from continued skilled OT treatment in order to maximize safety, independence and overall performance with ADLs, functional mobility, functional transfers, and cognition in order to achieve highest level of function.     Rehab Resource Intensity Level, OT: II (Moderate Resource Intensity) (d/c rec may change with OOB activity - limited at this time due to medical status)

## 2025-01-20 NOTE — ASSESSMENT & PLAN NOTE
Patient presents with dizziness and syncope and loss of consciousness and fall, 3 episodes in the past week, appears positional while getting up fast  Unwitnessed falls, no known seizure episodes in the past  Etiology unclear-likely vasovagal, cannot rule out cardiogenic or neurological  Noted to have elevated blood pressure of 230s/100s, is on 2 antihypertensive max dose, outpatient notes were reviewed and noted to have elevated blood pressure of 160s to 170s.  Patient does not appear to be dehydrated.  CT head , CTA head and neck shows no acute intracranial abnormality, no large vessel occlusion noted, moderate left extracranial carotid stenosis noted, mild to moderate narrowing at the bilateral petrous/cavernous ICA junctions noted, moderate to severe focal stenosis involving the mid to inferior basilar artery noted.  No focal neurological deficit noted, EKG shows sinus rhythm, no acute ST-T changes noted  Will check orthostatic vitals, likely may have autonomic dysfunction with longstanding diabetes mellitus type 2  Check 2D echocardiogram, continue telemonitoring  Neurochecks

## 2025-01-20 NOTE — ASSESSMENT & PLAN NOTE
Patient admits to having dizziness described as lightheadedness and room spinning sensation   1/20/25: On exam when looking to the right with leftward beating nystagmus of left eye  Will obtain MRI brain to rule out stroke  Continue meclizine  IV fluids  PT OT

## 2025-01-20 NOTE — ASSESSMENT & PLAN NOTE
Multiple heterogenous, hypodense bilateral thyroid nodules noted largest in the left side 1.7 x 3 x 2.7 cm, needs outpatient follow-up with thyroid ultrasound  Finding was discussed with the patient and the daughter and made aware on night of admission  Will need outpatient f/u

## 2025-01-20 NOTE — PROGRESS NOTES
Progress Note - Hospitalist   Name: Mar Elizondo 79 y.o. female I MRN: [id number]  Unit/Bed#: ED-42 I Date of Admission: 1/19/2025   Date of Service: 1/20/2025 I Hospital Day: 0    Assessment & Plan  Type 2 diabetes mellitus with other specified complication ([facility])  Lab Results   Component Value Date    HGBA1C 6.8 (H) 01/19/2025       Recent Labs     01/19/25  1810 01/19/25  2229 01/20/25  0813 01/20/25  1243   POCGLU 159* 249* 214* 209*       Blood Sugar Average: Last 72 hrs:  (P) 207.75  Patient has been diabetic for many years  He is on insulin Lantus 50 units daily and sliding scale insulin  Recent HbA1c has been under control  Patient also has been on Mounjaro for the past few months which has helped her control of diabetes mellitus type 2  Will cover with sliding scale insulin  Will monitor blood sugars  Has a continuous glucose monitor.  Psoriasis  Controlled with apremilast tablets 30 mg twice daily  Resistant hypertension  Patient presented with recurrent syncope dizziness with elevated blood pressure 200s/100s  Patient has been on Cardizem 360 mg daily and losartan 100 mg daily  CTA head and neck shows no acute intracranial abnormality, chronic microangiopathic ischemic changes noted, has no neurological symptoms  Blood pressure has remained uncontrolled    1/20  Discontinue hydrochlorothiazide as this can exacerbate patient's lightheadedness  Continue losartan 100 mg daily  Will discontinue oral Cardizem 360 mg daily for Procardia 60 mg daily  Start Toprol-XL 50 mg daily to prevent rebound tachycardia from stopping Cardizem  Stop hydralazine and start prn labetalol for systolic >180 or diastolic >100    Obesity, morbid (HCC)  Advised about weight reduction and lifestyle modification  Vitamin B12 deficiency  on vitamin B12 supplement monthly  Syncope and collapse  Patient presents with dizziness and syncope and loss of consciousness and fall, 3 episodes in the past week, appears positional while getting up  fast  Unwitnessed falls, no known seizure episodes in the past  Etiology unclear-likely vasovagal, cannot rule out cardiogenic or neurological  Noted to have elevated blood pressure of 230s/100s, is on 2 antihypertensive max dose, outpatient notes were reviewed and noted to have elevated blood pressure of 160s to 170s.  Patient does not appear to be dehydrated.  CT head , CTA head and neck shows no acute intracranial abnormality, no large vessel occlusion noted, moderate left extracranial carotid stenosis noted, mild to moderate narrowing at the bilateral petrous/cavernous ICA junctions noted, moderate to severe focal stenosis involving the mid to inferior basilar artery noted.  No focal neurological deficit noted, EKG shows sinus rhythm, no acute ST-T changes noted  Follow-up echo    Multiple thyroid nodules  Multiple heterogenous, hypodense bilateral thyroid nodules noted largest in the left side 1.7 x 3 x 2.7 cm, needs outpatient follow-up with thyroid ultrasound  Finding was discussed with the patient and the daughter and made aware on night of admission  Will need outpatient f/u  Intracranial carotid stenosis  Patient presents with syncope ,recurrent and collapse  CT brain:  No acute intracranial abnormality. Chronic microangiopathic ischemic changes.     CTA head:  Negative for large vessel intracranial occlusion .  Mild to moderate narrowing at the bilateral petrous/cavernous ICA junctions.  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  The cervical vertebral arteries are patent.  Will need vascular surgery follow-up  Continue asa 81 mg daily  Start lipitor 40 mg daily   F/u lipid panel  Mixed hyperlipidemia  Not on statin  F/u lipid panel   Dizziness  Patient admits to having dizziness described as lightheadedness and room spinning sensation   1/20/25: On exam when looking to the right with leftward beating nystagmus of  left eye  Will obtain MRI brain to rule out stroke  Continue meclizine  IV fluids  PT OT    VTE Pharmacologic Prophylaxis:   Moderate Risk (Score 3-4) - Pharmacological DVT Prophylaxis Ordered: enoxaparin (Lovenox).    Mobility:      -[facility] Goal achieved. Continue to encourage appropriate mobility.    Patient Centered Rounds: I performed bedside rounds with nursing staff today.   Discussions with Specialists or Other Care Team Provider: case management    Education and Discussions with Family / Patient: Patient declined call to .     Current Length of Stay: 0 day(s)  Current Patient Status: Observation   Certification Statement: The patient will continue to require additional inpatient hospital stay due to blood pressure control and pending MRI brain  Discharge Plan: Anticipate discharge in 24-48 hrs to discharge location to be determined pending rehab evaluations.    Code Status: Level 1 - Full Code    Subjective   Pt admits to having dizziness described as light headedness and room spinning sensation described.  Patient states that her symptoms been going on for couple weeks. She states that her nausea has resolved.    Objective :  HR:  [59-78] 76  BP: (110-239)/() 158/68  Resp:  [16-20] 18  SpO2:  [91 %-98 %] 92 %  O2 Device: None (Room air)    Body mass index is 38.49 kg/m².     Input and Output Summary (last 24 hours):   No intake or output data in the 24 hours ending 01/20/25 1337    Physical Exam  Vitals and nursing note reviewed.   Constitutional:       General: She is not in acute distress.     Appearance: She is well-developed.   HENT:      Head: Normocephalic and atraumatic.   Eyes:      Conjunctiva/sclera: Conjunctivae normal.   Cardiovascular:      Rate and Rhythm: Normal rate and regular rhythm.      Heart sounds: No murmur heard.  Pulmonary:      Effort: Pulmonary effort is normal. No respiratory distress.      Breath sounds: Normal breath sounds.   Abdominal:      Palpations:  Abdomen is soft.      Tenderness: There is no abdominal tenderness.   Musculoskeletal:         General: No swelling.      Cervical back: Neck supple.   Skin:     General: Skin is warm and dry.      Capillary Refill: Capillary refill takes less than 2 seconds.   Neurological:      Mental Status: She is alert. Mental status is at baseline.      Sensory: No sensory deficit.      Motor: No weakness.   Psychiatric:         Mood and Affect: Mood normal.           Lines/Drains:        Telemetry:  Telemetry Orders (From admission, onward)               24 Hour Telemetry Monitoring  Continuous x 24 Hours (Telem)        Expiring   Question:  Reason for 24 Hour Telemetry  Answer:  Arrhythmias requiring acute medical intervention / PPM or ICD malfunction                                    Lab Results: I have reviewed the following results:   Results from last 7 days   Lab Units 01/20/25  0413 01/19/25  1120   WBC Thousand/uL 10.91* 8.28   HEMOGLOBIN g/dL 14.9 14.7   HEMATOCRIT % 43.7 44.2   PLATELETS Thousands/uL 265 273   SEGS PCT %  --  67   LYMPHO PCT %  --  20   MONO PCT %  --  9   EOS PCT %  --  2     Results from last 7 days   Lab Units 01/20/25  0413 01/19/25  1120   SODIUM mmol/L 137 137   POTASSIUM mmol/L 3.7 4.0   CHLORIDE mmol/L 102 102   CO2 mmol/L 26 28   BUN mg/dL 15 11   CREATININE mg/dL 0.71 0.67   ANION GAP mmol/L 9 7   CALCIUM mg/dL 9.4 9.3   ALBUMIN g/dL  --  4.1   TOTAL BILIRUBIN mg/dL  --  0.54   ALK PHOS U/L  --  97   ALT U/L  --  18   AST U/L  --  14   GLUCOSE RANDOM mg/dL 180* 221*         Results from last 7 days   Lab Units 01/20/25  1243 01/20/25  0813 01/19/25  2229 01/19/25  1810   POC GLUCOSE mg/dl 209* 214* 249* 159*     Results from last 7 days   Lab Units 01/19/25  1120   HEMOGLOBIN A1C % 6.8*           Recent Cultures (last 7 days):         Imaging Results Review: I reviewed radiology reports from this admission including: CT head.  Other Study Results Review: EKG was reviewed.     Last 24  Hours Medication List:     Current Facility-Administered Medications:     acetaminophen (TYLENOL) tablet 650 mg, Q6H PRN    albuterol (PROVENTIL HFA,VENTOLIN HFA) inhaler 2 puff, 4x Daily    Apremilast TABS 1 tablet, BID    aspirin chewable tablet 81 mg, Daily    cyanocobalamin injection 1,000 mcg, Q30 Days    enoxaparin (LOVENOX) subcutaneous injection 40 mg, Daily    insulin glargine (LANTUS) subcutaneous injection 50 Units 0.5 mL, QAM    insulin lispro (HumALOG/ADMELOG) 100 units/mL subcutaneous injection 1-6 Units, TID AC **AND** Fingerstick Glucose (POCT), TID AC    insulin lispro (HumALOG/ADMELOG) 100 units/mL subcutaneous injection 1-6 Units, HS    labetalol (NORMODYNE) injection 10 mg, Q6H PRN    losartan (COZAAR) tablet 100 mg, Daily **AND** [DISCONTINUED] hydroCHLOROthiazide tablet 25 mg, Daily    meclizine (ANTIVERT) tablet 25 mg, Q8H KAYODE    metoprolol succinate (TOPROL-XL) 24 hr tablet 50 mg, Daily    NIFEdipine (PROCARDIA XL) 24 hr tablet 60 mg, Daily    Administrative Statements   Today, Patient Was Seen By: Silvia Russell DO      **Please Note: This note may have been constructed using a voice recognition system.**

## 2025-01-20 NOTE — ASSESSMENT & PLAN NOTE
Multiple heterogenous, hypodense bilateral thyroid nodules noted largest in the left side 1.7 x 3 x 2.7 cm, needs outpatient follow-up with thyroid ultrasound  Finding was discussed with the patient and the daughter and made aware

## 2025-01-21 PROBLEM — N17.9 AKI (ACUTE KIDNEY INJURY) (HCC): Status: ACTIVE | Noted: 2025-01-21

## 2025-01-21 LAB
ALBUMIN SERPL BCG-MCNC: 3.5 G/DL (ref 3.5–5)
ANION GAP SERPL CALCULATED.3IONS-SCNC: 7 MMOL/L (ref 4–13)
BACTERIA UR QL AUTO: ABNORMAL /HPF
BILIRUB UR QL STRIP: NEGATIVE
BUN SERPL-MCNC: 26 MG/DL (ref 5–25)
CALCIUM SERPL-MCNC: 9.2 MG/DL (ref 8.4–10.2)
CHLORIDE SERPL-SCNC: 103 MMOL/L (ref 96–108)
CLARITY UR: CLEAR
CO2 SERPL-SCNC: 28 MMOL/L (ref 21–32)
COLOR UR: ABNORMAL
CREAT SERPL-MCNC: 1.02 MG/DL (ref 0.6–1.3)
ERYTHROCYTE [DISTWIDTH] IN BLOOD BY AUTOMATED COUNT: 14.4 % (ref 11.6–15.1)
GFR SERPL CREATININE-BSD FRML MDRD: 51 ML/MIN/1.73SQ M
GLUCOSE SERPL-MCNC: 138 MG/DL (ref 65–140)
GLUCOSE SERPL-MCNC: 143 MG/DL (ref 65–140)
GLUCOSE SERPL-MCNC: 160 MG/DL (ref 65–140)
GLUCOSE SERPL-MCNC: 169 MG/DL (ref 65–140)
GLUCOSE SERPL-MCNC: 214 MG/DL (ref 65–140)
GLUCOSE UR STRIP-MCNC: NEGATIVE MG/DL
HCT VFR BLD AUTO: 44.4 % (ref 34.8–46.1)
HGB BLD-MCNC: 14.3 G/DL (ref 11.5–15.4)
HGB UR QL STRIP.AUTO: NEGATIVE
HYALINE CASTS #/AREA URNS LPF: ABNORMAL /LPF
KETONES UR STRIP-MCNC: NEGATIVE MG/DL
LEUKOCYTE ESTERASE UR QL STRIP: ABNORMAL
MAGNESIUM SERPL-MCNC: 1.9 MG/DL (ref 1.9–2.7)
MCH RBC QN AUTO: 28.5 PG (ref 26.8–34.3)
MCHC RBC AUTO-ENTMCNC: 32.2 G/DL (ref 31.4–37.4)
MCV RBC AUTO: 89 FL (ref 82–98)
NITRITE UR QL STRIP: NEGATIVE
NON-SQ EPI CELLS URNS QL MICRO: ABNORMAL /HPF
PH UR STRIP.AUTO: 5 [PH]
PHOSPHATE SERPL-MCNC: 4.4 MG/DL (ref 2.3–4.1)
PLATELET # BLD AUTO: 302 THOUSANDS/UL (ref 149–390)
PMV BLD AUTO: 10.8 FL (ref 8.9–12.7)
POTASSIUM SERPL-SCNC: 3.7 MMOL/L (ref 3.5–5.3)
PROT UR STRIP-MCNC: NEGATIVE MG/DL
RBC # BLD AUTO: 5.01 MILLION/UL (ref 3.81–5.12)
RBC #/AREA URNS AUTO: ABNORMAL /HPF
SODIUM SERPL-SCNC: 138 MMOL/L (ref 135–147)
SP GR UR STRIP.AUTO: 1.01 (ref 1–1.03)
UROBILINOGEN UR STRIP-ACNC: <2 MG/DL
WBC # BLD AUTO: 9.53 THOUSAND/UL (ref 4.31–10.16)
WBC #/AREA URNS AUTO: ABNORMAL /HPF

## 2025-01-21 PROCEDURE — 82948 REAGENT STRIP/BLOOD GLUCOSE: CPT

## 2025-01-21 PROCEDURE — 99233 SBSQ HOSP IP/OBS HIGH 50: CPT | Performed by: STUDENT IN AN ORGANIZED HEALTH CARE EDUCATION/TRAINING PROGRAM

## 2025-01-21 PROCEDURE — 83735 ASSAY OF MAGNESIUM: CPT | Performed by: STUDENT IN AN ORGANIZED HEALTH CARE EDUCATION/TRAINING PROGRAM

## 2025-01-21 PROCEDURE — 85027 COMPLETE CBC AUTOMATED: CPT | Performed by: STUDENT IN AN ORGANIZED HEALTH CARE EDUCATION/TRAINING PROGRAM

## 2025-01-21 PROCEDURE — 94664 DEMO&/EVAL PT USE INHALER: CPT

## 2025-01-21 PROCEDURE — 94669 MECHANICAL CHEST WALL OSCILL: CPT

## 2025-01-21 PROCEDURE — 94668 MNPJ CHEST WALL SBSQ: CPT

## 2025-01-21 PROCEDURE — 81001 URINALYSIS AUTO W/SCOPE: CPT | Performed by: STUDENT IN AN ORGANIZED HEALTH CARE EDUCATION/TRAINING PROGRAM

## 2025-01-21 PROCEDURE — 80069 RENAL FUNCTION PANEL: CPT | Performed by: STUDENT IN AN ORGANIZED HEALTH CARE EDUCATION/TRAINING PROGRAM

## 2025-01-21 RX ORDER — GUAIFENESIN 600 MG/1
1200 TABLET, EXTENDED RELEASE ORAL EVERY 12 HOURS SCHEDULED
Status: DISCONTINUED | OUTPATIENT
Start: 2025-01-21 | End: 2025-01-23 | Stop reason: HOSPADM

## 2025-01-21 RX ORDER — NIFEDIPINE 30 MG/1
30 TABLET, EXTENDED RELEASE ORAL DAILY
Status: DISCONTINUED | OUTPATIENT
Start: 2025-01-21 | End: 2025-01-22

## 2025-01-21 RX ORDER — LOSARTAN POTASSIUM 50 MG/1
50 TABLET ORAL DAILY
Status: DISCONTINUED | OUTPATIENT
Start: 2025-01-22 | End: 2025-01-22

## 2025-01-21 RX ORDER — FLUTICASONE PROPIONATE 50 MCG
1 SPRAY, SUSPENSION (ML) NASAL 2 TIMES DAILY
Status: DISCONTINUED | OUTPATIENT
Start: 2025-01-21 | End: 2025-01-23 | Stop reason: HOSPADM

## 2025-01-21 RX ORDER — METOPROLOL SUCCINATE 25 MG/1
25 TABLET, EXTENDED RELEASE ORAL DAILY
Status: DISCONTINUED | OUTPATIENT
Start: 2025-01-21 | End: 2025-01-23 | Stop reason: HOSPADM

## 2025-01-21 RX ORDER — SODIUM CHLORIDE 9 MG/ML
100 INJECTION, SOLUTION INTRAVENOUS CONTINUOUS
Status: DISCONTINUED | OUTPATIENT
Start: 2025-01-21 | End: 2025-01-22

## 2025-01-21 RX ORDER — MECLIZINE HYDROCHLORIDE 25 MG/1
25 TABLET ORAL EVERY 8 HOURS PRN
Status: DISCONTINUED | OUTPATIENT
Start: 2025-01-21 | End: 2025-01-23 | Stop reason: HOSPADM

## 2025-01-21 RX ORDER — NIFEDIPINE 30 MG/1
30 TABLET, EXTENDED RELEASE ORAL DAILY
Status: DISCONTINUED | OUTPATIENT
Start: 2025-01-22 | End: 2025-01-21

## 2025-01-21 RX ADMIN — GUAIFENESIN 1200 MG: 600 TABLET, EXTENDED RELEASE ORAL at 15:38

## 2025-01-21 RX ADMIN — ENOXAPARIN SODIUM 40 MG: 40 INJECTION SUBCUTANEOUS at 08:39

## 2025-01-21 RX ADMIN — INSULIN LISPRO 1 UNITS: 100 INJECTION, SOLUTION INTRAVENOUS; SUBCUTANEOUS at 08:39

## 2025-01-21 RX ADMIN — SODIUM CHLORIDE 500 ML: 0.9 INJECTION, SOLUTION INTRAVENOUS at 09:18

## 2025-01-21 RX ADMIN — ATORVASTATIN CALCIUM 40 MG: 40 TABLET, FILM COATED ORAL at 17:12

## 2025-01-21 RX ADMIN — INSULIN GLARGINE 50 UNITS: 100 INJECTION, SOLUTION SUBCUTANEOUS at 08:38

## 2025-01-21 RX ADMIN — NIFEDIPINE 30 MG: 30 TABLET, FILM COATED, EXTENDED RELEASE ORAL at 18:55

## 2025-01-21 RX ADMIN — GUAIFENESIN 1200 MG: 600 TABLET, EXTENDED RELEASE ORAL at 21:57

## 2025-01-21 RX ADMIN — ALBUTEROL SULFATE 2 PUFF: 90 AEROSOL, METERED RESPIRATORY (INHALATION) at 08:40

## 2025-01-21 RX ADMIN — ASPIRIN 81 MG CHEWABLE TABLET 81 MG: 81 TABLET CHEWABLE at 08:39

## 2025-01-21 RX ADMIN — SODIUM CHLORIDE 100 ML/HR: 0.9 INJECTION, SOLUTION INTRAVENOUS at 09:18

## 2025-01-21 RX ADMIN — METOPROLOL SUCCINATE 25 MG: 25 TABLET, EXTENDED RELEASE ORAL at 18:55

## 2025-01-21 RX ADMIN — MECLIZINE HYDROCHLORIDE 25 MG: 25 TABLET ORAL at 15:38

## 2025-01-21 RX ADMIN — MECLIZINE HYDROCHLORIDE 25 MG: 25 TABLET ORAL at 06:59

## 2025-01-21 RX ADMIN — INSULIN LISPRO 1 UNITS: 100 INJECTION, SOLUTION INTRAVENOUS; SUBCUTANEOUS at 21:57

## 2025-01-21 RX ADMIN — INSULIN LISPRO 2 UNITS: 100 INJECTION, SOLUTION INTRAVENOUS; SUBCUTANEOUS at 12:02

## 2025-01-21 NOTE — PLAN OF CARE

## 2025-01-21 NOTE — ASSESSMENT & PLAN NOTE
Patient presents with dizziness and syncope and loss of consciousness and fall, 3 episodes in the past week, appears positional while getting up fast  Unwitnessed falls, no known seizure episodes in the past  Etiology unclear-likely vasovagal, cannot rule out cardiogenic or neurological  Noted to have elevated blood pressure of 230s/100s, is on 2 antihypertensive max dose, outpatient notes were reviewed and noted to have elevated blood pressure of 160s to 170s.  Patient does not appear to be dehydrated.  CT head , CTA head and neck shows no acute intracranial abnormality, no large vessel occlusion noted, moderate left extracranial carotid stenosis noted, mild to moderate narrowing at the bilateral petrous/cavernous ICA junctions noted, moderate to severe focal stenosis involving the mid to inferior basilar artery noted.  No focal neurological deficit noted, EKG shows sinus rhythm, no acute ST-T changes noted  MRI brain negative for stroke   Suspect from dehydration in setting of HCTZ use

## 2025-01-21 NOTE — CASE MANAGEMENT
Case Management Assessment & Discharge Planning Note    Patient name Mar Elmore S /S -01 MRN [id number]  : 1945 Date 2025       Current Admission Date: 2025  Current Admission Diagnosis:Syncope and collapse   Patient Active Problem List    Diagnosis Date Noted Date Diagnosed    JEFFERY (acute kidney injury) (HCC) 2025     Dizziness 2025     Syncope and collapse 2025     Multiple thyroid nodules 2025     Intracranial carotid stenosis 2025     Microalbuminuria 2024     Obesity, morbid (HCC) 2024     Vitamin D insufficiency 2024     Vitamin B12 deficiency 2024     Elevated blood sugar 2024     COPD exacerbation (HCC) 2024     Medication management 10/04/2023     Hypokalemia 2021     Increased BMI 02/15/2021     Myalgia due to statin 10/13/2020     Need for vaccination 10/13/2020     Psoriasis 10/13/2020     Type 2 diabetes mellitus with other specified complication (HCC) 10/13/2020     Mixed simple and mucopurulent chronic bronchitis (HCC) 2020     Cyanocobalamin deficiency 2020     Mixed hyperlipidemia 2020     Psoriasis-like skin disease 2020     Unspecified abnormalities of gait and mobility 2019     Personal history of nicotine dependence 2019     Other chronic pain 2019     Resistant hypertension 2019       LOS (days): 1  Geometric Mean LOS (GMLOS) (days): 2.3  Days to GMLOS:1.4     OBJECTIVE:    Risk of Unplanned Readmission Score: 13.04         Current admission status: Inpatient       Preferred Pharmacy:   [id number] [place], [place] - [address] [facility]  [address] [address]  Phone: [phone] Fax: [phone]    [facility] - [place], [place] - [address]  [address] [address]  Phone: [phone] Fax:  [phone]    [facility]  [address]  [place] [address]  Phone: [phone] Fax: [phone]    [facility] - [place], [place] - [address]  [address]  [address]  [facility] [address]  Phone: [phone] Fax: [phone]    Primary Care Provider: TAMIR Montejo DO    Primary Insurance: KEVIN  REP  Secondary Insurance:     ASSESSMENT:  Active Health Care Proxies       ORTIZ PULLIAM Health Care Representative - Daughter   Primary Phone: [phone] (Mobile)                   Readmission Root Cause  30 Day Readmission: No    Patient Information  Admitted from:: Home  Mental Status: Alert  During Assessment patient was accompanied by: Not accompanied during assessment  Assessment information provided by:: Daughter  Primary Caregiver: Self  Support Systems: Self, Daughter, Family members  County of Residence: [place]  What city do you live in?: [place]  Home entry access options. Select all that apply.: Stairs  Number of steps to enter home.: One Flight  Type of Current Residence: [facility]  Living Arrangements: Other (Comment) (Grandson)    Activities of Daily Living Prior to Admission  Functional Status: Independent  Completes ADLs independently?: Yes  Ambulates independently?: Yes  Does patient use assisted devices?: Yes  Assisted Devices (DME) used: Walker, Shower Chair  Does patient currently own DME?: Yes  What DME does the patient currently own?: Walker, Shower Chair, Bedside Commode  Does patient have a history of Outpatient Therapy (PT/OT)?: No  Does the patient have a history of Short-Term Rehab?: Yes  Does patient have a history of HHC?: No  Does patient currently have HHC?: No    Current Home Health Care  Home Health Agency Name:: Other (TBD)    Patient Information Continued  Income Source: Pension/longterm  Does patient have prescription coverage?: Yes  Does patient receive dialysis treatments?: No  Does patient have a history of substance  abuse?: No  Does patient have a history of Mental Health Diagnosis?: No         Means of Transportation  Means of Transport to Appts:: Drives Self          DISCHARGE DETAILS:    Discharge planning discussed with:: Patient daughter  Freedom of Choice: Yes  Comments - Freedom of Choice: CM reviewed PT rec level 2. Daughter declining STR, reported preference for pt to return home with C. Agreeable to blanket referrals.  CM contacted family/caregiver?: Yes (Daughter via phone)  Were Treatment Team discharge recommendations reviewed with patient/caregiver?: Yes  Did patient/caregiver verbalize understanding of patient care needs?: Yes  Were patient/caregiver advised of the risks associated with not following Treatment Team discharge recommendations?: Yes    Contacts  Patient Contacts: koko Ayoub  Relationship to Patient:: Family  Contact Method: Phone  Phone Number: [phone]  Reason/Outcome: Continuity of Care, Emergency Contact, Referral, Discharge Planning    Requested Home Health Care         Is the patient interested in HHC at discharge?: Yes  Home Health Discipline requested:: Home Health Aide, Nursing, Occupational Therapy, Physical Therapy  Home Health Agency Name:: Other (TBD)  Home Health Follow-Up Provider:: PCP  Home Health Services Needed:: Evaluate Functional Status and Safety, Gait/ADL Training, Strengthening/Theraputic Exercises to Improve Function, Diabetes Management  Homebound Criteria Met:: Requires the Assistance of Another Person for Safe Ambulation or to Leave the Home, Uses an Assist Device (i.e. cane, walker, etc)  Supporting Clincal Findings:: Limited Endurance, Fatigues Easliy in Short Distances    DME Referral Provided  Referral made for DME?: No    Other Referral/Resources/Interventions Provided:  Interventions: Other (Specify), C  Referral Comments: CM spoke with pt koko Ayoub via phone, introduced self and role with dcp. Daughter reported pt lives with her grandson in a 1  story home with about 10-12 FAM the home through the garage. Daughter reported pt was fully IPTA, sometimes uses a RW with ambulation. Daughter reported pt also has a shower chair and BSC, is not on O2 at baseline. Daughter reported pt has a hx of STR. CM reviewed PT rec level 2 and reviewed pt current LOF as per PT/OT notes. Daughter declining STR, reported preference of pt returning home with [facility]. Daughter agreeable to blanket [facility] referrals, aware choice list will be provided to establish preference. CM sent referrals via [employer]. Daughter reported preferred pharmacy is [facility] and PCP is Dr. Montejo. Daughter reported she is pt POA. Daughter reported pt drives self at baseline. CM will continue to follow.    Would you like to participate in our [facility] service program?  : No - Declined    Treatment Team Recommendation: Short Term Rehab  Discharge Destination Plan:: Home with Home Health Care

## 2025-01-21 NOTE — ASSESSMENT & PLAN NOTE
Cr bumped from 0.7 to 1.0  Suspect 2/2 contrast  Will give 500 cc bolus and NS @100 ml/hr for 5 hours

## 2025-01-21 NOTE — PROGRESS NOTES
Progress Note - Hospitalist   Name: Mar Elizondo 79 y.o. female I MRN: [id number]  Unit/Bed#: S -01 I Date of Admission: 1/19/2025   Date of Service: 1/21/2025 I Hospital Day: 1    Assessment & Plan  Type 2 diabetes mellitus with other specified complication (HCC)  Lab Results   Component Value Date    HGBA1C 6.8 (H) 01/19/2025       Recent Labs     01/20/25  2049 01/21/25  0714 01/21/25  1120 01/21/25  1704   POCGLU 162* 169* 214* 143*       Blood Sugar Average: Last 72 hrs:  (P) [id number]  Patient has been diabetic for many years  He is on insulin Lantus 50 units daily and sliding scale insulin  Recent HbA1c has been under control  Patient also has been on Mounjaro for the past few months which has helped her control of diabetes mellitus type 2  Will cover with sliding scale insulin  Will monitor blood sugars  Has a continuous glucose monitor.  Psoriasis  Controlled with apremilast tablets 30 mg twice daily  Resistant hypertension  Patient presented with recurrent syncope dizziness with elevated blood pressure 200s/100s  Patient has been on Cardizem 360 mg daily and losartan 100 mg daily  CTA head and neck shows no acute intracranial abnormality, chronic microangiopathic ischemic changes noted, has no neurological symptoms  Blood pressure has remained uncontrolled    1/20  Discontinue hydrochlorothiazide as this can exacerbate patient's lightheadedness and vertigo symptoms  Continue losartan 100 mg daily  Will discontinue oral Cardizem 360 mg daily for Procardia 60 mg daily  Start Toprol-XL 50 mg daily to prevent rebound tachycardia from stopping Cardizem  Stop hydralazine and start prn labetalol for systolic >180 or diastolic >100  1/21  Blood pressure noted to be on lower side this morning, started to rise to 160's in afternoon.  Will give 500 cc bolus and IV fluids at 100 ml/hr for 5 hours  Decrease toprol xl to 25 mg, decrease procardia to 30 mg daily, hold losartan today and resume tomorrow  at possibly lower dose if needed.  Obesity, morbid (HCC)  Advised about weight reduction and lifestyle modification  Vitamin B12 deficiency  on vitamin B12 supplement monthly  Syncope and collapse  Patient presents with dizziness and syncope and loss of consciousness and fall, 3 episodes in the past week, appears positional while getting up fast  Unwitnessed falls, no known seizure episodes in the past  Etiology unclear-likely vasovagal, cannot rule out cardiogenic or neurological  Noted to have elevated blood pressure of 230s/100s, is on 2 antihypertensive max dose, outpatient notes were reviewed and noted to have elevated blood pressure of 160s to 170s.  Patient does not appear to be dehydrated.  CT head , CTA head and neck shows no acute intracranial abnormality, no large vessel occlusion noted, moderate left extracranial carotid stenosis noted, mild to moderate narrowing at the bilateral petrous/cavernous ICA junctions noted, moderate to severe focal stenosis involving the mid to inferior basilar artery noted.  No focal neurological deficit noted, EKG shows sinus rhythm, no acute ST-T changes noted  MRI brain negative for stroke   Suspect from dehydration in setting of HCTZ use   Multiple thyroid nodules  Multiple heterogenous, hypodense bilateral thyroid nodules noted largest in the left side 1.7 x 3 x 2.7 cm, needs outpatient follow-up with thyroid ultrasound  Finding was discussed with the patient and the daughter and made aware on night of admission  Will need outpatient f/u  Intracranial carotid stenosis  Patient presents with syncope ,recurrent and collapse  CT brain:  No acute intracranial abnormality. Chronic microangiopathic ischemic changes.     CTA head:  Negative for large vessel intracranial occlusion .  Mild to moderate narrowing at the bilateral petrous/cavernous ICA junctions.  Moderate to severe focal stenosis involving the mid to inferior basilar artery  CTA neck: Moderate left extracranial  carotid stenosis.  The cervical vertebral arteries are patent.  Will need vascular surgery follow-up  Continue asa 81 mg daily  Start lipitor 40 mg daily       Mixed hyperlipidemia     Latest Reference Range & Units 01/20/25 04:13   Cholesterol See Comment mg/dL 233 (H)   Triglycerides See Comment mg/dL 143   HDL >=50 mg/dL 38 (L)   LDL Calculated 0 - 100 mg/dL 166 (H)     Start lipitor 40 mg daily  Dizziness  Patient admits to having dizziness described as lightheadedness and room spinning sensation   1/20/25: On exam when looking to the right with leftward beating nystagmus of left eye  Mri brain negative for stroke  Continue meclizine   PT OT  Reports some improvement in symptoms  JEFFERY (acute kidney injury) (HCC)  Cr bumped from 0.7 to 1.0  Suspect 2/2 contrast  Will give 500 cc bolus and NS @100 ml/hr for 5 hours    VTE Pharmacologic Prophylaxis:   Moderate Risk (Score 3-4) - Pharmacological DVT Prophylaxis Ordered: enoxaparin (Lovenox).    Mobility:   Basic Mobility Inpatient Raw Score: 17  JH-HLM Goal: 5: Stand one or more mins  JH-HLM Achieved: 1: Laying in bed  JH-HLM Goal achieved. Continue to encourage appropriate mobility.    Patient Centered Rounds: I performed bedside rounds with nursing staff today.   Discussions with Specialists or Other Care Team Provider: case management    Education and Discussions with Family / Patient: Patient declined call to .     Current Length of Stay: 1 day(s)  Current Patient Status: Inpatient   Certification Statement: The patient will continue to require additional inpatient hospital stay due to blood pressure control   Discharge Plan: Anticipate discharge in 24-48 hrs to discharge location to be determined pending rehab evaluations.    Code Status: Level 1 - Full Code    Subjective   Pt admits to having some dizziness and cough, continues to cough    Objective :  Temp:  [97.5 °F (36.4 °C)-99.6 °F (37.6 °C)] 97.5 °F (36.4 °C)  HR:  [55-69] 69  BP:  ()/(35-75) 160/53  Resp:  [16-18] 18  SpO2:  [89 %-95 %] 95 %  O2 Device: Nasal cannula  Nasal Cannula O2 Flow Rate (L/min):  [4 L/min] 4 L/min    Body mass index is 38.36 kg/m².     Input and Output Summary (last 24 hours):     Intake/Output Summary (Last 24 hours) at 1/21/2025 1712  Last data filed at 1/21/2025 1300  Gross per 24 hour   Intake 700 ml   Output --   Net 700 ml       Physical Exam  Vitals and nursing note reviewed.   Constitutional:       General: She is not in acute distress.     Appearance: She is well-developed.   HENT:      Head: Normocephalic and atraumatic.   Eyes:      Conjunctiva/sclera: Conjunctivae normal.   Cardiovascular:      Rate and Rhythm: Normal rate and regular rhythm.      Heart sounds: No murmur heard.  Pulmonary:      Effort: Pulmonary effort is normal. No respiratory distress.      Breath sounds: Normal breath sounds.   Abdominal:      Palpations: Abdomen is soft.      Tenderness: There is no abdominal tenderness.   Musculoskeletal:         General: No swelling.      Cervical back: Neck supple.   Skin:     General: Skin is warm and dry.      Capillary Refill: Capillary refill takes less than 2 seconds.   Neurological:      Mental Status: She is alert. Mental status is at baseline.      Sensory: No sensory deficit.      Motor: No weakness.   Psychiatric:         Mood and Affect: Mood normal.           Lines/Drains:                   Lab Results: I have reviewed the following results:   Results from last 7 days   Lab Units 01/21/25  0520 01/20/25 0413 01/19/25  1120   WBC Thousand/uL 9.53   < > 8.28   HEMOGLOBIN g/dL 14.3   < > 14.7   HEMATOCRIT % 44.4   < > 44.2   PLATELETS Thousands/uL 302   < > 273   SEGS PCT %  --   --  67   LYMPHO PCT %  --   --  20   MONO PCT %  --   --  9   EOS PCT %  --   --  2    < > = values in this interval not displayed.     Results from last 7 days   Lab Units 01/21/25  0520 01/20/25 0413 01/19/25  1120   SODIUM mmol/L 138   < > 137    POTASSIUM mmol/L 3.7   < > 4.0   CHLORIDE mmol/L 103   < > 102   CO2 mmol/L 28   < > 28   BUN mg/dL 26*   < > 11   CREATININE mg/dL 1.02   < > 0.67   ANION GAP mmol/L 7   < > 7   CALCIUM mg/dL 9.2   < > 9.3   ALBUMIN g/dL 3.5  --  4.1   TOTAL BILIRUBIN mg/dL  --   --  0.54   ALK PHOS U/L  --   --  97   ALT U/L  --   --  18   AST U/L  --   --  14   GLUCOSE RANDOM mg/dL 138   < > 221*    < > = values in this interval not displayed.         Results from last 7 days   Lab Units 01/21/25  1704 01/21/25  1120 01/21/25  0714 01/20/25  2049 01/20/25  1634 01/20/25  1243 01/20/25  0813 01/19/25  2229 01/19/25  1810   POC GLUCOSE mg/dl 143* 214* 169* 162* 140 209* 214* 249* 159*     Results from last 7 days   Lab Units 01/19/25  1120   HEMOGLOBIN A1C % 6.8*           Recent Cultures (last 7 days):         Imaging Results Review: I reviewed radiology reports from this admission including: CT head.  Other Study Results Review: EKG was reviewed.     Last 24 Hours Medication List:     Current Facility-Administered Medications:     acetaminophen (TYLENOL) tablet 650 mg, Q6H PRN    albuterol (PROVENTIL HFA,VENTOLIN HFA) inhaler 2 puff, 4x Daily    Apremilast TABS 1 tablet, BID    aspirin chewable tablet 81 mg, Daily    atorvastatin (LIPITOR) tablet 40 mg, Daily With Dinner    enoxaparin (LOVENOX) subcutaneous injection 40 mg, Daily    fluticasone (FLONASE) 50 mcg/act nasal spray 1 spray, BID    guaiFENesin (MUCINEX) 12 hr tablet 1,200 mg, Q12H KAYODE    insulin glargine (LANTUS) subcutaneous injection 50 Units 0.5 mL, QAM    insulin lispro (HumALOG/ADMELOG) 100 units/mL subcutaneous injection 1-6 Units, TID AC **AND** Fingerstick Glucose (POCT), TID AC    insulin lispro (HumALOG/ADMELOG) 100 units/mL subcutaneous injection 1-6 Units, HS    labetalol (NORMODYNE) injection 10 mg, Q6H PRN    [START ON 1/22/2025] losartan (COZAAR) tablet 50 mg, Daily **AND** [DISCONTINUED] hydroCHLOROthiazide tablet 25 mg, Daily    meclizine (ANTIVERT)  tablet 25 mg, Q8H PRN    metoprolol succinate (TOPROL-XL) 24 hr tablet 25 mg, Daily    NIFEdipine (PROCARDIA XL) 24 hr tablet 30 mg, Daily    sodium chloride 0.9 % infusion, Continuous, Last Rate: 100 mL/hr (01/21/25 0918)    Administrative Statements   Today, Patient Was Seen By: Silvia Russell DO      **Please Note: This note may have been constructed using a voice recognition system.**

## 2025-01-21 NOTE — ASSESSMENT & PLAN NOTE
Latest Reference Range & Units 01/20/25 04:13   Cholesterol See Comment mg/dL 233 (H)   Triglycerides See Comment mg/dL 143   HDL >=50 mg/dL 38 (L)   LDL Calculated 0 - 100 mg/dL 166 (H)     Start lipitor 40 mg daily

## 2025-01-21 NOTE — UTILIZATION REVIEW
Continued Stay Review    SEE INITIAL REVIEW AT BOTTOM    Date: 1/21/25                          Current Patient Class: Inpatient  Current Level of Care: Med Surg    HPI:79 y.o. female initially admitted on 1/20     Assessment/Plan:     Date: 1/21  Day 3: Has surpassed a 2nd midnight with active treatments and services. Decrease metoprolol to 25 mg daily. Start losartan 50 mg daily. MRI brain showing no acute infarct. Mild chronic white matter microangiopathic changes involving both cerebral hemispheres and the brainstem. Continue ASA, Lipitor and meclizine. PT/OT evals.       Medications:   Scheduled Medications:  albuterol, 2 puff, Inhalation, 4x Daily  Apremilast, 30 mg, Oral, BID  aspirin, 81 mg, Oral, Daily  atorvastatin, 40 mg, Oral, Daily With Dinner  enoxaparin, 40 mg, Subcutaneous, Daily  fluticasone, 1 spray, Each Nare, BID  guaiFENesin, 1,200 mg, Oral, Q12H KAYODE  insulin glargine, 50 Units, Subcutaneous, QAM  insulin lispro, 1-6 Units, Subcutaneous, TID AC  insulin lispro, 1-6 Units, Subcutaneous, HS  [START ON 1/22/2025] losartan, 50 mg, Oral, Daily  metoprolol succinate, 25 mg, Oral, Daily  NIFEdipine, 30 mg, Oral, Daily      Continuous IV Infusions:  sodium chloride, 100 mL/hr, Intravenous, Continuous      PRN Meds:  acetaminophen, 650 mg, Oral, Q6H PRN  labetalol, 10 mg, Intravenous, Q6H PRN  meclizine, 25 mg, Oral, Q8H PRN      Discharge Plan: TBD    Vital Signs (last 3 days)       Date/Time Temp Pulse Resp BP MAP (mmHg) SpO2 Calculated FIO2 (%) - Nasal Cannula Nasal Cannula O2 Flow Rate (L/min) O2 Device Patient Position - Orthostatic VS [place] Coma Scale Score Pain    01/21/25 11:12:17 -- 60 -- 108/48 68 92 % -- -- -- -- -- --    01/21/25 10:49:27 -- 63 -- 108/36 60 93 % -- -- -- -- -- --    01/21/25 10:24:04 -- 56 -- 109/39 62 92 % -- -- -- -- -- --    01/21/25 0900 -- -- -- -- -- -- -- -- -- -- 15 No Pain    01/21/25 07:06:59 98.2 °F (36.8 °C) 55 17 110/35 60 89 % -- -- -- Lying -- --    01/20/25  [facility] EMERGENCY DEPT  EMERGENCY DEPARTMENT HISTORY AND PHYSICAL EXAM      Date: 3/4/2023  Patient Name: Luke Damon  MRN: [id number]  [place]: 1993  Date of evaluation: 3/4/2023  Provider: Margot Null NP   Note Started: 8:05 PM 3/4/23    HISTORY OF PRESENT ILLNESS     Chief Complaint   Patient presents with    Sore Throat       History Provided By: Patient    HPI: Luke Damon is a 26 y.o. male with no significant past medical history presents with his 1year-old daughter and complaint of sore throat, myalgias, chills, and headache x3 days. Patient has 2 daughters with similar symptoms. No flu vaccination. He denies measured fever, nausea, vomiting, diarrhea, chest pain, difficulty breathing, dizziness, or dysphagia. No OTC treatment. Taking p.o. without difficulty. PAST MEDICAL HISTORY   Past Medical History:  No past medical history on file. Past Surgical History:  No past surgical history on file. Family History:  No family history on file. Social History: Allergies:  No Known Allergies    PCP: None    Current Meds:   There are no discharge medications for this patient. REVIEW OF SYSTEMS   Review of Systems   Constitutional:  Positive for chills. Negative for appetite change and fever. HENT:  Positive for congestion, rhinorrhea and sore throat. Negative for trouble swallowing. Respiratory:  Negative for cough and shortness of breath. Cardiovascular:  Negative for chest pain and palpitations. Gastrointestinal:  Negative for abdominal pain, diarrhea, nausea and vomiting. Genitourinary:  Negative for dysuria. Musculoskeletal:  Positive for myalgias. Negative for back pain. Skin:  Negative for rash. Neurological:  Positive for headaches. Negative for dizziness, weakness and numbness. Positives and Pertinent negatives as per HPI.     PHYSICAL EXAM     ED Triage Vitals [03/04/23 2002]   ED Encounter Vitals Group      /87      Pulse (Heart Rate) 99      Resp 22:20:09 99.6 °F (37.6 °C) 68 16 98/75 83 90 % -- -- -- -- -- --    01/20/25 2100 -- -- -- -- -- 91 % 36 4 L/min Nasal cannula -- 15 No Pain    01/20/25 1852 98.2 °F (36.8 °C) 57 18 107/57 74 92 % -- -- -- -- -- --    01/20/25 1600 -- -- -- -- -- -- -- -- -- -- 15 No Pain    01/20/25 15:17:36 98.1 °F (36.7 °C) 61 18 153/55 88 94 % -- -- None (Room air) Lying -- --    01/20/25 1445 -- 59 17 135/62 89 96 % -- -- None (Room air) Lying -- --    01/20/25 1327 -- -- -- 158/68 98 -- -- -- -- Sitting -- --    01/20/25 1236 -- 76 -- 152/67 -- -- -- -- -- -- -- --    01/20/25 1148 -- -- -- -- -- -- -- -- -- -- -- No Pain    01/20/25 1143 -- -- -- 198/84 121 -- -- -- -- -- -- --    01/20/25 1141 -- -- -- 181/151 163 -- -- -- -- -- -- --    01/20/25 1138 -- -- -- 205/75 108 -- -- -- -- -- -- --    01/20/25 1137 -- -- -- -- -- -- -- -- -- -- -- No Pain    01/20/25 1000 -- 72 18 110/53 77 92 % -- -- -- -- -- --    01/20/25 0800 -- 73 18 124/61 88 95 % -- -- -- -- -- --    01/20/25 0700 -- 72 18 137/65 94 93 % -- -- -- -- -- --    01/20/25 0600 -- 74 18 119/57 82 92 % -- -- -- -- -- --    01/20/25 0500 -- 71 -- 134/60 86 91 % -- -- -- -- -- --    01/20/25 0430 -- 78 -- 162/80 104 -- -- -- -- Lying - Orthostatic VS -- --    01/20/25 0400 -- 72 -- 159/68 98 97 % -- -- -- -- 15 --    01/20/25 0300 -- 74 18 171/71 102 95 % -- -- -- -- 15 --    01/20/25 0245 -- 73 20 193/78 112 96 % -- -- -- -- -- --    01/20/25 0100 -- 64 16 173/76 109 95 % -- -- -- -- -- --    01/20/25 0000 -- -- -- -- -- -- -- -- -- -- 15 --    01/19/25 2300 -- 73 -- 186/78 112 96 % -- -- -- -- -- --    01/19/25 2230 -- 75 18 192/81 116 96 % -- -- None (Room air) -- -- --    01/19/25 2115 -- 72 18 189/100 -- 96 % -- -- -- -- -- --    01/19/25 2000 -- -- -- -- -- -- -- -- -- -- 15 --    01/19/25 1800 -- 69 17 168/72 -- 95 % -- -- -- -- 15 --    01/19/25 1700 -- 64 19 166/70 -- 98 % -- -- -- -- 15 --    01/19/25 1600 -- 68 18 167/65 -- 96 % -- -- -- -- 15 --     Rate 16      Temp 99.2 °F (37.3 °C)      Temp src       O2 Sat (%) 99 %      Weight 118 lb      Height 5' 6.5\"      Physical Exam  Vitals and nursing note reviewed. Constitutional:       General: He is not in acute distress. Appearance: He is not ill-appearing. HENT:      Head: Normocephalic. Right Ear: Tympanic membrane and ear canal normal.      Left Ear: Tympanic membrane and ear canal normal.      Nose: Congestion and rhinorrhea present. Mouth/Throat:      Mouth: Mucous membranes are moist. No oral lesions. Pharynx: Oropharynx is clear. Uvula midline. Posterior oropharyngeal erythema present. No oropharyngeal exudate or uvula swelling. Eyes:      Extraocular Movements: Extraocular movements intact. Pupils: Pupils are equal, round, and reactive to light. Cardiovascular:      Rate and Rhythm: Normal rate and regular rhythm. Pulses: Normal pulses. Heart sounds: Normal heart sounds. No murmur heard. Pulmonary:      Effort: Pulmonary effort is normal. No respiratory distress. Breath sounds: Normal breath sounds. Abdominal:      General: Bowel sounds are normal. There is no distension. Palpations: Abdomen is soft. Tenderness: There is no abdominal tenderness. There is no guarding. Musculoskeletal:         General: Normal range of motion. Cervical back: Normal range of motion. Skin:     General: Skin is warm and dry. Neurological:      General: No focal deficit present. Mental Status: He is alert and oriented to person, place, and time. Psychiatric:         Mood and Affect: Mood normal.     SCREENINGS        No data recorded    LAB, EKG AND DIAGNOSTIC RESULTS   Labs:  Recent Results (from the past 12 hour(s))   STREP AG SCREEN, GROUP A    Collection Time: 03/04/23  8:39 PM    Specimen: Serum;  Throat   Result Value Ref Range    Group A Strep Ag ID Negative Negative         Radiologic Studies:  Non-plain film images such as CT, Ultrasound and 01/19/25 1543 -- 66 17 167/68 -- 95 % -- -- -- -- -- --    01/19/25 1500 -- 61 18 197/84 121 94 % -- -- -- -- 15 --    01/19/25 1445 -- -- -- -- -- -- -- -- -- -- 15 --    01/19/25 1430 -- 60 18 226/88 127 96 % -- -- -- -- -- --    01/19/25 1400 -- 59 -- 239/100 143 93 % -- -- -- -- -- --    01/19/25 1315 -- 62 18 210/88 126 96 % -- -- -- -- 15 --    01/19/25 1300 -- 65 18 210/98 141 96 % -- -- -- -- 15 --    01/19/25 1230 -- 62 17 207/89 -- 96 % -- -- -- -- 15 --    01/19/25 1215 -- 62 18 221/97 -- 97 % -- -- -- -- 15 --    01/19/25 1200 -- 66 18 215/90 -- -- -- -- -- -- 15 --    01/19/25 1130 -- 65 17 230/100 -- -- -- -- -- -- 15 --    01/19/25 1112 98.3 °F (36.8 °C) 66 18 239/105 -- 97 % -- -- None (Room air) Sitting 15 5    01/19/25 0425 -- -- -- 165/105 -- -- -- -- -- Sitting - Orthostatic VS -- --          Weight (last 2 days)       Date/Time Weight    01/20/25 1445 92.1 (203)    01/19/25 2230 92.4 (203.71)            Pertinent Labs/Diagnostic Results:   Radiology:  MRI brain wo contrast   Final Interpretation by José Oliveira MD (01/20 1838)      No acute infarct. Mild chronic white matter microangiopathic changes involving both cerebral hemispheres and the brainstem.      Workstation performed: [id number]         XR hip/pelv 2-3 vws right if performed   ED Interpretation by Vitaly Ramirez MD (01/19 1328)   No acute osseous abnormality identified on independent evaluation of x-ray.      Final Interpretation by Shailesh Burks MD (01/19 1602)      No acute osseous abnormality.         Computerized Assisted Algorithm (CAA) may have been used to analyze all applicable images.            Workstation performed: [id number]         CTA head and neck with and without contrast   Final Interpretation by Jadon Cordero MD (01/19 5292)      CT brain:  No acute intracranial abnormality. Chronic microangiopathic ischemic changes.      CTA head:   -Negative for large vessel intracranial occlusion .  MRI are read by the radiologist. Plain radiographic images are visualized and preliminarily interpreted by the ED     Interpretation per the Radiologist below, if available at the time of this note:  No results found. PROCEDURES   Unless otherwise noted below, none. Procedures    CRITICAL CARE TIME   None  ED COURSE and DIFFERENTIAL DIAGNOSIS/MDM   CC/HPI Summary, DDx, ED Course, and Reassessment:     Disposition Considerations (Tests not done, Shared Decision Making, Pt Expectation of Test or Treatment.):     Vitals:    Vitals:    03/04/23 2002   BP: 127/87   Pulse: 99   Resp: 16   Temp: 99.2 °F (37.3 °C)   SpO2: 99%   Weight: 53.5 kg (118 lb)   Height: 5' 6.5\" (1.689 m)        ED Course as of 03/05/23 0050   Sat Mar 04, 2023   215 78-year-old male presents with URI symptoms. He is well-appearing with appropriate triage vitals. Airway intact. No respiratory distress or increased work of breathing. Differentials include URI, strep pharyngitis, COVID, influenza, PTA, RPA, dehydration, RSV. We will obtain throat swab. [KW]   2126 Group A Strep Ag ID: Negative [KW]   2128 Patient reports improvement in symptoms. [KW]   2145 Discharge plan with follow-up with primary care if no improvement in symptoms. Warning signs and return precautions discussed. Jada Marquez is in agreement with plan of care, verbalized understanding, questions answered. [KW]      ED Course User Index  [KW] Sonja Marcus NP     Patient presents with symptoms suspicious for likely viral upper respiratory infection. Do not suspect underlying cardiopulmonary process. I considered, but think unlikely, dangerous causes of this patient´s symptoms to include ACS, CHF / COPD exacerbations, or pneumothorax. Patient is nontoxic appearing, in no respiratory distress, saturating well on room air, and not in need of emergent medical intervention.     Flu vaccination status: negative    Unlikely PTA/RPA: No hot potato voice, no uvular deviation,   -Mild to moderate narrowing at the bilateral petrous/cavernous ICA junctions.   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  The cervical vertebral arteries are patent.      Bilateral heterogeneous thyroid nodules, largest located within the left lobe as detailed above.                     Workstation performed: [id number]         XR chest 1 view portable   ED Interpretation by Vitaly Ramirez MD (01/19 1325)   No acute cardiac or pulmonary abnormality identified.      Final Interpretation by Shailesh Burks MD (01/19 1555)      No acute cardiopulmonary disease.            Workstation performed: [id number]           Cardiology:  Echo complete w/ contrast if indicated   Final Result by Kaitlin Bustillos DO (01/20 1530)        Left Ventricle: Left ventricular cavity size is normal. Wall thickness    is mildly increased. There is concentric remodeling. The left ventricular    ejection fraction is 70%. Systolic function is hyperdynamic. Wall motion    is normal. Diastolic function is mildly abnormal, consistent with grade I    (abnormal) relaxation. There is no LV dynamic obstruction.     Right Ventricle: Right ventricular cavity size is normal. Systolic    function is normal.     Mitral Valve: There is moderate annular calcification.         ECG 12 lead   Final Result by Venessa Mccullough MD (01/20 0453)   Sinus bradycardia   Otherwise normal ECG   When compared with ECG of 08-Jul-2024 15:06,   No significant change was found   Confirmed by Venessa Mccullough ([id number]) on 1/20/2025 6:47:52 PM        GI:  No orders to display       Results from last 7 days   Lab Units 01/19/25  1949   SARS-COV-2  Negative     Results from last 7 days   Lab Units 01/21/25  0520 01/20/25  0413 01/19/25  1120   WBC Thousand/uL 9.53 10.91* 8.28   HEMOGLOBIN g/dL 14.3 14.9 14.7   HEMATOCRIT % 44.4 43.7 44.2   PLATELETS Thousands/uL 302 265 273  suspicious swelling. Unlikely Esophageal rupture: No history of dysphagia  Unlikely deep space infection/Narinder´s: No submental swelling, dysphonia, neck pain. Low suspicion for CNS infection, bacterial sinusitis, or pneumonia given exam and history. Unlikely Strep or EBV as no pharyngeal exudate, posterior LAD, or splenomegaly. Will attempt to alleviate symptoms conservatively; no overt indications at this time for antibiotics. Will discuss prompt follow up with PCP. Will discuss warning signs and return precautions. Plan: reassurance, reassessment, over the counter medications, hydration, humidification of air, honey for cough, gargle with salt water, discharge with PCP followup. Age - Group A streptococcus (GAS) rare under 3  15-44 years 0    Exudate or swelling on tonsils  Yes +1    Tender/swollen anterior cervical lymph nodes  Yes +1    Temp >38°C (100.4°F)  No 0    Cough  Cough present 0    2 points  11% - 17%  Optional rapid strep testing and/or culture. Patient was given the following medications:  Medications   acetaminophen (TYLENOL) tablet 1,000 mg (1,000 mg Oral Given 3/4/23 2046)   ibuprofen (MOTRIN) tablet 600 mg (600 mg Oral Given 3/4/23 2046)       CONSULTS: (Who and What was discussed)  None     Social Determinants affecting Dx or Tx: None    Records Reviewed (source and summary of external notes): Prior medical records and Nursing notes    FINAL IMPRESSION     1. Upper respiratory tract infection, unspecified type    2. Acute nonintractable headache, unspecified headache type          DISPOSITION/PLAN   Discharged    Discharge Note: The patient is stable for discharge home. The signs, symptoms, diagnosis, and discharge instructions have been discussed, understanding conveyed, and agreed upon. The patient is to follow up as recommended or return to ER should their symptoms worsen.       PATIENT REFERRED TO:  Follow-up Information       Follow up With Specialties Details Why   TOTAL NEUT ABS Thousands/µL  --   --  5.64         Results from last 7 days   Lab Units 01/21/25  0520 01/20/25  0413 01/19/25  1120   SODIUM mmol/L 138 137 137   POTASSIUM mmol/L 3.7 3.7 4.0   CHLORIDE mmol/L 103 102 102   CO2 mmol/L 28 26 28   ANION GAP mmol/L 7 9 7   BUN mg/dL 26* 15 11   CREATININE mg/dL 1.02 0.71 0.67   EGFR ml/min/1.73sq m 51 80 82   CALCIUM mg/dL 9.2 9.4 9.3   MAGNESIUM mg/dL 1.9  --   --    PHOSPHORUS mg/dL 4.4*  --   --      Results from last 7 days   Lab Units 01/21/25  0520 01/19/25  1120   AST U/L  --  14   ALT U/L  --  18   ALK PHOS U/L  --  97   TOTAL PROTEIN g/dL  --  6.7   ALBUMIN g/dL 3.5 4.1   TOTAL BILIRUBIN mg/dL  --  0.54     Results from last 7 days   Lab Units 01/21/25  1120 01/21/25  0714 01/20/25  2049 01/20/25  1634 01/20/25  1243 01/20/25  0813 01/19/25  2229 01/19/25  1810   POC GLUCOSE mg/dl 214* 169* 162* 140 209* 214* 249* 159*     Results from last 7 days   Lab Units 01/21/25  0520 01/20/25  0413 01/19/25  1120   GLUCOSE RANDOM mg/dL 138 180* 221*         Results from last 7 days   Lab Units 01/19/25  1120   HEMOGLOBIN A1C % 6.8*   EAG mg/dl 148       Results from last 7 days   Lab Units 01/19/25  1320 01/19/25  1120   HS TNI 0HR ng/L  --  6   HS TNI 2HR ng/L 6  --    HSTNI D2 ng/L 0  --              Results from last 7 days   Lab Units 01/20/25  0413   TSH 3RD GENERATON uIU/mL 6.428*           Results from last 7 days   Lab Units 01/19/25  1949   INFLUENZA A PCR  Negative   INFLUENZA B PCR  Negative   RSV PCR  Negative         Network Utilization Review Department  ATTENTION: Please call with any questions or concerns to [phone] and carefully listen to the prompts so that you are directed to the right person. All voicemails are confidential.   For Discharge needs, contact Care Management DC Support Team at [phone] opt. [phone]  Send all requests for admission clinical reviews, approved or denied determinations and any other requests to dedicated fax number  [address] EMERGENCY DEPT Emergency Medicine  If symptoms worsen [facility]    Lindsay Franklin MD Family Medicine Schedule an appointment as soon as possible for a visit   [address] [place] [address]  [phone]                DISCHARGE MEDICATIONS:  There are no discharge medications for this patient. DISCONTINUED MEDICATIONS:  There are no discharge medications for this patient. I am the Primary Clinician of Record: Shanae Lema NP (electronically signed)    (Please note that parts of this dictation were completed with voice recognition software. Quite often unanticipated grammatical, syntax, homophones, and other interpretive errors are inadvertently transcribed by the computer software. Please disregards these errors.  Please excuse any errors that have escaped final proofreading.) below belonging to the campus where the patient is receiving treatment. List of dedicated fax numbers for the Facilities:  FACILITY NAME UR FAX NUMBER   ADMISSION DENIALS (Administrative/Medical Necessity) [phone]   DISCHARGE SUPPORT TEAM (NETWORK) [phone]   PARENT CHILD HEALTH (Maternity/NICU/Pediatrics) [phone]   [facility] [phone]   [facility] [phone]   [facility] [phone]   [facility] [phone]   Count includes the [facility] [phone]   [facility] [phone]   [facility] [phone]   [facility] [phone]   [facility] [phone]   [facility] [phone]   General acute hospital [phone]   [facility] [phone]

## 2025-01-21 NOTE — ASSESSMENT & PLAN NOTE
Lab Results   Component Value Date    HGBA1C 6.8 (H) 01/19/2025       Recent Labs     01/20/25  2049 01/21/25  0714 01/21/25  1120 01/21/25  1704   POCGLU 162* 169* 214* 143*       Blood Sugar Average: Last 72 hrs:  (P) [id number]  Patient has been diabetic for many years  He is on insulin Lantus 50 units daily and sliding scale insulin  Recent HbA1c has been under control  Patient also has been on Mounjaro for the past few months which has helped her control of diabetes mellitus type 2  Will cover with sliding scale insulin  Will monitor blood sugars  Has a continuous glucose monitor.

## 2025-01-21 NOTE — ASSESSMENT & PLAN NOTE
Patient admits to having dizziness described as lightheadedness and room spinning sensation   1/20/25: On exam when looking to the right with leftward beating nystagmus of left eye  Mri brain negative for stroke  Continue meclizine   PT OT  Reports some improvement in symptoms

## 2025-01-21 NOTE — PLAN OF CARE

## 2025-01-21 NOTE — ASSESSMENT & PLAN NOTE
Patient presents with syncope ,recurrent and collapse  CT brain:  No acute intracranial abnormality. Chronic microangiopathic ischemic changes.     CTA head:  Negative for large vessel intracranial occlusion .  Mild to moderate narrowing at the bilateral petrous/cavernous ICA junctions.  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  The cervical vertebral arteries are patent.  Will need vascular surgery follow-up  Continue asa 81 mg daily  Start lipitor 40 mg daily

## 2025-01-21 NOTE — ASSESSMENT & PLAN NOTE
Patient presented with recurrent syncope dizziness with elevated blood pressure 200s/100s  Patient has been on Cardizem 360 mg daily and losartan 100 mg daily  CTA head and neck shows no acute intracranial abnormality, chronic microangiopathic ischemic changes noted, has no neurological symptoms  Blood pressure has remained uncontrolled    1/20  Discontinue hydrochlorothiazide as this can exacerbate patient's lightheadedness and vertigo symptoms  Continue losartan 100 mg daily  Will discontinue oral Cardizem 360 mg daily for Procardia 60 mg daily  Start Toprol-XL 50 mg daily to prevent rebound tachycardia from stopping Cardizem  Stop hydralazine and start prn labetalol for systolic >180 or diastolic >100  1/21  Blood pressure noted to be on lower side this morning, started to rise to 160's in afternoon.  Will give 500 cc bolus and IV fluids at 100 ml/hr for 5 hours  Decrease toprol xl to 25 mg, decrease procardia to 30 mg daily, hold losartan today and resume tomorrow at possibly lower dose if needed.

## 2025-01-21 NOTE — PLAN OF CARE
Chart(s)/Patient Problem: PAIN - ADULT  Goal: Verbalizes/displays adequate comfort level or baseline comfort level  Description: Interventions:  - Encourage patient to monitor pain and request assistance  - Assess pain using appropriate pain scale  - Administer analgesics based on type and severity of pain and evaluate response  - Implement non-pharmacological measures as appropriate and evaluate response  - Consider cultural and social influences on pain and pain management  - Notify physician/advanced practitioner if interventions unsuccessful or patient reports new pain  Outcome: Progressing     Problem: INFECTION - ADULT  Goal: Absence or prevention of progression during hospitalization  Description: INTERVENTIONS:  - Assess and monitor for signs and symptoms of infection  - Monitor lab/diagnostic results  - Monitor all insertion sites, i.e. indwelling lines, tubes, and drains  - Monitor endotracheal if appropriate and nasal secretions for changes in amount and color  - Rinard appropriate cooling/warming therapies per order  - Administer medications as ordered  - Instruct and encourage patient and family to use good hand hygiene technique  - Identify and instruct in appropriate isolation precautions for identified infection/condition  Outcome: Progressing  Goal: Absence of fever/infection during neutropenic period  Description: INTERVENTIONS:  - Monitor WBC    Outcome: Progressing     Problem: SAFETY ADULT  Goal: Patient will remain free of falls  Description: INTERVENTIONS:  - Educate patient/family on patient safety including physical limitations  - Instruct patient to call for assistance with activity   - Consult OT/PT to assist with strengthening/mobility   - Keep Call bell within reach  - Keep bed low and locked with side rails adjusted as appropriate  - Keep care items and personal belongings within reach  - Initiate and maintain comfort rounds  - Make Fall Risk Sign visible to staff  - Offer Toileting every 2 Hours, in  advance of need  - Initiate/Maintain bed/chair alarm  - Obtain necessary fall risk management equipment  - Apply yellow socks and bracelet for high fall risk patients  - Consider moving patient to room near nurses station  Outcome: Progressing     Problem: DISCHARGE PLANNING  Goal: Discharge to home or other facility with appropriate resources  Description: INTERVENTIONS:  - Identify barriers to discharge w/patient and caregiver  - Arrange for needed discharge resources and transportation as appropriate  - Identify discharge learning needs (meds, wound care, etc.)  - Arrange for interpretive services to assist at discharge as needed  - Refer to Case Management Department for coordinating discharge planning if the patient needs post-hospital services based on physician/advanced practitioner order or complex needs related to functional status, cognitive ability, or social support system  Outcome: Progressing     Problem: Knowledge Deficit  Goal: Patient/family/caregiver demonstrates understanding of disease process, treatment plan, medications, and discharge instructions  Description: Complete learning assessment and assess knowledge base.  Interventions:  - Provide teaching at level of understanding  - Provide teaching via preferred learning methods  Outcome: Progressing

## 2025-01-22 LAB
ALBUMIN SERPL BCG-MCNC: 3.8 G/DL (ref 3.5–5)
ANION GAP SERPL CALCULATED.3IONS-SCNC: 7 MMOL/L (ref 4–13)
BUN SERPL-MCNC: 21 MG/DL (ref 5–25)
CALCIUM SERPL-MCNC: 9.2 MG/DL (ref 8.4–10.2)
CHLORIDE SERPL-SCNC: 105 MMOL/L (ref 96–108)
CO2 SERPL-SCNC: 30 MMOL/L (ref 21–32)
CREAT SERPL-MCNC: 0.7 MG/DL (ref 0.6–1.3)
ERYTHROCYTE [DISTWIDTH] IN BLOOD BY AUTOMATED COUNT: 14.1 % (ref 11.6–15.1)
GFR SERPL CREATININE-BSD FRML MDRD: 81 ML/MIN/1.73SQ M
GLUCOSE SERPL-MCNC: 107 MG/DL (ref 65–140)
GLUCOSE SERPL-MCNC: 129 MG/DL (ref 65–140)
GLUCOSE SERPL-MCNC: 130 MG/DL (ref 65–140)
GLUCOSE SERPL-MCNC: 170 MG/DL (ref 65–140)
GLUCOSE SERPL-MCNC: 174 MG/DL (ref 65–140)
HCT VFR BLD AUTO: 44.8 % (ref 34.8–46.1)
HGB BLD-MCNC: 14.3 G/DL (ref 11.5–15.4)
MAGNESIUM SERPL-MCNC: 1.7 MG/DL (ref 1.9–2.7)
MCH RBC QN AUTO: 28.4 PG (ref 26.8–34.3)
MCHC RBC AUTO-ENTMCNC: 31.9 G/DL (ref 31.4–37.4)
MCV RBC AUTO: 89 FL (ref 82–98)
PHOSPHATE SERPL-MCNC: 3.4 MG/DL (ref 2.3–4.1)
PLATELET # BLD AUTO: 270 THOUSANDS/UL (ref 149–390)
PMV BLD AUTO: 10.6 FL (ref 8.9–12.7)
POTASSIUM SERPL-SCNC: 3.7 MMOL/L (ref 3.5–5.3)
PROCALCITONIN SERPL-MCNC: 0.06 NG/ML
RBC # BLD AUTO: 5.03 MILLION/UL (ref 3.81–5.12)
SODIUM SERPL-SCNC: 142 MMOL/L (ref 135–147)
WBC # BLD AUTO: 10.32 THOUSAND/UL (ref 4.31–10.16)

## 2025-01-22 PROCEDURE — 83735 ASSAY OF MAGNESIUM: CPT | Performed by: STUDENT IN AN ORGANIZED HEALTH CARE EDUCATION/TRAINING PROGRAM

## 2025-01-22 PROCEDURE — 85027 COMPLETE CBC AUTOMATED: CPT | Performed by: STUDENT IN AN ORGANIZED HEALTH CARE EDUCATION/TRAINING PROGRAM

## 2025-01-22 PROCEDURE — 99233 SBSQ HOSP IP/OBS HIGH 50: CPT | Performed by: STUDENT IN AN ORGANIZED HEALTH CARE EDUCATION/TRAINING PROGRAM

## 2025-01-22 PROCEDURE — 82948 REAGENT STRIP/BLOOD GLUCOSE: CPT

## 2025-01-22 PROCEDURE — 84145 PROCALCITONIN (PCT): CPT | Performed by: STUDENT IN AN ORGANIZED HEALTH CARE EDUCATION/TRAINING PROGRAM

## 2025-01-22 PROCEDURE — 94668 MNPJ CHEST WALL SBSQ: CPT

## 2025-01-22 PROCEDURE — 80069 RENAL FUNCTION PANEL: CPT | Performed by: STUDENT IN AN ORGANIZED HEALTH CARE EDUCATION/TRAINING PROGRAM

## 2025-01-22 RX ORDER — LOSARTAN POTASSIUM 25 MG/1
25 TABLET ORAL DAILY
Status: DISCONTINUED | OUTPATIENT
Start: 2025-01-23 | End: 2025-01-23 | Stop reason: HOSPADM

## 2025-01-22 RX ORDER — MAGNESIUM SULFATE HEPTAHYDRATE 40 MG/ML
2 INJECTION, SOLUTION INTRAVENOUS ONCE
Status: COMPLETED | OUTPATIENT
Start: 2025-01-22 | End: 2025-01-23

## 2025-01-22 RX ORDER — NIFEDIPINE 30 MG/1
30 TABLET, EXTENDED RELEASE ORAL EVERY EVENING
Status: DISCONTINUED | OUTPATIENT
Start: 2025-01-22 | End: 2025-01-23 | Stop reason: HOSPADM

## 2025-01-22 RX ADMIN — INSULIN LISPRO 1 UNITS: 100 INJECTION, SOLUTION INTRAVENOUS; SUBCUTANEOUS at 22:01

## 2025-01-22 RX ADMIN — ASPIRIN 81 MG CHEWABLE TABLET 81 MG: 81 TABLET CHEWABLE at 08:45

## 2025-01-22 RX ADMIN — ENOXAPARIN SODIUM 40 MG: 40 INJECTION SUBCUTANEOUS at 08:45

## 2025-01-22 RX ADMIN — INSULIN GLARGINE 50 UNITS: 100 INJECTION, SOLUTION SUBCUTANEOUS at 08:45

## 2025-01-22 RX ADMIN — METOPROLOL SUCCINATE 25 MG: 25 TABLET, EXTENDED RELEASE ORAL at 08:45

## 2025-01-22 RX ADMIN — LOSARTAN POTASSIUM 50 MG: 50 TABLET, FILM COATED ORAL at 08:45

## 2025-01-22 RX ADMIN — SODIUM CHLORIDE 100 ML/HR: 0.9 INJECTION, SOLUTION INTRAVENOUS at 02:50

## 2025-01-22 RX ADMIN — NIFEDIPINE 30 MG: 30 TABLET, FILM COATED, EXTENDED RELEASE ORAL at 18:12

## 2025-01-22 RX ADMIN — INSULIN LISPRO 1 UNITS: 100 INJECTION, SOLUTION INTRAVENOUS; SUBCUTANEOUS at 18:13

## 2025-01-22 RX ADMIN — GUAIFENESIN 1200 MG: 600 TABLET, EXTENDED RELEASE ORAL at 08:45

## 2025-01-22 RX ADMIN — MAGNESIUM SULFATE IN WATER FOR 2 G: 40 INJECTION INTRAVENOUS at 09:43

## 2025-01-22 RX ADMIN — ATORVASTATIN CALCIUM 40 MG: 40 TABLET, FILM COATED ORAL at 18:12

## 2025-01-22 RX ADMIN — NIFEDIPINE 30 MG: 30 TABLET, FILM COATED, EXTENDED RELEASE ORAL at 08:45

## 2025-01-22 NOTE — ASSESSMENT & PLAN NOTE
1/21 Cr bumped from 0.7 to 1.0  Suspect 2/2 contrast  1/22 Creatinine improved down to baseline with IV fluid hydration

## 2025-01-22 NOTE — PROGRESS NOTES
Progress Note - Hospitalist   Name: Mar Elizondo 79 y.o. female I MRN: [id number]  Unit/Bed#: S -01 I Date of Admission: 1/19/2025   Date of Service: 1/22/2025 I Hospital Day: 2    Assessment & Plan  Type 2 diabetes mellitus with other specified complication (HCC)  Lab Results   Component Value Date    HGBA1C 6.8 (H) 01/19/2025       Recent Labs     01/21/25  2053 01/22/25  0720 01/22/25  1201 01/22/25  1647   POCGLU 160* 129 130 174*       Blood Sugar Average: Last 72 hrs:  (P) [id number]  Patient has been diabetic for many years  He is on insulin Lantus 50 units daily and sliding scale insulin  Recent HbA1c has been under control  Patient also has been on Mounjaro for the past few months which has helped her control of diabetes mellitus type 2  Will cover with sliding scale insulin  Will monitor blood sugars  Has a continuous glucose monitor.  Psoriasis  Controlled with apremilast tablets 30 mg twice daily  Resistant hypertension  Patient presented with recurrent syncope dizziness with elevated blood pressure 200s/100s  Patient has been on Cardizem 360 mg daily and losartan 100 mg daily  CTA head and neck shows no acute intracranial abnormality, chronic microangiopathic ischemic changes noted, has no neurological symptoms  Blood pressure has remained uncontrolled    1/20  Discontinue hydrochlorothiazide as this can exacerbate patient's lightheadedness and vertigo symptoms  Continue losartan 100 mg daily  Will discontinue oral Cardizem 360 mg daily for Procardia 60 mg daily  Start Toprol-XL 50 mg daily to prevent rebound tachycardia from stopping Cardizem  Stop hydralazine and start prn labetalol for systolic >180 or diastolic >100  1/21  Blood pressure noted to be on lower side this morning, started to rise to 160's in afternoon.  Will give 500 cc bolus and IV fluids at 100 ml/hr for 5 hours  Decrease toprol xl to 25 mg, decrease procardia to 30 mg daily, hold losartan today and resume tomorrow at  possibly lower dose if needed.    1/22  Patient's orthostatic blood pressure was positive today after she had received her morning blood pressure medications  Patient placed on IV fluid hydration  Will split her medication regiment to Procardia 30 mg at night   Continue with losartan 25 mg daily.  And Toprol-XL 25 mg daily      Obesity, morbid (HCC)  Advised about weight reduction and lifestyle modification  Vitamin B12 deficiency  on vitamin B12 supplement monthly  Syncope and collapse  Patient presents with dizziness and syncope and loss of consciousness and fall, 3 episodes in the past week, appears positional while getting up fast  Unwitnessed falls, no known seizure episodes in the past  Etiology unclear-likely vasovagal, cannot rule out cardiogenic or neurological  Noted to have elevated blood pressure of 230s/100s, is on 2 antihypertensive max dose, outpatient notes were reviewed and noted to have elevated blood pressure of 160s to 170s.  Patient does not appear to be dehydrated.  CT head , CTA head and neck shows no acute intracranial abnormality, no large vessel occlusion noted, moderate left extracranial carotid stenosis noted, mild to moderate narrowing at the bilateral petrous/cavernous ICA junctions noted, moderate to severe focal stenosis involving the mid to inferior basilar artery noted.  No focal neurological deficit noted, EKG shows sinus rhythm, no acute ST-T changes noted  MRI brain negative for stroke   Suspect from dehydration in setting of HCTZ use   Multiple thyroid nodules  Multiple heterogenous, hypodense bilateral thyroid nodules noted largest in the left side 1.7 x 3 x 2.7 cm, needs outpatient follow-up with thyroid ultrasound  Finding was discussed with the patient and the daughter and made aware on night of admission  Will need outpatient f/u  Intracranial carotid stenosis  Patient presents with syncope ,recurrent and collapse  CT brain:  No acute intracranial abnormality. Chronic  microangiopathic ischemic changes.     CTA head:  Negative for large vessel intracranial occlusion .  Mild to moderate narrowing at the bilateral petrous/cavernous ICA junctions.  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  The cervical vertebral arteries are patent.  Will need vascular surgery follow-up  Continue asa 81 mg daily  Start lipitor 40 mg daily       Mixed hyperlipidemia     Latest Reference Range & Units 01/20/25 04:13   Cholesterol See Comment mg/dL 233 (H)   Triglycerides See Comment mg/dL 143   HDL >=50 mg/dL 38 (L)   LDL Calculated 0 - 100 mg/dL 166 (H)     Start lipitor 40 mg daily  Dizziness  Patient admits to having dizziness described as lightheadedness and room spinning sensation   1/20/25: On exam when looking to the right with leftward beating nystagmus of left eye  Mri brain negative for stroke  Continue meclizine   PT OT  Reports some improvement in symptoms  Complained of having dizziness with standing today  JEFFERY (acute kidney injury) (HCC)  1/21 Cr bumped from 0.7 to 1.0  Suspect 2/2 contrast  1/22 Creatinine improved down to baseline with IV fluid hydration    VTE Pharmacologic Prophylaxis:   Moderate Risk (Score 3-4) - Pharmacological DVT Prophylaxis Ordered: enoxaparin (Lovenox).    Mobility:   Basic Mobility Inpatient Raw Score: 18  JH-HLM Goal: 6: Walk 10 steps or more  JH-HLM Achieved: 6: Walk 10 steps or more  JH-HLM Goal achieved. Continue to encourage appropriate mobility.    Patient Centered Rounds: I performed bedside rounds with nursing staff today.   Discussions with Specialists or Other Care Team Provider: case management    Education and Discussions with Family / Patient: Updated  (daughter) via phone.    Current Length of Stay: 2 day(s)  Current Patient Status: Inpatient   Certification Statement: The patient will continue to require additional inpatient hospital stay  due to blood pressure control   Discharge Plan: Anticipate discharge in 24-48 hrs to discharge location to be determined pending rehab evaluations.    Code Status: Level 1 - Full Code    Subjective   Patient complained of feeling dizzy with standing today    Objective :  Temp:  [97.4 °F (36.3 °C)-97.8 °F (36.6 °C)] 97.6 °F (36.4 °C)  HR:  [63-80] 80  BP: ()/(46-59) 142/57  Resp:  [18] 18  SpO2:  [91 %-94 %] 94 %  O2 Device: None (Room air)    Body mass index is 38.28 kg/m².     Input and Output Summary (last 24 hours):     Intake/Output Summary (Last 24 hours) at 1/22/2025 1756  Last data filed at 1/22/2025 1300  Gross per 24 hour   Intake 2353.33 ml   Output 2325 ml   Net 28.33 ml       Physical Exam  Vitals and nursing note reviewed.   Constitutional:       General: She is not in acute distress.     Appearance: She is well-developed.   HENT:      Head: Normocephalic and atraumatic.   Eyes:      Conjunctiva/sclera: Conjunctivae normal.   Cardiovascular:      Rate and Rhythm: Normal rate and regular rhythm.      Heart sounds: No murmur heard.  Pulmonary:      Effort: Pulmonary effort is normal. No respiratory distress.      Breath sounds: Normal breath sounds.   Abdominal:      Palpations: Abdomen is soft.      Tenderness: There is no abdominal tenderness.   Musculoskeletal:         General: No swelling.      Cervical back: Neck supple.   Skin:     General: Skin is warm and dry.      Capillary Refill: Capillary refill takes less than 2 seconds.   Neurological:      Mental Status: She is alert. Mental status is at baseline.      Sensory: No sensory deficit.      Motor: No weakness.   Psychiatric:         Mood and Affect: Mood normal.           Lines/Drains:                   Lab Results: I have reviewed the following results:   Results from last 7 days   Lab Units 01/22/25  0448 01/20/25  0413 01/19/25  1120   WBC Thousand/uL 10.32*   < > 8.28   HEMOGLOBIN g/dL 14.3   < > 14.7   HEMATOCRIT % 44.8   < > 44.2    PLATELETS Thousands/uL 270   < > 273   SEGS PCT %  --   --  67   LYMPHO PCT %  --   --  20   MONO PCT %  --   --  9   EOS PCT %  --   --  2    < > = values in this interval not displayed.     Results from last 7 days   Lab Units 01/22/25  0448 01/20/25  0413 01/19/25  1120   SODIUM mmol/L 142   < > 137   POTASSIUM mmol/L 3.7   < > 4.0   CHLORIDE mmol/L 105   < > 102   CO2 mmol/L 30   < > 28   BUN mg/dL 21   < > 11   CREATININE mg/dL 0.70   < > 0.67   ANION GAP mmol/L 7   < > 7   CALCIUM mg/dL 9.2   < > 9.3   ALBUMIN g/dL 3.8   < > 4.1   TOTAL BILIRUBIN mg/dL  --   --  0.54   ALK PHOS U/L  --   --  97   ALT U/L  --   --  18   AST U/L  --   --  14   GLUCOSE RANDOM mg/dL 107   < > 221*    < > = values in this interval not displayed.         Results from last 7 days   Lab Units 01/22/25  1647 01/22/25  1201 01/22/25  0720 01/21/25  2053 01/21/25  1704 01/21/25  1120 01/21/25  0714 01/20/25  2049 01/20/25  1634 01/20/25  1243 01/20/25  0813 01/19/25  2229   POC GLUCOSE mg/dl 174* 130 129 160* 143* 214* 169* 162* 140 209* 214* 249*     Results from last 7 days   Lab Units 01/19/25  1120   HEMOGLOBIN A1C % 6.8*     Results from last 7 days   Lab Units 01/22/25  0448   PROCALCITONIN ng/ml 0.06       Recent Cultures (last 7 days):         Imaging Results Review: I reviewed radiology reports from this admission including: CT head.  Other Study Results Review: EKG was reviewed.     Last 24 Hours Medication List:     Current Facility-Administered Medications:     acetaminophen (TYLENOL) tablet 650 mg, Q6H PRN    albuterol (PROVENTIL HFA,VENTOLIN HFA) inhaler 2 puff, 4x Daily    Apremilast TABS 1 tablet, BID    aspirin chewable tablet 81 mg, Daily    atorvastatin (LIPITOR) tablet 40 mg, Daily With Dinner    enoxaparin (LOVENOX) subcutaneous injection 40 mg, Daily    fluticasone (FLONASE) 50 mcg/act nasal spray 1 spray, BID    guaiFENesin (MUCINEX) 12 hr tablet 1,200 mg, Q12H KAYODE    insulin glargine (LANTUS) subcutaneous  injection 50 Units 0.5 mL, QAM    insulin lispro (HumALOG/ADMELOG) 100 units/mL subcutaneous injection 1-6 Units, TID AC **AND** Fingerstick Glucose (POCT), TID AC    insulin lispro (HumALOG/ADMELOG) 100 units/mL subcutaneous injection 1-6 Units, HS    labetalol (NORMODYNE) injection 10 mg, Q6H PRN    [START ON 1/23/2025] losartan (COZAAR) tablet 25 mg, Daily **AND** [DISCONTINUED] hydroCHLOROthiazide tablet 25 mg, Daily    meclizine (ANTIVERT) tablet 25 mg, Q8H PRN    metoprolol succinate (TOPROL-XL) 24 hr tablet 25 mg, Daily    NIFEdipine (PROCARDIA XL) 24 hr tablet 30 mg, QPM    sodium chloride 0.9 % infusion, Continuous, Last Rate: 100 mL/hr (01/22/25 0250)    Administrative Statements   Today, Patient Was Seen By: Silvia Russell DO      **Please Note: This note may have been constructed using a voice recognition system.**

## 2025-01-22 NOTE — PLAN OF CARE
Problem: Potential for Falls  Goal: Patient will remain free of falls  Description: INTERVENTIONS:  - Educate patient/family on patient safety including physical limitations  - Instruct patient to call for assistance with activity   - Consult OT/PT to assist with strengthening/mobility   - Keep Call bell within reach  - Keep bed low and locked with side rails adjusted as appropriate  - Keep care items and personal belongings within reach  - Initiate and maintain comfort rounds  - Make Fall Risk Sign visible to staff  - Offer Toileting every 2 Hours, in advance of need  - Initiate/Maintain bed/chair alarm  - Obtain necessary fall risk management equipment  - Apply yellow socks and bracelet for high fall risk patients  - Consider moving patient to room near nurses station  Outcome: Progressing     Problem: Knowledge Deficit  Goal: Patient/family/caregiver demonstrates understanding of disease process, treatment plan, medications, and discharge instructions  Description: Complete learning assessment and assess knowledge base.  Interventions:  - Provide teaching at level of understanding  - Provide teaching via preferred learning methods  Outcome: Progressing     Problem: DISCHARGE PLANNING  Goal: Discharge to home or other facility with appropriate resources  Description: INTERVENTIONS:  - Identify barriers to discharge w/patient and caregiver  - Arrange for needed discharge resources and transportation as appropriate  - Identify discharge learning needs (meds, wound care, etc.)  - Arrange for interpretive services to assist at discharge as needed  - Refer to Case Management Department for coordinating discharge planning if the patient needs post-hospital services based on physician/advanced practitioner order or complex needs related to functional status, cognitive ability, or social support system  Outcome: Progressing

## 2025-01-22 NOTE — CASE MANAGEMENT
Case Management Discharge Planning Note    Patient name Mar FARRELL /S -01 MRN [id number]  : 1945 Date 2025       Current Admission Date: 2025  Current Admission Diagnosis:Syncope and collapse   Patient Active Problem List    Diagnosis Date Noted Date Diagnosed    JEFFERY (acute kidney injury) (HCC) 2025     Dizziness 2025     Syncope and collapse 2025     Multiple thyroid nodules 2025     Intracranial carotid stenosis 2025     Microalbuminuria 2024     Obesity, morbid (HCC) 2024     Vitamin D insufficiency 2024     Vitamin B12 deficiency 2024     Elevated blood sugar 2024     COPD exacerbation (HCC) 2024     Medication management 10/04/2023     Hypokalemia 2021     Increased BMI 02/15/2021     Myalgia due to statin 10/13/2020     Need for vaccination 10/13/2020     Psoriasis 10/13/2020     Type 2 diabetes mellitus with other specified complication (HCC) 10/13/2020     Mixed simple and mucopurulent chronic bronchitis (HCC) 2020     Cyanocobalamin deficiency 2020     Mixed hyperlipidemia 2020     Psoriasis-like skin disease 2020     Unspecified abnormalities of gait and mobility 2019     Personal history of nicotine dependence 2019     Other chronic pain 2019     Resistant hypertension 2019       LOS (days): 2  Geometric Mean LOS (GMLOS) (days): 2.3  Days to GMLOS:0.3     OBJECTIVE:  Risk of Unplanned Readmission Score: 12.37         Current admission status: Inpatient   Preferred Pharmacy:   [id number] [place], [place] - [address]  [address] [address]  Phone: [phone] Fax: [phone]    [facility] - [place], [place] - [address] [facility]  [address] [address]  Phone: [phone] Fax: [phone]    [facility] [place]  EQUIP[facility]  [address]  [place] [address]  Phone: [phone] Fax: [phone]    [facility] - [place], [place] - [address]  [address]  [address]  [facility] [address]  Phone: [phone] Fax: [phone]    Primary Care Provider: TAMIR Montejo DO    Primary Insurance: KEVIN  REP  Secondary Insurance:     DISCHARGE DETAILS:    Discharge planning discussed with:: patient  Freedom of Choice: Yes  Comments - Freedom of Choice: CM f/u with patient at bedside re: HHC options available - pt choice list provided for her review. CM to f/u with pt re: final HHC choice from list provided.                                                                          IMM reviewed with patient, patient agrees with discharge determination.  IMM Given (Date):: 01/22/25  IMM Given to:: Patient

## 2025-01-22 NOTE — ASSESSMENT & PLAN NOTE
Patient admits to having dizziness described as lightheadedness and room spinning sensation   1/20/25: On exam when looking to the right with leftward beating nystagmus of left eye  Mri brain negative for stroke  Continue meclizine   PT OT  Reports some improvement in symptoms  Complained of having dizziness with standing today

## 2025-01-22 NOTE — OCCUPATIONAL THERAPY NOTE
Occupational Therapy Cancellation Note        Patient Name: Mar Elizondo  Today's Date: 1/22/2025 01/22/25 0732   Note Type   Note type Cancelled Session   Cancel Reasons Medical status   Additional Comments OT orders recieved, chart reviewed. Pt recently completed orthostatics c BP drop from 161/33 to 75/55 supine>standing x 3 minutes. Spoke with Dr Russell who reports planned for fluids and med adjustements, will hold therapy at this time and f/u once medically appropriate     Venessa Burnett, OT

## 2025-01-22 NOTE — ASSESSMENT & PLAN NOTE
Patient presented with recurrent syncope dizziness with elevated blood pressure 200s/100s  Patient has been on Cardizem 360 mg daily and losartan 100 mg daily  CTA head and neck shows no acute intracranial abnormality, chronic microangiopathic ischemic changes noted, has no neurological symptoms  Blood pressure has remained uncontrolled    1/20  Discontinue hydrochlorothiazide as this can exacerbate patient's lightheadedness and vertigo symptoms  Continue losartan 100 mg daily  Will discontinue oral Cardizem 360 mg daily for Procardia 60 mg daily  Start Toprol-XL 50 mg daily to prevent rebound tachycardia from stopping Cardizem  Stop hydralazine and start prn labetalol for systolic >180 or diastolic >100  1/21  Blood pressure noted to be on lower side this morning, started to rise to 160's in afternoon.  Will give 500 cc bolus and IV fluids at 100 ml/hr for 5 hours  Decrease toprol xl to 25 mg, decrease procardia to 30 mg daily, hold losartan today and resume tomorrow at possibly lower dose if needed.    1/22  Patient's orthostatic blood pressure was positive today after she had received her morning blood pressure medications  Patient placed on IV fluid hydration  Will split her medication regiment to Procardia 30 mg at night   Continue with losartan 25 mg daily.  And Toprol-XL 25 mg daily

## 2025-01-22 NOTE — CASE MANAGEMENT
Case Management Discharge Planning Note    Patient name Mar FARRELL /S -01 MRN [id number]  : 1945 Date 2025       Current Admission Date: 2025  Current Admission Diagnosis:Syncope and collapse   Patient Active Problem List    Diagnosis Date Noted Date Diagnosed    JEFFERY (acute kidney injury) (HCC) 2025     Dizziness 2025     Syncope and collapse 2025     Multiple thyroid nodules 2025     Intracranial carotid stenosis 2025     Microalbuminuria 2024     Obesity, morbid (HCC) 2024     Vitamin D insufficiency 2024     Vitamin B12 deficiency 2024     Elevated blood sugar 2024     COPD exacerbation (HCC) 2024     Medication management 10/04/2023     Hypokalemia 2021     Increased BMI 02/15/2021     Myalgia due to statin 10/13/2020     Need for vaccination 10/13/2020     Psoriasis 10/13/2020     Type 2 diabetes mellitus with other specified complication (HCC) 10/13/2020     Mixed simple and mucopurulent chronic bronchitis (HCC) 2020     Cyanocobalamin deficiency 2020     Mixed hyperlipidemia 2020     Psoriasis-like skin disease 2020     Unspecified abnormalities of gait and mobility 2019     Personal history of nicotine dependence 2019     Other chronic pain 2019     Resistant hypertension 2019       LOS (days): 2  Geometric Mean LOS (GMLOS) (days): 2.3  Days to GMLOS:0.3     OBJECTIVE:  Risk of Unplanned Readmission Score: 12.37         Current admission status: Inpatient   Preferred Pharmacy:   [id number] [place], [place] - [address]  [address] [address]  Phone: [phone] Fax: [phone]    [facility] - [place], [place] - [address]  [address] [address]  Phone: [phone] Fax: [phone]    [facility] [place]  [facility]  [address]  [place] [address]  Phone: [phone] Fax: [phone]    [facility] - [place], [place] - [address]  [address]  [address]  [facility] [address]  Phone: [phone] Fax: [phone]    Primary Care Provider: TAMIR Montejo DO    Primary Insurance: [employer]  REP  Secondary Insurance:     DISCHARGE DETAILS:    Discharge planning discussed with:: patient  Freedom of Choice: Yes  Comments - Freedom of Choice: CM f/u with patient re: HHC choice. Patient relayed choice for [facility] - agency reserved in aidin, AVS updated.                     Requested Home Health Care         Is the patient interested in HHC at discharge?: Yes  Home Health Discipline requested:: Nursing, Occupational Therapy, Physical Therapy  Home Health Agency Name:: [facility]  HHA External Referral Reason (only applicable if external HHA name selected): Scheduling access issues  Home Health Follow-Up Provider:: PCP  Home Health Services Needed:: Evaluate Functional Status and Safety, Gait/ADL Training, Strengthening/Theraputic Exercises to Improve Function  Homebound Criteria Met:: Requires the Assistance of Another Person for Safe Ambulation or to Leave the Home, Uses an Assist Device (i.e. cane, walker, etc)  Supporting Clincal Findings:: Limited Endurance, Fatigues Easliy in Short Distances                   Treatment Team Recommendation: Short Term Rehab  Discharge Destination Plan:: Home with Home Health Care  Transport at Discharge : Family

## 2025-01-22 NOTE — ED PROVIDER NOTES
Emergency Department Trauma Note  Mar Elizondo 79 y.o. female MRN: [id number]  Unit/Bed#: S /S -01 Encounter: [id number]      Trauma Alert:    Model of Arrival:   via    Trauma Team: Current Providers  Attending Provider: Vitaly Ramirez MD  Attending Provider: Becky Nava MD  Attending Provider: Silvia Russell DO  Attending Provider: Becky Nava MD  Attending Provider: Silvia Russell DO  ED Technician: Marques Guidry  Registered Nurse: Deidre Paul RN  Resident: Vincenzo Morales MD  Registered Nurse: Sejal Gillette, RN  Registered Nurse: Luther Chow RN  Registered Nurse: Florence Stokes RN  Registered Nurse: Ashley Gutierrez RN  Registered Nurse: Sejal Gillette, OMAR  Physical Therapist: Clara Pollard, JUAN  Occupational Therapist: Fide Prado OTR  Registered Nurse: Mirian Mccartney RN  Patient Care Assistant: Citlaly Hooper  Registered Nurse: Clara Tomas RN  Registered Nurse: Jesus Olivares RN  Patient Care Assistant: Ama Chowdhury  Patient Care Assistant: Candelario Hinds  Patient Care Assistant: Vensesa Marshall  Registered Nurse: Clara Tomas RN  Respiratory Therapist: Bee Cerda, RT  Physical Therapist: Lorenza Dexter, PT   Care Manager: Anali Avila [facility]  Occupational Therapist: Venessa Burnett OT  Patient Care Assistant: Lawrence You  Registered Nurse: Jesus Olivares RN  Consultants:     None      History of Present Illness     Chief Complaint:   Chief Complaint   Patient presents with    Fall     Multiple falls yesterday and today. +aspirin. +headstrike     HPI:  Mar Elizondo is a 79 y.o. female who presents with this in the fall.  She reports fall from standing yesterday, with head strike.  She does take aspirin.  She states she felt dizzy and lightheaded, and subsequently fell.  Was not able to get up on her own afterwards.  Denies prodrome prior to fall.  She had similar episode about a week ago, also due to dizziness, and  was prescribed meclizine at that time.  She states meclizine has not improved her symptoms in the last few days.  Denies any other symptoms.  States she feels very unstable on her feet.  Mechanism:             Fall  Associated symptoms: no abdominal pain, no back pain, no chest pain, no nausea, no seizures and no vomiting      Review of Systems   Constitutional:  Negative for fever.   HENT:  Negative for ear pain and sore throat.    Eyes:  Negative for visual disturbance.   Respiratory:  Negative for shortness of breath.    Cardiovascular:  Negative for chest pain and palpitations.   Gastrointestinal:  Negative for abdominal pain, nausea and vomiting.   Genitourinary:  Negative for dysuria and hematuria.   Musculoskeletal:  Negative for arthralgias and back pain.   Neurological:  Positive for dizziness, syncope and light-headedness. Negative for seizures.   All other systems reviewed and are negative.      Historical Information     Immunizations:   Immunization History   Administered Date(s) Administered    COVID-19 PFIZER VACCINE 0.3 ML IM 04/01/2021, 04/22/2021, 11/09/2021    COVID-19 Pfizer vac (Lars-sucrose, gray cap) 12 yr+ IM 05/11/2022    INFLUENZA 09/28/2011, 09/26/2012, 12/20/2013, 10/07/2014, 10/27/2015, 10/12/2016, 11/21/2017, 11/20/2018, 10/01/2019    Influenza, high dose seasonal 0.7 mL 10/27/2020, 10/10/2021, 10/18/2022, 10/04/2023    Influenza, seasonal, injectable 10/01/2019    Pneumococcal Conjugate 13-Valent 10/27/2015    Pneumococcal Polysaccharide PPV23 08/11/2010, 10/01/2019    Zoster 09/26/2012       Past Medical History:   Diagnosis Date    Acute left-sided low back pain with left-sided sciatica 12/03/2019    Asthma     Benign essential hypertension     Chronic lower back pain     Controlled type 2 diabetes mellitus, with long-term current use of insulin (HCC) 12/03/2019    COPD (chronic obstructive pulmonary disease) (HCC)     Diabetes mellitus (HCC)     Hyperlipidemia     Sciatica         Family History   Problem Relation Age of Onset    Uterine cancer Mother     Emphysema Father     Leukemia Father      Past Surgical History:   Procedure Laterality Date    CHOLECYSTECTOMY       Social History     Tobacco Use    Smoking status: Former     Current packs/day: 0.00     Average packs/day: 1 pack/day for 30.0 years (30.0 ttl pk-yrs)     Types: Cigarettes     Start date: 1975     Quit date: 2005     Years since quittin.0    Smokeless tobacco: Never   Vaping Use    Vaping status: Former   Substance Use Topics    Alcohol use: Never    Drug use: Never     E-Cigarette/Vaping    E-Cigarette Use Former User      E-Cigarette/Vaping Substances    Nicotine No     THC No     CBD No     Flavoring No     Other No     Unknown No        Family History: non-contributory    Meds/Allergies   Prior to Admission Medications   Prescriptions Last Dose Informant Patient Reported? Taking?   Apremilast (Otezla) 30 MG TABS   No No   Sig: Take 1 tablet by mouth 2 (two) times a day   Cholecalciferol (VITAMIN D) 125 MCG (5000 UT) CAPS  Self Yes No   Sig: Take by mouth   Continuous Blood Gluc  (FreeStyle Dejuan 14 Day [place]) THERESA  Self No No   Sig: Check BG's 2-4x a day depending on sugar levels   Continuous Blood Gluc Sensor (FreeStyle Dejuan 14 Day Sensor) MISC  Self No No   Sig: Check sugars 2-4x daily depending on sugar levels   Insulin Glargine Solostar (Lantus SoloStar) 100 UNIT/ML SOPN  Self No No   Sig: INJECT SUBCUTANEOUSLY 50 UNITS  DAILY   Mounjaro 7.5 MG/0.5ML  Self No No   Sig: INJECT THE CONTENTS OF ONE PEN  SUBCUTANEOUSLY WEEKLY AS  DIRECTED   OneTouch Ultra test strip  Self No No   Sig: TEST 2-3 TIMES DAILY   albuterol (PROVENTIL HFA,VENTOLIN HFA) 90 mcg/act inhaler  Self No No   Sig: Inhale 2 puffs 4 (four) times a day   aspirin 81 mg chewable tablet  Self No No   Sig: Chew 1 tablet (81 mg total) daily   diltiazem (CARDIZEM CD) 360 MG 24 hr capsule  Self No No   Sig: TAKE 1 CAPSULE BY MOUTH  DAILY   insulin lispro (HumaLOG KwikPen) 100 units/mL injection pen  Self No No   Sig: INJECT SUBCUTANEOUSLY 5  UNITS BEFORE BREAKFAST 10  UNITS BEFORE LUNCH AND 15  UNITS BEFORE SUPPER   Patient not taking: Reported on 12/12/2024   ipratropium-albuterol (DUO-NEB) 0.5-2.5 mg/3 mL nebulizer solution  Self No No   Sig: Take 3 mL by nebulization 4 (four) times a day   losartan-hydrochlorothiazide (HYZAAR) 100-25 MG per tablet  Self No No   Sig: TAKE 1 TABLET BY MOUTH DAILY   potassium chloride (K-DUR,KLOR-CON) 20 mEq tablet  Self No No   Sig: TAKE 1 TABLET BY MOUTH TWICE  DAILY      Facility-Administered Medications Last Administration Doses Remaining   cyanocobalamin injection 1,000 mcg 8/8/2024  9:02 AM           No Known Allergies    PHYSICAL EXAM    PE limited by: n/a    Objective   Vitals:   First set: Temperature: 98.3 °F (36.8 °C) (01/19/25 1112)  Pulse: 66 (01/19/25 1112)  Respirations: 18 (01/19/25 1112)  Blood Pressure: (!) 165/105 (01/19/25 0425)  SpO2: 97 % (01/19/25 1112)    Primary Survey:   (A) Airway: Intact  (B) Breathing: Bilateral breath sounds  (C) Circulation: Pulses:   normal  (D) Disabliity:  GCS Total:  15  (E) Expose:  Completed    Secondary Survey: (Click on Physical Exam tab above)  Physical Exam  Vitals and nursing note reviewed.   Constitutional:       General: She is not in acute distress.     Appearance: She is well-developed.   HENT:      Head: Normocephalic and atraumatic.   Eyes:      Conjunctiva/sclera: Conjunctivae normal.      Pupils: Pupils are equal, round, and reactive to light.   Cardiovascular:      Rate and Rhythm: Normal rate and regular rhythm.      Heart sounds: No murmur heard.  Pulmonary:      Effort: Pulmonary effort is normal. No respiratory distress.      Breath sounds: Normal breath sounds.   Abdominal:      Palpations: Abdomen is soft.      Tenderness: There is no abdominal tenderness.   Skin:     General: Skin is warm and dry.      Capillary Refill: Capillary refill  takes less than 2 seconds.   Neurological:      General: No focal deficit present.      Mental Status: She is alert and oriented to person, place, and time.      Cranial Nerves: No cranial nerve deficit.      Sensory: No sensory deficit.      Motor: No weakness.   Psychiatric:         Mood and Affect: Mood normal.         Cervical spine cleared by clinical criteria? Yes     Invasive Devices       Peripheral Intravenous Line  Duration             Peripheral IV 01/19/25 Left Antecubital 2 days    Peripheral IV 01/20/25 Left Hand 1 day                    Lab Results:   Results Reviewed       Procedure Component Value Units Date/Time    Renal function panel [[id number]]  (Abnormal) Collected: 01/21/25 0520    Lab Status: Final result Specimen: Blood from Arm, Right Updated: 01/21/25 0710     Albumin 3.5 g/dL      Calcium 9.2 mg/dL      Phosphorus 4.4 mg/dL      Glucose 138 mg/dL      BUN 26 mg/dL      Creatinine 1.02 mg/dL      Sodium 138 mmol/L      Potassium 3.7 mmol/L      Chloride 103 mmol/L      CO2 28 mmol/L      ANION GAP 7 mmol/L      eGFR 51 ml/min/1.73sq m     Narrative:      National Kidney Disease Foundation guidelines for Chronic Kidney Disease (CKD):     Stage 1 with normal or high GFR (GFR > 90 mL/min/1.73 square meters)    Stage 2 Mild CKD (GFR = 60-89 mL/min/1.73 square meters)    Stage 3A Moderate CKD (GFR = 45-59 mL/min/1.73 square meters)    Stage 3B Moderate CKD (GFR = 30-44 mL/min/1.73 square meters)    Stage 4 Severe CKD (GFR = 15-29 mL/min/1.73 square meters)    Stage 5 End Stage CKD (GFR <15 mL/min/1.73 square meters)  Note: GFR calculation is accurate only with a steady state creatinine    Magnesium [[id number]]  (Normal) Collected: 01/21/25 0520    Lab Status: Final result Specimen: Blood from Arm, Right Updated: 01/21/25 0710     Magnesium 1.9 mg/dL     CBC [[id number]]  (Normal) Collected: 01/21/25 0520    Lab Status: Final result Specimen: Blood from Arm, Right Updated: 01/21/25 0649     WBC  9.53 Thousand/uL      RBC 5.01 Million/uL      Hemoglobin 14.3 g/dL      Hematocrit 44.4 %      MCV 89 fL      MCH 28.5 pg      MCHC 32.2 g/dL      RDW 14.4 %      Platelets 302 Thousands/uL      MPV 10.8 fL     Lipid Panel with Direct LDL reflex [[id number]]  (Abnormal) Collected: 01/20/25 0413    Lab Status: Final result Specimen: Blood from Arm, Left Updated: 01/20/25 1422     Cholesterol 233 mg/dL      Triglycerides 143 mg/dL      HDL, Direct 38 mg/dL      LDL Calculated 166 mg/dL     Fingerstick Glucose (POCT) [[id number]]  (Abnormal) Collected: 01/20/25 1243    Lab Status: Final result Specimen: Blood Updated: 01/20/25 1243     POC Glucose 209 mg/dl     Fingerstick Glucose (POCT) [[id number]]  (Abnormal) Collected: 01/20/25 0813    Lab Status: Final result Specimen: Blood Updated: 01/20/25 0814     POC Glucose 214 mg/dl     TSH, 3rd generation [[id number]]  (Abnormal) Collected: 01/20/25 0413    Lab Status: Final result Specimen: Blood from Arm, Left Updated: 01/20/25 0459     TSH 3RD GENERATON 6.428 uIU/mL     Basic metabolic panel [[id number]]  (Abnormal) Collected: 01/20/25 0413    Lab Status: Final result Specimen: Blood from Arm, Left Updated: 01/20/25 0453     Sodium 137 mmol/L      Potassium 3.7 mmol/L      Chloride 102 mmol/L      CO2 26 mmol/L      ANION GAP 9 mmol/L      BUN 15 mg/dL      Creatinine 0.71 mg/dL      Glucose 180 mg/dL      Glucose, Fasting 180 mg/dL      Calcium 9.4 mg/dL      eGFR 80 ml/min/1.73sq m     Narrative:      National Kidney Disease Foundation guidelines for Chronic Kidney Disease (CKD):     Stage 1 with normal or high GFR (GFR > 90 mL/min/1.73 square meters)    Stage 2 Mild CKD (GFR = 60-89 mL/min/1.73 square meters)    Stage 3A Moderate CKD (GFR = 45-59 mL/min/1.73 square meters)    Stage 3B Moderate CKD (GFR = 30-44 mL/min/1.73 square meters)    Stage 4 Severe CKD (GFR = 15-29 mL/min/1.73 square meters)    Stage 5 End Stage CKD (GFR <15 mL/min/1.73 square meters)  Note: GFR  calculation is accurate only with a steady state creatinine    CBC (With Platelets) [[id number]]  (Abnormal) Collected: 01/20/25 0413    Lab Status: Final result Specimen: Blood from Arm, Left Updated: 01/20/25 0421     WBC 10.91 Thousand/uL      RBC 5.06 Million/uL      Hemoglobin 14.9 g/dL      Hematocrit 43.7 %      MCV 86 fL      MCH 29.4 pg      MCHC 34.1 g/dL      RDW 14.2 %      Platelets 265 Thousands/uL      MPV 10.2 fL     Vitamin B12 [[id number]]  (Normal) Collected: 01/19/25 1320    Lab Status: Final result Specimen: Blood from Arm, Left Updated: 01/20/25 0017     Vitamin B-12 303 pg/mL     Hemoglobin A1c w/EAG Estimation (Prechecked if no A1C within 90 days) [[id number]]  (Abnormal) Collected: 01/19/25 1120    Lab Status: Final result Specimen: Blood from Arm, Left Updated: 01/19/25 2342     Hemoglobin A1C 6.8 %       mg/dl     Fingerstick Glucose (POCT) [[id number]]  (Abnormal) Collected: 01/19/25 2229    Lab Status: Final result Specimen: Blood Updated: 01/19/25 2230     POC Glucose 249 mg/dl     COVID/FLU/RSV [[id number]]  (Normal) Collected: 01/19/25 1949    Lab Status: Final result Specimen: Nares from Nose Updated: 01/19/25 2058     SARS-CoV-2 Negative     INFLUENZA A PCR Negative     INFLUENZA B PCR Negative     RSV PCR Negative    Narrative:      This test has been performed using the CoV-2/Flu/RSV plus assay on the [employer] GeneXpert platform. This test has been validated by the  and verified by the performing laboratory.     This test is designed to amplify and detect the following: nucleocapsid (N), envelope (E), and RNA-dependent RNA polymerase (RdRP) genes of the SARS-CoV-2 genome; matrix (M), basic polymerase (PB2), and acidic protein (PA) segments of the influenza A genome; matrix (M) and non-structural protein (NS) segments of the influenza B genome, and the nucleocapsid genes of RSV A and RSV B.     Positive results are indicative of the presence of Flu A, Flu B, RSV,  and/or SARS-CoV-2 RNA. Positive results for SARS-CoV-2 or suspected novel influenza should be reported to state, local, or federal health departments according to local reporting requirements.      All results should be assessed in conjunction with clinical presentation and other laboratory markers for clinical management.     FOR PEDIATRIC PATIENTS - copy/paste COVID Guidelines URL to browser: [email or web address]       Fingerstick Glucose (POCT) [[id number]]  (Abnormal) Collected: 01/19/25 1810    Lab Status: Final result Specimen: Blood Updated: 01/19/25 1812     POC Glucose 159 mg/dl     HS Troponin I 2hr [[id number]]  (Normal) Collected: 01/19/25 1320    Lab Status: Final result Specimen: Blood from Arm, Left Updated: 01/19/25 1356     hs TnI 2hr 6 ng/L      Delta 2hr hsTnI 0 ng/L     HS Troponin 0hr (reflex protocol) [[id number]]  (Normal) Collected: 01/19/25 1120    Lab Status: Final result Specimen: Blood from Arm, Left Updated: 01/19/25 1212     hs TnI 0hr 6 ng/L     Comprehensive metabolic panel [[id number]]  (Abnormal) Collected: 01/19/25 1120    Lab Status: Final result Specimen: Blood from Arm, Left Updated: 01/19/25 1154     Sodium 137 mmol/L      Potassium 4.0 mmol/L      Chloride 102 mmol/L      CO2 28 mmol/L      ANION GAP 7 mmol/L      BUN 11 mg/dL      Creatinine 0.67 mg/dL      Glucose 221 mg/dL      Calcium 9.3 mg/dL      AST 14 U/L      ALT 18 U/L      Alkaline Phosphatase 97 U/L      Total Protein 6.7 g/dL      Albumin 4.1 g/dL      Total Bilirubin 0.54 mg/dL      eGFR 82 ml/min/1.73sq m     Narrative:      National Kidney Disease Foundation guidelines for Chronic Kidney Disease (CKD):     Stage 1 with normal or high GFR (GFR > 90 mL/min/1.73 square meters)    Stage 2 Mild CKD (GFR = 60-89 mL/min/1.73 square meters)    Stage 3A Moderate CKD (GFR = 45-59 mL/min/1.73 square meters)    Stage 3B Moderate CKD (GFR = 30-44 mL/min/1.73 square  meters)    Stage 4 Severe CKD (GFR = 15-29 mL/min/1.73 square meters)    Stage 5 End Stage CKD (GFR <15 mL/min/1.73 square meters)  Note: GFR calculation is accurate only with a steady state creatinine    CBC and differential [[id number]] Collected: 01/19/25 1120    Lab Status: Final result Specimen: Blood from Arm, Left Updated: 01/19/25 1138     WBC 8.28 Thousand/uL      RBC 5.10 Million/uL      Hemoglobin 14.7 g/dL      Hematocrit 44.2 %      MCV 87 fL      MCH 28.8 pg      MCHC 33.3 g/dL      RDW 13.9 %      MPV 10.1 fL      Platelets 273 Thousands/uL      nRBC 0 /100 WBCs      Segmented % 67 %      Immature Grans % 1 %      Lymphocytes % 20 %      Monocytes % 9 %      Eosinophils Relative 2 %      Basophils Relative 1 %      Absolute Neutrophils 5.64 Thousands/µL      Absolute Immature Grans 0.04 Thousand/uL      Absolute Lymphocytes 1.66 Thousands/µL      Absolute Monocytes 0.70 Thousand/µL      Eosinophils Absolute 0.14 Thousand/µL      Basophils Absolute 0.10 Thousands/µL                    Imaging Studies:   Direct to CT: No  MRI brain wo contrast   Final Result by José Oliveira MD (01/20 1838)      No acute infarct. Mild chronic white matter microangiopathic changes involving both cerebral hemispheres and the brainstem.      Workstation performed: [id number]         XR hip/pelv 2-3 vws right if performed   ED Interpretation by Vitaly Ramirez MD (01/19 1324)   No acute osseous abnormality identified on independent evaluation of x-ray.      Final Result by Shailesh Burks MD (01/19 1600)      No acute osseous abnormality.         Computerized Assisted Algorithm (CAA) may have been used to analyze all applicable images.            Workstation performed: [id number]         CTA head and neck with and without contrast   Final Result by Jadon Cordero MD (01/19 7591)      CT brain:  No acute intracranial abnormality. Chronic microangiopathic ischemic changes.      CTA head:   -Negative for  large vessel intracranial occlusion .   -Mild to moderate narrowing at the bilateral petrous/cavernous ICA junctions.   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  The cervical vertebral arteries are patent.      Bilateral heterogeneous thyroid nodules, largest located within the left lobe as detailed above.                     Workstation performed: [id number]         XR chest 1 view portable   ED Interpretation by Vitaly Ramirez MD (01/19 1325)   No acute cardiac or pulmonary abnormality identified.      Final Result by Shailesh Burks MD (01/19 1555)      No acute cardiopulmonary disease.            Workstation performed: [id number]               Procedures  Procedures         ED Course           Medical Decision Making  79-year-old female presents for evaluation of dizziness.    Initial evaluation, patient in no acute distress, resting comfortably in bed.  Vital signs stable.  Neuroexam unremarkable.  Patient endorses dizziness with position change from laying down to sitting up.  Given persistent symptoms, will obtain CTA head and neck to evaluate for  intracranial abnormalities.  Labs, EKG also obtained.  Meclizine given in ED for symptomatic management.  On arrival to ED, patient was very hypertensive to 230 systolic blood pressure.  On further questioning, she states she did not take her blood pressure medication this morning.  Losartan and hydrochlorothiazide were administered.  CT negative for acute intracranial abnormality, CXR negative.  X-ray of right hip showed no acute fracture or dislocation.  Given persistent nausea, and concern for patient's syncopal episodes, will have her admitted for further evaluation.    Amount and/or Complexity of Data Reviewed  Labs: ordered.  Radiology: ordered and independent interpretation performed.    Risk  Prescription drug management.  Decision regarding  hospitalization.                Disposition  [facility] Transfer: No  Final diagnoses:   Syncope   Dizziness     Time reflects when diagnosis was documented in both MDM as applicable and the Disposition within this note       Time User Action Codes Description Comment    1/19/2025  3:28 PM SophiaMore [email or web address] Add [R55] Syncope     1/19/2025  3:28 PM Sophiaswathi Moresusy Add [R42] Dizziness           ED Disposition       ED Disposition   Admit    Condition   Stable    Date/Time   Sun Jan 19, 2025  3:28 PM    Comment   Case was discussed with AYESHA and the patient's admission status was agreed to be Admission Status: observation status to the service of Dr. Multani .               Follow-up Information    None       Current Discharge Medication List        CONTINUE these medications which have NOT CHANGED    Details   albuterol (PROVENTIL HFA,VENTOLIN HFA) 90 mcg/act inhaler Inhale 2 puffs 4 (four) times a day  Qty: 18 g, Refills: 1    Comments: Substitution to a formulary equivalent within the same pharmaceutical class is authorized.  Associated Diagnoses: COPD exacerbation (HCC)      Apremilast (Otezla) 30 MG TABS Take 1 tablet by mouth 2 (two) times a day  Qty: 180 tablet, Refills: 1    Associated Diagnoses: Psoriasis      aspirin 81 mg chewable tablet Chew 1 tablet (81 mg total) daily  Qty: 30 tablet, Refills: 0    Associated Diagnoses: Chest pain, unspecified type; Resistant hypertension      Cholecalciferol (VITAMIN D) 125 MCG (5000 UT) CAPS Take by mouth      Continuous Blood Gluc  (FreeStyle Dejuan 14 Day [place]) THERESA Check BG's 2-4x a day depending on sugar levels  Qty: 1 each, Refills: 0    Associated Diagnoses: Uncontrolled type 2 diabetes mellitus with hyperglycemia (HCC)      Continuous Blood Gluc Sensor (FreeStyle Dejuan 14 Day Sensor) MISC Check sugars 2-4x daily depending on sugar levels  Qty: 28 each, Refills: 6    Associated Diagnoses: Uncontrolled type 2 diabetes mellitus with  hyperglycemia (HCC)      diltiazem (CARDIZEM CD) 360 MG 24 hr capsule TAKE 1 CAPSULE BY MOUTH DAILY  Qty: 90 capsule, Refills: 1    Comments: Please send a replace/new response with 90-Day Supply if appropriate to maximize member benefit. Requesting 1 year supply.  Associated Diagnoses: Resistant hypertension      Insulin Glargine Solostar (Lantus SoloStar) 100 UNIT/ML SOPN INJECT SUBCUTANEOUSLY 50 UNITS  DAILY  Qty: 45 mL, Refills: 1    Comments: Please send a replace/new response with 90-Day Supply if appropriate to maximize member benefit. Requesting 1 year supply.  Associated Diagnoses: Controlled type 2 diabetes mellitus without complication, with long-term current use of insulin (HCC)      insulin lispro (HumaLOG KwikPen) 100 units/mL injection pen INJECT SUBCUTANEOUSLY 5  UNITS BEFORE BREAKFAST 10  UNITS BEFORE LUNCH AND 15  UNITS BEFORE SUPPER  Qty: 30 mL, Refills: 3    Comments: Requesting 1 year supply  Associated Diagnoses: Uncontrolled type 2 diabetes mellitus with hyperglycemia (HCC)      ipratropium-albuterol (DUO-NEB) 0.5-2.5 mg/3 mL nebulizer solution Take 3 mL by nebulization 4 (four) times a day  Qty: 180 mL, Refills: 1    Associated Diagnoses: Mixed simple and mucopurulent chronic bronchitis (HCC)      losartan-hydrochlorothiazide (HYZAAR) 100-25 MG per tablet TAKE 1 TABLET BY MOUTH DAILY  Qty: 90 tablet, Refills: 1    Comments: Please send a replace/new response with 90-Day Supply if appropriate to maximize member benefit. Requesting 1 year supply.  Associated Diagnoses: Resistant hypertension      Mounjaro 7.5 MG/0.5ML INJECT THE CONTENTS OF ONE PEN  SUBCUTANEOUSLY WEEKLY AS  DIRECTED  Qty: 4 mL, Refills: 5    Comments: Requesting 1 year supply  Associated Diagnoses: Uncontrolled type 2 diabetes mellitus with hyperglycemia (HCC)      OneTouch Ultra test strip TEST 2-3 TIMES DAILY  Qty: 100 strip, Refills: 3    Comments: ZERO refills remain on this prescription. Your patient is requesting advance  approval of refills for this medication to PREVENT ANY MISSED DOSES  Associated Diagnoses: Controlled type 2 diabetes mellitus without complication, with long-term current use of insulin (HCC)      potassium chloride (K-DUR,KLOR-CON) 20 mEq tablet TAKE 1 TABLET BY MOUTH TWICE  DAILY  Qty: 180 tablet, Refills: 3    Comments: Please send a replace/new response with 90-Day Supply if appropriate to maximize member benefit. Requesting 1 year supply.  Associated Diagnoses: Hypokalemia           No discharge procedures on file.    PDMP Review         Value Time User    PDMP Reviewed  Yes 9/20/2023  3:04 AM Arthur Jones MD            ED Provider  Electronically Signed by           Vincenzo Morales MD  01/21/25 8367

## 2025-01-22 NOTE — ASSESSMENT & PLAN NOTE
Lab Results   Component Value Date    HGBA1C 6.8 (H) 01/19/2025       Recent Labs     01/21/25  2053 01/22/25  0720 01/22/25  1201 01/22/25  1647   POCGLU 160* 129 130 174*       Blood Sugar Average: Last 72 hrs:  (P) 173.6473965086835397  Patient has been diabetic for many years  He is on insulin Lantus 50 units daily and sliding scale insulin  Recent HbA1c has been under control  Patient also has been on Mounjaro for the past few months which has helped her control of diabetes mellitus type 2  Will cover with sliding scale insulin  Will monitor blood sugars  Has a continuous glucose monitor.

## 2025-01-23 VITALS
RESPIRATION RATE: 16 BRPM | HEART RATE: 68 BPM | DIASTOLIC BLOOD PRESSURE: 88 MMHG | HEIGHT: 61 IN | WEIGHT: 201.28 LBS | OXYGEN SATURATION: 94 % | TEMPERATURE: 97.7 F | SYSTOLIC BLOOD PRESSURE: 128 MMHG | BODY MASS INDEX: 38 KG/M2

## 2025-01-23 LAB
ALBUMIN SERPL BCG-MCNC: 3.5 G/DL (ref 3.5–5)
ANION GAP SERPL CALCULATED.3IONS-SCNC: 6 MMOL/L (ref 4–13)
BUN SERPL-MCNC: 19 MG/DL (ref 5–25)
CALCIUM SERPL-MCNC: 9 MG/DL (ref 8.4–10.2)
CHLORIDE SERPL-SCNC: 106 MMOL/L (ref 96–108)
CO2 SERPL-SCNC: 28 MMOL/L (ref 21–32)
CREAT SERPL-MCNC: 0.63 MG/DL (ref 0.6–1.3)
ERYTHROCYTE [DISTWIDTH] IN BLOOD BY AUTOMATED COUNT: 14.3 % (ref 11.6–15.1)
GFR SERPL CREATININE-BSD FRML MDRD: 84 ML/MIN/1.73SQ M
GLUCOSE SERPL-MCNC: 132 MG/DL (ref 65–140)
GLUCOSE SERPL-MCNC: 132 MG/DL (ref 65–140)
GLUCOSE SERPL-MCNC: 175 MG/DL (ref 65–140)
HCT VFR BLD AUTO: 43 % (ref 34.8–46.1)
HGB BLD-MCNC: 13.9 G/DL (ref 11.5–15.4)
MAGNESIUM SERPL-MCNC: 1.7 MG/DL (ref 1.9–2.7)
MCH RBC QN AUTO: 28.8 PG (ref 26.8–34.3)
MCHC RBC AUTO-ENTMCNC: 32.3 G/DL (ref 31.4–37.4)
MCV RBC AUTO: 89 FL (ref 82–98)
PHOSPHATE SERPL-MCNC: 3.6 MG/DL (ref 2.3–4.1)
PLATELET # BLD AUTO: 250 THOUSANDS/UL (ref 149–390)
PMV BLD AUTO: 10.4 FL (ref 8.9–12.7)
POTASSIUM SERPL-SCNC: 4 MMOL/L (ref 3.5–5.3)
PROCALCITONIN SERPL-MCNC: <0.05 NG/ML
RBC # BLD AUTO: 4.83 MILLION/UL (ref 3.81–5.12)
SODIUM SERPL-SCNC: 140 MMOL/L (ref 135–147)
WBC # BLD AUTO: 8.28 THOUSAND/UL (ref 4.31–10.16)

## 2025-01-23 PROCEDURE — 83735 ASSAY OF MAGNESIUM: CPT | Performed by: STUDENT IN AN ORGANIZED HEALTH CARE EDUCATION/TRAINING PROGRAM

## 2025-01-23 PROCEDURE — 99239 HOSP IP/OBS DSCHRG MGMT >30: CPT | Performed by: STUDENT IN AN ORGANIZED HEALTH CARE EDUCATION/TRAINING PROGRAM

## 2025-01-23 PROCEDURE — 80069 RENAL FUNCTION PANEL: CPT | Performed by: STUDENT IN AN ORGANIZED HEALTH CARE EDUCATION/TRAINING PROGRAM

## 2025-01-23 PROCEDURE — 82948 REAGENT STRIP/BLOOD GLUCOSE: CPT

## 2025-01-23 PROCEDURE — 94668 MNPJ CHEST WALL SBSQ: CPT

## 2025-01-23 PROCEDURE — 84145 PROCALCITONIN (PCT): CPT | Performed by: STUDENT IN AN ORGANIZED HEALTH CARE EDUCATION/TRAINING PROGRAM

## 2025-01-23 PROCEDURE — 85027 COMPLETE CBC AUTOMATED: CPT | Performed by: STUDENT IN AN ORGANIZED HEALTH CARE EDUCATION/TRAINING PROGRAM

## 2025-01-23 RX ORDER — MAGNESIUM SULFATE HEPTAHYDRATE 40 MG/ML
4 INJECTION, SOLUTION INTRAVENOUS ONCE
Status: COMPLETED | OUTPATIENT
Start: 2025-01-23 | End: 2025-01-23

## 2025-01-23 RX ORDER — LOSARTAN POTASSIUM 25 MG/1
25 TABLET ORAL DAILY
Qty: 30 TABLET | Refills: 0 | Status: SHIPPED | OUTPATIENT
Start: 2025-01-24

## 2025-01-23 RX ORDER — NIFEDIPINE 30 MG
30 TABLET, EXTENDED RELEASE ORAL EVERY EVENING
Qty: 30 TABLET | Refills: 0 | Status: SHIPPED | OUTPATIENT
Start: 2025-01-23

## 2025-01-23 RX ORDER — METOPROLOL SUCCINATE 25 MG/1
25 TABLET, EXTENDED RELEASE ORAL DAILY
Qty: 30 TABLET | Refills: 0 | Status: SHIPPED | OUTPATIENT
Start: 2025-01-24

## 2025-01-23 RX ORDER — ATORVASTATIN CALCIUM 40 MG/1
40 TABLET, FILM COATED ORAL
Qty: 30 TABLET | Refills: 0 | Status: SHIPPED | OUTPATIENT
Start: 2025-01-23

## 2025-01-23 RX ADMIN — INSULIN LISPRO 1 UNITS: 100 INJECTION, SOLUTION INTRAVENOUS; SUBCUTANEOUS at 12:09

## 2025-01-23 RX ADMIN — INSULIN GLARGINE 50 UNITS: 100 INJECTION, SOLUTION SUBCUTANEOUS at 08:20

## 2025-01-23 RX ADMIN — ASPIRIN 81 MG CHEWABLE TABLET 81 MG: 81 TABLET CHEWABLE at 08:20

## 2025-01-23 RX ADMIN — MAGNESIUM SULFATE HEPTAHYDRATE 4 G: 40 INJECTION, SOLUTION INTRAVENOUS at 10:09

## 2025-01-23 RX ADMIN — METOPROLOL SUCCINATE 25 MG: 25 TABLET, EXTENDED RELEASE ORAL at 08:20

## 2025-01-23 RX ADMIN — LOSARTAN POTASSIUM 25 MG: 25 TABLET, FILM COATED ORAL at 08:20

## 2025-01-23 NOTE — ASSESSMENT & PLAN NOTE
Patient presented with recurrent syncope dizziness with elevated blood pressure 200s/100s  Patient has been on Cardizem 360 mg daily and losartan 100 mg daily  CTA head and neck shows no acute intracranial abnormality, chronic microangiopathic ischemic changes noted, has no neurological symptoms  Blood pressure has remained uncontrolled    1/20  Discontinue hydrochlorothiazide as this can exacerbate patient's lightheadedness and vertigo symptoms  Continue losartan 100 mg daily  Will discontinue oral Cardizem 360 mg daily for Procardia 60 mg daily  Start Toprol-XL 50 mg daily to prevent rebound tachycardia from stopping Cardizem  Stop hydralazine and start prn labetalol for systolic >180 or diastolic >100  1/21  Blood pressure noted to be on lower side this morning, started to rise to 160's in afternoon.  Will give 500 cc bolus and IV fluids at 100 ml/hr for 5 hours  Decrease toprol xl to 25 mg, decrease procardia to 30 mg daily, hold losartan today and resume tomorrow at possibly lower dose if needed.    1/22  Patient's orthostatic blood pressure was positive today after she had received her morning blood pressure medications  Patient placed on IV fluid hydration  Will split her medication regiment to Procardia 30 mg at night   Continue with losartan 25 mg daily.  And Toprol-XL 25 mg daily    1/23 Blood pressure well controlled  Continue Procardia 30 mg at night   Continue with losartan 25 mg daily & Toprol-XL 25 mg daily  123/66 this AM and 128/88 this afternoon

## 2025-01-23 NOTE — DISCHARGE INSTR - AVS FIRST PAGE
Dear Mary,    Blood pressure has been well-controlled on the following blood pressure regiment for which you are to continue on discharge  Continue Procardia XL 30 mg at night   Continue with losartan 25 mg daily & Toprol-XL 25 mg daily      Your CT scan showed  SOFT TISSUES OF THE NECK:Heterogeneous, hypodense bilateral thyroid nodules, largest located on the left measuring 1.7 x 3.0 x 2.7 cm Incidental discovery of one or more thyroid nodule(s) measuring more than 1.5 cm and without suspicious features is noted in this patient who is above 35 years old; according to guidelines published in the February 2015 white paper on incidental thyroid nodules in the Journal of the American College of Radiology (JACR), further characterization with thyroid ultrasound   is recommended.    -Please follow up with your primary care doctor to have outpatient thyroid ultrasound     LEFT CAROTID: There is calcified atheromatous plaque within the distal common carotid artery extending into the carotid bulb demonstrating moderate (approximately 50-55%) ICA stenosis . No dissection     I have started you on lipitor 40 mg daily for carotid artery stenosis  I have also made referral for you to see vascular surgery for follow up

## 2025-01-23 NOTE — ASSESSMENT & PLAN NOTE
Lab Results   Component Value Date    HGBA1C 6.8 (H) 01/19/2025       Recent Labs     01/22/25  1647 01/22/25 2024 01/23/25  0749 01/23/25  1110   POCGLU 174* 170* 132 175*       Blood Sugar Average: Last 72 hrs:  (P) 165.7833089738268857  Patient has been diabetic for many years  He is on insulin Lantus 50 units daily and sliding scale insulin  Recent HbA1c has been under control  Patient also has been on Mounjaro for the past few months which has helped her control of diabetes mellitus type 2  Continue home regiment on d/c

## 2025-01-23 NOTE — ASSESSMENT & PLAN NOTE
Patient admits to having dizziness described as lightheadedness and room spinning sensation   1/20/25: On exam when looking to the right with leftward beating nystagmus of left eye  Mri brain negative for stroke  PT OT  1/22 Complained of having dizziness with standing, orthostatic were positive when standing  Symptoms now resolved with IVF

## 2025-01-23 NOTE — ASSESSMENT & PLAN NOTE
Patient presents with dizziness and syncope and loss of consciousness and fall, 3 episodes in the past week, appears positional while getting up fast  Unwitnessed falls, no known seizure episodes in the past  Etiology unclear-likely vasovagal, cannot rule out cardiogenic or neurological  Noted to have elevated blood pressure of 230s/100s, is on 2 antihypertensive max dose, outpatient notes were reviewed and noted to have elevated blood pressure of 160s to 170s.  Patient does not appear to be dehydrated.  CT head , CTA head and neck shows no acute intracranial abnormality, no large vessel occlusion noted, moderate left extracranial carotid stenosis noted, mild to moderate narrowing at the bilateral petrous/cavernous ICA junctions noted, moderate to severe focal stenosis involving the mid to inferior basilar artery noted.  No focal neurological deficit noted, EKG shows sinus rhythm, no acute ST-T changes noted  MRI brain negative for stroke   Suspect from dehydration in setting of HCTZ use   Referral to vascular surgery made on D/C  Continue home asa 81 mg daily, started on lipitor 40 mg daily

## 2025-01-23 NOTE — PLAN OF CARE

## 2025-01-23 NOTE — ASSESSMENT & PLAN NOTE
Patient presents with syncope ,recurrent and collapse  CT brain:  No acute intracranial abnormality. Chronic microangiopathic ischemic changes.     CTA head:  Negative for large vessel intracranial occlusion .  Mild to moderate narrowing at the bilateral petrous/cavernous ICA junctions.  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  The cervical vertebral arteries are patent.  Vascular surgery referral  Continue asa 81 mg daily  Start lipitor 40 mg daily

## 2025-01-23 NOTE — DISCHARGE SUMMARY
Discharge Summary - Hospitalist   Name: Mar Elizondo 79 y.o. female I MRN: [id number]  Unit/Bed#: S -01 I Date of Admission: 1/19/2025   Date of Service: 1/23/2025 I Hospital Day: 3     Assessment & Plan  Type 2 diabetes mellitus with other specified complication (HCC)  Lab Results   Component Value Date    HGBA1C 6.8 (H) 01/19/2025       Recent Labs     01/22/25  1647 01/22/25 2024 01/23/25  0749 01/23/25  1110   POCGLU 174* 170* 132 175*       Blood Sugar Average: Last 72 hrs:  (P) 165.4076079555990319  Patient has been diabetic for many years  He is on insulin Lantus 50 units daily and sliding scale insulin  Recent HbA1c has been under control  Patient also has been on Mounjaro for the past few months which has helped her control of diabetes mellitus type 2  Continue home regiment on d/c  Psoriasis  Continue with apremilast tablets 30 mg twice daily  Resistant hypertension  Patient presented with recurrent syncope dizziness with elevated blood pressure 200s/100s  Patient has been on Cardizem 360 mg daily and losartan 100 mg daily  CTA head and neck shows no acute intracranial abnormality, chronic microangiopathic ischemic changes noted, has no neurological symptoms  Blood pressure has remained uncontrolled    1/20  Discontinue hydrochlorothiazide as this can exacerbate patient's lightheadedness and vertigo symptoms  Continue losartan 100 mg daily  Will discontinue oral Cardizem 360 mg daily for Procardia 60 mg daily  Start Toprol-XL 50 mg daily to prevent rebound tachycardia from stopping Cardizem  Stop hydralazine and start prn labetalol for systolic >180 or diastolic >100  1/21  Blood pressure noted to be on lower side this morning, started to rise to 160's in afternoon.  Will give 500 cc bolus and IV fluids at 100 ml/hr for 5 hours  Decrease toprol xl to 25 mg, decrease procardia to 30 mg daily, hold losartan today and resume tomorrow at possibly lower dose if needed.    1/22  Patient's orthostatic  blood pressure was positive today after she had received her morning blood pressure medications  Patient placed on IV fluid hydration  Will split her medication regiment to Procardia 30 mg at night   Continue with losartan 25 mg daily.  And Toprol-XL 25 mg daily    1/23 Blood pressure well controlled  Continue Procardia 30 mg at night   Continue with losartan 25 mg daily & Toprol-XL 25 mg daily  123/66 this AM and 128/88 this afternoon    Obesity, morbid (HCC)  Advised about weight reduction and lifestyle modification  Vitamin B12 deficiency  on vitamin B12 supplement monthly  Syncope and collapse  Patient presents with dizziness and syncope and loss of consciousness and fall, 3 episodes in the past week, appears positional while getting up fast  Unwitnessed falls, no known seizure episodes in the past  Etiology unclear-likely vasovagal, cannot rule out cardiogenic or neurological  Noted to have elevated blood pressure of 230s/100s, is on 2 antihypertensive max dose, outpatient notes were reviewed and noted to have elevated blood pressure of 160s to 170s.  Patient does not appear to be dehydrated.  CT head , CTA head and neck shows no acute intracranial abnormality, no large vessel occlusion noted, moderate left extracranial carotid stenosis noted, mild to moderate narrowing at the bilateral petrous/cavernous ICA junctions noted, moderate to severe focal stenosis involving the mid to inferior basilar artery noted.  No focal neurological deficit noted, EKG shows sinus rhythm, no acute ST-T changes noted  MRI brain negative for stroke   Suspect from dehydration in setting of HCTZ use   Referral to vascular surgery made on D/C  Continue home asa 81 mg daily, started on lipitor 40 mg daily  Multiple thyroid nodules  Multiple heterogenous, hypodense bilateral thyroid nodules noted largest in the left side 1.7 x 3 x 2.7 cm, needs outpatient follow-up with thyroid ultrasound  Finding was discussed with the patient and the  daughter and made aware on night of admission  Will need outpatient f/u  Intracranial carotid stenosis  Patient presents with syncope ,recurrent and collapse  CT brain:  No acute intracranial abnormality. Chronic microangiopathic ischemic changes.     CTA head:  Negative for large vessel intracranial occlusion .  Mild to moderate narrowing at the bilateral petrous/cavernous ICA junctions.  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  The cervical vertebral arteries are patent.  Vascular surgery referral  Continue asa 81 mg daily  Start lipitor 40 mg daily       Mixed hyperlipidemia     Latest Reference Range & Units 01/20/25 04:13   Cholesterol See Comment mg/dL 233 (H)   Triglycerides See Comment mg/dL 143   HDL >=50 mg/dL 38 (L)   LDL Calculated 0 - 100 mg/dL 166 (H)     Start lipitor 40 mg daily  Dizziness  Patient admits to having dizziness described as lightheadedness and room spinning sensation   1/20/25: On exam when looking to the right with leftward beating nystagmus of left eye  Mri brain negative for stroke  PT OT  1/22 Complained of having dizziness with standing, orthostatic were positive when standing  Symptoms now resolved with IVF  JEFFERY (acute kidney injury) (HCC)  1/21 Cr bumped from 0.7 to 1.0  Suspect 2/2 contrast  1/22 Creatinine improved down to baseline with IV fluid hydration       Medical Problems       Resolved Problems  Date Reviewed: 12/12/2024   None       Discharging Physician / Practitioner: Silvia Russell DO  PCP: TAMIR Montejo DO  Admission Date:   Admission Orders (From admission, onward)       Ordered        01/20/25 1355  INPATIENT ADMISSION  Once            01/19/25 1529  Place in Observation  Once                          Discharge Date: 01/23/25    Consultations During Hospital Stay:  N/a    Procedures Performed:   none    Significant Findings / Test Results:     Your CT scan showed  SOFT  TISSUES OF THE NECK:Heterogeneous, hypodense bilateral thyroid nodules, largest located on the left measuring 1.7 x 3.0 x 2.7 cm Incidental discovery of one or more thyroid nodule(s) measuring more than 1.5 cm and without suspicious features is noted in this patient who is above 35 years old; according to guidelines published in the February 2015 white paper on incidental thyroid nodules in the Journal of the American College of Radiology (JACR), further characterization with thyroid ultrasound   is recommended.       LEFT CAROTID: There is calcified atheromatous plaque within the distal common carotid artery extending into the carotid bulb demonstrating moderate (approximately 50-55%) ICA stenosis . No dissection       Incidental Findings:   See above   I reviewed the above mentioned incidental findings with the patient and/or family and they expressed understanding.    Test Results Pending at Discharge (will require follow up):   none     Outpatient Tests Requested:  Thyroid US  Vascular Surgery follow up    Complications:  none    Reason for Admission: Dizziness/Syncopy        Hospital Course:   Mar Elizondo is a 79 y.o. female patient who originally presented to the hospital on 1/19/2025 due to presented with dizziness and syncope.  Patient stated that she felt lightheaded after standing up causing her to fall to ground.  Patient admitted to losing consciousness and falling to the ground.  In the ED CT head negative for acute stroke or bleed.  CTA head and neck was noted Moderate left extracranial carotid stenosis. The cervical vertebral arteries are patent.  On admission she complained of having vertigo as well and was started on meclizine with no improvement in symptoms.  MRI brain was negative for acute infarct. Mild chronic white matter microangiopathic changes involving both cerebral hemispheres and the brainstem.  Patient's vertigo improved throughout hospitalization.  Patient is to continue taking her  "home aspirin 81 mg daily and has been started on Lipitor 40 mg daily.  Referral to vascular surgery has been made on discharge for her moderate left extracranial carotid artery stenosis.  On admission patient's blood pressure was also uncontrolled.  Patient's blood pressure regimen has been changed to losartan 25 mg daily, Toprol-XL 25 mg daily and Procardia 30 mg at night which appears to be working well for the patient.          Please see above list of diagnoses and related plan for additional information.     Condition at Discharge: stable    Discharge Day Visit / Exam:   Subjective: Patient states that she feels well. She currently denies any dizziness and feels well enough to be discharged.  Vitals: Blood Pressure: 128/88 (01/23/25 1104)  Pulse: 68 (01/23/25 1104)  Temperature: 97.7 °F (36.5 °C) (01/23/25 0742)  Temp Source: Oral (01/22/25 2016)  Respirations: 16 (01/23/25 0742)  Height: 5' 1\" (154.9 cm) (01/20/25 1445)  Weight - Scale: 91.3 kg (201 lb 4.5 oz) (01/23/25 0534)  SpO2: 94 % (01/23/25 1104)  Physical Exam  Vitals and nursing note reviewed.   Constitutional:       General: She is not in acute distress.     Appearance: She is well-developed.   HENT:      Head: Normocephalic and atraumatic.   Eyes:      Conjunctiva/sclera: Conjunctivae normal.   Cardiovascular:      Rate and Rhythm: Normal rate and regular rhythm.      Heart sounds: No murmur heard.  Pulmonary:      Effort: Pulmonary effort is normal. No respiratory distress.      Breath sounds: Normal breath sounds.   Abdominal:      Palpations: Abdomen is soft.      Tenderness: There is no abdominal tenderness.   Musculoskeletal:         General: No swelling.   Skin:     General: Skin is warm and dry.   Neurological:      Mental Status: She is alert.   Psychiatric:         Mood and Affect: Mood normal.          Discussion with Family: Updated  (son) via phone. Spoke with Les per patient's request    Discharge " instructions/Information to patient and family:   See after visit summary for information provided to patient and family.      Provisions for Follow-Up Care:  See after visit summary for information related to follow-up care and any pertinent home health orders.      Mobility at time of Discharge:   Basic Mobility Inpatient Raw Score: 18  JH-HLM Goal: 6: Walk 10 steps or more  JH-HLM Achieved: 4: Move to chair/commode  HLM Goal NOT achieved. Continue to encourage mobility in post discharge setting.     Disposition:   Home with VNA Services (Reminder: Complete face to face encounter)    Planned Readmission: no    Discharge Medications:  See after visit summary for reconciled discharge medications provided to patient and/or family.      Administrative Statements   Discharge Statement:  I have spent a total time of 35 minutes in caring for this patient on the day of the visit/encounter. >30 minutes of time was spent on: Diagnostic results, Prognosis, Risks and benefits of tx options, Instructions for management, and Patient and family education.    **Please Note: This note may have been constructed using a voice recognition system**

## 2025-01-23 NOTE — PLAN OF CARE
Problem: PAIN - ADULT  Goal: Verbalizes/displays adequate comfort level or baseline comfort level  Description: Interventions:  - Encourage patient to monitor pain and request assistance  - Assess pain using appropriate pain scale  - Administer analgesics based on type and severity of pain and evaluate response  - Implement non-pharmacological measures as appropriate and evaluate response  - Consider cultural and social influences on pain and pain management  - Notify physician/advanced practitioner if interventions unsuccessful or patient reports new pain  Outcome: Progressing     Problem: SAFETY ADULT  Goal: Patient will remain free of falls  Description: INTERVENTIONS:  - Educate patient/family on patient safety including physical limitations  - Instruct patient to call for assistance with activity   - Consult OT/PT to assist with strengthening/mobility   - Keep Call bell within reach  - Keep bed low and locked with side rails adjusted as appropriate  - Keep care items and personal belongings within reach  - Initiate and maintain comfort rounds  - Make Fall Risk Sign visible to staff  - Offer Toileting every 2 Hours, in advance of need  - Initiate/Maintain bed/chair alarm  - Obtain necessary fall risk management equipment  - Apply yellow socks and bracelet for high fall risk patients  - Consider moving patient to room near nurses station  Outcome: Progressing     Problem: DISCHARGE PLANNING  Goal: Discharge to home or other facility with appropriate resources  Description: INTERVENTIONS:  - Identify barriers to discharge w/patient and caregiver  - Arrange for needed discharge resources and transportation as appropriate  - Identify discharge learning needs (meds, wound care, etc.)  - Arrange for interpretive services to assist at discharge as needed  - Refer to Case Management Department for coordinating discharge planning if the patient needs post-hospital services based on physician/advanced practitioner order  or complex needs related to functional status, cognitive ability, or social support system  Outcome: Progressing

## 2025-01-24 ENCOUNTER — TRANSITIONAL CARE MANAGEMENT (OUTPATIENT)
Dept: FAMILY MEDICINE CLINIC | Facility: CLINIC | Age: 80
End: 2025-01-24

## 2025-01-24 ENCOUNTER — PATIENT OUTREACH (OUTPATIENT)
Dept: CASE MANAGEMENT | Facility: OTHER | Age: 80
End: 2025-01-24

## 2025-01-24 DIAGNOSIS — Z71.89 COMPLEX CARE COORDINATION: Primary | ICD-10-CM

## 2025-01-24 NOTE — UTILIZATION REVIEW
NOTIFICATION OF ADMISSION DISCHARGE   This is a Notification of Discharge from [facility]. Please be advised that this patient has been discharge from our facility. Below you will find the admission and discharge date and time including the patient’s disposition.   UTILIZATION REVIEW CONTACT:  Srinivas Ortiz  Utilization   Network Utilization Review Department  Phone: [phone] x carefully listen to the prompts. All voicemails are confidential.  Email: [email or web address]     ADMISSION INFORMATION  PRESENTATION DATE: 1/19/2025 12:00 PM  OBERVATION ADMISSION DATE: 01/19/2025 1529  INPATIENT ADMISSION DATE: 1/20/25  1:55 PM   DISCHARGE DATE: 1/23/2025  3:59 PM   DISPOSITION:Home with Home Health Care    Network Utilization Review Department  ATTENTION: Please call with any questions or concerns to [phone] and carefully listen to the prompts so that you are directed to the right person. All voicemails are confidential.   For Discharge needs, contact Care Management DC Support Team at [phone] opt. 2  Send all requests for admission clinical reviews, approved or denied determinations and any other requests to dedicated fax number below belonging to the campus where the patient is receiving treatment. List of dedicated fax numbers for the Facilities:  FACILITY NAME UR FAX NUMBER   ADMISSION DENIALS (Administrative/Medical Necessity) [phone]   DISCHARGE SUPPORT TEAM ([facility]) [phone]   PARENT CHILD HEALTH (Maternity/NICU/Pediatrics) [phone]   [facility] [phone]   [facility] [phone]   Critical access hospital [phone]   [facility] [phone]   [facility] [phone]   [facility] [phone]   [facility] [phone]   [facility]  [place] [phone]   [facility] [phone]   [facility] [phone]   [facility] [phone]   [facility] [phone]

## 2025-01-24 NOTE — PROGRESS NOTES
Received referral via in basket message as covering RN Care Manager. Chart reviewed.  Pt was admitted 1/19-1/23/25 with Syncope and Collapse, Hypertension. Pt with past medical history of T2DM, HTN, Carotid Artery Stenosis.  Discharged to home with home health services.   Telephone call to patient.   Verified she is receiving home health services from [address] [place] [address]. She reports they reveiwed AVS and Medications.  Pt is planning to call PCP to schedule appt and verified secure transportation.  She declines further calls at this time.   Pt shared that today has been contacted by multiple home health care agencies including [facility] and [facility].    Her daughter and grandson assist her as needed.  Will close referral and remove RN CM from care team. Pt provided my contact number if future assistance requested.

## 2025-01-27 ENCOUNTER — RA CDI HCC (OUTPATIENT)
Dept: OTHER | Facility: HOSPITAL | Age: 80
End: 2025-01-27

## 2025-01-27 ENCOUNTER — TELEPHONE (OUTPATIENT)
Age: 80
End: 2025-01-27

## 2025-01-27 PROBLEM — Z23 NEED FOR VACCINATION: Status: RESOLVED | Noted: 2020-10-13 | Resolved: 2025-01-27

## 2025-01-27 NOTE — TELEPHONE ENCOUNTER
MARY ANN:Raine called from [facility] to report patient blood pressure reading this afternoon during the visit 179/91,patient stated that she has been up since 2 am cleaning her house.Patient asymptomatic.   Home health nurse is requesting tomorrows AVS and any medication changes be faxed to [phone]

## 2025-01-28 ENCOUNTER — OFFICE VISIT (OUTPATIENT)
Dept: FAMILY MEDICINE CLINIC | Facility: CLINIC | Age: 80
End: 2025-01-28
Payer: COMMERCIAL

## 2025-01-28 VITALS
SYSTOLIC BLOOD PRESSURE: 174 MMHG | HEART RATE: 70 BPM | OXYGEN SATURATION: 98 % | HEIGHT: 61 IN | DIASTOLIC BLOOD PRESSURE: 86 MMHG | TEMPERATURE: 98 F | BODY MASS INDEX: 38.06 KG/M2 | WEIGHT: 201.6 LBS

## 2025-01-28 DIAGNOSIS — Z79.4 TYPE 2 DIABETES MELLITUS WITH OTHER SPECIFIED COMPLICATION, WITH LONG-TERM CURRENT USE OF INSULIN (HCC): ICD-10-CM

## 2025-01-28 DIAGNOSIS — M79.10 MYALGIA DUE TO STATIN: ICD-10-CM

## 2025-01-28 DIAGNOSIS — Z86.39 HX OF THYROID NODULE: ICD-10-CM

## 2025-01-28 DIAGNOSIS — I1A.0 RESISTANT HYPERTENSION: Primary | ICD-10-CM

## 2025-01-28 DIAGNOSIS — E78.2 MIXED HYPERLIPIDEMIA: ICD-10-CM

## 2025-01-28 DIAGNOSIS — J44.1 COPD EXACERBATION (HCC): ICD-10-CM

## 2025-01-28 DIAGNOSIS — Z79.899 MEDICATION MANAGEMENT: ICD-10-CM

## 2025-01-28 DIAGNOSIS — R55 SYNCOPE AND COLLAPSE: ICD-10-CM

## 2025-01-28 DIAGNOSIS — T46.6X5A MYALGIA DUE TO STATIN: ICD-10-CM

## 2025-01-28 DIAGNOSIS — E11.69 TYPE 2 DIABETES MELLITUS WITH OTHER SPECIFIED COMPLICATION, WITH LONG-TERM CURRENT USE OF INSULIN (HCC): ICD-10-CM

## 2025-01-28 PROCEDURE — 99496 TRANSJ CARE MGMT HIGH F2F 7D: CPT | Performed by: FAMILY MEDICINE

## 2025-01-28 NOTE — PROGRESS NOTES
Transition of Care Visit  Name: Mar Elizondo      : 1945      MRN: [id number]  Encounter Provider: TAMIR Montejo DO  Encounter Date: 2025   Encounter department: [facility] PRIMARY [facility] [place]    Assessment & Plan  Resistant hypertension  Plan at this time is to follow up, the following regime  #1 resume losartan 100/25 1 daily   #2 stop losartan 25   Stop Procardia XL 30   #4 increase Toprol XL 25-1 twice daily   #5 continue Lipitor 40 mg OD   #6 add co-Q10 100 mg once daily may increase to 200 mg 4for leg aches           Medication management         Hx of thyroid nodule    Orders:    US thyroid; Future    COPD exacerbation (HCC)         Mixed hyperlipidemia         Syncope and collapse         Myalgia due to statin         Type 2 diabetes mellitus with other specified complication, with long-term current use of insulin (HCC)    Lab Results   Component Value Date    HGBA1C 6.8 (H) 2025              Assessment & Plan  1. Resistant hypertension: Her blood pressure remains elevated at 174/86. She was hospitalized from 2024 to 2024. During her hospitalization, she presented with recurrent syncope and dizziness with elevated blood pressure readings in the 200s over 100s. She was on Cardizem 360 mg and losartan, which were adjusted during her stay. The discharge summary indicated that her blood pressure was well controlled with Procardia 30 mg at night, losartan 25 mg daily, and Toprol 25 mg daily. However, her blood pressure remains high post-discharge. The current plan is to resume losartan 100/25 mg once daily, discontinue losartan 25 mg, and stop Procardia XL 30 mg. Metoprolol XL will be increased to 25 mg twice daily.  - Resume losartan 100/25 mg once daily  - Discontinue losartan 25 mg  - Stop Procardia XL 30 mg  - Increase metoprolol XL to 25 mg twice daily    2. Hyperlipidemia: Her cholesterol levels are elevated, with a total cholesterol of 233 mg/dL and LDL of 166 mg/dL.  -  Continue Lipitor 40 mg once daily  - Add CoQ10 100 mg once daily, may increase to 200 mg for leg aches    3. Syncope and collapse: She experienced a syncopal episode in the kitchen, with no preceding symptoms except for a slight tremor when reaching up. She attempted to brace herself on the stove but was unsuccessful and sustained a head injury. This was her first such episode. CTA head and neck showed no acute intracranial abnormality, but chronic microangiographic ischemic changes were noted, indicating that high blood pressure is affecting the blood vessels in her head.  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 Myalgia due to statin: She reports leg pain associated with Lipitor.  - Add CoQ10 100 mg once daily, may increase to 200 mg for leg aches    5. Type 2 diabetes: Her blood sugar levels have been fluctuating, with a recent A1c under control. She is on insulin 50 units daily and a sliding scale, as well as Mounjaro 7.5 mg.  - Continue current regimen  - Monitor blood sugar levels closely  - If blood sugar drops too low, take lispro insulin 5 units    6. Thyroid nodule: A CTA performed during her hospital stay revealed a 1.7 cm thyroid nodule.  - Order ultrasound of the thyroid for further evaluation    Follow-up  - Follow up in 4 weeks       History of Present Illness     Transitional Care Management Review:   Mar Elizondo is a 79 y.o. female here for TCM follow up.     During the TCM phone call patient stated:  TCM Call       Date and time call was made  1/24/2025 10:04 AM    Hospital care reviewed  Records reviewed    Patient was hospitialized at  [facility]    Date of Admission  01/19/25    Date of discharge  01/23/25    Diagnosis  syncope and collapse    Disposition  Home    Were the patients medications reviewed and updated  Yes    Current Symptoms  None           TCM Call       Post hospital issues  None    Should patient be enrolled in anticoag monitoring?  No    Scheduled for follow up?  Yes    Did you obtain your prescribed medications  Yes    Do you need help managing your prescriptions or medications  No    Is transportation to your appointment needed  No    I have advised the patient to call PCP with any new or worsening symptoms  Rani Stark LPN    Living Arrangements  Alone    Counseling  Patient          History of Present Illness  The patient is an 79-year-old female who presents for evaluation of resistant hypertension, hyperlipidemia, syncope and collapse, myalgia due to statin, and type 2 diabetes.    Resistant Hypertension  - Persistent elevated blood pressure despite being on antihypertensive medication  - Informed of a blocked artery during hospital stay, which may be contributing to hypertension  - Experiencing dry mouth and throat, suspected side effect of losartan  - Does not have metoprolol at home  - Previously prescribed amlodipine  - Does not have Procardia at home  - Currently on losartan 25 mg daily, nifedipine 30 mg, and metoprolol 25 mg    Syncope and Collapse  - Experienced a syncopal episode in the kitchen  - No preceding symptoms except for a slight tremor when reaching up  - Attempted to brace herself on the stove but was unsuccessful and sustained a head injury  - First such episode    Hyperlipidemia  - Has been on Lipitor but discontinued due to leg pain  - Recalls a similar experience with another medication, which she cannot remember  - Leg pain is severe, radiating up her back, and disrupts her sleep  - Currently taking vitamins    Type 2 Diabetes  - Long-standing history of diabetes  - On Mounjaro 7.5 mg, lispro insulin as needed, and Lantus Solostar 50 units  - Blood glucose levels well-controlled, typically ranging between 100 and 180, only exceeding 200 after large meals  - Uses Humalog to manage postprandial hyperglycemia  -  "Monitors blood glucose levels 4 to 5 times daily using the FreeStyle Dejuan system    Supplemental information: None    MEDICATIONS  Current: aspirin, Otezla, losartan, Procardia, Toprol, atorvastatin, insulin, Lantus, lispro, Mounjaro.  Review of Systems   Constitutional:  Negative for chills and fever.   HENT:  Negative for ear pain and sore throat.    Eyes:  Negative for pain and visual disturbance.   Respiratory:  Negative for cough and shortness of breath.    Cardiovascular:  Negative for chest pain and palpitations.   Gastrointestinal:  Negative for abdominal pain and vomiting.   Genitourinary:  Negative for dysuria and hematuria.   Musculoskeletal:  Negative for arthralgias and back pain.   Skin:  Negative for color change and rash.   Neurological:  Negative for seizures and syncope.   All other systems reviewed and are negative.    Objective   BP (!) 174/86 (BP Location: Left arm, Patient Position: Sitting, Cuff Size: Standard) Comment: asymptomatic  Pulse 70   Temp 98 °F (36.7 °C) (Temporal)   Ht 5' 1\" (1.549 m)   Wt 91.4 kg (201 lb 9.6 oz)   SpO2 98%   BMI 38.09 kg/m²     Physical Exam  Vital Signs  Blood pressure is 174/86.  Physical Exam  Vitals and nursing note reviewed.   Constitutional:       General: She is not in acute distress.     Appearance: She is well-developed. She is not diaphoretic.   HENT:      Head: Normocephalic and atraumatic.      Nose: Nose normal.   Eyes:      Conjunctiva/sclera: Conjunctivae normal.      Pupils: Pupils are equal, round, and reactive to light.   Neck:      Thyroid: No thyromegaly.      Trachea: No tracheal deviation.   Cardiovascular:      Rate and Rhythm: Normal rate and regular rhythm.      Heart sounds: Normal heart sounds.   Pulmonary:      Effort: No respiratory distress.      Breath sounds: Normal breath sounds. No wheezing or rales.   Chest:      Chest wall: No tenderness.   Abdominal:      General: Bowel sounds are normal. There is no distension.      " Palpations: Abdomen is soft. There is no mass.      Tenderness: There is no abdominal tenderness. There is no guarding or rebound.   Musculoskeletal:         General: No tenderness or deformity. Normal range of motion.      Cervical back: Normal range of motion and neck supple.      Right lower leg: No edema.      Left lower leg: No edema.   Skin:     General: Skin is warm and dry.      Coloration: Skin is not pale.      Findings: No erythema or rash.   Neurological:      General: No focal deficit present.      Mental Status: She is alert and oriented to person, place, and time. Mental status is at baseline.      Cranial Nerves: No cranial nerve deficit.   Psychiatric:         Behavior: Behavior normal.         Thought Content: Thought content normal.         Judgment: Judgment normal.       Medications have been reviewed by provider in current encounter    Administrative Statements   I have spent a total time of 32 minutes in caring for this patient on the day of the visit/encounter including Diagnostic results, Prognosis, Risks and benefits of tx options, Instructions for management, Patient and family education, Importance of tx compliance, Risk factor reductions, Impressions, Counseling / Coordination of care, Documenting in the medical record, Reviewing / ordering tests, medicine, procedures  , and Obtaining or reviewing history  .

## 2025-01-28 NOTE — ASSESSMENT & PLAN NOTE
Plan at this time is to follow up, the following regime  #1 resume losartan 100/25 1 daily   #2 stop losartan 25   Stop Procardia XL 30   #4 increase Toprol XL 25-1 twice daily   #5 continue Lipitor 40 mg OD   #6 add co-Q10 100 mg once daily may increase to 200 mg 4for leg aches

## 2025-01-28 NOTE — PROGRESS NOTES
Diabetic Foot Exam    Patient's shoes and socks removed.    Right Foot/Ankle   Right Foot Inspection  Skin Exam: skin normal, skin intact, dry skin and warmth. No callus, no erythema, no maceration, no abnormal color, no pre-ulcer, no ulcer and no callus.     Toe Exam: ROM and strength within normal limits.     Sensory   Vibration: intact  Proprioception: intact  Monofilament testing: intact    Vascular  Capillary refills: < 3 seconds  The right DP pulse is 1+. The right PT pulse is 1+.     Left Foot/Ankle  Left Foot Inspection  Skin Exam: skin normal, skin intact, dry skin and warmth. No erythema, no maceration, normal color, no pre-ulcer, no ulcer and no callus.     Toe Exam: ROM and strength within normal limits.     Sensory   Vibration: intact  Proprioception: intact  Monofilament testing: intact    Vascular  Capillary refills: < 3 seconds  The left DP pulse is 1+. The left PT pulse is 1+.     Assign Risk Category  No deformity present  No loss of protective sensation  Weak pulses  Risk: 0

## 2025-01-29 ENCOUNTER — TELEPHONE (OUTPATIENT)
Age: 80
End: 2025-01-29

## 2025-02-03 DIAGNOSIS — I1A.0 RESISTANT HYPERTENSION: Primary | ICD-10-CM

## 2025-02-03 NOTE — TELEPHONE ENCOUNTER
Raine from [facility] called because the patients blood pressure was 183/84 today after lunch around 2 pm and she wanted to let the provider know. Thank you.

## 2025-02-04 RX ORDER — VALSARTAN 80 MG/1
80 TABLET ORAL DAILY
Qty: 30 TABLET | Refills: 1 | Status: SHIPPED | OUTPATIENT
Start: 2025-02-04 | End: 2025-02-07

## 2025-02-04 NOTE — TELEPHONE ENCOUNTER
Called and spoke w pt and she was agreeable to change. Rx queued and routed at this time. Appt already on file for BP re-ck

## 2025-02-05 ENCOUNTER — HOSPITAL ENCOUNTER (OUTPATIENT)
Dept: ULTRASOUND IMAGING | Facility: HOSPITAL | Age: 80
Discharge: HOME | End: 2025-02-05
Payer: COMMERCIAL

## 2025-02-05 ENCOUNTER — RA CDI HCC (OUTPATIENT)
Dept: OTHER | Facility: HOSPITAL | Age: 80
End: 2025-02-05

## 2025-02-05 DIAGNOSIS — Z86.39 HX OF THYROID NODULE: ICD-10-CM

## 2025-02-05 PROCEDURE — 76536 US EXAM OF HEAD AND NECK: CPT

## 2025-02-05 NOTE — TELEPHONE ENCOUNTER
Patient called stating this medication she took for BP last night made her tongue swell. She denied any trouble breathing and denied chest pain. She wanted to update PCP. She states she wanted to go back to sleep after she called to update. She mentioned she has an appointment today in the afternoon for an ultrasound of her thyroid, she will ask them to take her BP there, as she states her BP cuff is not working correctly.

## 2025-02-06 NOTE — TELEPHONE ENCOUNTER
Raine from [facility] called to report the pt's bp today was 166/72, which is better than it was. Pt got really sick to her stomach from the Valsartan. She didn't take it today and she does not want to continue it. Pt said she was told to discontinue Losartan. Should she resume the Losartan.

## 2025-02-07 RX ORDER — LOSARTAN POTASSIUM 100 MG/1
100 TABLET ORAL DAILY
Qty: 90 TABLET | Refills: 1 | Status: SHIPPED | OUTPATIENT
Start: 2025-02-07 | End: 2025-02-11

## 2025-02-07 NOTE — TELEPHONE ENCOUNTER
Called pt and left detailed message re stopping Valsartan 80 and beginning Losartan 100 mg OD. She has BP re-ck in office next wk.

## 2025-02-11 ENCOUNTER — OFFICE VISIT (OUTPATIENT)
Dept: FAMILY MEDICINE CLINIC | Facility: CLINIC | Age: 80
End: 2025-02-11
Payer: COMMERCIAL

## 2025-02-11 ENCOUNTER — RESULTS FOLLOW-UP (OUTPATIENT)
Dept: FAMILY MEDICINE CLINIC | Facility: CLINIC | Age: 80
End: 2025-02-11

## 2025-02-11 ENCOUNTER — TELEPHONE (OUTPATIENT)
Age: 80
End: 2025-02-11

## 2025-02-11 VITALS
DIASTOLIC BLOOD PRESSURE: 78 MMHG | TEMPERATURE: 98.1 F | OXYGEN SATURATION: 97 % | BODY MASS INDEX: 38.09 KG/M2 | HEART RATE: 73 BPM | HEIGHT: 61 IN | SYSTOLIC BLOOD PRESSURE: 152 MMHG

## 2025-02-11 DIAGNOSIS — I1A.0 RESISTANT HYPERTENSION: ICD-10-CM

## 2025-02-11 DIAGNOSIS — E04.1 THYROID NODULE GREATER THAN OR EQUAL TO 1.5 CM IN DIAMETER INCIDENTALLY NOTED ON IMAGING STUDY: Primary | ICD-10-CM

## 2025-02-11 PROCEDURE — 99214 OFFICE O/P EST MOD 30 MIN: CPT | Performed by: FAMILY MEDICINE

## 2025-02-11 PROCEDURE — G2211 COMPLEX E/M VISIT ADD ON: HCPCS | Performed by: FAMILY MEDICINE

## 2025-02-11 RX ORDER — NIFEDIPINE 30 MG
30 TABLET, EXTENDED RELEASE ORAL 2 TIMES DAILY
Qty: 60 TABLET | Refills: 3 | Status: SHIPPED | OUTPATIENT
Start: 2025-02-11

## 2025-02-11 NOTE — ASSESSMENT & PLAN NOTE
Orders:    NIFEdipine ER (ADALAT CC) 30 MG 24 hr tablet; Take 1 tablet (30 mg total) by mouth 2 (two) times a day    Ambulatory referral to Nephrology; Future

## 2025-02-11 NOTE — TELEPHONE ENCOUNTER
----- Message from TAMIR Montejo DO sent at 2/10/2025  7:08 PM EST -----  Please place him order for ultrasound-guided biopsy of thyroid nodule by interventional radiology--preferably at [facility].  Thank you so much  ----- Message -----  From: Interface, Radiology Results In  Sent: 2/10/2025  10:37 AM EST  To: TAMIR Montejo DO

## 2025-02-11 NOTE — TELEPHONE ENCOUNTER
Pt has same day appt today in office and will discuss at time of OV. NFA needed at this time as testing is placed.

## 2025-02-11 NOTE — PROGRESS NOTES
Name: Mar Elizondo      : 1945      MRN: [id number]  Encounter Provider: TAMIR Montejo DO  Encounter Date: 2025   Encounter department: [facility] [place]    Assessment & Plan  Resistant hypertension    Orders:    NIFEdipine ER (ADALAT CC) 30 MG 24 hr tablet; Take 1 tablet (30 mg total) by mouth 2 (two) times a day    Ambulatory referral to Nephrology; Future    Patient is being followed for hypertension and it has been labile and at times uncontrolled here is the hospital course summary of recent  hospital stay:Hospital Course:   Mar Elizondo is a 79 y.o. female patient who originally presented to the hospital on 2025 due to presented with dizziness and syncope.  Patient stated that she felt lightheaded after standing up causing her to fall to ground.  Patient admitted to losing consciousness and falling to the ground.  In the ED CT head negative for acute stroke or bleed.  CTA head and neck was noted Moderate left extracranial carotid stenosis. The cervical vertebral arteries are patent.  On admission she complained of having vertigo as well and was started on meclizine with no improvement in symptoms.  MRI brain was negative for acute infarct. Mild chronic white matter microangiopathic changes involving both cerebral hemispheres and the brainstem.  Patient's vertigo improved throughout hospitalization.  Patient is to continue taking her home aspirin 81 mg daily and has been started on Lipitor 40 mg daily.  Referral to vascular surgery has been made on discharge for her moderate left extracranial carotid artery stenosis.  On admission patient's blood pressure was also uncontrolled.  Patient's blood pressure regimen has been changed to losartan 25 mg daily, Toprol-XL 25 mg daily and Procardia 30 mg at night which appears to be working well for the patient.  Assessment & Plan  1. Hypertension: Labile and at times uncontrolled. Hospitalized in mid-January due to  dizziness and syncope. Currently on metoprolol XL 25 mg once daily and nifedipine ER 30 mg once daily. Did not tolerate losartan or valsartan. Blood pressure readings today were 177/97 and 159/86.  - Increase nifedipine ER to 30 mg twice daily  - If blood pressure remains elevated, increase nifedipine ER to 3 times daily  - Prescription for nifedipine ER 30 mg twice daily sent to [facility]  - Continue metoprolol XL 25 mg once daily  - Referral to nephrology for resistant hypertension--she seems to think she has been there before but I cannot find any accurate record.  Will refer now    Follow-up  - Follow up on 02/25/2025       History of Present Illness     History of Present Illness  The patient is an 79-year-old female who presents for evaluation of hypertension.    Hypertension  - Recently hospitalized on 01/19/2025 due to syncope and collapse, during which her blood pressure was recorded as 190.  - During her last visit, she was informed that her blood pressure was within normal limits for her age.  - Monitoring her blood pressure at home and brought her device to the clinic today for validation.  - Current antihypertensive regimen includes metoprolol 25 mg once daily and nifedipine 30 mg once daily in the evening.    Syncope and Collapse  - Experienced a fall, resulting in a head injury, but did not report any dizziness prior to the incident.  - Reported vomiting on the same day.    Medication and Allergies  - Discontinued the use of losartan due to an allergic reaction, which manifested as itching.  - Taking potassium supplements, although she is unsure of the reason for this.  - Taking CoQ10 100 mg twice daily for a month, but it has not alleviated the leg pain.    Leg Pain  - Experiencing leg pain, which she attributes to the atorvastatin.    Supplemental Information  - She has been under the care of a nephrologist, who has scheduled a follow-up appointment in 06/2025.  - Reports a high fluid  "intake.    ALLERGIES  The patient is allergic to LOSARTAN and VALSARTAN.    MEDICATIONS  Current: Metoprolol 25 mg once a day, nifedipine 30 mg once a day, atorvastatin 40 mg, potassium, CoQ10.  Discontinued: Losartan, valsartan.     Review of Systems   Constitutional:  Negative for activity change, appetite change, chills, diaphoresis, fatigue and fever.   HENT:  Negative for congestion, ear discharge, ear pain, facial swelling, nosebleeds, sore throat, tinnitus, trouble swallowing and voice change.    Eyes:  Negative for photophobia, pain, discharge, redness, itching and visual disturbance.   Respiratory:  Negative for apnea, cough, choking, chest tightness and shortness of breath.    Cardiovascular:  Negative for chest pain, palpitations and leg swelling.   Gastrointestinal:  Negative for abdominal distention, abdominal pain, blood in stool, constipation, diarrhea, nausea and vomiting.   Endocrine: Negative for cold intolerance, heat intolerance, polydipsia, polyphagia and polyuria.   Genitourinary:  Negative for decreased urine volume, difficulty urinating, dysuria, enuresis, frequency, hematuria, pelvic pain, urgency and vaginal bleeding.   Musculoskeletal:  Negative for arthralgias, back pain, gait problem, joint swelling, neck pain and neck stiffness.   Skin:  Negative for color change, pallor and rash.   Allergic/Immunologic: Negative for immunocompromised state.   Neurological:  Negative for dizziness, seizures, facial asymmetry, light-headedness, numbness and headaches.   Hematological:  Negative for adenopathy.   Psychiatric/Behavioral:  Negative for agitation, behavioral problems, confusion, decreased concentration, dysphoric mood and hallucinations.      Objective   /78 (BP Location: Left arm, Patient Position: Sitting, Cuff Size: Standard)   Pulse 73   Temp 98.1 °F (36.7 °C) (Temporal)   Ht 5' 1\" (1.549 m)   SpO2 97%   BMI 38.09 kg/m²     Physical Exam  Vital Signs  Blood pressure is " 152/78.  Physical Exam  Vitals and nursing note reviewed.   Constitutional:       General: She is not in acute distress.     Appearance: Normal appearance. She is well-developed. She is not ill-appearing, toxic-appearing or diaphoretic.   HENT:      Head: Normocephalic and atraumatic.      Nose: No congestion or rhinorrhea.   Eyes:      Conjunctiva/sclera: Conjunctivae normal.   Cardiovascular:      Rate and Rhythm: Normal rate and regular rhythm.      Heart sounds: No murmur heard.  Pulmonary:      Effort: Pulmonary effort is normal. No respiratory distress.      Breath sounds: Normal breath sounds.   Abdominal:      Palpations: Abdomen is soft.      Tenderness: There is no abdominal tenderness.   Musculoskeletal:         General: No swelling.      Cervical back: Neck supple.   Skin:     General: Skin is warm and dry.      Capillary Refill: Capillary refill takes less than 2 seconds.   Neurological:      General: No focal deficit present.      Mental Status: She is alert and oriented to person, place, and time.   Psychiatric:         Mood and Affect: Mood normal.         Behavior: Behavior normal.         Thought Content: Thought content normal.         Judgment: Judgment normal.

## 2025-02-13 ENCOUNTER — TELEPHONE (OUTPATIENT)
Age: 80
End: 2025-02-13

## 2025-02-13 NOTE — TELEPHONE ENCOUNTER
Called patient per referral from PCP. Patient is already an established patient of Dr. Davila. Called to schedule Follow up appointment. No answer. Left Message

## 2025-02-23 DIAGNOSIS — Z79.4 CONTROLLED TYPE 2 DIABETES MELLITUS WITHOUT COMPLICATION, WITH LONG-TERM CURRENT USE OF INSULIN (HCC): ICD-10-CM

## 2025-02-23 DIAGNOSIS — E11.9 CONTROLLED TYPE 2 DIABETES MELLITUS WITHOUT COMPLICATION, WITH LONG-TERM CURRENT USE OF INSULIN (HCC): ICD-10-CM

## 2025-02-24 RX ORDER — INSULIN GLARGINE 100 [IU]/ML
INJECTION, SOLUTION SUBCUTANEOUS
Qty: 45 ML | Refills: 1 | Status: SHIPPED | OUTPATIENT
Start: 2025-02-24

## 2025-02-26 ENCOUNTER — CONSULT (OUTPATIENT)
Dept: VASCULAR SURGERY | Facility: CLINIC | Age: 80
End: 2025-02-26
Payer: COMMERCIAL

## 2025-02-26 VITALS
DIASTOLIC BLOOD PRESSURE: 94 MMHG | RESPIRATION RATE: 18 BRPM | HEART RATE: 78 BPM | OXYGEN SATURATION: 99 % | SYSTOLIC BLOOD PRESSURE: 182 MMHG | WEIGHT: 203 LBS | HEIGHT: 61 IN | BODY MASS INDEX: 38.33 KG/M2

## 2025-02-26 DIAGNOSIS — I65.29 INTRACRANIAL CAROTID STENOSIS, UNSPECIFIED LATERALITY: Primary | ICD-10-CM

## 2025-02-26 DIAGNOSIS — I65.21 STENOSIS OF RIGHT CAROTID ARTERY: ICD-10-CM

## 2025-02-26 PROCEDURE — 99203 OFFICE O/P NEW LOW 30 MIN: CPT | Performed by: SURGERY

## 2025-02-26 NOTE — ASSESSMENT & PLAN NOTE
79-year-old female recently admitted for hypertension to the hospital.  She did experience some dizziness syncope while she was there and had a CTA head and neck this was positive for with respect to vascular findings bilateral cavernous internal carotid stenosis that was moderate as well as basilar artery stenosis.  She denies any strokelike symptoms prior neck surgery or neck radiation she was referred to vascular surgery.  I reviewed her imaging she does not have any significant cervical carotid stenosis I explained to her that vascular surgery does not manage the distal internal carotid and that I would provide her referral to neurosurgery.  She can follow-up with us on an as-needed basis.    Orders:    Ambulatory Referral to Neurosurgery; Future

## 2025-02-26 NOTE — PROGRESS NOTES
"Name: Mar Elizondo      : 1945      MRN: [id number]  Encounter Provider: Tramaine Howell DO  Encounter Date: 2025   Encounter department: [facility] [place]  :  Assessment & Plan  Stenosis of right carotid artery    Orders:    Ambulatory Referral to Vascular Surgery    Intracranial carotid stenosis, unspecified laterality  79-year-old female recently admitted for hypertension to the hospital.  She did experience some dizziness syncope while she was there and had a CTA head and neck this was positive for with respect to vascular findings bilateral cavernous internal carotid stenosis that was moderate as well as basilar artery stenosis.  She denies any strokelike symptoms prior neck surgery or neck radiation she was referred to vascular surgery.  I reviewed her imaging she does not have any significant cervical carotid stenosis I explained to her that vascular surgery does not manage the distal internal carotid and that I would provide her referral to neurosurgery.  She can follow-up with us on an as-needed basis.    Orders:    Ambulatory Referral to Neurosurgery; Future        History of Present Illness   HPI  Mar Elizondo is a 79 y.o. female     Patient had a CTA head/ neck 25. Denies TIA or CVA symptoms.       History obtained from: patient    Review of Systems   Eyes:  Negative for visual disturbance.   Neurological:  Positive for dizziness (occasionally). Negative for facial asymmetry, speech difficulty, weakness, light-headedness and numbness.       I have reviewed the ROS above and made changes as needed.         Objective   BP (!) 182/94 (BP Location: Right arm, Patient Position: Sitting)   Pulse 78   Resp 18   Ht 5' 1\" (1.549 m)   Wt 92.1 kg (203 lb)   SpO2 99%   BMI 38.36 kg/m²      Physical Exam    General  Exam: alert, awake, oriented, no distress, consistent with stated age    Head and Neck  Exam: supple, no bruits, trachea midline, no JVD, no mass or palpable " nodes    Adbomen  Exam: soft, non-tender, non-distended, no pulsatile abdominal masses, no abdominal bruit    Neurologic  Exam:alert, non-focal, oriented x 3, cranial nerves II-XII grossly intact      Administrative Statements   I have spent a total time of 15 minutes in caring for this patient on the day of the visit/encounter including Counseling / Coordination of care, Documenting in the medical record, and Reviewing/placing orders in the medical record (including tests, medications, and/or procedures).

## 2025-02-28 ENCOUNTER — TELEPHONE (OUTPATIENT)
Dept: FAMILY MEDICINE CLINIC | Facility: CLINIC | Age: 80
End: 2025-02-28

## 2025-02-28 ENCOUNTER — TELEPHONE (OUTPATIENT)
Dept: NEUROSURGERY | Facility: CLINIC | Age: 80
End: 2025-02-28

## 2025-02-28 NOTE — TELEPHONE ENCOUNTER
Received a call from Mar regarding referral to our office. She states she was seen by someone on Wednesday and they advised they could not help her and referred her to us.     Reviewed her chart and noticed she saw vascular surgery on 2/26 who referred her to [facility] as they do not treat the region of her carotid stenosis. Explained that we are attempting to reach her to process the intake so she can be scheduled. She expressed frustration that she was referred the incorrect speciality in the first place. I apologized but offered to have intake team contact her to complete that process and get her scheduled. She would prefer to discuss with her PCP before proceeding and requested we stop calling her before disconnecting the call abruptly.     Note: Prior to disconnect she also wanted to know who is going to help her with her blood pressure and thyroid nodule advised she should reach out to her PCP for guidance with these other findings.

## 2025-02-28 NOTE — TELEPHONE ENCOUNTER
"Pt called, had appt with Dr. Howell yesterday (vascular). He told her that she was in the wrong place--that he only deal with the  \"neck down\" and that she needs a vascular doctor that deals with the \"neck up.\" Please advise.   "

## 2025-03-03 ENCOUNTER — TELEPHONE (OUTPATIENT)
Age: 80
End: 2025-03-03

## 2025-03-03 NOTE — TELEPHONE ENCOUNTER
Patient said the soonest she can get in with Dr. Nagel is September of 2025.  She would like you to know.  Thank you.

## 2025-03-04 NOTE — TELEPHONE ENCOUNTER
Spoke with Mar--she says that she is feeling really good! Sleeping through the night, no more itching, and no more vomiting. She says seeing you on 3/25 will be just fine!

## 2025-03-04 NOTE — TELEPHONE ENCOUNTER
Called and spoke w pt -- she will be calling office back today in order to schedule for sooner appt date. NFA needed at this time.

## 2025-03-05 ENCOUNTER — HOSPITAL ENCOUNTER (OUTPATIENT)
Dept: ULTRASOUND IMAGING | Facility: HOSPITAL | Age: 80
Discharge: HOME | End: 2025-03-05
Attending: FAMILY MEDICINE
Payer: COMMERCIAL

## 2025-03-05 DIAGNOSIS — E04.1 THYROID NODULE GREATER THAN OR EQUAL TO 1.5 CM IN DIAMETER INCIDENTALLY NOTED ON IMAGING STUDY: ICD-10-CM

## 2025-03-05 PROCEDURE — 10005 FNA BX W/US GDN 1ST LES: CPT

## 2025-03-05 PROCEDURE — 88173 CYTOPATH EVAL FNA REPORT: CPT | Performed by: STUDENT IN AN ORGANIZED HEALTH CARE EDUCATION/TRAINING PROGRAM

## 2025-03-05 RX ORDER — LIDOCAINE HYDROCHLORIDE 10 MG/ML
5 INJECTION, SOLUTION EPIDURAL; INFILTRATION; INTRACAUDAL; PERINEURAL ONCE
Status: COMPLETED | OUTPATIENT
Start: 2025-03-05 | End: 2025-03-05

## 2025-03-05 RX ADMIN — LIDOCAINE HYDROCHLORIDE 5 ML: 10 INJECTION, SOLUTION EPIDURAL; INFILTRATION; INTRACAUDAL at 09:26

## 2025-03-07 PROCEDURE — 88173 CYTOPATH EVAL FNA REPORT: CPT | Performed by: STUDENT IN AN ORGANIZED HEALTH CARE EDUCATION/TRAINING PROGRAM

## 2025-03-10 DIAGNOSIS — E11.65 UNCONTROLLED TYPE 2 DIABETES MELLITUS WITH HYPERGLYCEMIA (HCC): ICD-10-CM

## 2025-03-11 RX ORDER — TIRZEPATIDE 7.5 MG/.5ML
INJECTION, SOLUTION SUBCUTANEOUS
Qty: 6 ML | Refills: 3 | Status: SHIPPED | OUTPATIENT
Start: 2025-03-11

## 2025-03-12 ENCOUNTER — OFFICE VISIT (OUTPATIENT)
Dept: NEUROSURGERY | Facility: CLINIC | Age: 80
End: 2025-03-12
Payer: COMMERCIAL

## 2025-03-12 ENCOUNTER — TELEPHONE (OUTPATIENT)
Dept: NEUROSURGERY | Facility: CLINIC | Age: 80
End: 2025-03-12

## 2025-03-12 VITALS
WEIGHT: 203 LBS | HEIGHT: 61 IN | DIASTOLIC BLOOD PRESSURE: 90 MMHG | BODY MASS INDEX: 38.33 KG/M2 | SYSTOLIC BLOOD PRESSURE: 160 MMHG | TEMPERATURE: 98.1 F | OXYGEN SATURATION: 96 % | HEART RATE: 84 BPM

## 2025-03-12 DIAGNOSIS — I65.29 INTRACRANIAL CAROTID STENOSIS, UNSPECIFIED LATERALITY: Primary | ICD-10-CM

## 2025-03-12 DIAGNOSIS — E11.9 CONTROLLED TYPE 2 DIABETES MELLITUS WITHOUT COMPLICATION, WITH LONG-TERM CURRENT USE OF INSULIN (HCC): ICD-10-CM

## 2025-03-12 DIAGNOSIS — Z79.4 CONTROLLED TYPE 2 DIABETES MELLITUS WITHOUT COMPLICATION, WITH LONG-TERM CURRENT USE OF INSULIN (HCC): ICD-10-CM

## 2025-03-12 PROCEDURE — 99203 OFFICE O/P NEW LOW 30 MIN: CPT | Performed by: NURSE PRACTITIONER

## 2025-03-12 NOTE — PROGRESS NOTES
Name: Mar Elizondo      : 1945      MRN: [id number]  Encounter Provider: ONELIA Shankar  Encounter Date: 3/12/2025   Encounter department: [facility]UROSURG[facility]  :  Assessment & Plan  Intracranial carotid stenosis, unspecified laterality  Presents as referral from vascular surgery for evaluation of bilateral carotid artery and basilar artery stenosis  Hospitalized - for dizziness, HTN, syncope.  MRI brain negative for stroke.  Hx poorly controlled hypertension.  Unable to tolerate ASA/statin/antihypertensive d/t side effects; has not been taking x 3 months.  Exam is non-focal.    Imaging:  MRI brain 25: No acute infarct. Mild chronic white matter microangiopathic changes involving both cerebral hemispheres and the brainstem.   CTA head/neck w/wo, 25: CT brain:  No acute intracranial abnormality. Chronic microangiopathic ischemic changes. CTA head: Negative for large vessel intracranial occlusion . Mild to moderate narrowing at the bilateral petrous/cavernous ICA junctions. Moderate to severe focal stenosis involving the mid to inferior basilar artery CTA neck: Moderate left extracranial carotid stenosis.  The cervical vertebral arteries are patent.    Plan:  Discussed results of CTA with patient.  Reviewed that treatment for asymptomatic cerebral atherosclerosis/stenosis is typically antiplatelet medication, statin and blood pressure control to prevent stroke.  Do not recommend endovascular intervention.  Advised to discuss medication options with PCP.  Referral placed to neurology as well.  Follow up as needed.       Orders:    Ambulatory Referral to Neurosurgery    Ambulatory referral to Neurology; Future    Controlled type 2 diabetes mellitus without complication, with long-term current use of insulin (HCC)    Lab Results   Component Value Date    HGBA1C 6.8 (H) 2025                History of Present Illness     Mar Elizondo is a 79 y.o. female who  presents     With past medical history of resistant hypertension, COPD, type 2 diabetes, on insulin, who was hospitalized in January from the 19th until the 23rd with dizziness, syncope and collapse.  She was also noted hypertensive.  She relates that for the past 2 years her medications including aspirin statin and antihypertensive have been making her nauseous and vomit constantly.  She stopped taking them 3 months ago and all of the symptoms have resolved.  She has discussed this issue with her PCP but relates that she is unwilling to continue these medications as they made her feel so sick.    While she was hospitalized, CTA of head was completed due to symptoms of dizziness and syncope and were noted with bilateral ICA stenosis intracranially as well as basilar artery stenosis.  She was initially referred to vascular surgery and was evaluated by them but then referred to neurosurgery as her issues were intracranial.  She denies any headache.  She denies any blurred vision.  She denies any numbness or weakness.         Review of Systems   Eyes:  Negative for photophobia and visual disturbance.   Musculoskeletal:  Negative for gait problem.   Neurological:  Negative for dizziness and headaches.   All other systems reviewed and are negative.    I have personally reviewed the MA's review of systems and made changes as necessary.    Past Medical History   Past Medical History:   Diagnosis Date    Acute left-sided low back pain with left-sided sciatica 12/03/2019    Asthma     Benign essential hypertension     Chronic lower back pain     Controlled type 2 diabetes mellitus, with long-term current use of insulin ([facility]) 12/03/2019    COPD (chronic obstructive pulmonary disease) (HCC)     Diabetes mellitus (HCC)     Hyperlipidemia     Need for vaccination 10/13/2020    Needs hi dose flu - will come in next wk     Needs Shingrix now -- will soon- did have the Zostavax in 2012     Needs TD also      Sciatica      Past  Surgical History:   Procedure Laterality Date    CHOLECYSTECTOMY      US GUIDED THYROID BIOPSY  3/5/2025     Family History   Problem Relation Age of Onset    Uterine cancer Mother     Emphysema Father     Leukemia Father      she reports that she quit smoking about 20 years ago. Her smoking use included cigarettes. She started smoking about 50 years ago. She has a 30 pack-year smoking history. She has never used smokeless tobacco. She reports that she does not drink alcohol and does not use drugs.  Current Outpatient Medications   Medication Instructions    albuterol (PROVENTIL HFA,VENTOLIN HFA) 90 mcg/act inhaler 2 puffs, Inhalation, 4 times daily    Apremilast (Otezla) 30 MG TABS 1 tablet, Oral, 2 times daily    aspirin 81 mg, Oral, Daily    atorvastatin (LIPITOR) 40 mg, Oral, Daily with dinner    Cholecalciferol (VITAMIN D) 125 MCG (5000 UT) CAPS Take by mouth    Continuous Blood Gluc  (FreeStyle Dejuan 14 Day [place]) THERESA Check BG's 2-4x a day depending on sugar levels    Continuous Blood Gluc Sensor (FreeStyle Dejuan 14 Day Sensor) MISC Check sugars 2-4x daily depending on sugar levels    Insulin Glargine Solostar (Lantus SoloStar) 100 UNIT/ML SOPN INJECT SUBCUTANEOUSLY 50 UNITS  DAILY    insulin lispro (HumaLOG KwikPen) 100 units/mL injection pen INJECT SUBCUTANEOUSLY 5  UNITS BEFORE BREAKFAST 10  UNITS BEFORE LUNCH AND 15  UNITS BEFORE SUPPER    ipratropium-albuterol (DUO-NEB) 0.5-2.5 mg/3 mL nebulizer solution 3 mL, Nebulization, 4 times daily    metoprolol succinate (TOPROL-XL) 25 mg, Oral, Daily    Mounjaro 7.5 MG/0.5ML SOAJ INJECT THE CONTENTS OF ONE PEN  SUBCUTANEOUSLY WEEKLY AS  DIRECTED    NIFEdipine ER (ADALAT CC) 30 mg, Oral, 2 times daily    OneTouch Ultra test strip TEST 2-3 TIMES DAILY    potassium chloride (K-DUR,KLOR-CON) 20 mEq tablet 20 mEq, Oral, 2 times daily     Allergies   Allergen Reactions    Losartan Vomiting    Nifedipine Vomiting    Valsartan Tongue Swelling      Objective   BP  "160/90 (BP Location: Left arm, Patient Position: Sitting, Cuff Size: Standard)   Pulse 84   Temp 98.1 °F (36.7 °C)   Ht 5' 1\" (1.549 m)   Wt 92.1 kg (203 lb)   SpO2 96%   BMI 38.36 kg/m²     Physical Exam  Constitutional:       Appearance: She is well-developed.   HENT:      Head: Normocephalic and atraumatic.   Eyes:      General: Lids are normal.      Extraocular Movements: Extraocular movements intact.      Pupils: Pupils are equal, round, and reactive to light.   Pulmonary:      Effort: Pulmonary effort is normal.   Abdominal:      Palpations: Abdomen is soft.   Musculoskeletal:         General: Normal range of motion.      Cervical back: Normal range of motion and neck supple.   Skin:     General: Skin is warm and dry.   Neurological:      General: No focal deficit present.      Mental Status: She is alert and oriented to person, place, and time. Mental status is at baseline.      Motor: Motor strength is normal.      Neurological Exam  Mental Status  Alert. Oriented to person, place, and time.    Cranial Nerves  CN II: Visual acuity is normal. Visual fields full to confrontation.  CN III, IV, VI: Extraocular movements intact bilaterally. Normal lids and orbits bilaterally. Pupils equal round and reactive to light bilaterally.  CN V: Facial sensation is normal.  CN VII: Full and symmetric facial movement.  CN VIII: Hearing is normal.  CN IX, X: Palate elevates symmetrically. Normal gag reflex.  CN XI: Shoulder shrug strength is normal.  CN XII: Tongue midline without atrophy or fasciculations.    Motor   Strength is 5/5 throughout all four extremities.      Radiology Results Review: I personally reviewed the following image studies in PACS and associated radiology reports: CT head and MRI brain. My interpretation of the radiology images/reports is: as above.    Administrative Statements   I have spent a total time of 40 minutes in caring for this patient on the day of the visit/encounter including " Diagnostic results, Risks and benefits of tx options, Instructions for management, Patient and family education, Importance of tx compliance, Risk factor reductions, Impressions, Counseling / Coordination of care, Documenting in the medical record, Reviewing/placing orders in the medical record (including tests, medications, and/or procedures), and Obtaining or reviewing history  .

## 2025-03-12 NOTE — ASSESSMENT & PLAN NOTE
Presents as referral from vascular surgery for evaluation of bilateral carotid artery and basilar artery stenosis  Hospitalized 1/19-23 for dizziness, HTN, syncope.  MRI brain negative for stroke.  Hx poorly controlled hypertension.  Unable to tolerate ASA/statin/antihypertensive d/t side effects; has not been taking x 3 months.  Exam is non-focal.    Imaging:  MRI brain 1/20/25: No acute infarct. Mild chronic white matter microangiopathic changes involving both cerebral hemispheres and the brainstem.   CTA head/neck w/wo, 1/19/25: CT brain:  No acute intracranial abnormality. Chronic microangiopathic ischemic changes. CTA head: Negative for large vessel intracranial occlusion . Mild to moderate narrowing at the bilateral petrous/cavernous ICA junctions. Moderate to severe focal stenosis involving the mid to inferior basilar artery CTA neck: Moderate left extracranial carotid stenosis.  The cervical vertebral arteries are patent.    Plan:  Discussed results of CTA with patient.  Reviewed that treatment for asymptomatic cerebral atherosclerosis/stenosis is typically antiplatelet medication, statin and blood pressure control to prevent stroke.  Do not recommend endovascular intervention.  Advised to discuss medication options with PCP.  Referral placed to neurology as well.  Follow up as needed.       Orders:    Ambulatory Referral to Neurosurgery    Ambulatory referral to Neurology; Future

## 2025-03-12 NOTE — TELEPHONE ENCOUNTER
Patient upset at office states she already has already had 2 appointments and now we are referring her to someone else.I stated I would look into her chart and get back to her. After reviewing chart and speaking with Mirna, I called the patient back to explain it is non unreasonable that she has seen 3 different specialties for her diagnosis. She was first referred to vascular then neurosurgery to rule out treatment and now we referred her to Neurology. Patient still was not happy. She stated she showed up last week on the third floor and she did not have appt but yet it was on her calendar. After reviewing her appt desk it's looks as though she had an appt with vascular on 3/5 but the appt was moved up to 2/26. I explained and apologized that it was not our office and that she was only scheduled with our office once. Patient still not satisfied but chose to hang up with me.

## 2025-03-18 ENCOUNTER — RA CDI HCC (OUTPATIENT)
Dept: OTHER | Facility: HOSPITAL | Age: 80
End: 2025-03-18

## 2025-03-25 ENCOUNTER — OFFICE VISIT (OUTPATIENT)
Dept: FAMILY MEDICINE CLINIC | Facility: CLINIC | Age: 80
End: 2025-03-25
Payer: COMMERCIAL

## 2025-03-25 VITALS
HEART RATE: 71 BPM | SYSTOLIC BLOOD PRESSURE: 204 MMHG | HEIGHT: 61 IN | DIASTOLIC BLOOD PRESSURE: 86 MMHG | WEIGHT: 204.4 LBS | TEMPERATURE: 97.2 F | BODY MASS INDEX: 38.59 KG/M2 | OXYGEN SATURATION: 96 %

## 2025-03-25 DIAGNOSIS — E11.65 UNCONTROLLED TYPE 2 DIABETES MELLITUS WITH HYPERGLYCEMIA (HCC): ICD-10-CM

## 2025-03-25 DIAGNOSIS — Z79.899 MEDICATION MANAGEMENT: ICD-10-CM

## 2025-03-25 DIAGNOSIS — I1A.0 RESISTANT HYPERTENSION: Primary | ICD-10-CM

## 2025-03-25 PROCEDURE — 99214 OFFICE O/P EST MOD 30 MIN: CPT | Performed by: FAMILY MEDICINE

## 2025-03-25 PROCEDURE — G2211 COMPLEX E/M VISIT ADD ON: HCPCS | Performed by: FAMILY MEDICINE

## 2025-03-25 RX ORDER — TIRZEPATIDE 10 MG/.5ML
10 INJECTION, SOLUTION SUBCUTANEOUS WEEKLY
Qty: 6 ML | Refills: 0 | Status: SHIPPED | OUTPATIENT
Start: 2025-03-25

## 2025-03-25 RX ORDER — TIRZEPATIDE 10 MG/.5ML
10 INJECTION, SOLUTION SUBCUTANEOUS WEEKLY
Qty: 6 ML | Refills: 0 | Status: SHIPPED | OUTPATIENT
Start: 2025-03-25 | End: 2025-03-25

## 2025-03-25 RX ORDER — LISINOPRIL 40 MG/1
40 TABLET ORAL DAILY
Qty: 30 TABLET | Refills: 3 | Status: SHIPPED | OUTPATIENT
Start: 2025-03-25

## 2025-03-25 NOTE — ASSESSMENT & PLAN NOTE
"Patient states that just about every medicine I give her causes her to \"throw up\" she stopped all of her medications including albuterol, Otezla, baby aspirin, Lipitor, vitamin D, DuoNeb, metoprolol succinate, nifedipine, potassium chloride!  She states she will be seeing Dr. Davila, nephrologist, very shortly and wants to see him first despite the fact that her blood pressure today is 204/86.  I urged her to take something immediately--I ordered lisinopril 40 mg once daily.  Hopefully nephrologist can get a  on controlling her blood pressure.  I have had her on just about everything including valsartan, Cardura, Norvasc, nifedipine, metoprolol, etc. everything I mention she states she cannot take  Orders:    Comprehensive metabolic panel; Future    lisinopril (ZESTRIL) 40 mg tablet; Take 1 tablet (40 mg total) by mouth daily    "

## 2025-03-25 NOTE — ASSESSMENT & PLAN NOTE
All medications reviewed for safety efficacy and tolerance  Orders:    Albumin / creatinine urine ratio; Future    Comprehensive metabolic panel; Future    Hemoglobin A1C; Future

## 2025-03-25 NOTE — PROGRESS NOTES
"Name: Mar Elizondo      : 1945      MRN: [id number]  Encounter Provider: TAMIR Montejo DO  Encounter Date: 3/25/2025   Encounter department: [facility] [place]    Assessment & Plan  Resistant hypertension  Patient states that just about every medicine I give her causes her to \"throw up\" she stopped all of her medications including albuterol, Otezla, baby aspirin, Lipitor, vitamin D, DuoNeb, metoprolol succinate, nifedipine, potassium chloride!  She states she will be seeing Dr. Davila, nephrologist, very shortly and wants to see him first despite the fact that her blood pressure today is 204/86.  I urged her to take something immediately--I ordered lisinopril 40 mg once daily.  Hopefully nephrologist can get a  on controlling her blood pressure.  I have had her on just about everything including valsartan, Cardura, Norvasc, nifedipine, metoprolol, etc. everything I mention she states she cannot take  Orders:    Comprehensive metabolic panel; Future    lisinopril (ZESTRIL) 40 mg tablet; Take 1 tablet (40 mg total) by mouth daily    Medication management  All medications reviewed for safety efficacy and tolerance  Orders:    Albumin / creatinine urine ratio; Future    Comprehensive metabolic panel; Future    Hemoglobin A1C; Future    Uncontrolled type 2 diabetes mellitus with hyperglycemia (HCC)    Lab Results   Component Value Date    HGBA1C 6.8 (H) 2025   A1c's have been consistently in the mid 6 to high 6 range for many months.  She is taking Mounjaro 7.5 mg and wants to go to 10 mg now despite her complaint of persistent nausea and \"throwing up\" of all of her medication but she refuses to believe it is the Mounjaro.  She has not lost any weight on Mounjaro.  Her weight today 204 compares with 211 in 2024 and 212 in 2024    Orders:    Albumin / creatinine urine ratio; Future    Comprehensive metabolic panel; Future    Hemoglobin A1C; Future    Mounjaro 10 MG/0.5ML " SOAJ; Inject 10 mg under the skin once a week      Assessment & Plan  1. Hypertension: Uncontrolled.  - Prescription for lisinopril 40 mg daily issued, 30 tablets with 3 refills, sent to [facility].  - Monitor for adverse effects such as vomiting.    2. Diabetes Mellitus.  - A1c 6.8 on 01/19/2025, previously 6.3 on 09/10/2024.  - Blood glucose levels fluctuate, monitored by FreeStyle sensor.  - Continues insulin therapy with Lantus and rapid-acting insulin.  - Mounjaro dosage increased from 7.5 to 10, [facility] supply [facility]  - Undergo blood tests prior to next visit.    Follow-up  - Scheduled for a follow-up visit in 1 month.       History of Present Illness     History of Present Illness  The patient presents for evaluation of hypertension and diabetes.    Hypertension  - She has discontinued her antihypertensive medication due to persistent nausea and vomiting, which she attributes to the medication.  - She has been experiencing these symptoms for the past 3 weeks.  - She has previously tried valsartan and amlodipine but was unable to tolerate them.  - She has not yet tried lisinopril.  - She has an upcoming appointment with Dr. Davila, a nephrologist, on 04/09/2025 and 04/25/2025.    Diabetes  - Her blood glucose levels have been fluctuating, as monitored by her FreeStyle sensor.  - She continues her insulin therapy, including Lantus and rapid-acting insulin, and is currently on Mounjaro 7.5.  - She reports that her blood glucose levels decrease postprandially.  - She has previously consulted an endocrinologist but was dissatisfied with the experience.  - Despite her efforts, she has not achieved any weight loss.    Supplemental information: She had a biopsy done in the hospital.    ALLERGIES  The patient is allergic to VALSARTAN, AMLODIPINE, and HYDROCHLOROTHIAZIDE.    MEDICATIONS  Current: Mounjaro, vitamin D, Lantus, lispro.  Discontinued: albuterol, Otezla, baby aspirin,  "Lipitor, metoprolol succinate.     Review of Systems   Constitutional:  Negative for activity change, appetite change, chills, diaphoresis, fatigue and fever.   HENT:  Negative for congestion, ear discharge, ear pain, facial swelling, nosebleeds, sore throat, tinnitus, trouble swallowing and voice change.    Eyes:  Negative for photophobia, pain, discharge, redness, itching and visual disturbance.   Respiratory:  Negative for apnea, cough, choking, chest tightness and shortness of breath.    Cardiovascular:  Negative for chest pain, palpitations and leg swelling.   Gastrointestinal:  Negative for abdominal distention, abdominal pain, blood in stool, constipation, diarrhea, nausea and vomiting.   Endocrine: Negative for cold intolerance, heat intolerance, polydipsia, polyphagia and polyuria.   Genitourinary:  Negative for decreased urine volume, difficulty urinating, dysuria, enuresis, frequency, hematuria, pelvic pain, urgency and vaginal bleeding.   Musculoskeletal:  Negative for arthralgias, back pain, gait problem, joint swelling, neck pain and neck stiffness.   Skin:  Negative for color change, pallor and rash.   Allergic/Immunologic: Negative for immunocompromised state.   Neurological:  Negative for dizziness, seizures, facial asymmetry, light-headedness, numbness and headaches.   Hematological:  Negative for adenopathy.   Psychiatric/Behavioral:  Negative for agitation, behavioral problems, confusion, decreased concentration, dysphoric mood and hallucinations.      Objective   BP (!) 204/86 (BP Location: Left arm, Patient Position: Sitting, Cuff Size: Standard) Comment: asymptomatic  Pulse 71   Temp (!) 97.2 °F (36.2 °C) (Temporal)   Ht 5' 1\" (1.549 m)   Wt 92.7 kg (204 lb 6.4 oz)   SpO2 96%   BMI 38.62 kg/m²     Physical Exam  Vital Signs  Blood pressure is 200/86.  Physical Exam  Vitals and nursing note reviewed.   Constitutional:       General: She is not in acute distress.     Appearance: Normal " appearance. She is not ill-appearing, toxic-appearing or diaphoretic.   HENT:      Head: Normocephalic.      Nose: Nose normal.      Mouth/Throat:      Mouth: Mucous membranes are moist.      Pharynx: No oropharyngeal exudate or posterior oropharyngeal erythema.   Eyes:      Extraocular Movements: Extraocular movements intact.      Pupils: Pupils are equal, round, and reactive to light.   Cardiovascular:      Rate and Rhythm: Normal rate and regular rhythm.      Pulses: Normal pulses.      Heart sounds: Normal heart sounds.      No gallop.   Pulmonary:      Effort: No respiratory distress.      Breath sounds: Normal breath sounds. No wheezing, rhonchi or rales.   Abdominal:      General: Abdomen is flat.   Musculoskeletal:         General: No tenderness.      Cervical back: Normal range of motion and neck supple.      Right lower leg: No edema.      Left lower leg: No edema.   Skin:     General: Skin is warm.   Neurological:      General: No focal deficit present.      Mental Status: She is alert and oriented to person, place, and time. Mental status is at baseline.   Psychiatric:         Mood and Affect: Mood normal.         Behavior: Behavior normal.         Thought Content: Thought content normal.         Judgment: Judgment normal.       Administrative Statements   I have spent a total time of 35 minutes in caring for this patient on the day of the visit/encounter including Diagnostic results, Prognosis, Risks and benefits of tx options, Instructions for management, Patient and family education, Importance of tx compliance, Risk factor reductions, Impressions, Counseling / Coordination of care, Documenting in the medical record, Reviewing/placing orders in the medical record (including tests, medications, and/or procedures), and Obtaining or reviewing history  .

## 2025-04-07 ENCOUNTER — TELEPHONE (OUTPATIENT)
Age: 80
End: 2025-04-07

## 2025-04-07 DIAGNOSIS — J20.9 ACUTE BRONCHITIS, UNSPECIFIED ORGANISM: Primary | ICD-10-CM

## 2025-04-07 DIAGNOSIS — R05.1 ACUTE COUGH: ICD-10-CM

## 2025-04-07 NOTE — TELEPHONE ENCOUNTER
Patient calling to ask for antibiotics for bronchitis to be sent to [facility] in [place].  Offered appointment , patient declined and said if Dr. Montejo cannot do it, she will go to the ED.  Please review and contact patient

## 2025-04-08 ENCOUNTER — OFFICE VISIT (OUTPATIENT)
Dept: FAMILY MEDICINE CLINIC | Facility: CLINIC | Age: 80
End: 2025-04-08
Payer: COMMERCIAL

## 2025-04-08 VITALS
DIASTOLIC BLOOD PRESSURE: 72 MMHG | HEIGHT: 61 IN | OXYGEN SATURATION: 98 % | TEMPERATURE: 97.2 F | HEART RATE: 73 BPM | BODY MASS INDEX: 38.62 KG/M2 | SYSTOLIC BLOOD PRESSURE: 166 MMHG

## 2025-04-08 DIAGNOSIS — Z79.4 TYPE 2 DIABETES MELLITUS WITH OTHER SPECIFIED COMPLICATION, WITH LONG-TERM CURRENT USE OF INSULIN (HCC): ICD-10-CM

## 2025-04-08 DIAGNOSIS — J44.1 COPD EXACERBATION (HCC): ICD-10-CM

## 2025-04-08 DIAGNOSIS — E11.69 TYPE 2 DIABETES MELLITUS WITH OTHER SPECIFIED COMPLICATION, WITH LONG-TERM CURRENT USE OF INSULIN (HCC): ICD-10-CM

## 2025-04-08 DIAGNOSIS — E78.2 MIXED HYPERLIPIDEMIA: ICD-10-CM

## 2025-04-08 DIAGNOSIS — I1A.0 RESISTANT HYPERTENSION: ICD-10-CM

## 2025-04-08 DIAGNOSIS — L40.9 PSORIASIS: ICD-10-CM

## 2025-04-08 DIAGNOSIS — E55.9 VITAMIN D INSUFFICIENCY: ICD-10-CM

## 2025-04-08 DIAGNOSIS — J20.9 ACUTE BRONCHITIS, UNSPECIFIED ORGANISM: Primary | ICD-10-CM

## 2025-04-08 DIAGNOSIS — Z79.899 MEDICATION MANAGEMENT: ICD-10-CM

## 2025-04-08 PROCEDURE — G2211 COMPLEX E/M VISIT ADD ON: HCPCS | Performed by: FAMILY MEDICINE

## 2025-04-08 PROCEDURE — 99214 OFFICE O/P EST MOD 30 MIN: CPT | Performed by: FAMILY MEDICINE

## 2025-04-08 RX ORDER — AZITHROMYCIN 250 MG/1
TABLET, FILM COATED ORAL
Qty: 6 TABLET | Refills: 0 | Status: SHIPPED | OUTPATIENT
Start: 2025-04-08 | End: 2025-04-12

## 2025-04-08 NOTE — ASSESSMENT & PLAN NOTE
Patient is sick for several days.  Not taking antibiotic will begin Z-David has terrific chest congestion coughing cannot sleep at night must sit up to sleep etc. no fever chills or night sweats and is no chest x-ray needed at this moment will begin Z-David and other supportive measures-  Orders:    azithromycin (Zithromax) 250 mg tablet; Take 2 tablets (500 mg total) by mouth daily for 1 day, THEN 1 tablet (250 mg total) daily for 4 days.

## 2025-04-08 NOTE — ASSESSMENT & PLAN NOTE
Patient discontinued Lipitor but urged to go back on it --as she notes it hurts her legs.  She could try co-Q10 100

## 2025-04-08 NOTE — ASSESSMENT & PLAN NOTE
Just has on the hypothenar eminence--discontinued Otezla it is dry flaky and she uses [place] board on it currently--states she is still taking it after a

## 2025-04-08 NOTE — PROGRESS NOTES
Name: Mar Elizondo      : 1945      MRN: [id number]  Encounter Provider: TAMIR Montejo DO  Encounter Date: 2025   Encounter department: [facility] (formerly [facility])    :  Assessment & Plan  Acute bronchitis, unspecified organism  Patient is sick for several days.  Not taking antibiotic will begin Z-David has terrific chest congestion coughing cannot sleep at night must sit up to sleep etc. no fever chills or night sweats and is no chest x-ray needed at this moment will begin Z-David and other supportive measures-  Orders:    azithromycin (Zithromax) 250 mg tablet; Take 2 tablets (500 mg total) by mouth daily for 1 day, THEN 1 tablet (250 mg total) daily for 4 days.    Resistant hypertension  Will see Dr. Davila tomorrow regarding her hypertension  Patient stopped almost all of her medication including Otezla aspirin atorvastatin vitamin D lisinopril metoprolol succinate nifedipine ER and potassium.  She is only taking Mounjaro 10 mg once weekly, Lantus 50 units daily and lispro 5-10 or 15 units AC depending--takes rarely       COPD exacerbation (HCC)         Type 2 diabetes mellitus with other specified complication, with long-term current use of insulin (HCC)    Lab Results   Component Value Date    HGBA1C 6.8 (H) 2025   Lantus 50U daily and Lispro as needed uses about once weekly she is         Psoriasis  Just has on the hypothenar eminence--discontinued Otezla it is dry flaky and she uses emery board on it currently--states she is still taking it after a       Vitamin D insufficiency  Discontinued her vitamin D--urged to resume       Medication management  Patient stopped almost all of her medication including Otezla aspirin atorvastatin vitamin D lisinopril metoprolol succinate nifedipine ER and potassium.  She is only taking Mounjaro 10 mg once weekly, Lantus 50 units daily and lispro 5-10 or 15 units AC depending--takes rarely          Mixed hyperlipidemia  Patient discontinued Lipitor but  urged to go back on it --as she notes it hurts her legs.  She could try co-Q10 100           Assessment & Plan  1. Chest congestion: Acute.  - Initiate Z-David.  - Continue using Mucinex.  - Use nebulizer four times daily.  - Ensure adequate hydration.  - Promethazine DM as needed, up to 1 teaspoon three or four times daily, and 2 teaspoons at bedtime if desired.  - Prescription for Zithromax sent to [facility].    2. Hypertension.  - Consult with Dr. Nguyễn tomorrow regarding hypertension management.    3. Psoriasis.  - Continue using emery board for management.  - Continue Otezla.    4. Diabetes mellitus.  - Lantus 50 units daily.  - Lispro as needed, approximately once a week.  - Mounjaro 10 mg subcutaneously once a week.    5. Cholesterol management.  - Resume Lipitor 40 mg.  - Consider taking CoQ10 100 mg or 200 mg daily to mitigate side effects.    Follow-up  - Follow up in 2 weeks.       History of Present Illness     History of Present Illness  The patient is an 79-year-old female who presents for evaluation of chest congestion, hypertension, diabetes mellitus, and psoriasis.    Chest Congestion  - Nebulizer and Mucinex are essential for her respiratory function  - Experiencing chest congestion causing sleep disturbances due to difficulty breathing and persistent coughing  - Producing phlegm and taking promethazine DM every 8 hours to manage symptoms  - Has not yet started the Z-David treatment  - Using albuterol, administering 2 puffs approximately once a month, with varying frequency  - Took the nebulizer twice on Saturday  - Using Nasonex every 4 hours    Hypertension  - Discontinued blood pressure medication due to gastrointestinal side effects, including nausea and vomiting  - Scheduled appointment with Dr. Nguyễn, a nephrologist, tomorrow    Diabetes Mellitus  - Currently on Lantus 50 units daily and lispro 5, 10, or 15 units before meals as needed, which she reports using about once a  "week  - Taking Mounjaro 10 mg subcutaneously once a week    Psoriasis  - Managing psoriasis with an emery board  - Continues to use Otezla  - Discontinued the use of vitamin D supplements    Supplemental information: She has stopped taking Lipitor 40 mg as it was causing leg pain. She has CoQ10 100 mg at home but has not been taking it due to her current illness.    MEDICATIONS  Current: Albuterol, Mucinex, Lantus, lispro, Mounjaro, promethazine DM, Otezla.  Discontinued: Aspirin, atorvastatin, vitamin D, lisinopril, metoprolol succinate, nifedipine ER, potassium, Lipitor.     Review of Systems  Objective   /72 (BP Location: Left arm, Patient Position: Sitting, Cuff Size: Standard)   Pulse 73   Temp (!) 97.2 °F (36.2 °C) (Temporal)   Ht 5' 1\" (1.549 m)   SpO2 98%   BMI 38.62 kg/m²     Physical Exam  Lungs were auscultated.  Skin on the hypothenar eminence is dry and flaky.  Physical Exam  Constitutional:       General: She is in acute distress.      Appearance: Normal appearance. She is ill-appearing.   HENT:      Head: Normocephalic and atraumatic.      Nose: Congestion and rhinorrhea present.      Mouth/Throat:      Mouth: Mucous membranes are moist.   Eyes:      Pupils: Pupils are equal, round, and reactive to light.   Cardiovascular:      Rate and Rhythm: Normal rate and regular rhythm.   Pulmonary:      Effort: Pulmonary effort is normal.      Breath sounds: Rhonchi and rales present.   Abdominal:      General: Abdomen is flat.   Musculoskeletal:      Right lower leg: No edema.      Left lower leg: No edema.   Skin:     General: Skin is warm.      Coloration: Skin is pale.   Neurological:      General: No focal deficit present.      Mental Status: She is alert and oriented to person, place, and time. Mental status is at baseline.   Psychiatric:         Mood and Affect: Mood normal.         Behavior: Behavior normal.         Thought Content: Thought content normal.         Judgment: Judgment normal. "     Administrative Statements   I have spent a total time of 30 minutes in caring for this patient on the day of the visit/encounter including Diagnostic results, Prognosis, Risks and benefits of tx options, Instructions for management, Patient and family education, Importance of tx compliance, Risk factor reductions, Impressions, Counseling / Coordination of care, Documenting in the medical record, Reviewing/placing orders in the medical record (including tests, medications, and/or procedures), and Obtaining or reviewing history  .

## 2025-04-08 NOTE — ASSESSMENT & PLAN NOTE
Will see Dr. Davila tomorrow regarding her hypertension  Patient stopped almost all of her medication including Otezla aspirin atorvastatin vitamin D lisinopril metoprolol succinate nifedipine ER and potassium.  She is only taking Mounjaro 10 mg once weekly, Lantus 50 units daily and lispro 5-10 or 15 units AC depending--takes rarely

## 2025-04-08 NOTE — ASSESSMENT & PLAN NOTE
Lab Results   Component Value Date    HGBA1C 6.8 (H) 01/19/2025   Lantus 50U daily and Lispro as needed uses about once weekly she is

## 2025-04-08 NOTE — ASSESSMENT & PLAN NOTE
Patient stopped almost all of her medication including Otezla aspirin atorvastatin vitamin D lisinopril metoprolol succinate nifedipine ER and potassium.  She is only taking Mounjaro 10 mg once weekly, Lantus 50 units daily and lispro 5-10 or 15 units AC depending--takes rarely

## 2025-04-09 ENCOUNTER — OFFICE VISIT (OUTPATIENT)
Dept: NEPHROLOGY | Facility: CLINIC | Age: 80
End: 2025-04-09

## 2025-04-09 VITALS
HEART RATE: 88 BPM | BODY MASS INDEX: 38.89 KG/M2 | SYSTOLIC BLOOD PRESSURE: 170 MMHG | HEIGHT: 61 IN | DIASTOLIC BLOOD PRESSURE: 70 MMHG | WEIGHT: 206 LBS

## 2025-04-09 DIAGNOSIS — I1A.0 RESISTANT HYPERTENSION: Primary | ICD-10-CM

## 2025-04-09 RX ORDER — LABETALOL 100 MG/1
200 TABLET, FILM COATED ORAL 2 TIMES DAILY
Qty: 60 TABLET | Refills: 5 | Status: SHIPPED | OUTPATIENT
Start: 2025-04-09

## 2025-04-09 NOTE — PATIENT INSTRUCTIONS
You are here for follow-up your kidney function is normal so that is doing great the biggest issue seems to be your blood pressure is difficult to control.  You are presently taking no medication from reactions and side effects and intolerance so restarting with a clean [place].    We reviewed some of your reactions I am going to start you on a new medication that you have not been on it did look like you got it in the hospital in January as needed.    Start labetalol 200 mg twice a day  Any side effects or intolerances or reactions stop it and call me otherwise I will call you next week and see how it is doing and hopefully will see the blood pressure coming down.

## 2025-04-09 NOTE — PROGRESS NOTES
Name: Mar Elizondo      : 1945      MRN: [id number]  Encounter Provider: Aguila Davila MD  Encounter Date: 2025   Encounter department: [facility] [place]  :  Assessment & Plan  Resistant hypertension    I saw the patient sometime ago and at this point the biggest issue is her difficult to control hypertension.  It seems to be difficult as she has been placed on multiple medications and has had side effects or reactions to many and presently she is on no medications due to this.  When she was in the hospital she received some labetalol as needed did not have any reaction to it.  She had been on metoprolol but stopped that she was not sure if that was agreeing with her.  So at this point I frankly asked her and explained to her that she has high blood pressure and the increased risk of stroke heart disease and kidney disease could be present.  She is willing to try medication she just wants to make sure she tolerates it without side effects.    It seems that she has had intolerance to ACE inhibitors and ARB's as well as calcium channel blocker.    I am going to start labetalol 200 mg p.o. twice daily  I told her to call me if there is any problems or side effects from the medication and to stop it if that occurs  I will contact her in a week to see how her blood pressure is doing and then arrange follow-up.    Given her history of hypercholesterolemia and intra cerebral vascular disease and intolerance to statins that she says she is not taking the atorvastatin.  You could consider a trial of Repatha and I asked her to discuss this with you.    I feel it is best to start 1 medication at a time given her history of intolerance and reactions and hopefully we can find one that she tolerates to bring her blood pressure down to reduce risk of hypertensive complications.    Her electrolytes are normal and creatinine is 0.6 when last checked.    Orders:    labetalol (NORMODYNE) 100 mg  "tablet; Take 2 tablets (200 mg total) by mouth 2 (two) times a day    I have spent a total time of 35 minutes in caring for this patient on the day of the visit/encounter including Diagnostic results, Patient and family education, Impressions, Reviewing/placing orders in the medical record (including tests, medications, and/or procedures), and Obtaining or reviewing history  .     History of Present Illness   HPI  Mar Elizondo is a 79 y.o. female who presents for follow-up.  The patient states she has been doing okay except that her blood pressures been very difficult to control but she is cannot really take the medications and she does not tolerate them.  She said that she had some it made her nauseous others that caused itching so presently she is taking no hypertensive medications.  She is here for recommendations.  History obtained from: patient    Review of Systems   Constitutional:  Negative for chills, diaphoresis and fever.   HENT: Negative.     Eyes: Negative.    Respiratory:  Negative for cough, chest tightness, shortness of breath and wheezing.    Cardiovascular:  Negative for chest pain and leg swelling.   Gastrointestinal:  Negative for abdominal pain, diarrhea, nausea and vomiting.   Genitourinary: Negative.    Neurological:  Negative for dizziness and headaches.   Psychiatric/Behavioral:  Negative for agitation, behavioral problems and confusion.           Objective   /70 (BP Location: Right arm, Patient Position: Sitting, Cuff Size: Standard)   Pulse 88   Ht 5' 1\" (1.549 m)   Wt 93.4 kg (206 lb)   BMI 38.92 kg/m²      Physical Exam  Constitutional:       General: She is not in acute distress.     Appearance: She is not toxic-appearing.   HENT:      Head: Normocephalic and atraumatic.      Nose: Nose normal.      Mouth/Throat:      Mouth: Mucous membranes are dry.   Eyes:      Extraocular Movements: Extraocular movements intact.   Cardiovascular:      Rate and Rhythm: Normal rate and " regular rhythm.      Heart sounds:      No gallop.   Pulmonary:      Effort: Pulmonary effort is normal. No respiratory distress.      Breath sounds: No wheezing or rales.   Abdominal:      General: Bowel sounds are normal. There is no distension.      Palpations: Abdomen is soft.      Tenderness: There is no abdominal tenderness.   Neurological:      Mental Status: She is alert and oriented to person, place, and time.   Psychiatric:         Mood and Affect: Mood normal.         Behavior: Behavior normal.

## 2025-04-09 NOTE — ASSESSMENT & PLAN NOTE
I saw the patient sometime ago and at this point the biggest issue is her difficult to control hypertension.  It seems to be difficult as she has been placed on multiple medications and has had side effects or reactions to many and presently she is on no medications due to this.  When she was in the hospital she received some labetalol as needed did not have any reaction to it.  She had been on metoprolol but stopped that she was not sure if that was agreeing with her.  So at this point I frankly asked her and explained to her that she has high blood pressure and the increased risk of stroke heart disease and kidney disease could be present.  She is willing to try medication she just wants to make sure she tolerates it without side effects.    It seems that she has had intolerance to ACE inhibitors and ARB's as well as calcium channel blocker.    I am going to start labetalol 200 mg p.o. twice daily  I told her to call me if there is any problems or side effects from the medication and to stop it if that occurs  I will contact her in a week to see how her blood pressure is doing and then arrange follow-up.    Given her history of hypercholesterolemia and intra cerebral vascular disease and intolerance to statins that she says she is not taking the atorvastatin.  You could consider a trial of Repatha and I asked her to discuss this with you.    I feel it is best to start 1 medication at a time given her history of intolerance and reactions and hopefully we can find one that she tolerates to bring her blood pressure down to reduce risk of hypertensive complications.    Her electrolytes are normal and creatinine is 0.6 when last checked.    Orders:    labetalol (NORMODYNE) 100 mg tablet; Take 2 tablets (200 mg total) by mouth 2 (two) times a day

## 2025-04-09 NOTE — LETTER
2025     TAMIR Montejo DO  [address]  [address]  [place] [address]    Patient: Mar Elizondo   YOB: 1945   Date of Visit: 2025       Dear Dr. TAMIR Montejo DO:    Thank you for referring Mar Elizondo to me for evaluation. Below are my notes for this consultation.    If you have questions, please do not hesitate to call me. I look forward to following your patient along with you.         Sincerely,        Aguila Davila MD        CC: No Recipients    Aguila Davila MD  2025 11:38 AM  Sign when Signing Visit  Name: Mar Elizondo      : 1945      MRN: [id number]  Encounter Provider: Aguila Davila MD  Encounter Date: 2025   Encounter department: [facility] [place]  :  Assessment & Plan  Resistant hypertension    I saw the patient sometime ago and at this point the biggest issue is her difficult to control hypertension.  It seems to be difficult as she has been placed on multiple medications and has had side effects or reactions to many and presently she is on no medications due to this.  When she was in the hospital she received some labetalol as needed did not have any reaction to it.  She had been on metoprolol but stopped that she was not sure if that was agreeing with her.  So at this point I frankly asked her and explained to her that she has high blood pressure and the increased risk of stroke heart disease and kidney disease could be present.  She is willing to try medication she just wants to make sure she tolerates it without side effects.    It seems that she has had intolerance to ACE inhibitors and ARB's as well as calcium channel blocker.    I am going to start labetalol 200 mg p.o. twice daily  I told her to call me if there is any problems or side effects from the medication and to stop it if that occurs  I will contact her in a week to see how her blood pressure is doing and then arrange follow-up.    Given her history of  hypercholesterolemia and intra cerebral vascular disease and intolerance to statins that she says she is not taking the atorvastatin.  You could consider a trial of Repatha and I asked her to discuss this with you.    I feel it is best to start 1 medication at a time given her history of intolerance and reactions and hopefully we can find one that she tolerates to bring her blood pressure down to reduce risk of hypertensive complications.    Her electrolytes are normal and creatinine is 0.6 when last checked.    Orders:  •  labetalol (NORMODYNE) 100 mg tablet; Take 2 tablets (200 mg total) by mouth 2 (two) times a day    I have spent a total time of 35 minutes in caring for this patient on the day of the visit/encounter including Diagnostic results, Patient and family education, Impressions, Reviewing/placing orders in the medical record (including tests, medications, and/or procedures), and Obtaining or reviewing history  .     History of Present Illness  HPI  Mar Eliznodo is a 79 y.o. female who presents for follow-up.  The patient states she has been doing okay except that her blood pressures been very difficult to control but she is cannot really take the medications and she does not tolerate them.  She said that she had some it made her nauseous others that caused itching so presently she is taking no hypertensive medications.  She is here for recommendations.  History obtained from: patient    Review of Systems   Constitutional:  Negative for chills, diaphoresis and fever.   HENT: Negative.     Eyes: Negative.    Respiratory:  Negative for cough, chest tightness, shortness of breath and wheezing.    Cardiovascular:  Negative for chest pain and leg swelling.   Gastrointestinal:  Negative for abdominal pain, diarrhea, nausea and vomiting.   Genitourinary: Negative.    Neurological:  Negative for dizziness and headaches.   Psychiatric/Behavioral:  Negative for agitation, behavioral problems and confusion.   "         Objective  /70 (BP Location: Right arm, Patient Position: Sitting, Cuff Size: Standard)   Pulse 88   Ht 5' 1\" (1.549 m)   Wt 93.4 kg (206 lb)   BMI 38.92 kg/m²      Physical Exam  Constitutional:       General: She is not in acute distress.     Appearance: She is not toxic-appearing.   HENT:      Head: Normocephalic and atraumatic.      Nose: Nose normal.      Mouth/Throat:      Mouth: Mucous membranes are dry.   Eyes:      Extraocular Movements: Extraocular movements intact.   Cardiovascular:      Rate and Rhythm: Normal rate and regular rhythm.      Heart sounds:      No gallop.   Pulmonary:      Effort: Pulmonary effort is normal. No respiratory distress.      Breath sounds: No wheezing or rales.   Abdominal:      General: Bowel sounds are normal. There is no distension.      Palpations: Abdomen is soft.      Tenderness: There is no abdominal tenderness.   Neurological:      Mental Status: She is alert and oriented to person, place, and time.   Psychiatric:         Mood and Affect: Mood normal.         Behavior: Behavior normal.           "

## 2025-04-14 NOTE — TELEPHONE ENCOUNTER
PT called to let Dr Montejo know that she started feeling better after she was done with the zithromax, however, she stated that she is starting to feel sick again. She said she is starting to feel the same way before she started on the zithromax.    Please call PT per her request to advise.    ThankYou

## 2025-04-15 RX ORDER — AZITHROMYCIN 250 MG/1
TABLET, FILM COATED ORAL
Qty: 6 TABLET | Refills: 0 | Status: SHIPPED | OUTPATIENT
Start: 2025-04-15 | End: 2025-04-20

## 2025-04-15 NOTE — TELEPHONE ENCOUNTER
Patient called back after missing the call.  Advised of provider's message.  Patient expressed understanding and will wait for notification from the pharmacy when prescription is ready.

## 2025-04-21 ENCOUNTER — TELEPHONE (OUTPATIENT)
Age: 80
End: 2025-04-21

## 2025-04-21 DIAGNOSIS — Z79.899 ON LONG TERM DRUG THERAPY: ICD-10-CM

## 2025-04-21 DIAGNOSIS — Z13.89 SCREENING FOR CARDIOVASCULAR, RESPIRATORY, AND GENITOURINARY DISEASES: ICD-10-CM

## 2025-04-21 DIAGNOSIS — Z13.6 SCREENING FOR CARDIOVASCULAR, RESPIRATORY, AND GENITOURINARY DISEASES: ICD-10-CM

## 2025-04-21 DIAGNOSIS — Z13.83 SCREENING FOR CARDIOVASCULAR, RESPIRATORY, AND GENITOURINARY DISEASES: ICD-10-CM

## 2025-04-21 DIAGNOSIS — Z13.89 SCREENING FOR HEMATURIA OR PROTEINURIA: Primary | ICD-10-CM

## 2025-04-21 NOTE — TELEPHONE ENCOUNTER
Pt calling in wanting to get a message to Dr. Montejo. She wanted to let him know she finished her z-stephan (she was unsure when her last dose was) but this morning she woke up with a sore throat again, and is bringing up a thicker yellowish color mucus when she coughs.   Pt states she isn't having any new symptoms.     Please advise, thank you

## 2025-04-22 ENCOUNTER — RA CDI HCC (OUTPATIENT)
Dept: OTHER | Facility: HOSPITAL | Age: 80
End: 2025-04-22

## 2025-04-24 NOTE — TELEPHONE ENCOUNTER
Discussed w pt -- she had stopped taker her daily antihistamine but will restart and notify office next wk as to progress.

## 2025-04-28 ENCOUNTER — TELEPHONE (OUTPATIENT)
Age: 80
End: 2025-04-28

## 2025-04-28 ENCOUNTER — APPOINTMENT (OUTPATIENT)
Dept: LAB | Facility: HOSPITAL | Age: 80
End: 2025-04-28
Attending: FAMILY MEDICINE
Payer: COMMERCIAL

## 2025-04-28 DIAGNOSIS — Z13.89 SCREENING FOR CARDIOVASCULAR, RESPIRATORY, AND GENITOURINARY DISEASES: ICD-10-CM

## 2025-04-28 DIAGNOSIS — Z79.899 ON LONG TERM DRUG THERAPY: ICD-10-CM

## 2025-04-28 DIAGNOSIS — Z13.6 SCREENING FOR CARDIOVASCULAR, RESPIRATORY, AND GENITOURINARY DISEASES: ICD-10-CM

## 2025-04-28 DIAGNOSIS — Z13.83 SCREENING FOR CARDIOVASCULAR, RESPIRATORY, AND GENITOURINARY DISEASES: ICD-10-CM

## 2025-04-28 LAB
ALBUMIN SERPL BCG-MCNC: 4 G/DL (ref 3.5–5)
ALP SERPL-CCNC: 90 U/L (ref 34–104)
ALT SERPL W P-5'-P-CCNC: 20 U/L (ref 7–52)
ANION GAP SERPL CALCULATED.3IONS-SCNC: 7 MMOL/L (ref 4–13)
AST SERPL W P-5'-P-CCNC: 14 U/L (ref 13–39)
BILIRUB SERPL-MCNC: 0.44 MG/DL (ref 0.2–1)
BUN SERPL-MCNC: 14 MG/DL (ref 5–25)
CALCIUM SERPL-MCNC: 9.1 MG/DL (ref 8.4–10.2)
CHLORIDE SERPL-SCNC: 102 MMOL/L (ref 96–108)
CO2 SERPL-SCNC: 30 MMOL/L (ref 21–32)
CREAT SERPL-MCNC: 0.64 MG/DL (ref 0.6–1.3)
CREAT UR-MCNC: 115.5 MG/DL
EST. AVERAGE GLUCOSE BLD GHB EST-MCNC: 148 MG/DL
GFR SERPL CREATININE-BSD FRML MDRD: 83 ML/MIN/1.73SQ M
GLUCOSE P FAST SERPL-MCNC: 140 MG/DL (ref 65–99)
HBA1C MFR BLD: 6.8 %
MICROALBUMIN UR-MCNC: 234.3 MG/L
MICROALBUMIN/CREAT 24H UR: 203 MG/G CREATININE (ref 0–30)
POTASSIUM SERPL-SCNC: 4.1 MMOL/L (ref 3.5–5.3)
PROT SERPL-MCNC: 6.2 G/DL (ref 6.4–8.4)
SODIUM SERPL-SCNC: 139 MMOL/L (ref 135–147)

## 2025-04-28 PROCEDURE — 83036 HEMOGLOBIN GLYCOSYLATED A1C: CPT

## 2025-04-28 PROCEDURE — 80053 COMPREHEN METABOLIC PANEL: CPT

## 2025-04-28 PROCEDURE — 82043 UR ALBUMIN QUANTITATIVE: CPT

## 2025-04-28 PROCEDURE — 36415 COLL VENOUS BLD VENIPUNCTURE: CPT

## 2025-04-28 PROCEDURE — 82570 ASSAY OF URINE CREATININE: CPT

## 2025-04-29 ENCOUNTER — OFFICE VISIT (OUTPATIENT)
Dept: FAMILY MEDICINE CLINIC | Facility: CLINIC | Age: 80
End: 2025-04-29
Payer: COMMERCIAL

## 2025-04-29 VITALS
HEART RATE: 67 BPM | SYSTOLIC BLOOD PRESSURE: 152 MMHG | DIASTOLIC BLOOD PRESSURE: 80 MMHG | OXYGEN SATURATION: 97 % | WEIGHT: 205.6 LBS | HEIGHT: 61 IN | TEMPERATURE: 98.1 F | BODY MASS INDEX: 38.82 KG/M2

## 2025-04-29 DIAGNOSIS — J44.1 COPD EXACERBATION (HCC): ICD-10-CM

## 2025-04-29 DIAGNOSIS — L40.9 PSORIASIS: ICD-10-CM

## 2025-04-29 DIAGNOSIS — I1A.0 RESISTANT HYPERTENSION: Primary | ICD-10-CM

## 2025-04-29 DIAGNOSIS — Z79.4 TYPE 2 DIABETES MELLITUS WITH OTHER SPECIFIED COMPLICATION, WITH LONG-TERM CURRENT USE OF INSULIN (HCC): ICD-10-CM

## 2025-04-29 DIAGNOSIS — E11.69 TYPE 2 DIABETES MELLITUS WITH OTHER SPECIFIED COMPLICATION, WITH LONG-TERM CURRENT USE OF INSULIN (HCC): ICD-10-CM

## 2025-04-29 PROCEDURE — G2211 COMPLEX E/M VISIT ADD ON: HCPCS | Performed by: FAMILY MEDICINE

## 2025-04-29 PROCEDURE — 99214 OFFICE O/P EST MOD 30 MIN: CPT | Performed by: FAMILY MEDICINE

## 2025-04-29 NOTE — PROGRESS NOTES
Name: Mar Elizondo      : 1945      MRN: [id number]  Encounter Provider: TAMIR Montejo DO  Encounter Date: 2025   Encounter department: [facility]    :  Assessment & Plan  Resistant hypertension   Saw Dr. Davila recently and he added Rx labetalol 200 mg BID, but she is taking 100 min BID and BP now 152/80       COPD exacerbation (HCC)  No smoking, quit yrs ago       Type 2 diabetes mellitus with other specified complication, with long-term current use of insulin (Formerly [facility])    Lab Results   Component Value Date    HGBA1C 6.8 (H) 2025   A1c 6.8 yesterday compared to 6.8 in January and 6.6 last .  She will restart the Mounjaro and hopefully 10 mg.  Last year she reports A1c was 11.2 and Mounjaro was brought that down         Psoriasis           Assessment & Plan  1. Diabetes Mellitus: Stable. A1c 6.8 on 10/03/2023.  - Monitor blood sugar levels closely.  - Adjust insulin dosage to avoid hypoglycemia.  - Continue Mounjaro regimen, starting with 7.5 mg and increasing to 10 mg.  - Reduce insulin intake by half if blood sugar levels drop too low.    2. Hypertension: Stable.  - Resume labetalol 100 mg twice daily.  - If vomiting occurs, reduce dosage to 100 mg once daily and gradually increase back to twice daily.  - Blood pressure goal is <140/90 to reduce risk of stroke and heart attack.    3. Weight Management: Recent weight gain, now weighing 207 pounds.  - Continue Mounjaro regimen for weight reduction.  - Maintain a balanced diet and regular exercise routine.  - Eat smaller portions and avoid overeating to manage nausea and vomiting.    4. Psoriasis: Monitor symptoms.  - Consider using moisturizers for dry skin.    Follow-up  - Follow-up appointment scheduled.        Depression Screening and Follow-up Plan: Patient was screened for depression during today's encounter. They screened negative with a PHQ-2 score of 0.          History of Present Illness     History of Present  Illness  The patient presents for evaluation of diabetes, hypertension, and weight management.    Diabetes  - Management includes the use of Mounjaro, which she plans to resume on Friday.  - No current symptoms of pruritus or xerostomia are reported.  - Blood glucose levels are well-controlled, with readings of 60 upon waking and 90 at lunchtime.  - A hypoglycemic episode with a reading of 40 occurred one day.  - Hemoglobin A1c level was recorded as 6.8 three months ago.  - Lantus dosage has been reduced from 50 units to 40 units without significant changes observed.  - Lispro insulin is administered at night but is not required during breakfast or lunch.    Hypertension  - Management includes labetalol, which was discontinued due to adverse effects such as leg soreness and back pain.  - Prescribed labetalol 200 mg twice daily is tolerated only once in the morning and once at night.  - Labetalol has not been taken for the past week.    Weight Management  - Despite attempts at weight loss, weight gain has been experienced, currently weighing 207 pounds.  - A healthy diet is maintained, including chicken, broccoli, roast beef, [place] sprouts, scrambled eggs, new potatoes, and a daily banana.  - Salads are avoided due to their tendency to cause diarrhea.    Supplemental information: Episodes of vomiting are attributed to overeating, and no medication is taken for stomach upset. A history of oily skin is noted.    SOCIAL HISTORY  She quit smoking years ago.     Review of Systems   Constitutional:  Negative for chills and fever.   HENT:  Negative for ear pain and sore throat.    Eyes:  Negative for pain and visual disturbance.   Respiratory:  Negative for cough and shortness of breath.    Cardiovascular:  Negative for chest pain and palpitations.   Gastrointestinal:  Negative for abdominal pain and vomiting.   Genitourinary:  Negative for dysuria and hematuria.   Musculoskeletal:  Negative for arthralgias and back  "pain.   Skin:  Negative for color change and rash.   Neurological:  Negative for seizures and syncope.   All other systems reviewed and are negative.    Objective   /80 (BP Location: Left arm, Patient Position: Sitting, Cuff Size: Standard)   Pulse 67   Temp 98.1 °F (36.7 °C) (Temporal)   Ht 5' 1\" (1.549 m)   Wt 93.3 kg (205 lb 9.6 oz)   SpO2 97%   BMI 38.85 kg/m²     Physical Exam  Respiratory: Clear to auscultation, no wheezing, rales or rhonchi  Physical Exam  Vitals and nursing note reviewed.   Constitutional:       General: She is not in acute distress.     Appearance: She is well-developed.   HENT:      Head: Normocephalic and atraumatic.   Eyes:      Conjunctiva/sclera: Conjunctivae normal.   Cardiovascular:      Rate and Rhythm: Normal rate and regular rhythm.      Heart sounds: No murmur heard.  Pulmonary:      Effort: Pulmonary effort is normal. No respiratory distress.      Breath sounds: Normal breath sounds.   Abdominal:      Palpations: Abdomen is soft.      Tenderness: There is no abdominal tenderness.   Musculoskeletal:         General: No swelling.      Cervical back: Neck supple.   Skin:     General: Skin is warm and dry.      Capillary Refill: Capillary refill takes less than 2 seconds.   Neurological:      Mental Status: She is alert.   Psychiatric:         Mood and Affect: Mood normal.       Administrative Statements   I have spent a total time of 30 minutes in caring for this patient on the day of the visit/encounter including Diagnostic results, Prognosis, Risks and benefits of tx options, Instructions for management, Patient and family education, Importance of tx compliance, Risk factor reductions, Impressions, Counseling / Coordination of care, Documenting in the medical record, Reviewing/placing orders in the medical record (including tests, medications, and/or procedures), and Obtaining or reviewing history  .  "

## 2025-04-29 NOTE — ASSESSMENT & PLAN NOTE
Lab Results   Component Value Date    HGBA1C 6.8 (H) 04/28/2025   A1c 6.8 yesterday compared to 6.8 in January and 6.6 last June.  She will restart the Mounjaro and hopefully 10 mg.  Last year she reports A1c was 11.2 and Mounjaro was brought that down

## 2025-04-29 NOTE — ASSESSMENT & PLAN NOTE
Saw Dr. Davila recently and he added Rx labetalol 200 mg BID, but she is taking 100 min BID and BP now 152/80

## 2025-05-21 ENCOUNTER — TELEPHONE (OUTPATIENT)
Dept: FAMILY MEDICINE CLINIC | Facility: CLINIC | Age: 80
End: 2025-05-21

## 2025-05-21 NOTE — TELEPHONE ENCOUNTER
Called and spoke to pt and informed her that her appointment for JULY 14 has been CANCELLED ,, pt stated that she is aware and she stated that she is not home at this point and will call back to reschedule

## 2025-07-23 ENCOUNTER — OFFICE VISIT (OUTPATIENT)
Dept: CARDIOLOGY CLINIC | Facility: CLINIC | Age: 80
End: 2025-07-23
Payer: COMMERCIAL

## 2025-07-23 VITALS
SYSTOLIC BLOOD PRESSURE: 136 MMHG | OXYGEN SATURATION: 97 % | DIASTOLIC BLOOD PRESSURE: 50 MMHG | HEART RATE: 61 BPM | BODY MASS INDEX: 39.68 KG/M2 | WEIGHT: 210 LBS

## 2025-07-23 DIAGNOSIS — T46.6X5A MYALGIA DUE TO STATIN: ICD-10-CM

## 2025-07-23 DIAGNOSIS — E78.2 MIXED HYPERLIPIDEMIA: Primary | ICD-10-CM

## 2025-07-23 DIAGNOSIS — M79.10 MYALGIA DUE TO STATIN: ICD-10-CM

## 2025-07-23 DIAGNOSIS — I10 PRIMARY HYPERTENSION: ICD-10-CM

## 2025-07-23 DIAGNOSIS — E78.2 MIXED HYPERLIPIDEMIA: ICD-10-CM

## 2025-07-23 PROCEDURE — 99214 OFFICE O/P EST MOD 30 MIN: CPT | Performed by: STUDENT IN AN ORGANIZED HEALTH CARE EDUCATION/TRAINING PROGRAM

## 2025-07-23 RX ORDER — EZETIMIBE 10 MG/1
10 TABLET ORAL DAILY
Qty: 30 TABLET | Refills: 5 | Status: SHIPPED | OUTPATIENT
Start: 2025-07-23

## 2025-07-23 NOTE — ASSESSMENT & PLAN NOTE
Stable.  With documented allergy to lisinopril, losartan, nifedipine, valsartan  Patient's blood pressure was previously well-controlled on losartan/HCTZ 100/25 mg daily, diltiazem 360 mg daily.  Stopped due to side effects. She does have a component of whitecoat hypertension. She checks her blood pressures daily at home and states the systolic blood pressures are usually in the 130s or below.  Continue lisinopril 40 mg daily and nifedipine 30 mg bid  I discussed that my next recommendation would be to start spironolactone if needed for further blood pressure control  Encourage home BP monitoring

## 2025-07-23 NOTE — PROGRESS NOTES
[facility] [place]  [address] [facility] [facility] [address]    [facility] [facility] [address]  Phone#  [phone]  Fax#  [phone]  [facility]'s Cardiology Office Follow-up Visit             NAME: Mar Elizondo  AGE: 80 y.o. SEX: female   : 1945   MRN: [id number]    DATE: 2025  TIME: 12:42 PM      Assessment & Plan  Mixed hyperlipidemia  Myalgia due to statin  Stable.  Not at goal.  2025 cholesterol 233, triglycerides 143, HDL 38,   Refuses statin medications.    Discussed dietary changes   Will start ezetimibe 10 mg daily, and reassess lipid panel in 2 months  Primary hypertension  Stable.  With documented allergy to lisinopril, losartan, nifedipine, valsartan  Patient's blood pressure was previously well-controlled on losartan/HCTZ 100/25 mg daily, diltiazem 360 mg daily.  Stopped due to side effects. She does have a component of whitecoat hypertension. She checks her blood pressures daily at home and states the systolic blood pressures are usually in the 130s or below.  Continue lisinopril 40 mg daily and nifedipine 30 mg bid  I discussed that my next recommendation would be to start spironolactone if needed for further blood pressure control  Encourage home BP monitoring      Follow up as needed  Patient reports she sees another physician for hypertension, and does not feel it's necessary to follow up regularly with cardiology    -----    Chief Complaint   Patient presents with    Follow-up    Shortness of Breath     SOB sometimes        HPI:    Mar Elizondo is a 80 y.o.-year-old female who presents to the cardiology clinic for follow up for the above-listed problems.     She has a past medical history of Acute left-sided low back pain with left-sided sciatica (2019), Asthma, Benign essential hypertension, Chronic lower back pain, Controlled type 2 diabetes mellitus, with long-term current use of insulin ([facility]) (2019), COPD (chronic obstructive pulmonary disease) ([facility]),  Diabetes mellitus (HCC), Hyperlipidemia, Need for vaccination (10/13/2020), and Sciatica.     PMH: She initially presented to me 8/27/24 for an emergency room follow-up as well as recommendations regarding blood pressure control.  She was in the emergency room at the beginning of July for an episode of vertigo.  Troponins were checked at that time that were negative.  Her ECG showed normal sinus rhythm, normal.  She denied any chest pain, chest discomfort, shortness of breath, or dyspnea exertion at that time.     Patient has had history of difficulty controlling blood pressures.  Is currently on losartan/HCTZ 100/25 mg daily and diltiazem 360 mg daily.  Has had issues with amlodipine in the past, and reported leg pain while taking it.  Blood pressure was 142/78 at the beginning of the appointment.  After resting for approximately 10 minutes, her blood pressure decreased to 132/68.  She reports a history of whitecoat hypertension as well as hypertension.  Blood pressures remain moderately well-controlled at home per patient report.  Reports systolics usually in the 120s-130s, but occasionally higher    136/50 today.  Her antihypertensive medication regimen was changed additional medication intolerances.  Per chart review, she was hospitalized in January of this year for syncope that was attributed to orthostatic hypotension.  She is currently on nifedipine 30 mg twice daily as well as lisinopril 40 mg daily.  She reports her blood pressures are well-controlled at home on this.    She denies chest pain, chest discomfort, shortness of breath at rest, dyspnea on exertion.  She reports her main concern is back and leg pain while walking which she attributes to her known spinal stenosis.    ------    I personally reviewed the patient's pertinent cardiac imaging and labs in [facility]:    4/28/25 hemoglobin A1c 6.8%  1/23/2025 magnesium low at 1.7  1/3/2025 CMP normal  1/3/2025 CBC normal  1/20/2025 cholesterol 233,  triglycerides 143, HDL 38,     1/20/2025 echocardiogram showed LVEF of 70% with mild diastolic dysfunction, moderate MAC, otherwise normal    -----    Past history, family history, social history, current medications, vital signs, recent lab and imaging studies and  prior cardiology studies reviewed independently on this visit.    Allergies[1]  Current Medications[2]    Review of Systems   Constitutional:  Negative for fatigue.   Respiratory:  Negative for chest tightness and shortness of breath.    Cardiovascular:  Negative for chest pain, palpitations and leg swelling.   Musculoskeletal:  Positive for back pain.   Neurological:  Negative for dizziness and light-headedness.         Objective:     Vitals:    07/23/25 0908   BP: 136/50   Pulse: 61   SpO2: 97%     Wt Readings from Last 3 Encounters:   07/23/25 95.3 kg (210 lb)   04/29/25 93.3 kg (205 lb 9.6 oz)   04/09/25 93.4 kg (206 lb)     Pulse Readings from Last 3 Encounters:   07/23/25 61   04/29/25 67   04/09/25 88     BP Readings from Last 3 Encounters:   07/23/25 136/50   04/29/25 152/80   04/09/25 170/70     Physical Exam  Vitals reviewed.   Constitutional:       General: She is not in acute distress.     Appearance: She is well-developed.   HENT:      Head: Normocephalic and atraumatic.     Eyes:      Conjunctiva/sclera: Conjunctivae normal.       Cardiovascular:      Rate and Rhythm: Normal rate and regular rhythm.      Heart sounds: No murmur heard.  Pulmonary:      Effort: Pulmonary effort is normal. No respiratory distress.      Breath sounds: Normal breath sounds.     Musculoskeletal:         General: No swelling.     Neurological:      Mental Status: She is alert.           Pertinent Laboratory/Diagnostic Studies:    Laboratory studies reviewed personally by Venessa Mccullough MD    BMP:   Lab Results   Component Value Date    SODIUM 139 04/28/2025    K 4.1 04/28/2025     04/28/2025    CO2 30 04/28/2025    BUN 14 04/28/2025    CREATININE 0.64  "04/28/2025    GLUC 132 01/23/2025    CALCIUM 9.1 04/28/2025     CBC:  Lab Results   Component Value Date    WBC 8.28 01/23/2025    HGB 13.9 01/23/2025    HCT 43.0 01/23/2025    MCV 89 01/23/2025     01/23/2025     Lipid Profile:   Lab Results   Component Value Date    HDL 38 (L) 01/20/2025     Lab Results   Component Value Date    LDLCALC 166 (H) 01/20/2025     Lab Results   Component Value Date    TRIG 143 01/20/2025      Other labs:  Lab Results   Component Value Date    QKD3KASFOQWE 6.428 (H) 01/20/2025     Lab Results   Component Value Date    ALT 20 04/28/2025    AST 14 04/28/2025     Lab Results   Component Value Date    HGBA1C 6.8 (H) 04/28/2025         Imaging Studies:     Pertinent imaging studies and cardiac studies were independently reviewed on this visit and findings summarized.    Venessa Mccullough MD, PhD    Portions of the record may have been created with voice recognition software.  Occasional wrong word or \"sound alike\" substitutions may have occurred due to the inherent limitations of voice recognition software.  Read the chart carefully and recognize, using context, where substitutions have occurred. Please reach out to me directly for any clarifications.          [1]   Allergies  Allergen Reactions    Lisinopril Itching     Vomiting, dry mouth    Losartan Vomiting    Nifedipine GI Intolerance and Vomiting    Valsartan Tongue Swelling   [2]   Current Outpatient Medications:     albuterol (PROVENTIL HFA,VENTOLIN HFA) 90 mcg/act inhaler, Inhale 2 puffs 4 (four) times a day, Disp: 18 g, Rfl: 1    Apremilast (Otezla) 30 MG TABS, Take 1 tablet by mouth 2 (two) times a day, Disp: 180 tablet, Rfl: 1    aspirin 81 mg chewable tablet, Chew 1 tablet (81 mg total) daily, Disp: 30 tablet, Rfl: 0    Continuous Blood Gluc  (FreeStyle Dejuan 14 Day [place]) THERESA, Check BG's 2-4x a day depending on sugar levels, Disp: 1 each, Rfl: 0    Continuous Blood Gluc Sensor (FreeStyle Dejuan 14 Day Sensor) " MISC, Check sugars 2-4x daily depending on sugar levels, Disp: 28 each, Rfl: 6    ezetimibe (ZETIA) 10 mg tablet, Take 1 tablet (10 mg total) by mouth daily, Disp: 30 tablet, Rfl: 5    Insulin Glargine Solostar (Lantus SoloStar) 100 UNIT/ML SOPN, INJECT SUBCUTANEOUSLY 50 UNITS  DAILY, Disp: 45 mL, Rfl: 1    insulin lispro (HumaLOG KwikPen) 100 units/mL injection pen, INJECT SUBCUTANEOUSLY 5  UNITS BEFORE BREAKFAST 10  UNITS BEFORE LUNCH AND 15  UNITS BEFORE SUPPER, Disp: 30 mL, Rfl: 3    ipratropium-albuterol (DUO-NEB) 0.5-2.5 mg/3 mL nebulizer solution, Take 3 mL by nebulization 4 (four) times a day, Disp: 180 mL, Rfl: 1    Mounjaro 10 MG/0.5ML SOAJ, Inject 10 mg under the skin once a week, Disp: 6 mL, Rfl: 0    NIFEdipine ER (ADALAT CC) 30 MG 24 hr tablet, Take 1 tablet (30 mg total) by mouth 2 (two) times a day, Disp: 60 tablet, Rfl: 3    potassium chloride (K-DUR,KLOR-CON) 20 mEq tablet, TAKE 1 TABLET BY MOUTH TWICE  DAILY, Disp: 180 tablet, Rfl: 3    lisinopril (ZESTRIL) 40 mg tablet, Take 1 tablet (40 mg total) by mouth daily, Disp: 30 tablet, Rfl: 3    OneTouch Ultra test strip, TEST 2-3 TIMES DAILY (Patient not taking: No sig reported), Disp: 100 strip, Rfl: 3    Current Facility-Administered Medications:     cyanocobalamin injection 1,000 mcg, 1,000 mcg, Intramuscular, Q30 Days, TAMIR Montejo DO, 1,000 mcg at 08/08/24 0902

## 2025-07-23 NOTE — ASSESSMENT & PLAN NOTE
Stable.  Not at goal.  1/20/2025 cholesterol 233, triglycerides 143, HDL 38,   Refuses statin medications.    Discussed dietary changes   Will start ezetimibe 10 mg daily, and reassess lipid panel in 2 months

## 2025-07-25 RX ORDER — EZETIMIBE 10 MG/1
10 TABLET ORAL DAILY
Qty: 90 TABLET | OUTPATIENT
Start: 2025-07-25

## 2025-08-12 ENCOUNTER — OFFICE VISIT (OUTPATIENT)
Dept: FAMILY MEDICINE CLINIC | Facility: CLINIC | Age: 80
End: 2025-08-12
Payer: COMMERCIAL